# Patient Record
Sex: MALE | Race: WHITE | NOT HISPANIC OR LATINO | Employment: OTHER | URBAN - METROPOLITAN AREA
[De-identification: names, ages, dates, MRNs, and addresses within clinical notes are randomized per-mention and may not be internally consistent; named-entity substitution may affect disease eponyms.]

---

## 2017-01-30 ENCOUNTER — TELEPHONE (OUTPATIENT)
Dept: INTERNAL MEDICINE | Facility: CLINIC | Age: 67
End: 2017-01-30

## 2017-01-30 NOTE — TELEPHONE ENCOUNTER
Panel Management Review      Patient has the following on his problem list:       IVD   ASA:     Last LDL:    CHOL      195   12/1/2015  HDL       38   12/1/2015  LDL      118   12/1/2015  TRIG      195   12/1/2015   CHOLHDLRATIO      6.4   10/6/2015     Is the patient on a Statin? NO   Is the patient on Aspirin? NO                    Last three blood pressure readings:  BP Readings from Last 3 Encounters:   11/18/16 146/82   10/17/16 152/87   09/10/16 144/67        Tobacco History:     History   Smoking status     Never Smoker    Smokeless tobacco     Not on file         Hypertension   Last three blood pressure readings:  BP Readings from Last 3 Encounters:   11/18/16 146/82   10/17/16 152/87   09/10/16 144/67     Blood pressure: Failed    HTN Guidelines:  Age 18-59 BP range:  Less than 140/90  Age 60-85 with Diabetes:  Less than 140/90  Age 60-85 without Diabetes:  less than 150/90      Composite cancer screening  Chart review shows that this patient is due/due soon for the following None  Summary:    Patient is due/failing the following:   LDL and STATIN    Action needed:   Patient needs office visit for as above.    Type of outreach:    Phone, spoke to patient.  Declines follow up on BP and cholesterol. Plans to work on diet. Px 4-3-2017.    Questions for provider review:    None                                                                                                                                    LEE Nguyen LPN       Chart routed to none.

## 2017-02-13 ENCOUNTER — OFFICE VISIT (OUTPATIENT)
Dept: NEUROSURGERY | Facility: CLINIC | Age: 67
End: 2017-02-13
Attending: NURSE PRACTITIONER
Payer: COMMERCIAL

## 2017-02-13 ENCOUNTER — RADIANT APPOINTMENT (OUTPATIENT)
Dept: GENERAL RADIOLOGY | Facility: CLINIC | Age: 67
End: 2017-02-13
Attending: NURSE PRACTITIONER
Payer: COMMERCIAL

## 2017-02-13 VITALS
BODY MASS INDEX: 31.08 KG/M2 | SYSTOLIC BLOOD PRESSURE: 141 MMHG | HEIGHT: 67 IN | DIASTOLIC BLOOD PRESSURE: 85 MMHG | TEMPERATURE: 97.9 F | HEART RATE: 62 BPM | OXYGEN SATURATION: 96 % | WEIGHT: 198 LBS

## 2017-02-13 DIAGNOSIS — Z98.1 STATUS POST CERVICAL SPINAL FUSION: Primary | ICD-10-CM

## 2017-02-13 DIAGNOSIS — Z98.1 STATUS POST CERVICAL SPINAL FUSION: ICD-10-CM

## 2017-02-13 PROCEDURE — 72040 X-RAY EXAM NECK SPINE 2-3 VW: CPT

## 2017-02-13 PROCEDURE — 99211 OFF/OP EST MAY X REQ PHY/QHP: CPT | Performed by: NURSE PRACTITIONER

## 2017-02-13 PROCEDURE — 99214 OFFICE O/P EST MOD 30 MIN: CPT | Performed by: NURSE PRACTITIONER

## 2017-02-13 ASSESSMENT — PAIN SCALES - GENERAL: PAINLEVEL: MILD PAIN (3)

## 2017-02-13 NOTE — PROGRESS NOTES
[facility]  Neurosurgery followup:    HPI:  Mr. Vera is a 66 year old female who returns about 6 months post C6-7 ACDF.  He is doing well at this time. He only notes increased headaches with ROM to the neck and some numbness and tingling.  He states that he is very happy with the results of the surgery.         Exam:  Constitutional:  Alert, well nourished, NAD.  HEENT: Normocephalic, atraumatic.   Pulm:  Without shortness of breath   CV:  No pitting edema of BLE.     Neurological:  Awake  Alert  Oriented x 3  Motor exam:     Shoulder Abduction:  Right:  5/5    Left:  5/5  Biceps:                      Right:  5/5    Left:  5/5  Triceps:                     Right:  5/5    Left:  5/5  Wrist Extensors:       Right:  5/5    Left:  5/5  Wrist Flexors:           Right:  5/5    Left:  5/5  Intrinsics:                  Right:  5/5    Left:  5/5     Able to spontaneously move U/E bilaterally  Sensation intact throughout all U/E dermatomes  Incisions: cervical incision healed  Imaging: cervical fusion intact per xray       A/P: Mr. Vera is a 66 year old female who returns about 6 months post C6-7 ACDF.  He is doing well at this time. He only notes increased headaches with ROM to the neck and some numbness and tingling.  He states that he is very happy with the results of the surgery. He recently he fell again on the ice.  He did so prior to his last visit.  He states that he seems to slip on the ice. He was educated about proper body mechanics.  He is ready to restart to PT as well.            Patient Instructions   Plan:  - Continue activity using pain as your guide  - followup in 6 months with xray prior   - Please schedule your physical therapy.    - Call the clinic at [phone] for increased pain or any other questions and concerns.       Yvonne Li [facility]  [facility]  [address]  [address] [place] [id number]    Tel [phone]  Pager  [phone]

## 2017-02-13 NOTE — PATIENT INSTRUCTIONS
Plan:  - Continue activity using pain as your guide  - followup in 6 months with xray prior   - Please schedule your physical therapy.    - Call the clinic at [phone] for increased pain or any other questions and concerns.

## 2017-02-13 NOTE — MR AVS SNAPSHOT
After Visit Summary   2/13/2017    Harpreet Vera    MRN: [id number]           Patient Information     Date Of Birth          1950        Visit Information        Provider Department      2/13/2017 1:00 PM Yvonne Li APRN CNP [facility]        Today's Diagnoses     Status post cervical spinal fusion    -  1      Care Instructions    Plan:  - Continue activity using pain as your guide  - followup in 6 months with xray prior   - Please schedule your physical therapy.    - Call the clinic at [phone] for increased pain or any other questions and concerns.        Follow-ups after your visit        Additional Services     ERMA PT, HAND, AND CHIROPRACTIC REFERRAL       **This order will print in the [facility] Scheduling Office**    Physical Therapy, Hand Therapy and Chiropractic Care are available through:    *[facility]  *[facility]  *[facility]    Call one number to schedule at any of the above locations: [phone].    Your provider has referred you to: Physical Therapy at [facility] or [facility]    Indication/Reason for Referral: neck and arm pain   Onset of Illness: chronic  Therapy Orders: Evaluate and Treat  Special Programs: None  Special Request: None    Robinson Bowers      Additional Comments for the Therapist or Chiropractor:         Please be aware that coverage of these services is subject to the terms and limitations of your health insurance plan.  Call member services at your health plan with any benefit or coverage questions.      Please bring the following to your appointment:    *Your personal calendar for scheduling future appointments  *Comfortable clothing                  Your next 10 appointments already scheduled     Feb 13, 2017  1:00 PM CST   Return Visit with SALVATORE Hudson CNP   [facility] ([facility])    [address]  [address]  [facility] [id number] [phone]            Apr 03, 2017  8:00 AM CDT   [facility]YS[facility] Lucía Sandhu MD   [facility]    [address]  [facility] [id number] [phone]              Future tests that were ordered for you today     Open Future Orders        Priority Expected Expires Ordered    XR Cervical Spine 2/3 Views Routine 8/13/2017 2/13/2018 2/13/2017            Who to contact     If you have questions or need follow up information about today's clinic visit or your schedule please contact [facility] directly at [phone].  Normal or non-critical lab and imaging results will be communicated to you by [employer], letter or phone within 4 business days after the clinic has received the results. If you do not hear from us within 7 days, please contact the clinic through [employer] or phone. If you have a critical or abnormal lab result, we will notify you by phone as soon as possible.  Submit refill requests through [employer] or call your pharmacy and they will forward the refill request to us. Please allow 3 business days for your refill to be completed.          Additional Information About Your Visit        [employer] Information     [email or web address] gives you secure access to your electronic health record. If you see a primary care provider, you can also send messages to your care team and make appointments. If you have questions, please call your primary care clinic.  If you do not have a primary care provider, please call [phone] and they will assist you.        Care EveryWhere ID     This is your Care EveryWhere ID. This could be used by other organizations to access your [facility] medical records  [id number]        Your Vitals Were     Pulse Temperature Height Pulse Oximetry BMI (Body Mass Index)       62 97.9  F (36.6  C) (Oral) 5' 7\" (1.702 m) 96% 31.01 kg/m2        Blood Pressure from Last 3 Encounters:   02/13/17 " 141/85   11/18/16 146/82   10/17/16 152/87    Weight from Last 3 Encounters:   02/13/17 198 lb (89.8 kg)   11/18/16 198 lb 3.2 oz (89.9 kg)   10/17/16 195 lb 12.8 oz (88.8 kg)              We Performed the Following     ERMA PT, HAND, AND CHIROPRACTIC REFERRAL        Primary Care Provider Office Phone # Fax #    Lucía Sandhu [phone]       [facility] [address]  [facility] [address]        Thank you!     Thank you for choosing [facility]  for your care. Our goal is always to provide you with excellent care. Hearing back from our patients is one way we can continue to improve our services. Please take a few minutes to complete the written survey that you may receive in the mail after your visit with us. Thank you!             Your Updated Medication List - Protect others around you: Learn how to safely use, store and throw away your medicines at www.disposemymeds.org.          This list is accurate as of: 2/13/17 12:33 PM.  Always use your most recent med list.                   Brand Name Dispense Instructions for use    atenolol 25 MG tablet    TENORMIN    90 tablet    Take 1 tablet (25 mg) by mouth daily       AVODART 0.5 MG capsule   Generic drug:  dutasteride     30    1 CAPSULE DAILY       loratadine 10 MG tablet    CLARITIN     Take 10 mg by mouth daily       losartan 100 MG tablet    COZAAR    90 tablet    Take 1 tablet (100 mg) by mouth daily       Misc Intestinal Kaylin Regulat Tabs      Take 1 tablet by mouth daily       mometasone 50 MCG/ACT spray    NASONEX    3 Box    Spray 2 sprays into both nostrils daily       order for DME     1 Device    Bi pap sleep apnea 2003       TYLENOL PO      Take 500 mg by mouth 2 times daily as needed for mild pain or fever       VITAMIN D (CHOLECALCIFEROL) PO      Take 1,000 Units by mouth daily

## 2017-02-13 NOTE — NURSING NOTE
"Harpreet Vera is a 66 year old male who presents for:  Chief Complaint   Patient presents with     Neurologic Problem     6 month follow up, status post cervical spinal fusion DOS 09/09/16 JM        Initial Vitals:  /85  Pulse 62  Temp 97.9  F (36.6  C) (Oral)  Ht 5' 7\" (1.702 m)  Wt 198 lb (89.8 kg)  SpO2 96%  BMI 31.01 kg/m2 Estimated body mass index is 31.01 kg/(m^2) as calculated from the following:    Height as of this encounter: 5' 7\" (1.702 m).    Weight as of this encounter: 198 lb (89.8 kg).. Body surface area is 2.06 meters squared. BP completed using cuff size: large  Mild Pain (3)    Do you feel safe in your environment?  Yes  Do you need any refills today? No    Nursing Comments: 6 month follow up, status post cervical spinal fusion DOS 09/09/16.  Patient rates his pain today as 3      5 min. nursing intake time  Violet [place] MA       Discharge plan: - Continue activity using pain as your guide  - followup in 6 months with xray prior   - Please schedule your physical therapy.    - Call the clinic at [phone] for increased pain or any other questions and concerns.  2 min. nursing discharge time  Violet Chester MA        "

## 2017-02-23 ENCOUNTER — THERAPY VISIT (OUTPATIENT)
Dept: PHYSICAL THERAPY | Facility: CLINIC | Age: 67
End: 2017-02-23
Payer: MEDICARE

## 2017-02-23 DIAGNOSIS — M54.2 CERVICALGIA: Primary | ICD-10-CM

## 2017-02-23 PROCEDURE — 97110 THERAPEUTIC EXERCISES: CPT | Mod: GP | Performed by: PHYSICAL THERAPIST

## 2017-02-23 PROCEDURE — G8982 BODY POS GOAL STATUS: HCPCS | Mod: GP | Performed by: PHYSICAL THERAPIST

## 2017-02-23 PROCEDURE — G8981 BODY POS CURRENT STATUS: HCPCS | Mod: GP | Performed by: PHYSICAL THERAPIST

## 2017-02-23 NOTE — MR AVS SNAPSHOT
After Visit Summary   2/23/2017    Harpreet Vera    MRN: [id number]           Patient Information     Date Of Birth          1950        Visit Information        Provider Department      2/23/2017 9:30 AM Yong Cates, PT [facility] [place]        Today's Diagnoses     Cervicalgia    -  1       Follow-ups after your visit        Your next 10 appointments already scheduled     Mar 02, 2017  9:30 AM CST   ERMA Spine with Yong Cates PT   [facility] [place] ([facility] [place])    [address]  [place] [address] [phone]            Apr 03, 2017  8:00 AM CDT   PHYSICAL with Lucía Sandhu MD   [facility]    [address]  [facility] [address] [phone]              Who to contact     If you have questions or need follow up information about today's clinic visit or your schedule please contact [facility] directly at [phone].  Normal or non-critical lab and imaging results will be communicated to you by [employer], letter or phone within 4 business days after the clinic has received the results. If you do not hear from us within 7 days, please contact the clinic through [employer] or phone. If you have a critical or abnormal lab result, we will notify you by phone as soon as possible.  Submit refill requests through apomio or call your pharmacy and they will forward the refill request to us. Please allow 3 business days for your refill to be completed.          Additional Information About Your Visit        [employer] Information     apomio gives you secure access to your electronic health record. If you see a primary care provider, you can also send messages to your care team and make appointments. If you have questions, please call your primary care clinic.  If you do not have a primary care provider, please call [phone] and they will assist you.         Care EveryWhere ID     This is your Care EveryWhere ID. This could be used by other organizations to access your [facility] medical records  [id number]         Blood Pressure from Last 3 Encounters:   02/13/17 141/85   11/18/16 146/82   10/17/16 152/87    Weight from Last 3 Encounters:   02/13/17 89.8 kg (198 lb)   11/18/16 89.9 kg (198 lb 3.2 oz)   10/17/16 88.8 kg (195 lb 12.8 oz)              We Performed the Following     ERMA PROGRESS NOTES REPORT     ERMA RE-CERT REPORT     THERAPEUTIC EXERCISES        Primary Care Provider Office Phone # Fax #    Lucía Sandhu [phone]       [facility] [address]  [facility] [address]        Thank you!     Thank you for choosing [facility]  for your care. Our goal is always to provide you with excellent care. Hearing back from our patients is one way we can continue to improve our services. Please take a few minutes to complete the written survey that you may receive in the mail after your visit with us. Thank you!             Your Updated Medication List - Protect others around you: Learn how to safely use, store and throw away your medicines at www.disposemymeds.org.          This list is accurate as of: 2/23/17 10:03 AM.  Always use your most recent med list.                   Brand Name Dispense Instructions for use    atenolol 25 MG tablet    TENORMIN    90 tablet    Take 1 tablet (25 mg) by mouth daily       AVODART 0.5 MG capsule   Generic drug:  dutasteride     30    1 CAPSULE DAILY       loratadine 10 MG tablet    CLARITIN     Take 10 mg by mouth daily       losartan 100 MG tablet    COZAAR    90 tablet    Take 1 tablet (100 mg) by mouth daily       Misc Intestinal Kaylin Regulat Tabs      Take 1 tablet by mouth daily       mometasone 50 MCG/ACT spray    NASONEX    3 Box    Spray 2 sprays into both nostrils daily       order for DME     1 Device    Bi pap sleep apnea 2003       TYLENOL PO       Take 500 mg by mouth 2 times daily as needed for mild pain or fever       VITAMIN D (CHOLECALCIFEROL) PO      Take 1,000 Units by mouth daily

## 2017-02-23 NOTE — PROGRESS NOTES
Subjective:    HPI                    Objective:    System    Physical Exam    General     ROS    Assessment/Plan:      PROGRESS  REPORT    Progress reporting period is from November 2016 to February 2017.       SUBJECTIVE  Subjective: Patient reports that he has been having HA's since surgery; his HA's have decreased over the past few weeks since the surgeon allowed him to start taking Alleve; now, his biggest c/o is stiffness in his neck; pt wants to return to golfing, but, surgeon said he can't until after he completes PT to try to lessen stiffness    Current pain level is 3/10  .     Previous pain level was  1/10  .   Changes in function:  Yes (See Goal flowsheet attached for changes in current functional level)  Adverse reaction to treatment or activity: activity - sleeping    OBJECTIVE  Changes noted in objective findings:  Yes  Objective: C-AROM: All WNL except R SB & R rot - mod loss with R-sided pain; after treatment, min loss with sig decrease in pain & increase in ROM; pt required a lot of verbal and manual cueing to not rotate his head/neck during SB      ASSESSMENT/PLAN  Updated problem list and treatment plan: Diagnosis 1:  Neck Pain and Headaches s/p Cervical Fusion C6-7  Pain -  self management, education, directional preference exercise and home program  Decreased ROM/flexibility - therapeutic exercise and home program  Decreased function - therapeutic activities and home program  Impaired posture - home program  STG/LTGs have been met or progress has been made towards goals:  Yes (See Goal flow sheet completed today.)  Assessment of Progress: The patient's condition has exacerbated.  Self Management Plans:  Patient has been instructed in a home treatment program.  I have re-evaluated this patient and find that the nature, scope, duration and intensity of the therapy is appropriate for the medical condition of the patient.  Harpreet continues to require the following intervention to meet STG and LTG's:   PT    Recommendations:  This patient would benefit from continued therapy.     Frequency:  3 X a month, once daily  Duration:  for 1 months        Please refer to the daily flowsheet for treatment today, total treatment time and time spent performing 1:1 timed codes.

## 2017-02-23 NOTE — LETTER
DEPARTMENT OF HEALTH AND HUMAN SERVICES  CENTERS FOR MEDICARE & MEDICAID SERVICES    PLAN/UPDATED PLAN OF PROGRESS FOR OUTPATIENT REHABILITATION    PATIENTS NAME:  Harpreet Vera   : 1950  PROVIDER NUMBER:    [id number]  [id number]  PROVIDER NAME[facility]  MEDICAL RECORD NUMBER: [id number]   START OF CARE DATE:   10/20/16   TYPE:  PT  PRIMARY/TREATMENT DIAGNOSIS: Cervicalgia  VISITS FROM START OF CARE:  Rxs Used: 4     PROGRESS  REPORT  Progress reporting period is from 2016 to 2017.       SUBJECTIVE  Subjective: Patient reports that he has been having HA's since surgery; his HA's have decreased over the past few weeks since the surgeon allowed him to start taking Alleve; now, his biggest c/o is stiffness in his neck; pt wants to return to golfing, but, surgeon said he can't until after he completes PT to try to lessen stiffness    Current pain level is 3/10  .     Previous pain level was  1/10  .   Changes in function:  Yes (See Goal flowsheet attached for changes in current functional level)  Adverse reaction to treatment or activity: activity - sleeping    OBJECTIVE  Changes noted in objective findings:  Yes  Objective: C-AROM: All WNL except R SB & R rot - mod loss with R-sided pain; after treatment, min loss with sig decrease in pain & increase in ROM; pt required a lot of verbal and manual cueing to not rotate his head/neck during SB      ASSESSMENT/PLAN  Updated problem list and treatment plan: Diagnosis 1:  Neck Pain and Headaches s/p Cervical Fusion C6-7  Pain -  self management, education, directional preference exercise and home program  Decreased ROM/flexibility - therapeutic exercise and home program  Decreased function - therapeutic activities and home program  Impaired posture - home program  STG/LTGs have been met or progress has been made towards goals:  Yes (See Goal flow sheet completed today.)  Assessment of Progress: The patient's  "condition has exacerbated.  Self Management Plans:  Patient has been instructed in a home treatment program.  I have re-evaluated this patient and find that the nature, scope, duration and intensity of the therapy is appropriate for the medical condition of the patient.  Harpreet continues to require the following intervention to meet STG and LTG's:  PT      PATIENTS NAME:  Harpreet Vera   : 1950      Recommendations:  This patient would benefit from continued therapy.     Frequency:  3 X a month, once daily  Duration:  for 1 months      Caregiver Signature/Credentials _____________________________ Date ________   Treating Provider: Yong Cates, PT, DPT, Cert MDT, PES, CSCS     I have reviewed and certified the need for these services and plan of treatment while under my care.        PHYSICIAN'S SIGNATURE:   _________________________________________  Date___________   Yvonne Li    Certification period:  Beginning of Cert date period: 17 to  End of Cert period date: 17     Functional Level Progress Report: Please see attached \"Goal Flow sheet for Functional level.\"    ____X____ Continue Services or       ________ DC Services                Service dates: From  SOC Date: 10/20/16 date to present                         "

## 2017-03-02 ENCOUNTER — THERAPY VISIT (OUTPATIENT)
Dept: PHYSICAL THERAPY | Facility: CLINIC | Age: 67
End: 2017-03-02
Payer: MEDICARE

## 2017-03-02 DIAGNOSIS — M54.2 CERVICALGIA: ICD-10-CM

## 2017-03-02 PROCEDURE — 97110 THERAPEUTIC EXERCISES: CPT | Mod: GP | Performed by: PHYSICAL THERAPIST

## 2017-03-02 PROCEDURE — 97530 THERAPEUTIC ACTIVITIES: CPT | Mod: GP | Performed by: PHYSICAL THERAPIST

## 2017-03-02 NOTE — MR AVS SNAPSHOT
After Visit Summary   3/2/2017    Harpreet Vera    MRN: [id number]           Patient Information     Date Of Birth          1950        Visit Information        Provider Department      3/2/2017 9:30 AM Yong Cates PT [facility]        Today's Diagnoses     Cervicalgia           Follow-ups after your visit        Your next 10 appointments already scheduled     Apr 03, 2017  8:00 AM CDT   [facility]YS[facility]    [address]  [facility] [id number] [phone]              Who to contact     If you have questions or need follow up information about today's clinic visit or your schedule please contact [facility] directly at [phone].  Normal or non-critical lab and imaging results will be communicated to you by MyChart, letter or phone within 4 business days after the clinic has received the results. If you do not hear from us within 7 days, please contact the clinic through MyChart or phone. If you have a critical or abnormal lab result, we will notify you by phone as soon as possible.  Submit refill requests through MediaV or call your pharmacy and they will forward the refill request to us. Please allow 3 business days for your refill to be completed.          Additional Information About Your Visit        MyChart Information     MediaV gives you secure access to your electronic health record. If you see a primary care provider, you can also send messages to your care team and make appointments. If you have questions, please call your primary care clinic.  If you do not have a primary care provider, please call [phone] and they will assist you.        Care EveryWhere ID     This is your Care EveryWhere ID. This could be used by other organizations to access your [place] medical records  [id number]         Blood Pressure from Last 3  Encounters:   02/13/17 141/85   11/18/16 146/82   10/17/16 152/87    Weight from Last 3 Encounters:   02/13/17 89.8 kg (198 lb)   11/18/16 89.9 kg (198 lb 3.2 oz)   10/17/16 88.8 kg (195 lb 12.8 oz)              We Performed the Following     THERAPEUTIC ACTIVITIES     THERAPEUTIC EXERCISES        Primary Care Provider Office Phone # Fax #    Lucía Sandhu [phone]       [facility] [address]  [facility] [address]        Thank you!     Thank you for choosing [facility]  for your care. Our goal is always to provide you with excellent care. Hearing back from our patients is one way we can continue to improve our services. Please take a few minutes to complete the written survey that you may receive in the mail after your visit with us. Thank you!             Your Updated Medication List - Protect others around you: Learn how to safely use, store and throw away your medicines at www.disposemymeds.org.          This list is accurate as of: 3/2/17  9:57 AM.  Always use your most recent med list.                   Brand Name Dispense Instructions for use    atenolol 25 MG tablet    TENORMIN    90 tablet    Take 1 tablet (25 mg) by mouth daily       AVODART 0.5 MG capsule   Generic drug:  dutasteride     30    1 CAPSULE DAILY       loratadine 10 MG tablet    CLARITIN     Take 10 mg by mouth daily       losartan 100 MG tablet    COZAAR    90 tablet    Take 1 tablet (100 mg) by mouth daily       Misc Intestinal Kaylin Regulat Tabs      Take 1 tablet by mouth daily       mometasone 50 MCG/ACT spray    NASONEX    3 Box    Spray 2 sprays into both nostrils daily       order for DME     1 Device    Bi pap sleep apnea 2003       TYLENOL PO      Take 500 mg by mouth 2 times daily as needed for mild pain or fever       VITAMIN D (CHOLECALCIFEROL) PO      Take 1,000 Units by mouth daily

## 2017-03-10 ENCOUNTER — DOCUMENTATION ONLY (OUTPATIENT)
Dept: NEUROSURGERY | Facility: CLINIC | Age: 67
End: 2017-03-10

## 2017-03-22 DIAGNOSIS — I10 ESSENTIAL HYPERTENSION, BENIGN: ICD-10-CM

## 2017-03-22 RX ORDER — LOSARTAN POTASSIUM 100 MG/1
TABLET ORAL
Qty: 90 TABLET | Refills: 0 | Status: SHIPPED | OUTPATIENT
Start: 2017-03-22 | End: 2017-04-03

## 2017-03-22 NOTE — TELEPHONE ENCOUNTER
Per note attached to 9/6/16 lab resutls-Your glucose is elevated at 110 (normal <100) but not high enough to make you a diabetic. But it does indicate you are at high risk for developing diabetes mellitus. Exercise, avoiding simple carbohydrates in your diet and wt loss will all help to lower this risk. Please work on these as you can and I recommend rechecking fasting blood sugar in 6 months.     Gayathri Rivers CNP     Pt sched for appt on 4/3      Last OV-9/6/16 for pre op, 8/29/16 with Edson

## 2017-04-03 ENCOUNTER — OFFICE VISIT (OUTPATIENT)
Dept: INTERNAL MEDICINE | Facility: CLINIC | Age: 67
End: 2017-04-03
Payer: COMMERCIAL

## 2017-04-03 VITALS
DIASTOLIC BLOOD PRESSURE: 84 MMHG | OXYGEN SATURATION: 96 % | TEMPERATURE: 98.6 F | WEIGHT: 193.7 LBS | SYSTOLIC BLOOD PRESSURE: 124 MMHG | HEART RATE: 60 BPM | HEIGHT: 67 IN | BODY MASS INDEX: 30.4 KG/M2

## 2017-04-03 DIAGNOSIS — M54.2 CERVICALGIA: ICD-10-CM

## 2017-04-03 DIAGNOSIS — J31.0 CHRONIC RHINITIS: ICD-10-CM

## 2017-04-03 DIAGNOSIS — Z00.00 ROUTINE GENERAL MEDICAL EXAMINATION AT A HEALTH CARE FACILITY: Primary | ICD-10-CM

## 2017-04-03 DIAGNOSIS — J30.2 SEASONAL ALLERGIC RHINITIS, UNSPECIFIED ALLERGIC RHINITIS TRIGGER: ICD-10-CM

## 2017-04-03 DIAGNOSIS — N40.0 BENIGN PROSTATIC HYPERPLASIA WITHOUT LOWER URINARY TRACT SYMPTOMS, UNSPECIFIED MORPHOLOGY: ICD-10-CM

## 2017-04-03 DIAGNOSIS — I10 ESSENTIAL HYPERTENSION, BENIGN: ICD-10-CM

## 2017-04-03 DIAGNOSIS — G47.33 OSA (OBSTRUCTIVE SLEEP APNEA): ICD-10-CM

## 2017-04-03 DIAGNOSIS — E78.00 HYPERCHOLESTEREMIA: ICD-10-CM

## 2017-04-03 DIAGNOSIS — Z12.5 ENCOUNTER FOR SCREENING FOR MALIGNANT NEOPLASM OF PROSTATE: ICD-10-CM

## 2017-04-03 LAB
ALBUMIN SERPL-MCNC: 4.3 G/DL (ref 3.4–5)
ALP SERPL-CCNC: 58 U/L (ref 40–150)
ALT SERPL W P-5'-P-CCNC: 26 U/L (ref 0–70)
ANION GAP SERPL CALCULATED.3IONS-SCNC: 7 MMOL/L (ref 3–14)
AST SERPL W P-5'-P-CCNC: 18 U/L (ref 0–45)
BILIRUB SERPL-MCNC: 1.4 MG/DL (ref 0.2–1.3)
BUN SERPL-MCNC: 16 MG/DL (ref 7–30)
CALCIUM SERPL-MCNC: 9 MG/DL (ref 8.5–10.1)
CHLORIDE SERPL-SCNC: 109 MMOL/L (ref 94–109)
CHOLEST SERPL-MCNC: 199 MG/DL
CO2 SERPL-SCNC: 27 MMOL/L (ref 20–32)
CREAT SERPL-MCNC: 1.07 MG/DL (ref 0.66–1.25)
ERYTHROCYTE [DISTWIDTH] IN BLOOD BY AUTOMATED COUNT: 13.2 % (ref 10–15)
GFR SERPL CREATININE-BSD FRML MDRD: 69 ML/MIN/1.7M2
GLUCOSE SERPL-MCNC: 108 MG/DL (ref 70–99)
HCT VFR BLD AUTO: 43.1 % (ref 40–53)
HDLC SERPL-MCNC: 34 MG/DL
HGB BLD-MCNC: 15.4 G/DL (ref 13.3–17.7)
LDLC SERPL CALC-MCNC: 130 MG/DL
MCH RBC QN AUTO: 32.3 PG (ref 26.5–33)
MCHC RBC AUTO-ENTMCNC: 35.7 G/DL (ref 31.5–36.5)
MCV RBC AUTO: 90 FL (ref 78–100)
NONHDLC SERPL-MCNC: 165 MG/DL
PLATELET # BLD AUTO: 180 10E9/L (ref 150–450)
POTASSIUM SERPL-SCNC: 4.2 MMOL/L (ref 3.4–5.3)
PROT SERPL-MCNC: 7.1 G/DL (ref 6.8–8.8)
PSA SERPL-ACNC: 3 UG/L (ref 0–4)
RBC # BLD AUTO: 4.77 10E12/L (ref 4.4–5.9)
SODIUM SERPL-SCNC: 143 MMOL/L (ref 133–144)
TRIGL SERPL-MCNC: 175 MG/DL
TSH SERPL DL<=0.005 MIU/L-ACNC: 1.4 MU/L (ref 0.4–4)
WBC # BLD AUTO: 7.1 10E9/L (ref 4–11)

## 2017-04-03 PROCEDURE — 80053 COMPREHEN METABOLIC PANEL: CPT | Performed by: INTERNAL MEDICINE

## 2017-04-03 PROCEDURE — 36415 COLL VENOUS BLD VENIPUNCTURE: CPT | Performed by: INTERNAL MEDICINE

## 2017-04-03 PROCEDURE — 99397 PER PM REEVAL EST PAT 65+ YR: CPT | Performed by: INTERNAL MEDICINE

## 2017-04-03 PROCEDURE — 80061 LIPID PANEL: CPT | Performed by: INTERNAL MEDICINE

## 2017-04-03 PROCEDURE — G0103 PSA SCREENING: HCPCS | Performed by: INTERNAL MEDICINE

## 2017-04-03 PROCEDURE — 85027 COMPLETE CBC AUTOMATED: CPT | Performed by: INTERNAL MEDICINE

## 2017-04-03 PROCEDURE — 84443 ASSAY THYROID STIM HORMONE: CPT | Performed by: INTERNAL MEDICINE

## 2017-04-03 RX ORDER — LOSARTAN POTASSIUM 100 MG/1
100 TABLET ORAL DAILY
Qty: 90 TABLET | Refills: 3 | Status: SHIPPED | OUTPATIENT
Start: 2017-04-03 | End: 2018-06-15

## 2017-04-03 RX ORDER — MOMETASONE FUROATE MONOHYDRATE 50 UG/1
2 SPRAY, METERED NASAL DAILY
Qty: 3 BOX | Refills: 2 | Status: SHIPPED | OUTPATIENT
Start: 2017-04-03 | End: 2019-08-21

## 2017-04-03 NOTE — PROGRESS NOTES
SUBJECTIVE:                                                            Harpreet Vera is a 66 year old male who presents for Preventive Visit.    Are you in the first 12 months of your Medicare coverage?  No    Physical   Annual:     Getting at least 3 servings of Calcium per day::  NO    Bi-annual eye exam::  Yes    Dental care twice a year::  Yes    Sleep apnea or symptoms of sleep apnea::  Daytime drowsiness and Sleep apnea    Frequency of exercise::  2-3 days/week    Duration of exercise::  15-30 minutes    Taking medications regularly::  Yes    Medication side effects::  Not applicable    Additional concerns today::  No      COGNITIVE SCREEN  1) Repeat 3 items (Banana, Sunrise, Chair)    2) Clock draw: NORMAL  3) 3 item recall: Recalls 3 objects  Results: 3 items recalled: COGNITIVE IMPAIRMENT LESS LIKELY    Mini-CogTM Copyright S Johnna. Licensed by the author for use in [facility]; reprinted with permission (sae@[facility]). All rights reserved.        Reviewed and updated as needed this visit by clinical staff  Tobacco  Allergies  Med Hx  Surg Hx  Fam Hx  Soc Hx        Reviewed and updated as needed this visit by Provider        Social History   Substance Use Topics     Smoking status: Never Smoker     Smokeless tobacco: Not on file     Alcohol use 0.0 oz/week     0 Standard drinks or equivalent per week      Comment: rarely       The patient does not drink >3 drinks per day nor >7 drinks per week.      Today's PHQ-2 Score:   PHQ-2 ( 1999 [employer]) 3/31/2017   Q1: Little interest or pleasure in doing things -   Q2: Feeling down, depressed or hopeless -   PHQ-2 Score -   Little interest or pleasure in doing things Not at all   Feeling down, depressed or hopeless Not at all   PHQ-2 Score 0       Do you feel safe in your environment - Yes    Do you have a Health Care Directive?: Yes: Patient states has Advance Directive and will bring in a copy to clinic.    Current providers sharing in care for  "this patient include:   Patient Care Team:  Lucía Sandhu MD as PCP - General      Hearing impairment: No    Ability to successfully perform activities of daily living: Yes, no assistance needed     Fall risk: 0     Home safety:  none identified    The following health maintenance items are reviewed in Epic and correct as of today:  Health Maintenance   Topic Date Due     HEPATITIS C SCREENING  04/10/1966     AORTIC ANEURYSM SCREENING (SYSTEM ASSIGNED)  04/10/2013     PNEUMOCOCCAL (1 of 2 - PCV13) 10/01/2013     ADVANCE DIRECTIVE PLANNING Q5 YRS (NO INBASKET)  02/02/2017     FALL RISK ASSESSMENT  08/29/2017     INFLUENZA VACCINE (SYSTEM ASSIGNED)  09/01/2017     LIPID SCREEN Q5 YR MALE (SYSTEM ASSIGNED)  12/01/2020     DEXA Q5 YR  09/19/2021     TETANUS Q10 YR  10/12/2021     COLONOSCOPY Q10 YR INBASKET MESSAGE  04/12/2022              ROS:  C: NEGATIVE for fever, chills, change in weight  I: NEGATIVE for worrisome rashes, moles or lesions  E: NEGATIVE for vision changes or irritation  E/M: NEGATIVE for ear, mouth and throat problems  R: NEGATIVE for significant cough or SOB  B: NEGATIVE for masses, tenderness or discharge  CV: NEGATIVE for chest pain, palpitations or peripheral edema  GI: NEGATIVE for nausea, abdominal pain, heartburn, or change in bowel habits  : NEGATIVE for frequency, dysuria, or hematuria  MUSCULOSKELETAL:ongoing .iontm neck pain  N: NEGATIVE for weakness, dizziness or paresthesias  E: NEGATIVE for temperature intolerance, skin/hair changes  H: NEGATIVE for bleeding problems  P: NEGATIVE for changes in mood or affect    Problem list, Medication list, Allergies, and Medical/Social/Surgical histories reviewed in [facility] and updated as appropriate.  OBJECTIVE:                                                            /84 (BP Location: Right arm, Patient Position: Chair, Cuff Size: Adult Large)  Pulse 60  Temp 98.6  F (37  C) (Oral)  Ht 5' 6.75\" (1.695 m)  Wt 193 lb 11.2 oz (87.9 " "kg)  SpO2 96%  BMI 30.57 kg/m2 Estimated body mass index is 30.57 kg/(m^2) as calculated from the following:    Height as of this encounter: 5' 6.75\" (1.695 m).    Weight as of this encounter: 193 lb 11.2 oz (87.9 kg).  EXAM:   GENERAL: healthy, alert and no distress  EYES: Eyes grossly normal to inspection, PERRL and conjunctivae and sclerae normal  HENT: ear canals and TM's normal, nose and mouth without ulcers or lesions  NECK: no adenopathy, no asymmetry, masses, or scars and thyroid normal to palpation  RESP: lungs clear to auscultation - no rales, rhonchi or wheezes  BREAST: normal without masses, tenderness or nipple discharge and no palpable axillary masses or adenopathy  CV: regular rate and rhythm, normal S1 S2, no S3 or S4, no murmur, click or rub, no peripheral edema and peripheral pulses strong  ABDOMEN: soft, nontender, no hepatosplenomegaly, no masses and bowel sounds normal  MS: no gross musculoskeletal defects noted, no edema  SKIN: no suspicious lesions or rashes  NEURO: Normal strength and tone, mentation intact and speech normal  PSYCH: mentation appears normal, affect normal/bright    ASSESSMENT / PLAN:                                                            1. Routine general medical examination at a health care facility     - PSA, screen  - Comprehensive metabolic panel (BMP + Alb, Alk Phos, ALT, AST, Total. Bili, TP)  - CBC with platelets  - TSH with free T4 reflex  - Lipid Profile with reflex to direct LDL    2. BENIGN HYPERTENSION     - losartan (COZAAR) 100 MG tablet; Take 1 tablet (100 mg) by mouth daily  Dispense: 90 tablet; Refill: 3    3. Chronic rhinitis     - mometasone (NASONEX) 50 MCG/ACT spray; Spray 2 sprays into both nostrils daily  Dispense: 3 Box; Refill: 2    4. Encounter for screening for malignant neoplasm of prostate      - PSA, screen    5. Hypercholesteremia       6. Seasonal allergic rhinitis, unspecified allergic rhinitis trigger       7. MAN (obstructive sleep " "apnea)       8. Benign prostatic hyperplasia without lower urinary tract symptoms, unspecified morphology       9. Cervicalgia         End of Life Planning:  Patient currently has an advanced directive: No.  I have verified the patient's ablity to prepare an advanced directive/make health care decisions.  Literature was provided to assist patient in preparing an advanced directive.    COUNSELING:  Reviewed preventive health counseling, as reflected in patient instructions       Regular exercise       Healthy diet/nutrition        Estimated body mass index is 30.57 kg/(m^2) as calculated from the following:    Height as of this encounter: 5' 6.75\" (1.695 m).    Weight as of this encounter: 193 lb 11.2 oz (87.9 kg).  Weight management plan: Discussed healthy diet and exercise guidelines and patient will follow up in 12 months in clinic to re-evaluate.   reports that he has never smoked. He does not have any smokeless tobacco history on file.      Appropriate preventive services were discussed with this patient, including applicable screening as appropriate for cardiovascular disease, diabetes, osteopenia/osteoporosis, and glaucoma.  As appropriate for age/gender, discussed screening for colorectal cancer, prostate cancer, breast cancer, and cervical cancer. Checklist reviewing preventive services available has been given to the patient.    Reviewed patients plan of care and provided an AVS. The Basic Care Plan (routine screening as documented in Health Maintenance) for Harpreet meets the Care Plan requirement. This Care Plan has been established and reviewed with the Patient.    Counseling Resources:  ATP IV Guidelines  Pooled Cohorts Equation Calculator  Breast Cancer Risk Calculator  FRAX Risk Assessment  ICSI Preventive Guidelines  Dietary Guidelines for Americans, 2010  [employer]'s MyPlate  ASA Prophylaxis  Lung CA Screening    Lucía Sandhu MD  [facility]  Answers for HPI/ROS submitted by the " patient on 3/31/2017   Q1: Little interest or pleasure in doing things: 0=Not at all  Q2: Feeling down, depressed or hopeless: 0=Not at all  PHQ-2 Score: 0

## 2017-04-03 NOTE — NURSING NOTE
"Chief Complaint   Patient presents with     Physical       Initial /84 (BP Location: Right arm, Patient Position: Chair, Cuff Size: Adult Large)  Pulse 60  Temp 98.6  F (37  C) (Oral)  Ht 5' 6.75\" (1.695 m)  Wt 193 lb 11.2 oz (87.9 kg)  SpO2 96%  BMI 30.57 kg/m2 Estimated body mass index is 30.57 kg/(m^2) as calculated from the following:    Height as of this encounter: 5' 6.75\" (1.695 m).    Weight as of this encounter: 193 lb 11.2 oz (87.9 kg).  Medication Reconciliation: complete    "

## 2017-04-03 NOTE — MR AVS SNAPSHOT
After Visit Summary   4/3/2017    Harpreet Vera    MRN: [id number]           Patient Information     Date Of Birth          1950        Visit Information        Provider Department      4/3/2017 8:00 AM Lucía Sandhu MD [facility]        Today's Diagnoses     Routine general medical examination at a health care facility    -  1    BENIGN HYPERTENSION        Chronic rhinitis        Encounter for screening for malignant neoplasm of prostate            Follow-ups after your visit        Your next 10 appointments already scheduled     Apr 05, 2017   Procedure with Tylor Rice MD   [facility]    [address] [facility] [id number] [phone]           [facility] is located at [address] [place]              Who to contact     If you have questions or need follow up information about today's clinic visit or your schedule please contact [facility] directly at [phone].  Normal or non-critical lab and imaging results will be communicated to you by MyChart, letter or phone within 4 business days after the clinic has received the results. If you do not hear from us within 7 days, please contact the clinic through import2hart or phone. If you have a critical or abnormal lab result, we will notify you by phone as soon as possible.  Submit refill requests through Pumodo or call your pharmacy and they will forward the refill request to us. Please allow 3 business days for your refill to be completed.          Additional Information About Your Visit        MyChart Information     Pumodo gives you secure access to your electronic health record. If you see a primary care provider, you can also send messages to your care team and make appointments. If you have questions, please call your primary care clinic.  If you do not have a primary care provider, please call [phone] and  "they will assist you.        Care EveryWhere ID     This is your Care EveryWhere ID. This could be used by other organizations to access your [place] medical records  [id number]        Your Vitals Were     Pulse Temperature Height Pulse Oximetry BMI (Body Mass Index)       60 98.6  F (37  C) (Oral) 5' 6.75\" (1.695 m) 96% 30.57 kg/m2        Blood Pressure from Last 3 Encounters:   04/03/17 124/84   02/13/17 141/85   11/18/16 146/82    Weight from Last 3 Encounters:   04/03/17 193 lb 11.2 oz (87.9 kg)   02/13/17 198 lb (89.8 kg)   11/18/16 198 lb 3.2 oz (89.9 kg)              We Performed the Following     CBC with platelets     Comprehensive metabolic panel (BMP + Alb, Alk Phos, ALT, AST, Total. Bili, TP)     Lipid Profile with reflex to direct LDL     PSA, screen     TSH with free T4 reflex          Today's Medication Changes          These changes are accurate as of: 4/3/17  8:35 AM.  If you have any questions, ask your nurse or doctor.               These medicines have changed or have updated prescriptions.        Dose/Directions    losartan 100 MG tablet   Commonly known as:  COZAAR   This may have changed:  See the new instructions.   Used for:  Essential hypertension, benign   Changed by:  Lucía Sandhu MD        Dose:  100 mg   Take 1 tablet (100 mg) by mouth daily   Quantity:  90 tablet   Refills:  3            Where to get your medicines      These medications were sent to [facility] #[id number] [place] [id number]     Phone:  [phone]     losartan 100 MG tablet    mometasone 50 MCG/ACT spray                Primary Care Provider Office Phone # Fax #    Lucía Sandhu [phone]       [facility] [address]   [facility] [address]        Thank you!     Thank you for choosing [facility]  for your care. Our goal is always to provide you with excellent care. Hearing back from our " patients is one way we can continue to improve our services. Please take a few minutes to complete the written survey that you may receive in the mail after your visit with us. Thank you!             Your Updated Medication List - Protect others around you: Learn how to safely use, store and throw away your medicines at www.disposemymeds.org.          This list is accurate as of: 4/3/17  8:35 AM.  Always use your most recent med list.                   Brand Name Dispense Instructions for use    ALEVE PO          atenolol 25 MG tablet    TENORMIN    90 tablet    Take 1 tablet (25 mg) by mouth daily       AVODART 0.5 MG capsule   Generic drug:  dutasteride     30    1 CAPSULE DAILY       loratadine 10 MG tablet    CLARITIN     Take 10 mg by mouth daily       losartan 100 MG tablet    COZAAR    90 tablet    Take 1 tablet (100 mg) by mouth daily       Misc Intestinal Kaylin Regulat Tabs      Take 1 tablet by mouth daily       mometasone 50 MCG/ACT spray    NASONEX    3 Box    Spray 2 sprays into both nostrils daily       order for DME     1 Device    Bi pap sleep apnea 2003       VITAMIN D (CHOLECALCIFEROL) PO      Take 1,000 Units by mouth daily

## 2017-04-05 ENCOUNTER — HOSPITAL ENCOUNTER (OUTPATIENT)
Facility: CLINIC | Age: 67
Discharge: HOME | End: 2017-04-05
Attending: INTERNAL MEDICINE | Admitting: INTERNAL MEDICINE
Payer: MEDICARE

## 2017-04-05 VITALS
DIASTOLIC BLOOD PRESSURE: 104 MMHG | OXYGEN SATURATION: 96 % | SYSTOLIC BLOOD PRESSURE: 145 MMHG | RESPIRATION RATE: 11 BRPM

## 2017-04-05 LAB — COLONOSCOPY: NORMAL

## 2017-04-05 PROCEDURE — G0500 MOD SEDAT ENDO SERVICE >5YRS: HCPCS | Performed by: INTERNAL MEDICINE

## 2017-04-05 PROCEDURE — 45380 COLONOSCOPY AND BIOPSY: CPT | Performed by: INTERNAL MEDICINE

## 2017-04-05 PROCEDURE — 88305 TISSUE EXAM BY PATHOLOGIST: CPT | Mod: 26 | Performed by: INTERNAL MEDICINE

## 2017-04-05 PROCEDURE — 88305 TISSUE EXAM BY PATHOLOGIST: CPT | Performed by: INTERNAL MEDICINE

## 2017-04-05 PROCEDURE — 25000125 ZZHC RX 250: Performed by: INTERNAL MEDICINE

## 2017-04-05 PROCEDURE — 25000128 H RX IP 250 OP 636: Performed by: INTERNAL MEDICINE

## 2017-04-05 RX ORDER — ONDANSETRON 4 MG/1
4 TABLET, ORALLY DISINTEGRATING ORAL EVERY 6 HOURS PRN
Status: DISCONTINUED | OUTPATIENT
Start: 2017-04-05 | End: 2017-04-05 | Stop reason: HOSPADM

## 2017-04-05 RX ORDER — ONDANSETRON 2 MG/ML
4 INJECTION INTRAMUSCULAR; INTRAVENOUS EVERY 6 HOURS PRN
Status: DISCONTINUED | OUTPATIENT
Start: 2017-04-05 | End: 2017-04-05 | Stop reason: HOSPADM

## 2017-04-05 RX ORDER — LIDOCAINE 40 MG/G
CREAM TOPICAL
Status: DISCONTINUED | OUTPATIENT
Start: 2017-04-05 | End: 2017-04-05 | Stop reason: HOSPADM

## 2017-04-05 RX ORDER — ONDANSETRON 2 MG/ML
4 INJECTION INTRAMUSCULAR; INTRAVENOUS
Status: DISCONTINUED | OUTPATIENT
Start: 2017-04-05 | End: 2017-04-05 | Stop reason: HOSPADM

## 2017-04-05 RX ORDER — FENTANYL CITRATE 50 UG/ML
INJECTION, SOLUTION INTRAMUSCULAR; INTRAVENOUS PRN
Status: DISCONTINUED | OUTPATIENT
Start: 2017-04-05 | End: 2017-04-05 | Stop reason: HOSPADM

## 2017-04-05 RX ORDER — NALOXONE HYDROCHLORIDE 0.4 MG/ML
.1-.4 INJECTION, SOLUTION INTRAMUSCULAR; INTRAVENOUS; SUBCUTANEOUS
Status: DISCONTINUED | OUTPATIENT
Start: 2017-04-05 | End: 2017-04-05 | Stop reason: HOSPADM

## 2017-04-05 RX ORDER — FLUMAZENIL 0.1 MG/ML
0.2 INJECTION, SOLUTION INTRAVENOUS
Status: DISCONTINUED | OUTPATIENT
Start: 2017-04-05 | End: 2017-04-05 | Stop reason: HOSPADM

## 2017-04-05 NOTE — H&P
"Pre-Endoscopy History and Physical     Harpreet Vera MRN# [id number]   YOB: 1950 Age: 66 year old     Date of Procedure: 4/5/2017  Primary care provider: Lucía Sandhu  Type of Endoscopy: Colonoscopy with possible biopsy, possible polypectomy  Reason for Procedure: screen  Type of Anesthesia Anticipated: Conscious Sedation    HPI:    Harpreet is a 66 year old male who will be undergoing the above procedure.      A history and physical has been performed. The patient's medications and allergies have been reviewed. The risks and benefits of the procedure and the sedation options and risks were discussed with the patient.  All questions were answered and informed consent was obtained.      He denies a personal or family history of anesthesia complications or bleeding disorders.     Patient Active Problem List   Diagnosis     Essential hypertension, benign     Allergic rhinitis     Intestinal infection due to Clostridium difficile     CARDIOVASCULAR SCREENING; LDL GOAL LESS THAN 130     Advanced directives, counseling/discussion     Hypercholesteremia     MAN (obstructive sleep apnea)     Vitamin D deficiency     BPH (benign prostatic hypertrophy)     S/P cervical spinal fusion     Cervicalgia        Past Medical History:   Diagnosis Date     Arthritis      Benign neoplasm of colon      Brachial plexopathy      Chronic infection 2007    HX of C Diff     Hypertension      Sleep apnea     Bi-PAP        Past Surgical History:   Procedure Laterality Date     C NONSPECIFIC PROCEDURE      Right clavicle fracture, Multiple right sided rib fracture, hairline fracture \"pelvis\", secondary to fall from tree         C NONSPECIFIC PROCEDURE      Right ulnar/radial fracture      CHOLECYSTECTOMY       COLONOSCOPY       DECOMPRESSION, FUSION CERVICAL ANTERIOR ONE LEVEL, COMBINED N/A 9/9/2016    Procedure: COMBINED DECOMPRESSION, FUSION CERVICAL ANTERIOR ONE LEVEL;  Surgeon: Erick Valles MD;  Location: [facility]" OR     ENT SURGERY      tonsilectomy  as a child     ORTHOPEDIC SURGERY      ambar knees scopedrt shoulder,fx rt arm fx       Social History   Substance Use Topics     Smoking status: Never Smoker     Smokeless tobacco: Not on file     Alcohol use 0.0 oz/week     0 Standard drinks or equivalent per week      Comment: rarely       Family History   Problem Relation Age of Onset     Arthritis Mother      Eye Disorder Mother      cateracts     Lipids Mother      CANCER Father      colon/prostate     Eye Disorder Father      cateracts     Lipids Father      DIABETES Father      CANCER Maternal Grandfather      colon     CANCER Paternal Grandmother      colon     CANCER Maternal Aunt      colon     CANCER Maternal Uncle      colon     Prostate Cancer Brother      Prostate Cancer Brother      Colon Cancer No family hx of        Prior to Admission medications    Medication Sig Start Date End Date Taking? Authorizing Provider   losartan (COZAAR) 100 MG tablet Take 1 tablet (100 mg) by mouth daily 4/3/17  Yes Lucía Sandhu MD   mometasone (NASONEX) 50 MCG/ACT spray Spray 2 sprays into both nostrils daily 4/3/17  Yes Lucía Sandhu MD   atenolol (TENORMIN) 25 MG tablet Take 1 tablet (25 mg) by mouth daily 10/31/16  Yes Lucía Sandhu MD   loratadine (CLARITIN) 10 MG tablet Take 10 mg by mouth daily   Yes Reported, Patient   VITAMIN D, CHOLECALCIFEROL, PO Take 1,000 Units by mouth daily    Yes Reported, Patient   Probiotic Product (MISC INTESTINAL DENICE REGULAT) TABS Take 1 tablet by mouth daily  8/1/13  Yes Lucía Sandhu MD   AVODART 0.5 MG OR CAPS 1 CAPSULE DAILY 1/5/10  Yes Lucía Sandhu MD   Naproxen Sodium (ALEVE PO)     Reported, Patient   ORDER FOR DME Bi pap sleep apnea 2003 8/1/13   Lucía Sandhu MD       Allergies   Allergen Reactions     Pravastatin      Fuzzy brain.         REVIEW OF SYSTEMS:   5 point ROS negative except as noted above in HPI, including Gen., Resp., CV, GI &   "system review.    PHYSICAL EXAM:   BP (!) 168/95  Resp 16  SpO2 98% Estimated body mass index is 30.57 kg/(m^2) as calculated from the following:    Height as of 4/3/17: 1.695 m (5' 6.75\").    Weight as of 4/3/17: 87.9 kg (193 lb 11.2 oz).   GENERAL APPEARANCE: alert, and oriented  MENTAL STATUS: alert  AIRWAY EXAM: Mallampatti Class I (visualization of the soft palate, fauces, uvula, anterior and posterior pillars)  RESP: lungs clear to auscultation - no rales, rhonchi or wheezes  CV: regular rates and rhythm  DIAGNOSTICS:    Not indicated    IMPRESSION   ASA Class 2 - Mild systemic disease    PLAN:   Plan for Colonoscopy with possible biopsy, possible polypectomy. We discussed the risks, benefits and alternatives and the patient wished to proceed.    The above has been forwarded to the consulting provider.      Signed Electronically by: Tylor Rice  April 5, 2017          "

## 2017-04-05 NOTE — LETTER
March 24, 2017      Harpreet Vera  [address] [facility] [address]              Dear Harpreet Vera,    Thank you for choosing [facility] [place]. You are scheduled for the following service(s).   Miralax Prep    Procedure:   Colonoscopy   Provider:         Dr. Tylor Rice  Date:   April 5th, 2017 - Wednesday                Arrival Time:   8:30 am  *check in at Emergency/Endoscopy desk*  Procedure Time:     9:00 am     Location:   [facility]      Endoscopy Department, First Floor (Enter through ER Doors) *       [address] [place], [place] [address]    [phone] or [phone] () to reschedule   What is a colonoscopy?  A colonoscopy is the most accurate test to detect colon polyps and colon cancer, and the only test where polyps can be removed. During this procedure, a doctor examines the lining of your large intestine and rectum through a flexible tube called a colonoscope.   To produce the best and most accurate results, your colon must be completely clean. You will drink a special bowel cleansing preparation to help clean out your colon. You will also need to follow a special diet several days prior to your scheduled colonoscopy.  What happens during a colonoscopy?  Plan to spend up to two hours, starting at registration time, at the endoscopy center the day of your procedure. The exam itself takes approximately 15 minutes to complete, and recovery is approximately 30 minutes.     Before the exam:    You will change into a gown.    Your medical history will be reviewed with you and you will be given a consent form to sign.     A nurse will insert an intravenous (IV) line into your hand or arm.  During the exam:     Medicine will be given through the IV line to help you relax and feel drowsy.     Your heart rate and oxygen levels will be monitored. If your blood pressure is low, you may be given fluids through the IV line.     The doctor  will insert a flexible hollow tube, called a colonoscope, into your rectum.   The scope will be advanced slowly through the large intestine (colon).    You may have a feeling of pressure or fullness.     If an abnormal tissue, or a polyp are found, the doctor may remove it through the endoscope for closer examination, or biopsy. Tissue removal is painless.  What happens after the exam?           The doctor will talk with you about the initial results of your exam.     The doctor will prepare a full report for the physician who referred you for the colonoscopy.     You may feel bloated after the procedure. This is normal.     Medication given during the exam will prohibit you from driving for the rest of the day.     Following the exam, you may resume your normal diet. Avoid alcohol until the next day.     You may resume your regular activities the day after the procedure.     A nurse will provide you with complete discharge instructions before you leave the endoscopy center. Be sure to ask the nurse for specific instructions if you take blood thinners such as aspirin, Coumadin or Plavix.     Any tissue samples removed during the exam will be sent to a lab for evaluation. It may take 5-7 working days for you to be notified of the results.     Miralax-Gatorade  If you have diabetes, ask your regular doctor for diet and medication restrictions.   If you take a medication to thin your blood, such as coumadin or warfarin, please call your primary care provider for directions on when to stop this medication.  If you take aspirin, you may continue to do so.  If you are or may be pregnant, please discuss the risks and benefits of this procedure with your doctor.  You must arrange for a ride for the day of your exam. If you fail to arrange transportation with a responsible adult (someone you know and trust) your procedure will need to be cancelled and rescheduled.  If you must cancel or reschedule your appointment, please  call [phone] as soon as possible.        PREPARATION  To ensure a successful exam, please follow all instructions carefully. Failure to accurately and completely prepare for your exam may result in the need for an additional procedure and both procedures will be billed to your insurance.     Purchase the following over-the-counter supplies at your local pharmacy:      ? 2 tablets bisacodyl, each containing 5 mg of bisacodyl (Dulcolax  laxative NOT Dulcolax  stool softener)   ? 1-8.3 oz. bottle Miralax  powder (238 grams)   ? 64 oz. Gatorade  liquid (NOT red; NOT powdered). Regular Gatorade , Gatorade G2 , Powerade  or  PoweradeZero  are acceptable.   ? 1-10 oz. bottle Magnesium Citrate (NOT red)  7 days before your exam:  Discontinue fiber supplements or medications containing iron. This includes multivitamins with iron, Metamucil  and Fibercon .    3 Days prior to your exam:  Stop eating all high-fiber foods and begin a Low-Fiber Diet. A low fiber diet helps make the cleanout more effective.     Examples of a Low Fiber Diet include:     White bread, white rice, pasta, potato without skin, plain crackers     Fish, white meat chicken, eggs, peanut butter without nuts     Clear beverages (apple juice, white grape juice, Sprite , sparkling water, Gatorade )     Cooked carrots, cooked green beans, cooked spinach        Milk, plain yogurt, cheese     Jelly, salt, pepper, sugar  Avoid: Raw fruits or vegetables, whole wheat or high fiber foods, seeds, nuts, popcorn, bran or bulking agents     2 days prior to your exam     Continue Low Fiber Diet.     Drink at least 8 (eight ounces) glasses of water throughout the day.     Refrigerate the Gatorade  or Powerade , if you wish to drink it cold.     Stop eating solid foods at 11:45 pm.    1 day prior to your exam  Start a Clear Liquid Diet   Examples of a Clear Liquid Diet:     No red liquids; No coffee; No alcohol; No dairy products     May drink clear caffeinated  beverages    Water: drink at least 8 glasses of water during the day     Tea (do not add milk or creamer)     Clear broth or bouillon     Gatorade , Pedialyte  or Powerade  (No red)     Carbonated and non-carbonated soft drinks (Sprite , 7-Up , Ginger ale)     Strained fruit juices without pulp (apple, white grape, white cranberry)     Jell-O , popsicles, hard candy (No red)    At 12 Noon:  Take the 2 bisacodyl (Dulcolax ) tablets    At 4 PM (no later than 6 PM)    Mix 1 bottle of Miralax  (8.3 oz) with 64 oz. of Gatorade  in a large pitcher.     Drink 1 - 8 oz. glass of the Miralax /Gatorade  solution.     Continue drinking 1 - 8 oz. glass every 15 minutes thereafter until the mixture is gone.     Continue clear liquid diet     Colon Cleansing Tips     If you experience nausea or vomiting while taking the prep, rinse your mouth with water,  or mouthwash , take a 15-30 minute break, and then continue taking the prep solution. If you are still unable to complete the prep, without severe nausea or vomiting, you will need to contact your primary care provider (the provider who ordered the test) for a possible anti-nausea medication. Or if you are prone to nausea you may want to ask your primary care provider to prescribe an as needed anti-nausea medication that you may have on hand.    Chill the Miralax /Gatorade  solution in the refrigerator. DO NOT add ice to the solution or your drinking glass.      Set a timer for every 15 minutes. Drink each 8 - oz. glass of solution quickly to help flush your colon.     Stay near a toilet! You will have diarrhea.     Even if you are sitting on the toilet, continue to drink the cleansing solution every 15 minutes.     Drink all of the solution until it is gone.     You will be uncomfortable until the stool has flushed from your colon (in about 2-4 hours). You may feel chilled.     You may suck on a few hard candies (NO red).     Alcohol-free baby wipes or Vaseline  may help ease  skin irritation.     Over-the-counter hydrocortisone creams, hemorrhoid treatments or Tucks may be used if desired.    The day of your exam:            Continue clear liquid diet. Do not eat solid foods.     You may take all of your morning medications.    4 hours before your procedure:     Drink 10 oz. of Magnesium Citrate.    2 hours before your procedure:     Stop drinking clear liquids.     Allow extra time to travel to your procedure as you may need to stop and use a restroom along the way.  You are ready for the exam, if you followed all instructions and your stool is no longer formed, but clear or yellow liquid. If you are unsure whether your colon is clean, please call our department at [phone] before you leave for your appointment.      Bring a list of all of your current medications, including any allergy or over-the-counter medications.      Bring a photo ID, as well as up-to-date insurance information, such as your insurance card and any referral forms that might be required by your insurance company.  DIRECTIONS  From the north ([facility])  Take [address] south, exit to [place] [address]. Get into the left hand codey, turn left (east), go one-half mile to [address]. Turn left (north) on [address]. Go north to first stoplight, take a right on the newly constructed [address] and follow it to the Emergency entrance.  From the south ([facility])  Take 35 north to the east split, [address], and exit to [place] [address]. Turn left (west) on [place] [address] to [address]. Turn right (north) on [address]. Go north to first stoplight, take a right on the newly [employer] and follow it to the Emergency entrance.    From the east via 35E ([facility])  Take [address] south to [place] [address] exit. Turn right on [address] [place] [address]. Go west to [address]. Turn right (north) on [address], go to the first stoplight, take a right and  follow the newly constructed road, [employer], to the Emergency entrance.    From the east via [address] ([facility])  Take [address] west to [address]. Turn left (south) on [address] to [employer]. Turn left (east) on [address] and follow the newly constructed road to the Emergency entrance.    From the west via [address] ([place])  Take [address] east to [address]. Turn right (south) on [address] to [employer].  Turn left (east) on [address] and follow the newly constructed road to the Emergency entrance.  Cut along line  -------------------------------------------------------------------------------------------------------------------  SHOPPING LIST FOR OVER-THE-COUNTER COLONOSCOPY PREP:  ? 2 tablets bisacodyl, each containing 5 mg of bisacodyl (Dulcolax  laxative                     NOT Dulcolax  stool softener)   ? 1-8.3 oz. bottle Miralax  powder (238 grams)   ? 64 oz. Gatorade  liquid (NOT red; NOT powdered). Regular Gatorade ,                     Gatorade G2 , Powerade  or  PoweradeZero  are acceptable.   ? 1-10 oz. bottle Magnesium Citrate (NOT red)

## 2017-04-06 LAB — COPATH REPORT: NORMAL

## 2017-04-10 ENCOUNTER — TELEPHONE (OUTPATIENT)
Dept: INTERNAL MEDICINE | Facility: CLINIC | Age: 67
End: 2017-04-10

## 2017-04-10 NOTE — TELEPHONE ENCOUNTER
Letter/fax recieved from [facility] (in formulary folder/basket) states mometasone is not covered under patient's insurance.      Can medication be changed or should a PA be initiated?    (Provider may request we ask patient to check their formulary book or call their insurance company to find out what medications are on their formulary. Ask patient to call back within 2-3 days.  If they are not able to call back in this time frame this encounter will be closed.  Open new encounter when/if patient calls back.)      PA call [phone]  Pt [phone]

## 2017-04-11 NOTE — TELEPHONE ENCOUNTER
Spoke with pt and will call insurance to find alternatives. Will call back with information. Encounter left open.  Veronica Licea MA

## 2017-04-18 NOTE — TELEPHONE ENCOUNTER
Spoke with pt.  He was not able to get the info re: covered alternatives.    [facility] PA dept [phone]    Per Kobe with [facility], Mometasone has been approved until 4-18-18.    Case # [id number]    Pt and pharm informed.

## 2017-07-13 DIAGNOSIS — R97.20 ELEVATED PSA: Primary | ICD-10-CM

## 2017-07-13 RX ORDER — DUTASTERIDE 0.5 MG/1
0.5 CAPSULE, LIQUID FILLED ORAL DAILY
Qty: 30 CAPSULE | Refills: 0 | Status: SHIPPED | OUTPATIENT
Start: 2017-07-13 | End: 2017-08-14

## 2017-07-21 ENCOUNTER — OFFICE VISIT (OUTPATIENT)
Dept: UROLOGY | Facility: CLINIC | Age: 67
End: 2017-07-21
Payer: COMMERCIAL

## 2017-07-21 VITALS
HEART RATE: 50 BPM | BODY MASS INDEX: 29.51 KG/M2 | WEIGHT: 188 LBS | HEIGHT: 67 IN | SYSTOLIC BLOOD PRESSURE: 172 MMHG | DIASTOLIC BLOOD PRESSURE: 92 MMHG

## 2017-07-21 DIAGNOSIS — Z80.42 FAMILY HISTORY OF PROSTATE CANCER: Primary | ICD-10-CM

## 2017-07-21 DIAGNOSIS — N40.0 BENIGN PROSTATIC HYPERPLASIA WITHOUT LOWER URINARY TRACT SYMPTOMS: ICD-10-CM

## 2017-07-21 LAB
ALBUMIN UR-MCNC: NEGATIVE MG/DL
APPEARANCE UR: CLEAR
BILIRUB UR QL STRIP: NEGATIVE
COLOR UR AUTO: YELLOW
GLUCOSE UR STRIP-MCNC: NEGATIVE MG/DL
HGB UR QL STRIP: NEGATIVE
KETONES UR STRIP-MCNC: NEGATIVE MG/DL
LEUKOCYTE ESTERASE UR QL STRIP: NEGATIVE
NITRATE UR QL: NEGATIVE
PH UR STRIP: 7 PH (ref 5–7)
PSA SERPL-MCNC: 1.9 NG/ML (ref 0–4)
RESIDUAL VOLUME (RV) (EXTERNAL): 42
SP GR UR STRIP: 1.02 (ref 1–1.03)
URN SPEC COLLECT METH UR: NORMAL
UROBILINOGEN UR STRIP-ACNC: 1 EU/DL (ref 0.2–1)

## 2017-07-21 PROCEDURE — 84153 ASSAY OF PSA TOTAL: CPT | Performed by: UROLOGY

## 2017-07-21 PROCEDURE — 51798 US URINE CAPACITY MEASURE: CPT | Performed by: UROLOGY

## 2017-07-21 PROCEDURE — 81003 URINALYSIS AUTO W/O SCOPE: CPT | Performed by: UROLOGY

## 2017-07-21 PROCEDURE — 36415 COLL VENOUS BLD VENIPUNCTURE: CPT | Performed by: UROLOGY

## 2017-07-21 PROCEDURE — 99212 OFFICE O/P EST SF 10 MIN: CPT | Mod: 25 | Performed by: UROLOGY

## 2017-07-21 ASSESSMENT — PAIN SCALES - GENERAL: PAINLEVEL: NO PAIN (0)

## 2017-07-21 NOTE — NURSING NOTE
Chief Complaint   Patient presents with     Psa Screening     Patient is here for PSA.     Annual Prostate Exam     Patient is here for annual exam. He states that he doesnt have any urinary issues.     Ghulam Millan LPN 11:42 AM July 21, 2017

## 2017-07-21 NOTE — LETTER
7/21/2017       RE: Harpreet Vera  [address] [facility] [address]     Dear Colleague,    Thank you for referring your patient, Harpreet Vera, to the [facility] [place] [facility]. Please see a copy of my visit note below.    Harpreet Vera is a 67-year-old male with a family history of prostate cancer. His PSA is 1.9 this morning. He is having no voiding difficulty. His postvoid residual is 40 cc.  Other past medical history: Recent cervical fusion, arthritis, benign colonic polyps, hypertension, sleep apnea, nonsmoker  Medications: Tenormin, Avodart, Claritin, Cozaar, Nasonex, naproxen, probiotic, vitamin D  Allergies: Pravastatin  Urinalysis: Normal  Exam: Normal appearance, normal vital signs, alert and oriented, normocephalic, normal respirations, neuro grossly intact. Normal sphincter tone, no rectal mass or impaction, benign feeling prostate, normal seminal vesicles  Assessment: Family history of prostate cancer-discussed follow-up, PSA screening  Plan: See me yearly with PSA and for digital rectal exam    Again, thank you for allowing me to participate in the care of your patient.      Sincerely,    Hadley Bruno MD

## 2017-07-21 NOTE — PROGRESS NOTES
Harpreet Vera is a 67-year-old male with a family history of prostate cancer. His PSA is 1.9 this morning. He is having no voiding difficulty. His postvoid residual is 40 cc.  Other past medical history: Recent cervical fusion, arthritis, benign colonic polyps, hypertension, sleep apnea, nonsmoker  Medications: Tenormin, Avodart, Claritin, Cozaar, Nasonex, naproxen, probiotic, vitamin D  Allergies: Pravastatin  Urinalysis: Normal  Exam: Normal appearance, normal vital signs, alert and oriented, normocephalic, normal respirations, neuro grossly intact. Normal sphincter tone, no rectal mass or impaction, benign feeling prostate, normal seminal vesicles  Assessment: Family history of prostate cancer-discussed follow-up, PSA screening  Plan: See me yearly with PSA and for digital rectal exam

## 2017-07-21 NOTE — MR AVS SNAPSHOT
After Visit Summary   7/21/2017    Harpreet Vera    MRN: [id number]           Patient Information     Date Of Birth          1950        Visit Information        Provider Department      7/21/2017 11:10 AM Hadley Bruno MD [facility] [place]        Today's Diagnoses     Family history of prostate cancer    -  1    Benign prostatic hyperplasia without lower urinary tract symptoms           Follow-ups after your visit        Follow-up notes from your care team     Return in about 1 year (around 7/21/2018) for PSA.      Future tests that were ordered for you today     Open Future Orders        Priority Expected Expires Ordered    PSA Diag Urologic Phys Routine 7/21/2018 7/21/2018 7/21/2017            Who to contact     If you have questions or need follow up information about today's clinic visit or your schedule please contact [facility] [place] directly at [phone].  Normal or non-critical lab and imaging results will be communicated to you by [employer], letter or phone within 4 business days after the clinic has received the results. If you do not hear from us within 7 days, please contact the clinic through [employer] or phone. If you have a critical or abnormal lab result, we will notify you by phone as soon as possible.  Submit refill requests through [employer] or call your pharmacy and they will forward the refill request to us. Please allow 3 business days for your refill to be completed.          Additional Information About Your Visit        MyChart Information     [employer] gives you secure access to your electronic health record. If you see a primary care provider, you can also send messages to your care team and make appointments. If you have questions, please call your primary care clinic.  If you do not have a primary care provider, please call [phone] and they will assist you.        Care EveryWhere ID     This is  "your Care EveryWhere ID. This could be used by other organizations to access your [facility] medical records  [id number]        Your Vitals Were     Pulse Height BMI (Body Mass Index)             50 1.702 m (5' 7\") 29.44 kg/m2          Blood Pressure from Last 3 Encounters:   07/21/17 (!) 172/92   04/05/17 (!) 145/104   04/03/17 124/84    Weight from Last 3 Encounters:   07/21/17 85.3 kg (188 lb)   04/03/17 87.9 kg (193 lb 11.2 oz)   02/13/17 89.8 kg (198 lb)              We Performed the Following     MEASURE POST-VOID RESIDUAL URINE/BLADDER CAPACITY, US NON-IMAGING     PSA Diag Urologic Phys [[id number]]     UA without Microscopic        Primary Care Provider Office Phone # Fax #    Lucía Sandhu [phone]       [facility] [address]   [facility] [address]        Equal Access to Services     MAXIME ESCOBAR : Hadii aad ku hadasho Soomaali, waaxda luqadaha, qaybta kaalmada adeegyada, waxay idiin hayaan adeeg kharanicole lakassandra . So [facility] [phone].    ATENCIÓN: Si habla español, tiene a melgar disposición servicios gratuitos de asistencia lingüística. Km al [phone].    We comply with applicable federal civil rights laws and [place] laws. We do not discriminate on the basis of race, color, national origin, age, disability sex, sexual orientation or gender identity.            Thank you!     Thank you for choosing [facility] [place]  for your care. Our goal is always to provide you with excellent care. Hearing back from our patients is one way we can continue to improve our services. Please take a few minutes to complete the written survey that you may receive in the mail after your visit with us. Thank you!             Your Updated Medication List - Protect others around you: Learn how to safely use, store and throw away your medicines at www.disposemymeds.org.          This list is accurate as of: 7/21/17 11:56 AM.  Always use your " most recent med list.                   Brand Name Dispense Instructions for use Diagnosis    ALEVE PO           atenolol 25 MG tablet    TENORMIN    90 tablet    Take 1 tablet (25 mg) by mouth daily    Essential hypertension, benign       dutasteride 0.5 MG capsule    AVODART    30 capsule    Take 1 capsule (0.5 mg) by mouth daily    Elevated PSA       loratadine 10 MG tablet    CLARITIN     Take 10 mg by mouth daily        losartan 100 MG tablet    COZAAR    90 tablet    Take 1 tablet (100 mg) by mouth daily    Essential hypertension, benign       Misc Intestinal Kaylin Regulat Tabs      Take 1 tablet by mouth daily        mometasone 50 MCG/ACT spray    NASONEX    3 Box    Spray 2 sprays into both nostrils daily    Chronic rhinitis       order for DME     1 Device    Bi pap sleep apnea 2003    MAN (obstructive sleep apnea)       VITAMIN D (CHOLECALCIFEROL) PO      Take 1,000 Units by mouth daily

## 2017-07-24 ENCOUNTER — TELEPHONE (OUTPATIENT)
Dept: INTERNAL MEDICINE | Facility: CLINIC | Age: 67
End: 2017-07-24

## 2017-07-24 DIAGNOSIS — I10 ESSENTIAL HYPERTENSION, BENIGN: Primary | ICD-10-CM
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
## 2017-07-24 RX ORDER — CHLORTHALIDONE 25 MG/1
25 TABLET ORAL DAILY
Qty: 30 TABLET | Refills: 1 | Status: SHIPPED | OUTPATIENT
Start: 2017-07-24 | End: 2017-09-22

## 2017-07-24 RX ORDER — DOXYCYCLINE HYCLATE 100 MG
100 TABLET ORAL 2 TIMES DAILY
Qty: 20 TABLET | Refills: 0 | Status: SHIPPED | OUTPATIENT
Start: 2017-07-24 | End: 2017-08-09

## 2017-07-24 NOTE — TELEPHONE ENCOUNTER
Call from pt stating his wife removed a dear tick from his back last night. Tick was attached. Unknown how long it was there but he thinks it may have been since Thursday. States area is itchy, sore and reddened. States the redness, itchiness and soreness is improved since last night. Denies typical bullseye pattern as he has had in the past. Pt did save the tick. Pt also states his BP has been elevated for the past 1.5 weeks. States it has been running 180's/100's. Was in to see urologist Friday and was 182/103. States his BP does get high when he goes in for Dr. melednez but he has also been monitoring it at home and it has been 175-195/100. Advised MD khan. Pt prefers to see PCP. No apts available until Wednesday. Pt has a presentation today at 12:00 so would not be available until 2:15 or after. Please advise if pt should be worked in for apt.

## 2017-07-24 NOTE — TELEPHONE ENCOUNTER
1) tick bite he needs Doxycycline 100 mg bid for 10 days. Rx sent in  2)hypertension - will add Chlorthalidone a diuretic, he has never been on this one (he was on Hydrochlorothiazide in the past). He should see me in 2 weeks in the office.

## 2017-08-09 ENCOUNTER — OFFICE VISIT (OUTPATIENT)
Dept: INTERNAL MEDICINE | Facility: CLINIC | Age: 67
End: 2017-08-09
Payer: COMMERCIAL

## 2017-08-09 VITALS
DIASTOLIC BLOOD PRESSURE: 76 MMHG | HEART RATE: 60 BPM | OXYGEN SATURATION: 95 % | SYSTOLIC BLOOD PRESSURE: 134 MMHG | HEIGHT: 67 IN | WEIGHT: 188 LBS | TEMPERATURE: 98.6 F | BODY MASS INDEX: 29.51 KG/M2

## 2017-08-09 DIAGNOSIS — I10 ESSENTIAL HYPERTENSION, BENIGN: Primary | ICD-10-CM
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
## 2017-08-09 PROCEDURE — 99214 OFFICE O/P EST MOD 30 MIN: CPT | Performed by: INTERNAL MEDICINE

## 2017-08-09 RX ORDER — LORATADINE 10 MG/1
10 TABLET ORAL DAILY PRN
Qty: 30 TABLET | Status: ON HOLD | COMMUNITY
Start: 2017-08-09 | End: 2020-08-19

## 2017-08-09 RX ORDER — FAMOTIDINE 20 MG
2000 TABLET ORAL DAILY
Status: ON HOLD | COMMUNITY
Start: 2017-08-09 | End: 2020-08-19

## 2017-08-09 NOTE — PROGRESS NOTES
"  SUBJECTIVE:                                                    Harpreet Vera is a 67 year old male who presents to clinic today for the following health issues:    Follow up HTN.    HPI:   3 weeks ago he was bit by a deer tick. And got Doxcycline but he thinks the tick was on him for about 4 days. He wants to know if there is anything else he needs to do.    He also had about 10 days when hisd blood pressure was high running 190/110. He says he was running some touchy [employer] meetings and was at the same time very worried about his tick bite. blood pressure has since come down and he feels fine.     Problem list and histories reviewed & adjusted, as indicated.  Additional history: as documented    Patient Active Problem List   Diagnosis     Essential hypertension, benign     Allergic rhinitis     Intestinal infection due to Clostridium difficile     CARDIOVASCULAR SCREENING; LDL GOAL LESS THAN 130     Advanced directives, counseling/discussion     Hypercholesteremia     MAN (obstructive sleep apnea)     Vitamin D deficiency     Benign prostatic hyperplasia     S/P cervical spinal fusion     Cervicalgia     Past Surgical History:   Procedure Laterality Date     C NONSPECIFIC PROCEDURE      Right clavicle fracture, Multiple right sided rib fracture, hairline fracture \"pelvis\", secondary to fall from tree         C NONSPECIFIC PROCEDURE      Right ulnar/radial fracture      CHOLECYSTECTOMY       COLONOSCOPY       COLONOSCOPY N/A 4/5/2017    Procedure: COMBINED COLONOSCOPY, SINGLE OR MULTIPLE BIOPSY/POLYPECTOMY BY BIOPSY;  Surgeon: Tylor Rice MD;  Location:  GI     DECOMPRESSION, FUSION CERVICAL ANTERIOR ONE LEVEL, COMBINED N/A 9/9/2016    Procedure: COMBINED DECOMPRESSION, FUSION CERVICAL ANTERIOR ONE LEVEL;  Surgeon: Erick Valles MD;  Location:  OR     ENT SURGERY      tonsilectomy  as a child     ORTHOPEDIC SURGERY      ambar knees scopedrt shoulder,fx rt arm fx     TESTICLE SURGERY       " "VASECTOMY         Social History   Substance Use Topics     Smoking status: Never Smoker     Smokeless tobacco: Never Used     Alcohol use 0.0 oz/week     0 Standard drinks or equivalent per week      Comment: rarely     Family History   Problem Relation Age of Onset     Arthritis Mother      Eye Disorder Mother      cateracts     Lipids Mother      CANCER Father      colon/prostate     Eye Disorder Father      cateracts     Lipids Father      DIABETES Father      CANCER Maternal Grandfather      colon     CANCER Paternal Grandmother      colon     CANCER Maternal Aunt      colon     CANCER Maternal Uncle      colon     Prostate Cancer Brother      Prostate Cancer Brother      Colon Cancer No family hx of              Reviewed and updated as needed this visit by clinical staffTobacco  Allergies  Meds  Med Hx  Surg Hx  Fam Hx  Soc Hx      Reviewed and updated as needed this visit by Provider         ROS:  Constitutional, HEENT, cardiovascular, pulmonary, GI, , musculoskeletal, neuro, skin, endocrine and psych systems are negative, except as otherwise noted.      OBJECTIVE:   /76 (BP Location: Left arm, Patient Position: Chair, Cuff Size: Adult Large)  Pulse 60  Temp 98.6  F (37  C) (Oral)  Ht 5' 7\" (1.702 m)  Wt 188 lb (85.3 kg)  SpO2 95%  BMI 29.44 kg/m2  Body mass index is 29.44 kg/(m^2).  GENERAL: healthy, alert and no distress  RESP: lungs clear to auscultation - no rales, rhonchi or wheezes  CV: regular rate and rhythm, normal S1 S2, no S3 or S4, no murmur, click or rub, no peripheral edema and peripheral pulses strong  ABDOMEN: soft, nontender, no hepatosplenomegaly, no masses and bowel sounds normal  MS: no gross musculoskeletal defects noted, no edema  SKIN: no suspicious lesions or rashes      ASSESSMENT/PLAN:       1. Essential hypertension, benign  Will follow clinically for now. If blood pressure goes up again would increase his Atenolol from 25mg qd to 50 mg qd. Update labs.  - Lyme " Disease Shireen with reflex to WB Serum; Future  - Basic metabolic panel  (Ca, Cl, CO2, Creat, Gluc, K, Na, BUN); Future    2. Tick bite, initial encounter  Will check Lyme titer in 6 weeks      Lucía Sandhu MD  [facility]

## 2017-08-09 NOTE — MR AVS SNAPSHOT
After Visit Summary   8/9/2017    Harpreet Vera    MRN: [id number]           Patient Information     Date Of Birth          1950        Visit Information        Provider Department      8/9/2017 7:00 AM Lucía Sandhu MD [facility]        Today's Diagnoses     Essential hypertension, benign    -  1    Tick bite, initial encounter           Follow-ups after your visit        Future tests that were ordered for you today     Open Future Orders        Priority Expected Expires Ordered    Lyme Disease Shireen with reflex to WB Serum Routine  11/9/2017 8/9/2017    Basic metabolic panel  (Ca, Cl, CO2, Creat, Gluc, K, Na, BUN) Routine  11/9/2017 8/9/2017            Who to contact     If you have questions or need follow up information about today's clinic visit or your schedule please contact [facility] directly at [phone].  Normal or non-critical lab and imaging results will be communicated to you by MyChart, letter or phone within 4 business days after the clinic has received the results. If you do not hear from us within 7 days, please contact the clinic through [employer] or phone. If you have a critical or abnormal lab result, we will notify you by phone as soon as possible.  Submit refill requests through [employer] or call your pharmacy and they will forward the refill request to us. Please allow 3 business days for your refill to be completed.          Additional Information About Your Visit        MyChart Information     [employer] gives you secure access to your electronic health record. If you see a primary care provider, you can also send messages to your care team and make appointments. If you have questions, please call your primary care clinic.  If you do not have a primary care provider, please call [phone] and they will assist you.        Care EveryWhere ID     This is your Care EveryWhere ID. This could be used by other organizations to access your  "[facility] medical records  [id number]        Your Vitals Were     Pulse Temperature Height Pulse Oximetry BMI (Body Mass Index)       60 98.6  F (37  C) (Oral) 5' 7\" (1.702 m) 95% 29.44 kg/m2        Blood Pressure from Last 3 Encounters:   08/09/17 134/76   07/21/17 (!) 172/92   04/05/17 (!) 145/104    Weight from Last 3 Encounters:   08/09/17 188 lb (85.3 kg)   07/21/17 188 lb (85.3 kg)   04/03/17 193 lb 11.2 oz (87.9 kg)                 Today's Medication Changes          These changes are accurate as of: 8/9/17  7:39 AM.  If you have any questions, ask your nurse or doctor.               These medicines have changed or have updated prescriptions.        Dose/Directions    loratadine 10 MG tablet   Commonly known as:  CLARITIN   This may have changed:    - when to take this  - reasons to take this   Changed by:  Lucía Sandhu MD        Dose:  10 mg   Take 1 tablet (10 mg) by mouth daily as needed for allergies   Quantity:  30 tablet   Refills:  0       Vitamin D (Cholecalciferol) 1000 UNITS Caps   This may have changed:    - medication strength  - how much to take   Changed by:  Lucaí Sandhu MD        Dose:  2000 Units   Take 2,000 Units by mouth daily   Refills:  0                Primary Care Provider Office Phone # Fax #    Lucía Sandhu [phone]       [address]   [facility] [address]        Equal Access to Services     [facility] AH: Hadii aad ku hadasho Soomaali, waaxda luqadaha, qaybta kaalmada adeegyada, waxay rosaleein hayhans coelho . So [facility] [phone].    ATENCIÓN: Si innala shayna, tiene a melgar disposición servicios gratuitos de asistencia lingüística. Llame al [phone].    We comply with applicable federal civil rights laws and [place] laws. We do not discriminate on the basis of race, color, national origin, age, disability sex, sexual orientation or gender identity.            Thank you!     Thank you for choosing [facility]  for your care. Our goal is always to provide you with excellent care. Hearing back from our patients is one way we can continue to improve our services. Please take a few minutes to complete the written survey that you may receive in the mail after your visit with us. Thank you!             Your Updated Medication List - Protect others around you: Learn how to safely use, store and throw away your medicines at www.disposemymeds.org.          This list is accurate as of: 8/9/17  7:39 AM.  Always use your most recent med list.                   Brand Name Dispense Instructions for use Diagnosis    ALEVE PO           atenolol 25 MG tablet    TENORMIN    90 tablet    Take 1 tablet (25 mg) by mouth daily    Essential hypertension, benign       chlorthalidone 25 MG tablet    HYGROTON    30 tablet    Take 1 tablet (25 mg) by mouth daily    Essential hypertension, benign       dutasteride 0.5 MG capsule    AVODART    30 capsule    Take 1 capsule (0.5 mg) by mouth daily    Elevated PSA       loratadine 10 MG tablet    CLARITIN    30 tablet    Take 1 tablet (10 mg) by mouth daily as needed for allergies        losartan 100 MG tablet    COZAAR    90 tablet    Take 1 tablet (100 mg) by mouth daily    Essential hypertension, benign       Misc Intestinal Kaylin Regulat Tabs      Take 1 tablet by mouth daily        mometasone 50 MCG/ACT spray    NASONEX    3 Box    Spray 2 sprays into both nostrils daily    Chronic rhinitis       order for DME     1 Device    Bi pap sleep apnea 2003    MAN (obstructive sleep apnea)       Vitamin D (Cholecalciferol) 1000 UNITS Caps      Take 2,000 Units by mouth daily

## 2017-08-14 DIAGNOSIS — R97.20 ELEVATED PSA: ICD-10-CM

## 2017-08-14 RX ORDER — DUTASTERIDE 0.5 MG/1
0.5 CAPSULE, LIQUID FILLED ORAL DAILY
Qty: 90 CAPSULE | Refills: 3 | Status: SHIPPED | OUTPATIENT
Start: 2017-08-14 | End: 2018-08-11

## 2017-09-06 ENCOUNTER — OFFICE VISIT (OUTPATIENT)
Dept: NEUROSURGERY | Facility: CLINIC | Age: 67
End: 2017-09-06
Attending: NURSE PRACTITIONER
Payer: COMMERCIAL

## 2017-09-06 ENCOUNTER — RADIANT APPOINTMENT (OUTPATIENT)
Dept: GENERAL RADIOLOGY | Facility: CLINIC | Age: 67
End: 2017-09-06
Attending: NURSE PRACTITIONER
Payer: COMMERCIAL

## 2017-09-06 VITALS
HEART RATE: 50 BPM | SYSTOLIC BLOOD PRESSURE: 123 MMHG | BODY MASS INDEX: 28.98 KG/M2 | DIASTOLIC BLOOD PRESSURE: 72 MMHG | WEIGHT: 185 LBS | OXYGEN SATURATION: 98 % | TEMPERATURE: 97.4 F

## 2017-09-06 DIAGNOSIS — Z98.1 STATUS POST CERVICAL SPINAL FUSION: Primary | ICD-10-CM

## 2017-09-06 DIAGNOSIS — Z98.1 STATUS POST CERVICAL SPINAL FUSION: ICD-10-CM

## 2017-09-06 PROCEDURE — 99212 OFFICE O/P EST SF 10 MIN: CPT | Performed by: NURSE PRACTITIONER

## 2017-09-06 PROCEDURE — 72040 X-RAY EXAM NECK SPINE 2-3 VW: CPT

## 2017-09-06 NOTE — NURSING NOTE
"Harpreet Vera is a 67 year old male who presents for:  Chief Complaint   Patient presents with     Neurologic Problem     F/U C6-7 ACDF with xrays prior        Initial Vitals:  /72 (BP Location: Right arm, Patient Position: Chair, Cuff Size: Adult Large)  Pulse 50  Temp 97.4  F (36.3  C) (Oral)  Wt 185 lb (83.9 kg)  SpO2 98%  BMI 28.98 kg/m2 Estimated body mass index is 28.98 kg/(m^2) as calculated from the following:    Height as of 8/9/17: 5' 7\" (1.702 m).    Weight as of this encounter: 185 lb (83.9 kg).. Body surface area is 1.99 meters squared. BP completed using cuff size: large  Data Unavailable    Do you feel safe in your environment?  Yes  Do you need any refills today? No    Nursing Comments: F/U C6-7 ACDF with xrays prior.  Patient rates 0  pain today as 9/6/17      5 min. nursing intake time  Love Mei CMA      Discharge plan: See Np dictation  2 min. nursing discharge time  Love Mei CMA         "

## 2017-09-06 NOTE — PROGRESS NOTES
[facility]  Neurosurgery followup:    HPI:  Mr. Vera is a 66 year old female who returns about 12 months post C6-7 ACDF.  He is doing well at this time. He is very happy with the results of the surgery and notes that his golf game is good which makes him happy.     Exam:  Constitutional:  Alert, well nourished, NAD.  HEENT: Normocephalic, atraumatic.   Pulm:  Without shortness of breath   CV:  No pitting edema of BLE.     Neurological:  Awake  Alert  Oriented x 3  Motor exam:     Shoulder Abduction:  Right:  5/5    Left:  5/5  Biceps:                      Right:  5/5    Left:  5/5  Triceps:                     Right:  5/5    Left:  5/5  Wrist Extensors:       Right:  5/5    Left:  5/5  Wrist Flexors:           Right:  5/5    Left:  5/5  Intrinsics:                  Right:  5/5    Left:  5/5     Able to spontaneously move U/E bilaterally  Sensation intact throughout all U/E dermatomes    Imaging:  Cervical xray shows fusion intact   A/P: Mr. Vera is a 66 year old female who returns about 12 months post C6-7 ACDF.  He is doing well at this time. He is very happy with the results of the surgery and notes that his golf game is good which makes him happy. It was explained that we will have him return as needed.      Patient Instructions   - follow up as needed  - Continue activity       Yvonne Li [facility]  [facility]  [address] [address]    Tel [phone]  Pager [phone]

## 2017-09-06 NOTE — MR AVS SNAPSHOT
After Visit Summary   9/6/2017    Harpreet Vera    MRN: [id number]           Patient Information     Date Of Birth          1950        Visit Information        Provider Department      9/6/2017 9:20 AM Yvonne Li APRN CNP [facility]        Care Instructions    - follow up as needed  - Continue activity            Follow-ups after your visit        Your next 10 appointments already scheduled     Sep 06, 2017  9:20 AM CDT   Return Visit with SALVATORE Hudson CNP   [facility]    [address] [facility] [address]  [facility] [address] [phone]              Who to contact     If you have questions or need follow up information about today's clinic visit or your schedule please contact [facility] directly at [phone].  Normal or non-critical lab and imaging results will be communicated to you by MyChart, letter or phone within 4 business days after the clinic has received the results. If you do not hear from us within 7 days, please contact the clinic through MyChart or phone. If you have a critical or abnormal lab result, we will notify you by phone as soon as possible.  Submit refill requests through [employer] or call your pharmacy and they will forward the refill request to us. Please allow 3 business days for your refill to be completed.          Additional Information About Your Visit        MyChart Information     [employer] gives you secure access to your electronic health record. If you see a primary care provider, you can also send messages to your care team and make appointments. If you have questions, please call your primary care clinic.  If you do not have a primary care provider, please call [phone] and they will assist you.        Care EveryWhere ID     This is your Care EveryWhere ID. This could be used by other organizations to access your [facility]  records  [id number]        Your Vitals Were     Pulse Temperature Pulse Oximetry BMI (Body Mass Index)          50 97.4  F (36.3  C) (Oral) 98% 28.98 kg/m2         Blood Pressure from Last 3 Encounters:   09/06/17 123/72   08/09/17 134/76   07/21/17 (!) 172/92    Weight from Last 3 Encounters:   09/06/17 185 lb (83.9 kg)   08/09/17 188 lb (85.3 kg)   07/21/17 188 lb (85.3 kg)              Today, you had the following     No orders found for display       Primary Care Provider Office Phone # Fax #    Lucía Sandhu [phone]       [address] [facility] [address]  [facility] [address]        Equal Access to Services     MAXIME ESCOBAR : Hadii mariela villanueva hadasho Soomaali, waaxda luqadaha, qaybta kaalmada adeegyada, arun singletary hayhans coelho . So [facility] [phone].    ATENCIÓN: Si habla español, tiene a melgar disposición servicios gratuitos de asistencia lingüística. Llame al [phone].    We comply with applicable federal civil rights laws and [place] laws. We do not discriminate on the basis of race, color, national origin, age, disability sex, sexual orientation or gender identity.            Thank you!     Thank you for choosing [facility]  for your care. Our goal is always to provide you with excellent care. Hearing back from our patients is one way we can continue to improve our services. Please take a few minutes to complete the written survey that you may receive in the mail after your visit with us. Thank you!             Your Updated Medication List - Protect others around you: Learn how to safely use, store and throw away your medicines at www.disposemymeds.org.          This list is accurate as of: 9/6/17  9:19 AM.  Always use your most recent med list.                   Brand Name Dispense Instructions for use Diagnosis    ALEVE PO           atenolol 25 MG tablet    TENORMIN    90 tablet    Take 1 tablet (25 mg) by mouth daily    Essential hypertension,  benign       chlorthalidone 25 MG tablet    HYGROTON    30 tablet    Take 1 tablet (25 mg) by mouth daily    Essential hypertension, benign       dutasteride 0.5 MG capsule    AVODART    90 capsule    Take 1 capsule (0.5 mg) by mouth daily    Elevated PSA       loratadine 10 MG tablet    CLARITIN    30 tablet    Take 1 tablet (10 mg) by mouth daily as needed for allergies        losartan 100 MG tablet    COZAAR    90 tablet    Take 1 tablet (100 mg) by mouth daily    Essential hypertension, benign       Misc Intestinal Kaylin Regulat Tabs      Take 1 tablet by mouth daily        mometasone 50 MCG/ACT spray    NASONEX    3 Box    Spray 2 sprays into both nostrils daily    Chronic rhinitis       order for DME     1 Device    Bi pap sleep apnea 2003    MAN (obstructive sleep apnea)       Vitamin D (Cholecalciferol) 1000 UNITS Caps      Take 2,000 Units by mouth daily

## 2017-09-22 DIAGNOSIS — I10 ESSENTIAL HYPERTENSION, BENIGN: ICD-10-CM

## 2017-09-22 RX ORDER — CHLORTHALIDONE 25 MG/1
TABLET ORAL
Qty: 90 TABLET | Refills: 0 | Status: SHIPPED | OUTPATIENT
Start: 2017-09-22 | End: 2017-12-21

## 2017-09-22 NOTE — TELEPHONE ENCOUNTER
Chlorthalidone       Last Written Prescription Date: 7/24/17  Last Fill Quantity: 30, # refills: 1  Last Office Visit with [facility] or [facility] prescribing provider: 8/9/17       Potassium   Date Value Ref Range Status   04/03/2017 4.2 3.4 - 5.3 mmol/L Final     Creatinine   Date Value Ref Range Status   04/03/2017 1.07 0.66 - 1.25 mg/dL Final     BP Readings from Last 3 Encounters:   09/06/17 123/72   08/09/17 134/76   07/21/17 (!) 172/92

## 2017-10-30 DIAGNOSIS — I10 ESSENTIAL HYPERTENSION, BENIGN: ICD-10-CM

## 2017-10-30 NOTE — TELEPHONE ENCOUNTER
Per 8/9 dict-Essential hypertension, benign  Will follow clinically for now. If blood pressure goes up again would increase his Atenolol from 25mg qd to 50 mg qd. Update labs.  - Lyme Disease Shireen with reflex to WB Serum; Future  - Basic metabolic panel  (Ca, Cl, CO2, Creat, Gluc, K, Na, BUN); Future      Pt past due for labs-and med list still states 25mg qd of Atenolol. Need to know if pt increased dose. Sent pt a my chart message

## 2017-11-06 RX ORDER — ATENOLOL 25 MG/1
TABLET ORAL
Qty: 90 TABLET | Refills: 0 | Status: SHIPPED | OUTPATIENT
Start: 2017-11-06 | End: 2018-02-09

## 2017-11-06 NOTE — TELEPHONE ENCOUNTER
Atenolol 25 mg daily is patient's current dose.  He doesn't know what his BP has been running, encouraged him to start checking his BP even once a week as we would like to know what he is running.  Patient is now scheduling the labwork ordered in August.  MADHAV Young R.N.  Requested Prescriptions   Pending Prescriptions Disp Refills     atenolol (TENORMIN) 25 MG tablet [Pharmacy Med Name: Atenolol Oral Tablet 25 MG] 90 tablet 2     Sig: TAKE 1 TABLET BY MOUTH ONCE DAILY    Beta-Blockers Protocol Passed    10/30/2017  3:19 PM       Passed - Blood pressure under 140/90    BP Readings from Last 3 Encounters:   09/06/17 123/72   08/09/17 134/76   07/21/17 (!) 172/92                Passed - Patient is age 6 or older       Passed - Recent or future visit with authorizing provider's specialty    Patient had office visit in the last year or has a visit in the next 30 days with authorizing provider.  See chart review.

## 2017-11-08 DIAGNOSIS — I10 ESSENTIAL HYPERTENSION, BENIGN: ICD-10-CM

## 2017-11-08 LAB
ANION GAP SERPL CALCULATED.3IONS-SCNC: 11 MMOL/L (ref 3–14)
BUN SERPL-MCNC: 26 MG/DL (ref 7–30)
CALCIUM SERPL-MCNC: 9.1 MG/DL (ref 8.5–10.1)
CHLORIDE SERPL-SCNC: 104 MMOL/L (ref 94–109)
CO2 SERPL-SCNC: 24 MMOL/L (ref 20–32)
CREAT SERPL-MCNC: 1.2 MG/DL (ref 0.66–1.25)
GFR SERPL CREATININE-BSD FRML MDRD: 60 ML/MIN/1.7M2
GLUCOSE SERPL-MCNC: 104 MG/DL (ref 70–99)
POTASSIUM SERPL-SCNC: 3.9 MMOL/L (ref 3.4–5.3)
SODIUM SERPL-SCNC: 139 MMOL/L (ref 133–144)

## 2017-11-08 PROCEDURE — 86618 LYME DISEASE ANTIBODY: CPT | Performed by: INTERNAL MEDICINE

## 2017-11-08 PROCEDURE — 36415 COLL VENOUS BLD VENIPUNCTURE: CPT | Performed by: INTERNAL MEDICINE

## 2017-11-08 PROCEDURE — 80048 BASIC METABOLIC PNL TOTAL CA: CPT | Performed by: INTERNAL MEDICINE

## 2017-11-09 LAB — B BURGDOR IGG+IGM SER QL: 0.34 (ref 0–0.89)

## 2017-12-21 DIAGNOSIS — I10 ESSENTIAL HYPERTENSION, BENIGN: ICD-10-CM

## 2017-12-22 RX ORDER — CHLORTHALIDONE 25 MG/1
TABLET ORAL
Qty: 90 TABLET | Refills: 2 | Status: SHIPPED | OUTPATIENT
Start: 2017-12-22 | End: 2018-08-20

## 2017-12-22 NOTE — TELEPHONE ENCOUNTER
Requested Prescriptions   Pending Prescriptions Disp Refills     chlorthalidone (HYGROTON) 25 MG tablet [Pharmacy Med Name: Chlorthalidone Oral Tablet 25 MG] 90 tablet 0     Sig: TAKE 1 TABLET BY MOUTH ONE TIME DAILY    Diuretics (Including Combos) Protocol Passed    12/21/2017  4:53 PM       Passed - Blood pressure under 140/90    BP Readings from Last 3 Encounters:   09/06/17 123/72   08/09/17 134/76   07/21/17 (!) 172/92                Passed - Recent or future visit with authorizing provider's specialty    Patient had office visit in the last year or has a visit in the next 30 days with authorizing provider.  See chart review.              Passed - Patient is age 18 or older       Passed - Normal serum creatinine on file in past 12 months    Recent Labs   Lab Test  11/08/17   0926   CR  1.20             Passed - Normal serum potassium on file in past 12 months    Recent Labs   Lab Test  11/08/17   0926   POTASSIUM  3.9                   Passed - Normal serum sodium on file in past 12 months    Recent Labs   Lab Test  11/08/17   0926   NA  139              Last OV: 08/09/17  Prescription approved per [facility] Refill Protocol.

## 2018-02-09 DIAGNOSIS — I10 ESSENTIAL HYPERTENSION, BENIGN: ICD-10-CM

## 2018-02-14 RX ORDER — ATENOLOL 25 MG/1
TABLET ORAL
Qty: 90 TABLET | Refills: 2 | Status: SHIPPED | OUTPATIENT
Start: 2018-02-14 | End: 2018-11-09

## 2018-02-14 NOTE — TELEPHONE ENCOUNTER
"Requested Prescriptions   Pending Prescriptions Disp Refills     atenolol (TENORMIN) 25 MG tablet [Pharmacy Med Name: Atenolol Oral Tablet 25 MG] 90 tablet 0     Sig: TAKE 1 TABLET BY MOUTH ONCE DAILY    Beta-Blockers Protocol Passed    2/9/2018  2:49 PM       Passed - Blood pressure under 140/90 in past 12 months    BP Readings from Last 3 Encounters:   09/06/17 123/72   08/09/17 134/76   07/21/17 (!) 172/92                Passed - Patient is age 6 or older       Passed - Recent or future visit with authorizing provider's specialty    Patient had office visit in the last year or has a visit in the next 30 days with authorizing provider.  See \"Patient Info\" tab in inbasket, or \"Choose Columns\" in Meds & Orders section of the refill encounter.             Prescription approved per [facility] Refill Protocol.  Silke Davis RN      "

## 2018-06-15 DIAGNOSIS — I10 ESSENTIAL HYPERTENSION, BENIGN: ICD-10-CM

## 2018-06-21 ENCOUNTER — MYC MEDICAL ADVICE (OUTPATIENT)
Dept: INTERNAL MEDICINE | Facility: CLINIC | Age: 68
End: 2018-06-21

## 2018-06-21 RX ORDER — LOSARTAN POTASSIUM 100 MG/1
TABLET ORAL
Qty: 90 TABLET | Refills: 0 | Status: SHIPPED | OUTPATIENT
Start: 2018-06-21 | End: 2018-09-22

## 2018-06-21 NOTE — TELEPHONE ENCOUNTER
Medication is being filled for 1 time refill only due to:  due for f/u appt in Aug 2018.     [employer] message sent.

## 2018-06-21 NOTE — TELEPHONE ENCOUNTER
"Requested Prescriptions   Pending Prescriptions Disp Refills     losartan (COZAAR) 100 MG tablet [Pharmacy Med Name: Losartan Potassium Oral Tablet 100 MG] 90 tablet 2    Last Written Prescription Date:  04/03/2017  Last Fill Quantity: 90,  # refills: 3   Last office visit: 8/9/2017 with prescribing provider:     Future Office Visit:   Sig: TAKE 1 TABLET BY MOUTH ONE TIME DAILY    Angiotensin-II Receptors Passed    6/15/2018  7:00 AM       Passed - Blood pressure under 140/90 in past 12 months    BP Readings from Last 3 Encounters:   09/06/17 123/72   08/09/17 134/76   07/21/17 (!) 172/92                Passed - Recent (12 mo) or future (30 days) visit within the authorizing provider's specialty    Patient had office visit in the last 12 months or has a visit in the next 30 days with authorizing provider or within the authorizing provider's specialty.  See \"Patient Info\" tab in inbasket, or \"Choose Columns\" in Meds & Orders section of the refill encounter.           Passed - Patient is age 18 or older       Passed - Normal serum creatinine on file in past 12 months    Recent Labs   Lab Test  11/08/17   0926   CR  1.20            Passed - Normal serum potassium on file in past 12 months    Recent Labs   Lab Test  11/08/17   0926   POTASSIUM  3.9                    "

## 2018-06-22 NOTE — TELEPHONE ENCOUNTER
Patient calling asking if ok to wait until August to f/u in clinic, advised ok to wait rx was refilled for 90 days.   Chlorthalidone was stopped over a week ago, because feeling dizzy and fatigued. Patient reports feels so much better, not dizzy (mostly relieved) or fatigued, patient reports feeling like I have better energy. Using BiPap machine daily and takes a nap daily.    BP6/21/18: Before meds in AM BP: 122/66 50, Afternoon right after supper BP: 130/71 P. 48  BP6/22/18. Morning before meds: 132/74 P. 46,   Provider please review and advise. Thank you.      Scheduled for OV for 8/20/18 with Dr. Fung.

## 2018-06-25 NOTE — TELEPHONE ENCOUNTER
Let pt know what Dr KELLY commented below and instructed him to come in sooner if he feels dizzy or more fatigued. He thinks it's probably from the chlorthalidone that he stopped 1 week ago

## 2018-06-25 NOTE — TELEPHONE ENCOUNTER
Okay to wait    He dose have a low pulse, but has had a low pulse in the past    If he continues to feel dizzy or more fatigued, he should schedule sooner

## 2018-07-24 DIAGNOSIS — Z80.42 FAMILY HISTORY OF PROSTATE CANCER: ICD-10-CM

## 2018-07-24 DIAGNOSIS — N40.0 BENIGN PROSTATIC HYPERPLASIA WITHOUT LOWER URINARY TRACT SYMPTOMS: ICD-10-CM

## 2018-07-24 PROCEDURE — G0103 PSA SCREENING: HCPCS | Performed by: UROLOGY

## 2018-07-25 LAB — PSA SERPL-ACNC: 1.92 UG/L (ref 0–4)

## 2018-08-02 ENCOUNTER — OFFICE VISIT (OUTPATIENT)
Dept: UROLOGY | Facility: CLINIC | Age: 68
End: 2018-08-02
Payer: COMMERCIAL

## 2018-08-02 VITALS — WEIGHT: 185 LBS | HEIGHT: 67 IN | OXYGEN SATURATION: 95 % | HEART RATE: 48 BPM | BODY MASS INDEX: 29.03 KG/M2

## 2018-08-02 DIAGNOSIS — Z80.42 FAMILY HISTORY OF PROSTATE CANCER: Primary | ICD-10-CM

## 2018-08-02 PROCEDURE — 99213 OFFICE O/P EST LOW 20 MIN: CPT | Performed by: UROLOGY

## 2018-08-02 ASSESSMENT — PAIN SCALES - GENERAL: PAINLEVEL: NO PAIN (0)

## 2018-08-02 NOTE — LETTER
8/2/2018       RE: Harpreet Vera  [address] [facility] [address]     Dear Colleague,    Thank you for referring your patient, Harpreet Vera, to the [facility] UROLOGY CLINIC [place] at [facility]. Please see a copy of my visit note below.    Harpreet Vera is a 68-year-old male with a family history of prostate cancer. His PSA is stable at 1.92. He is voiding comfortably and has no dysuria or hematuria.  Other past medical history:arthritis, benign neoplasm of colon, brachial plexopathy, chronic infection, hypertension, sleep apnea, nonsmoker, knee arthroscopies, shoulder arthroscopy, arm fracture, tonsillectomy, cervical disc surgery  Family history: Father and 2 brothers with prostate cancer  Medications: Atenolol, chlorthalidone, Avodart, loratadine, losartan, Nasonex spray, naproxen, probiotic, vitamin D  Allergies: Pravastatin  Exam: Alert and oriented, normocephalic, normal respirations, neuro grossly intact. Normal sphincter tone, no rectal mass or impaction, benign and symmetric prostate, normal seminal vesicles  Assessment: Strong family history of prostate cancer  Plan: PSA and digital rectal exam yearly    Again, thank you for allowing me to participate in the care of your patient.      Sincerely,    Hadley Bruno MD

## 2018-08-02 NOTE — PROGRESS NOTES
Harpreet Vera is a 68-year-old male with a family history of prostate cancer. His PSA is stable at 1.92. He is voiding comfortably and has no dysuria or hematuria.  Other past medical history:arthritis, benign neoplasm of colon, brachial plexopathy, chronic infection, hypertension, sleep apnea, nonsmoker, knee arthroscopies, shoulder arthroscopy, arm fracture, tonsillectomy, cervical disc surgery  Family history: Father and 2 brothers with prostate cancer  Medications: Atenolol, chlorthalidone, Avodart, loratadine, losartan, Nasonex spray, naproxen, probiotic, vitamin D  Allergies: Pravastatin  Exam: Alert and oriented, normocephalic, normal respirations, neuro grossly intact. Normal sphincter tone, no rectal mass or impaction, benign and symmetric prostate, normal seminal vesicles  Assessment: Strong family history of prostate cancer  Plan: PSA and digital rectal exam yearly

## 2018-08-02 NOTE — MR AVS SNAPSHOT
After Visit Summary   8/2/2018    Harpreet Vera    MRN: [id number]           Patient Information     Date Of Birth          1950        Visit Information        Provider Department      8/2/2018 3:20 PM Hadley Bruno MD [facility] Urology [facility]        Today's Diagnoses     Family history of prostate cancer    -  1       Follow-ups after your visit        Follow-up notes from your care team     Return in about 1 year (around 8/2/2019) for PSA.      Your next 10 appointments already scheduled     Aug 20, 2018  7:20 AM CDT   SHORT with Gonzalez Fung MD   [facility]    [address]  [facility] [id number] [phone]              Future tests that were ordered for you today     Open Future Orders        Priority Expected Expires Ordered    PSA tumor marker Routine 8/2/2019 8/3/2019 8/2/2018            Who to contact     If you have questions or need follow up information about today's clinic visit or your schedule please contact [facility] UROLOGY [facility] directly at [phone].  Normal or non-critical lab and imaging results will be communicated to you by 121nexushart, letter or phone within 4 business days after the clinic has received the results. If you do not hear from us within 7 days, please contact the clinic through 121nexushart or phone. If you have a critical or abnormal lab result, we will notify you by phone as soon as possible.  Submit refill requests through CloudFloor or call your pharmacy and they will forward the refill request to us. Please allow 3 business days for your refill to be completed.          Additional Information About Your Visit        121nexushart Information     CloudFloor gives you secure access to your electronic health record. If you see a primary care provider, you can also send messages to your care team and make appointments. If you have  "questions, please call your primary care clinic.  If you do not have a primary care provider, please call [phone] and they will assist you.        Care EveryWhere ID     This is your Care EveryWhere ID. This could be used by other organizations to access your [place] medical records  [id number]        Your Vitals Were     Pulse Height Pulse Oximetry BMI (Body Mass Index)          48 1.702 m (5' 7\") 95% 28.98 kg/m2         Blood Pressure from Last 3 Encounters:   09/06/17 123/72   08/09/17 134/76   07/21/17 (!) 172/92    Weight from Last 3 Encounters:   08/02/18 83.9 kg (185 lb)   09/06/17 83.9 kg (185 lb)   08/09/17 85.3 kg (188 lb)               Primary Care Provider Office Phone # Fax #    Lucía Sandhu [phone]       [address]  [facility] [address]        Equal Access to Services     [facility] : Hadii aad ku hadasho Soomaali, waaxda luqadaha, qaybta kaalmada adeegyada, waxay idiin hayaan adeeg kharanicole coelho . So [facility] [phone].    ATENCIÓN: Si habla español, tiene a melgar disposición servicios gratuitos de asistencia lingüística. Ll[facility] [phone].    We comply with applicable federal civil rights laws and [place] laws. We do not discriminate on the basis of race, color, national origin, age, disability, sex, sexual orientation, or gender identity.            Thank you!     Thank you for choosing [facility] UROLOGY CLINIC [place]  for your care. Our goal is always to provide you with excellent care. Hearing back from our patients is one way we can continue to improve our services. Please take a few minutes to complete the written survey that you may receive in the mail after your visit with us. Thank you!             Your Updated Medication List - Protect others around you: Learn how to safely use, store and throw away your medicines at www.disposemymeds.org.          This list is accurate as of 8/2/18  3:41 PM.  Always use your " most recent med list.                   Brand Name Dispense Instructions for use Diagnosis    ALEVE PO           atenolol 25 MG tablet    TENORMIN    90 tablet    TAKE 1 TABLET BY MOUTH ONCE DAILY    Essential hypertension, benign       chlorthalidone 25 MG tablet    HYGROTON    90 tablet    TAKE 1 TABLET BY MOUTH ONE TIME DAILY    Essential hypertension, benign       dutasteride 0.5 MG capsule    AVODART    90 capsule    Take 1 capsule (0.5 mg) by mouth daily    Elevated PSA       loratadine 10 MG tablet    CLARITIN    30 tablet    Take 1 tablet (10 mg) by mouth daily as needed for allergies        losartan 100 MG tablet    COZAAR    90 tablet    TAKE 1 TABLET BY MOUTH ONE TIME DAILY    Essential hypertension, benign       Misc Intestinal Kaylin Regulat Tabs      Take 1 tablet by mouth daily        mometasone 50 MCG/ACT spray    NASONEX    3 Box    Spray 2 sprays into both nostrils daily    Chronic rhinitis       order for DME     1 Device    Bi pap sleep apnea 2003    MAN (obstructive sleep apnea)       Vitamin D (Cholecalciferol) 1000 units Caps      Take 2,000 Units by mouth daily

## 2018-08-11 DIAGNOSIS — R97.20 ELEVATED PSA: ICD-10-CM

## 2018-08-13 RX ORDER — DUTASTERIDE 0.5 MG/1
CAPSULE, LIQUID FILLED ORAL
Qty: 90 CAPSULE | Refills: 3 | Status: SHIPPED | OUTPATIENT
Start: 2018-08-13 | End: 2019-08-01

## 2018-08-20 ENCOUNTER — OFFICE VISIT (OUTPATIENT)
Dept: INTERNAL MEDICINE | Facility: CLINIC | Age: 68
End: 2018-08-20
Payer: COMMERCIAL

## 2018-08-20 VITALS
HEIGHT: 67 IN | BODY MASS INDEX: 29.35 KG/M2 | HEART RATE: 56 BPM | SYSTOLIC BLOOD PRESSURE: 128 MMHG | WEIGHT: 187 LBS | DIASTOLIC BLOOD PRESSURE: 64 MMHG | TEMPERATURE: 97.9 F | OXYGEN SATURATION: 97 %

## 2018-08-20 DIAGNOSIS — Z23 NEED FOR SHINGLES VACCINE: ICD-10-CM

## 2018-08-20 DIAGNOSIS — Z00.00 ROUTINE GENERAL MEDICAL EXAMINATION AT A HEALTH CARE FACILITY: Primary | ICD-10-CM

## 2018-08-20 DIAGNOSIS — N40.0 BENIGN PROSTATIC HYPERPLASIA WITHOUT LOWER URINARY TRACT SYMPTOMS: ICD-10-CM

## 2018-08-20 DIAGNOSIS — I10 ESSENTIAL HYPERTENSION, BENIGN: ICD-10-CM

## 2018-08-20 LAB
ALBUMIN SERPL-MCNC: 4.2 G/DL (ref 3.4–5)
ALBUMIN UR-MCNC: NEGATIVE MG/DL
ALP SERPL-CCNC: 50 U/L (ref 40–150)
ALT SERPL W P-5'-P-CCNC: 31 U/L (ref 0–70)
ANION GAP SERPL CALCULATED.3IONS-SCNC: 7 MMOL/L (ref 3–14)
APPEARANCE UR: CLEAR
AST SERPL W P-5'-P-CCNC: 20 U/L (ref 0–45)
BILIRUB SERPL-MCNC: 0.9 MG/DL (ref 0.2–1.3)
BILIRUB UR QL STRIP: NEGATIVE
BUN SERPL-MCNC: 28 MG/DL (ref 7–30)
CALCIUM SERPL-MCNC: 8.9 MG/DL (ref 8.5–10.1)
CHLORIDE SERPL-SCNC: 104 MMOL/L (ref 94–109)
CHOLEST SERPL-MCNC: 175 MG/DL
CO2 SERPL-SCNC: 29 MMOL/L (ref 20–32)
COLOR UR AUTO: YELLOW
CREAT SERPL-MCNC: 1.16 MG/DL (ref 0.66–1.25)
ERYTHROCYTE [DISTWIDTH] IN BLOOD BY AUTOMATED COUNT: 12.3 % (ref 10–15)
GFR SERPL CREATININE-BSD FRML MDRD: 62 ML/MIN/1.7M2
GLUCOSE SERPL-MCNC: 113 MG/DL (ref 70–99)
GLUCOSE UR STRIP-MCNC: NEGATIVE MG/DL
HCT VFR BLD AUTO: 42.1 % (ref 40–53)
HDLC SERPL-MCNC: 31 MG/DL
HGB BLD-MCNC: 15 G/DL (ref 13.3–17.7)
HGB UR QL STRIP: NEGATIVE
KETONES UR STRIP-MCNC: NEGATIVE MG/DL
LDLC SERPL CALC-MCNC: 89 MG/DL
LEUKOCYTE ESTERASE UR QL STRIP: NEGATIVE
MCH RBC QN AUTO: 32.8 PG (ref 26.5–33)
MCHC RBC AUTO-ENTMCNC: 35.6 G/DL (ref 31.5–36.5)
MCV RBC AUTO: 92 FL (ref 78–100)
NITRATE UR QL: NEGATIVE
NONHDLC SERPL-MCNC: 144 MG/DL
PH UR STRIP: 5.5 PH (ref 5–7)
PLATELET # BLD AUTO: 131 10E9/L (ref 150–450)
POTASSIUM SERPL-SCNC: 3.7 MMOL/L (ref 3.4–5.3)
PROT SERPL-MCNC: 6.9 G/DL (ref 6.8–8.8)
RBC # BLD AUTO: 4.57 10E12/L (ref 4.4–5.9)
SODIUM SERPL-SCNC: 140 MMOL/L (ref 133–144)
SOURCE: NORMAL
SP GR UR STRIP: 1.02 (ref 1–1.03)
TRIGL SERPL-MCNC: 274 MG/DL
TSH SERPL DL<=0.005 MIU/L-ACNC: 1.48 MU/L (ref 0.4–4)
UROBILINOGEN UR STRIP-ACNC: 0.2 EU/DL (ref 0.2–1)
WBC # BLD AUTO: 6.6 10E9/L (ref 4–11)

## 2018-08-20 PROCEDURE — 80053 COMPREHEN METABOLIC PANEL: CPT | Performed by: INTERNAL MEDICINE

## 2018-08-20 PROCEDURE — 81003 URINALYSIS AUTO W/O SCOPE: CPT | Performed by: INTERNAL MEDICINE

## 2018-08-20 PROCEDURE — 99397 PER PM REEVAL EST PAT 65+ YR: CPT | Mod: 25 | Performed by: INTERNAL MEDICINE

## 2018-08-20 PROCEDURE — 85027 COMPLETE CBC AUTOMATED: CPT | Performed by: INTERNAL MEDICINE

## 2018-08-20 PROCEDURE — 84443 ASSAY THYROID STIM HORMONE: CPT | Performed by: INTERNAL MEDICINE

## 2018-08-20 PROCEDURE — 90471 IMMUNIZATION ADMIN: CPT | Performed by: INTERNAL MEDICINE

## 2018-08-20 PROCEDURE — 90750 HZV VACC RECOMBINANT IM: CPT | Performed by: INTERNAL MEDICINE

## 2018-08-20 PROCEDURE — 80061 LIPID PANEL: CPT | Performed by: INTERNAL MEDICINE

## 2018-08-20 PROCEDURE — 36415 COLL VENOUS BLD VENIPUNCTURE: CPT | Performed by: INTERNAL MEDICINE

## 2018-08-20 RX ORDER — CHLORTHALIDONE 25 MG/1
12.5 TABLET ORAL DAILY
Qty: 90 TABLET | Refills: 2 | Status: SHIPPED | OUTPATIENT
Start: 2018-08-20 | End: 2019-08-21

## 2018-08-20 ASSESSMENT — ACTIVITIES OF DAILY LIVING (ADL)
CURRENT_FUNCTION: NO ASSISTANCE NEEDED
I_NEED_ASSISTANCE_FOR_THE_FOLLOWING_DAILY_ACTIVITIES:: NO ASSISTANCE IS NEEDED

## 2018-08-20 NOTE — LETTER
August 21, 2018      Harpreet Vera  [address] [place] [address]        Dear ,    We are writing to inform you of your test results.  High TG and low HDL cholesterol. Keep diet and exercise.   Borderline elevated glucose and low platelets . Suggest to recheck in 3 months CBC and check HgbA1C.      Resulted Orders   CBC with platelets   Result Value Ref Range    WBC 6.6 4.0 - 11.0 10e9/L    RBC Count 4.57 4.4 - 5.9 10e12/L    Hemoglobin 15.0 13.3 - 17.7 g/dL    Hematocrit 42.1 40.0 - 53.0 %    MCV 92 78 - 100 fl    MCH 32.8 26.5 - 33.0 pg    MCHC 35.6 31.5 - 36.5 g/dL    RDW 12.3 10.0 - 15.0 %    Platelet Count 131 (L) 150 - 450 10e9/L      Comment:      Results confirmed by repeat test   Comprehensive metabolic panel   Result Value Ref Range    Sodium 140 133 - 144 mmol/L    Potassium 3.7 3.4 - 5.3 mmol/L    Chloride 104 94 - 109 mmol/L    Carbon Dioxide 29 20 - 32 mmol/L    Anion Gap 7 3 - 14 mmol/L    Glucose 113 (H) 70 - 99 mg/dL      Comment:      Fasting specimen    Urea Nitrogen 28 7 - 30 mg/dL    Creatinine 1.16 0.66 - 1.25 mg/dL    GFR Estimate 62 >60 mL/min/1.7m2      Comment:      Non  GFR Calc    GFR Estimate If Black 75 >60 mL/min/1.7m2      Comment:       GFR Calc    Calcium 8.9 8.5 - 10.1 mg/dL    Bilirubin Total 0.9 0.2 - 1.3 mg/dL    Albumin 4.2 3.4 - 5.0 g/dL    Protein Total 6.9 6.8 - 8.8 g/dL    Alkaline Phosphatase 50 40 - 150 U/L    ALT 31 0 - 70 U/L    AST 20 0 - 45 U/L   Lipid panel reflex to direct LDL Fasting   Result Value Ref Range    Cholesterol 175 <200 mg/dL    Triglycerides 274 (H) <150 mg/dL      Comment:      Borderline high:  150-199 mg/dl  High:             200-499 mg/dl  Very high:       >499 mg/dl  Fasting specimen      HDL Cholesterol 31 (L) >39 mg/dL    LDL Cholesterol Calculated 89 <100 mg/dL      Comment:      Desirable:       <100 mg/dl    Non HDL Cholesterol 144 (H) <130 mg/dL      Comment:      Above Desirable:   130-159 mg/dl  Borderline high:  160-189 mg/dl  High:             190-219 mg/dl  Very high:       >219 mg/dl     TSH with free T4 reflex   Result Value Ref Range    TSH 1.48 0.40 - 4.00 mU/L   *UA reflex to Microscopic   Result Value Ref Range    Color Urine Yellow     Appearance Urine Clear     Glucose Urine Negative NEG^Negative mg/dL    Bilirubin Urine Negative NEG^Negative    Ketones Urine Negative NEG^Negative mg/dL    Specific Gravity Urine 1.020 1.003 - 1.035    Blood Urine Negative NEG^Negative    pH Urine 5.5 5.0 - 7.0 pH    Protein Albumin Urine Negative NEG^Negative mg/dL    Urobilinogen Urine 0.2 0.2 - 1.0 EU/dL    Nitrite Urine Negative NEG^Negative    Leukocyte Esterase Urine Negative NEG^Negative    Source Midstream Urine        If you have any questions or concerns, please call the clinic at the number listed above.       Sincerely,        Gonzalez Fung MD

## 2018-08-20 NOTE — PROGRESS NOTES
SUBJECTIVE:   Harpreet Vera is a 68 year old male who presents for Preventive Visit.      Are you in the first 12 months of your Medicare coverage?  No    Physical   Annual:     Getting at least 3 servings of Calcium per day:  NO    Bi-annual eye exam:  Yes    Dental care twice a year:  Yes    Sleep apnea or symptoms of sleep apnea:  Sleep apnea    Diet:  Regular (no restrictions)    Frequency of exercise:  1 day/week    Duration of exercise:  15-30 minutes    Taking medications regularly:  Yes    Medication side effects:  Other    Additional concerns today:  No    Ability to successfully perform activities of daily living: no assistance needed    Home Safety:  Throw rugs in the hallway    Hearing Impairment: need to ask people to speak up or repeat themselves, find that men's voices are easier to understand than woman's and difficulty understanding soft or whispered speech        Fall risk:       COGNITIVE SCREEN  1) Repeat 3 items (Leader, Season, Table)    2) Clock draw: NORMAL  3) 3 item recall: Recalls 3 objects  Results: 3 items recalled: COGNITIVE IMPAIRMENT LESS LIKELY    Mini-CogTM Copyright S Johnna. Licensed by the author for use in [facility]; reprinted with permission (somazin@[email or web address]). All rights reserved.        Reviewed and updated as needed this visit by clinical staff         Reviewed and updated as needed this visit by Provider        Social History   Substance Use Topics     Smoking status: Never Smoker     Smokeless tobacco: Never Used     Alcohol use 0.0 oz/week     0 Standard drinks or equivalent per week      Comment: rarely       Alcohol Use 8/20/2018   If you drink alcohol do you typically have greater than 3 drinks per day OR greater than 7 drinks per week? No           PROBLEMS TO ADD ON...  Has h/o HTN. on medical treatment. BP has been controlled. No side effects from medications. No CP, HA,has felt mild dizziness with getting up from bending down.Good compliance with  medications and low salt diet.  Has H/O BPH. On treatment with Avodart . No  symptoms of frequency, decreased urinary stream or dysuria. No side effects from medications. Follows with urology.       Today's PHQ-2 Score: 0  PHQ-2 ( 1999 [employer]) 8/20/2018   Q1: Little interest or pleasure in doing things 0   Q2: Feeling down, depressed or hopeless 0   PHQ-2 Score 0   Q1: Little interest or pleasure in doing things Not at all   Q2: Feeling down, depressed or hopeless Not at all   PHQ-2 Score 0       Do you feel safe in your environment - Yes    Do you have a Health Care Directive?: Yes: Advance Directive has been received and scanned.    Current providers sharing in care for this patient include:   Patient Care Team:  Lucía Sandhu MD as PCP - General    The following health maintenance items are reviewed in Epic and correct as of today:  Health Maintenance   Topic Date Due     HEPATITIS C SCREENING  04/10/1966     AORTIC ANEURYSM SCREENING (SYSTEM ASSIGNED)  04/10/2013     PNEUMOCOCCAL (1 of 2 - PCV13) 10/01/2013     ADVANCE DIRECTIVE PLANNING Q5 YRS  02/02/2017     FALL RISK ASSESSMENT  04/03/2018     PHQ-2 Q1 YR  04/03/2018     INFLUENZA VACCINE (1) 09/01/2018     DEXA Q5 YR  09/19/2021     TETANUS Q10 YR  10/12/2021     LIPID SCREEN Q5 YR MALE (SYSTEM ASSIGNED)  04/03/2022     COLONOSCOPY Q10 YR  04/05/2027     Labs reviewed in EPIC  BP Readings from Last 3 Encounters:   08/20/18 128/64   09/06/17 123/72   08/09/17 134/76    Wt Readings from Last 3 Encounters:   08/20/18 187 lb (84.8 kg)   08/02/18 185 lb (83.9 kg)   09/06/17 185 lb (83.9 kg)                        Review of Systems  CONSTITUTIONAL: NEGATIVE for fever, chills, change in weight  INTEGUMENTARY/SKIN: NEGATIVE for worrisome rashes, moles or lesions  EYES: NEGATIVE for vision changes or irritation  ENT/MOUTH: NEGATIVE for ear, mouth and throat problems  RESP: NEGATIVE for significant cough or SOB  BREAST: NEGATIVE for masses, tenderness or  "discharge  CV: NEGATIVE for chest pain, palpitations or peripheral edema  GI: NEGATIVE for nausea, abdominal pain, heartburn, or change in bowel habits  : NEGATIVE for frequency, dysuria, or hematuria  MUSCULOSKELETAL: NEGATIVE for significant arthralgias or myalgia  NEURO: NEGATIVE for weakness, dizziness or paresthesias  ENDOCRINE: NEGATIVE for temperature intolerance, skin/hair changes  HEME: NEGATIVE for bleeding problems  PSYCHIATRIC: NEGATIVE for changes in mood or affect    OBJECTIVE:   There were no vitals taken for this visit. Estimated body mass index is 28.98 kg/(m^2) as calculated from the following:    Height as of 8/2/18: 5' 7\" (1.702 m).    Weight as of 8/2/18: 185 lb (83.9 kg).  Physical Exam  GENERAL: healthy, alert and no distress  EYES: Eyes grossly normal to inspection, PERRL and conjunctivae and sclerae normal  HENT: ear canals and TM's normal, nose and mouth without ulcers or lesions  NECK: no adenopathy, no asymmetry, masses, or scars and thyroid normal to palpation  RESP: lungs clear to auscultation - no rales, rhonchi or wheezes  CV: regular rate and rhythm, normal S1 S2, no S3 or S4, no murmur, click or rub, no peripheral edema and peripheral pulses strong  ABDOMEN: soft, nontender, no hepatosplenomegaly, no masses and bowel sounds normal  MS: no gross musculoskeletal defects noted, no edema  SKIN: no suspicious lesions or rashes  NEURO: Normal strength and tone, mentation intact and speech normal  PSYCH: mentation appears normal, affect normal/bright    Diagnostic Test Results:  none     ASSESSMENT / PLAN:       ICD-10-CM    1. Routine general medical examination at a health care facility Z00.00 CBC with platelets     Comprehensive metabolic panel     Lipid panel reflex to direct LDL Fasting     TSH with free T4 reflex     *UA reflex to Microscopic   2. Essential hypertension, benign I10 CBC with platelets     Comprehensive metabolic panel     Lipid panel reflex to direct LDL Fasting     " "TSH with free T4 reflex     *UA reflex to Microscopic     chlorthalidone (HYGROTON) 25 MG tablet   3. Benign prostatic hyperplasia without lower urinary tract symptoms N40.0      Decrease Chlorthalidone to 12.5 mg, monitor BP. Likely orthostatic BP changes.   Also has bradycardia, may needs to stop BB if symptoms persist.     End of Life Planning:  Patient currently has an advanced directive: Yes.  Practitioner is supportive of decision.    COUNSELING:  Reviewed preventive health counseling, as reflected in patient instructions       Regular exercise       Healthy diet/nutrition       Vision screening       Hearing screening       Dental care       Colon cancer screening       Prostate cancer screening    BP Readings from Last 1 Encounters:   09/06/17 123/72     Estimated body mass index is 28.98 kg/(m^2) as calculated from the following:    Height as of 8/2/18: 5' 7\" (1.702 m).    Weight as of 8/2/18: 185 lb (83.9 kg).           reports that he has never smoked. He has never used smokeless tobacco.      Appropriate preventive services were discussed with this patient, including applicable screening as appropriate for cardiovascular disease, diabetes, osteopenia/osteoporosis, and glaucoma.  As appropriate for age/gender, discussed screening for colorectal cancer, prostate cancer, breast cancer, and cervical cancer. Checklist reviewing preventive services available has been given to the patient.    Reviewed patients plan of care and provided an AVS. The Intermediate Care Plan ( asthma action plan, low back pain action plan, and migraine action plan) for Harpreet meets the Care Plan requirement. This Care Plan has been established and reviewed with the Patient.    Counseling Resources:  ATP IV Guidelines  Pooled Cohorts Equation Calculator  Breast Cancer Risk Calculator  FRAX Risk Assessment  ICSI Preventive Guidelines  Dietary Guidelines for Americans, 2010  USDA's MyPlate  ASA Prophylaxis  Lung CA Screening    Gonzalez " DIAMANTE Fung MD  [facility]  Answers for HPI/ROS submitted by the patient on 8/20/2018   PHQ-2 Score: 0

## 2018-08-20 NOTE — NURSING NOTE
"Vital signs:  Temp: 97.9  F (36.6  C) Temp src: Oral BP: 128/64 Pulse: 56     SpO2: 97 %     Height: 5' 7\" (170.2 cm) Weight: 187 lb (84.8 kg)  Estimated body mass index is 29.29 kg/(m^2) as calculated from the following:    Height as of this encounter: 5' 7\" (1.702 m).    Weight as of this encounter: 187 lb (84.8 kg).          "

## 2018-08-20 NOTE — MR AVS SNAPSHOT
After Visit Summary   8/20/2018    Harpreet Vera    MRN: [id number]           Patient Information     Date Of Birth          1950        Visit Information        Provider Department      8/20/2018 7:20 AM Gonzalez Fung MD [facility]        Today's Diagnoses     Routine general medical examination at a health care facility    -  1    Essential hypertension, benign        Benign prostatic hyperplasia without lower urinary tract symptoms           Follow-ups after your visit        Follow-up notes from your care team     Return in about 1 year (around 8/20/2019) for Physical Exam.      Who to contact     If you have questions or need follow up information about today's clinic visit or your schedule please contact [facility] directly at [phone].  Normal or non-critical lab and imaging results will be communicated to you by MyChart, letter or phone within 4 business days after the clinic has received the results. If you do not hear from us within 7 days, please contact the clinic through [id number] or phone. If you have a critical or abnormal lab result, we will notify you by phone as soon as possible.  Submit refill requests through [employer] or call your pharmacy and they will forward the refill request to us. Please allow 3 business days for your refill to be completed.          Additional Information About Your Visit        MyChart Information     [employer] gives you secure access to your electronic health record. If you see a primary care provider, you can also send messages to your care team and make appointments. If you have questions, please call your primary care clinic.  If you do not have a primary care provider, please call [phone] and they will assist you.        Care EveryWhere ID     This is your Care EveryWhere ID. This could be used by other organizations to access your [facility] medical records  [id number]        Your Vitals Were     Pulse  "Temperature Height Pulse Oximetry BMI (Body Mass Index)       56 97.9  F (36.6  C) (Oral) 5' 7\" (1.702 m) 97% 29.29 kg/m2        Blood Pressure from Last 3 Encounters:   08/20/18 128/64   09/06/17 123/72   08/09/17 134/76    Weight from Last 3 Encounters:   08/20/18 187 lb (84.8 kg)   08/02/18 185 lb (83.9 kg)   09/06/17 185 lb (83.9 kg)              We Performed the Following     *UA reflex to Microscopic     CBC with platelets     Comprehensive metabolic panel     Lipid panel reflex to direct LDL Fasting     TSH with free T4 reflex          Today's Medication Changes          These changes are accurate as of 8/20/18  7:48 AM.  If you have any questions, ask your nurse or doctor.               These medicines have changed or have updated prescriptions.        Dose/Directions    chlorthalidone 25 MG tablet   Commonly known as:  HYGROTON   This may have changed:  See the new instructions.   Used for:  Essential hypertension, benign   Changed by:  Gonzalez Fung MD        Dose:  12.5 mg   Take 0.5 tablets (12.5 mg) by mouth daily   Quantity:  90 tablet   Refills:  2            Where to get your medicines      These medications were sent to [id number] [place] [id number]     Phone:  [phone]     chlorthalidone 25 MG tablet                Primary Care Provider Office Phone # Fax #    Lucía Sandhu [phone]       [address] [address]        Equal Access to Services     [facility] : Hadii mariela villanueva hadasho Soomaali, waaxda luqadaha, qaybta kaalmada adeegyada, arun coelho . So [facility] [phone].    ATENCIÓN: Si habla español, tiene a melgar disposición servicios gratuitos de asistencia lingüística. Km al [phone].    We comply with applicable federal civil rights laws and [place] laws. We do not discriminate on the basis of race, color, national origin, age, " disability, sex, sexual orientation, or gender identity.            Thank you!     Thank you for choosing [facility]  for your care. Our goal is always to provide you with excellent care. Hearing back from our patients is one way we can continue to improve our services. Please take a few minutes to complete the written survey that you may receive in the mail after your visit with us. Thank you!             Your Updated Medication List - Protect others around you: Learn how to safely use, store and throw away your medicines at www.disposemymeds.org.          This list is accurate as of 8/20/18  7:48 AM.  Always use your most recent med list.                   Brand Name Dispense Instructions for use Diagnosis    ALEVE PO           atenolol 25 MG tablet    TENORMIN    90 tablet    TAKE 1 TABLET BY MOUTH ONCE DAILY    Essential hypertension, benign       chlorthalidone 25 MG tablet    HYGROTON    90 tablet    Take 0.5 tablets (12.5 mg) by mouth daily    Essential hypertension, benign       dutasteride 0.5 MG capsule    AVODART    90 capsule    TAKE 1 CAPSULE BY MOUTH DAILY    Elevated PSA       loratadine 10 MG tablet    CLARITIN    30 tablet    Take 1 tablet (10 mg) by mouth daily as needed for allergies        losartan 100 MG tablet    COZAAR    90 tablet    TAKE 1 TABLET BY MOUTH ONE TIME DAILY    Essential hypertension, benign       Misc Intestinal Kaylin Regulat Tabs      Take 1 tablet by mouth daily        mometasone 50 MCG/ACT spray    NASONEX    3 Box    Spray 2 sprays into both nostrils daily    Chronic rhinitis       Vitamin D (Cholecalciferol) 1000 units Caps      Take 2,000 Units by mouth daily

## 2018-09-22 DIAGNOSIS — I10 ESSENTIAL HYPERTENSION, BENIGN: ICD-10-CM

## 2018-09-24 NOTE — TELEPHONE ENCOUNTER
"Requested Prescriptions   Pending Prescriptions Disp Refills     losartan (COZAAR) 100 MG tablet [Pharmacy Med Name: Losartan Potassium Oral Tablet 100 MG] 90 tablet 0    Last Written Prescription Date:  06/21/2018  Last Fill Quantity: 90,  # refills: 0   Last office visit: 8/20/2018 with prescribing provider:     Future Office Visit:   Sig: TAKE 1 TABLET BY MOUTH ONE TIME DAILY    Angiotensin-II Receptors Passed    9/22/2018 11:48 AM       Passed - Blood pressure under 140/90 in past 12 months    BP Readings from Last 3 Encounters:   08/20/18 128/64   09/06/17 123/72   08/09/17 134/76                Passed - Recent (12 mo) or future (30 days) visit within the authorizing provider's specialty    Patient had office visit in the last 12 months or has a visit in the next 30 days with authorizing provider or within the authorizing provider's specialty.  See \"Patient Info\" tab in inbasket, or \"Choose Columns\" in Meds & Orders section of the refill encounter.           Passed - Patient is age 18 or older       Passed - Normal serum creatinine on file in past 12 months    Recent Labs   Lab Test  08/20/18   0753   CR  1.16            Passed - Normal serum potassium on file in past 12 months    Recent Labs   Lab Test  08/20/18   0753   POTASSIUM  3.7                    "

## 2018-09-25 RX ORDER — LOSARTAN POTASSIUM 100 MG/1
TABLET ORAL
Qty: 90 TABLET | Refills: 2 | Status: SHIPPED | OUTPATIENT
Start: 2018-09-25 | End: 2019-06-26

## 2018-11-09 DIAGNOSIS — I10 ESSENTIAL HYPERTENSION, BENIGN: ICD-10-CM

## 2018-11-09 RX ORDER — ATENOLOL 25 MG/1
TABLET ORAL
Qty: 90 TABLET | Refills: 3 | Status: SHIPPED | OUTPATIENT
Start: 2018-11-09 | End: 2019-08-21

## 2018-11-09 NOTE — TELEPHONE ENCOUNTER
"Requested Prescriptions   Pending Prescriptions Disp Refills     atenolol (TENORMIN) 25 MG tablet [Pharmacy Med Name: Atenolol Oral Tablet 25 MG] 90 tablet 1    Last Written Prescription Date:  02/14/2018/  Last Fill Quantity: 90,  # refills: 2   Last office visit: 8/20/2018 with prescribing provider:     Future Office Visit:   Sig: TAKE 1 TABLET BY MOUTH ONE TIME DAILY    Beta-Blockers Protocol Passed    11/9/2018  7:00 AM       Passed - Blood pressure under 140/90 in past 12 months    BP Readings from Last 3 Encounters:   08/20/18 128/64   09/06/17 123/72   08/09/17 134/76                Passed - Patient is age 6 or older       Passed - Recent (12 mo) or future (30 days) visit within the authorizing provider's specialty    Patient had office visit in the last 12 months or has a visit in the next 30 days with authorizing provider or within the authorizing provider's specialty.  See \"Patient Info\" tab in inbasket, or \"Choose Columns\" in Meds & Orders section of the refill encounter.              "

## 2019-01-02 ENCOUNTER — ALLIED HEALTH/NURSE VISIT (OUTPATIENT)
Dept: NURSING | Facility: CLINIC | Age: 69
End: 2019-01-02
Payer: MEDICARE

## 2019-01-02 DIAGNOSIS — Z23 NEED FOR VACCINATION: Primary | ICD-10-CM

## 2019-01-02 PROCEDURE — 90471 IMMUNIZATION ADMIN: CPT

## 2019-01-02 PROCEDURE — 90750 HZV VACC RECOMBINANT IM: CPT

## 2019-01-02 NOTE — NURSING NOTE
"Chief Complaint   Patient presents with     Allied Health Visit     2nd shingrix     initial There were no vitals taken for this visit. Estimated body mass index is 29.29 kg/m  as calculated from the following:    Height as of 8/20/18: 1.702 m (5' 7\").    Weight as of 8/20/18: 84.8 kg (187 lb)..  bp completed using cuff size NA (Not Taken)  MARYBEL WILSON LPN  .Screening Questionnaire for Adult Immunization    Are you sick today?   No   Do you have allergies to medications, food, a vaccine component or latex?   No   Have you ever had a serious reaction after receiving a vaccination?   No   Do you have a long-term health problem with heart disease, lung disease, asthma, kidney disease, metabolic disease (e.g. diabetes), anemia, or other blood disorder?   No   Do you have cancer, leukemia, HIV/AIDS, or any other immune system problem?   No   In the past 3 months, have you taken medications that affect  your immune system, such as prednisone, other steroids, or anticancer drugs; drugs for the treatment of rheumatoid arthritis, Crohn s disease, or psoriasis; or have you had radiation treatments?   No   Have you had a seizure, or a brain or other nervous system problem?   No   During the past year, have you received a transfusion of blood or blood     products, or been given immune (gamma) globulin or antiviral drug?   No   For women: Are you pregnant or is there a chance you could become        pregnant during the next month?   No   Have you received any vaccinations in the past 4 weeks?   No     Immunization questionnaire answers were all negative.        Per orders of Dr. Sandhu, injection of Shingrix given by Marybel Wilson. Patient instructed to remain in clinic for 15 minutes afterwards, and to report any adverse reaction to me immediately.       Screening performed by Marybel Wilson on 1/2/2019 at 1:39 PM.  .Prior to injection, verified patient identity using patient's name and date of birth.  Due to injection " administration, patient instructed to remain in clinic for 15 minutes  afterwards, and to report any adverse reaction to me immediately.        Drug Amount Wasted:  None.  Vial/Syringe: Single dose vial  Expiration Date:  04/18/2021

## 2019-06-26 DIAGNOSIS — I10 ESSENTIAL HYPERTENSION, BENIGN: ICD-10-CM

## 2019-06-26 NOTE — TELEPHONE ENCOUNTER
"Requested Prescriptions   Pending Prescriptions Disp Refills     losartan (COZAAR) 100 MG tablet [Pharmacy Med Name: Losartan Potassium Oral Tablet 100 MG] 90 tablet 1     Sig: TAKE 1 TABLET BY MOUTH ONE TIME DAILY   Last Written Prescription Date:  09/25/2018  Last Fill Quantity: 90,  # refills: 2   Last office visit: 8/20/2018 with prescribing provider:     Future Office Visit:      Angiotensin-II Receptors Passed - 6/26/2019  7:01 AM        Passed - Blood pressure under 140/90 in past 12 months     BP Readings from Last 3 Encounters:   08/20/18 128/64   09/06/17 123/72   08/09/17 134/76                 Passed - Recent (12 mo) or future (30 days) visit within the authorizing provider's specialty     Patient had office visit in the last 12 months or has a visit in the next 30 days with authorizing provider or within the authorizing provider's specialty.  See \"Patient Info\" tab in inbasket, or \"Choose Columns\" in Meds & Orders section of the refill encounter.              Passed - Medication is active on med list        Passed - Patient is age 18 or older        Passed - Normal serum creatinine on file in past 12 months     Recent Labs   Lab Test 08/20/18  0753   CR 1.16             Passed - Normal serum potassium on file in past 12 months     Recent Labs   Lab Test 08/20/18  0753   POTASSIUM 3.7                    "

## 2019-06-27 RX ORDER — LOSARTAN POTASSIUM 100 MG/1
TABLET ORAL
Qty: 90 TABLET | Refills: 0 | Status: SHIPPED | OUTPATIENT
Start: 2019-06-27 | End: 2019-08-21

## 2019-06-27 NOTE — TELEPHONE ENCOUNTER
Medication is being filled for 1 time refill only due to:  Patient needs to be seen because it has been more than one year since last visit.     Annual visit due in August.  [employer] message sent.  Silke Davis RN

## 2019-08-01 DIAGNOSIS — R97.20 ELEVATED PSA: ICD-10-CM

## 2019-08-01 RX ORDER — DUTASTERIDE 0.5 MG/1
CAPSULE, LIQUID FILLED ORAL
Qty: 90 CAPSULE | Refills: 2 | Status: SHIPPED | OUTPATIENT
Start: 2019-08-01 | End: 2020-05-12

## 2019-08-16 DIAGNOSIS — R97.20 ELEVATED PSA: Primary | ICD-10-CM

## 2019-08-21 ENCOUNTER — OFFICE VISIT (OUTPATIENT)
Dept: INTERNAL MEDICINE | Facility: CLINIC | Age: 69
End: 2019-08-21
Payer: MEDICARE

## 2019-08-21 VITALS
TEMPERATURE: 97.7 F | HEIGHT: 67 IN | DIASTOLIC BLOOD PRESSURE: 72 MMHG | BODY MASS INDEX: 29.84 KG/M2 | OXYGEN SATURATION: 98 % | HEART RATE: 48 BPM | RESPIRATION RATE: 16 BRPM | SYSTOLIC BLOOD PRESSURE: 132 MMHG | WEIGHT: 190.1 LBS

## 2019-08-21 DIAGNOSIS — J31.0 CHRONIC RHINITIS: ICD-10-CM

## 2019-08-21 DIAGNOSIS — M25.511 RIGHT SHOULDER PAIN, UNSPECIFIED CHRONICITY: ICD-10-CM

## 2019-08-21 DIAGNOSIS — Z12.5 SCREENING FOR PROSTATE CANCER: ICD-10-CM

## 2019-08-21 DIAGNOSIS — Z00.00 PREVENTATIVE HEALTH CARE: Primary | ICD-10-CM

## 2019-08-21 DIAGNOSIS — G47.33 OSA (OBSTRUCTIVE SLEEP APNEA): ICD-10-CM

## 2019-08-21 DIAGNOSIS — I10 ESSENTIAL HYPERTENSION, BENIGN: ICD-10-CM

## 2019-08-21 LAB
ERYTHROCYTE [DISTWIDTH] IN BLOOD BY AUTOMATED COUNT: 13.2 % (ref 10–15)
HCT VFR BLD AUTO: 42 % (ref 40–53)
HGB BLD-MCNC: 14.8 G/DL (ref 13.3–17.7)
MCH RBC QN AUTO: 33 PG (ref 26.5–33)
MCHC RBC AUTO-ENTMCNC: 35.2 G/DL (ref 31.5–36.5)
MCV RBC AUTO: 94 FL (ref 78–100)
PLATELET # BLD AUTO: 133 10E9/L (ref 150–450)
RBC # BLD AUTO: 4.48 10E12/L (ref 4.4–5.9)
WBC # BLD AUTO: 5.8 10E9/L (ref 4–11)

## 2019-08-21 PROCEDURE — 36415 COLL VENOUS BLD VENIPUNCTURE: CPT | Performed by: INTERNAL MEDICINE

## 2019-08-21 PROCEDURE — 80061 LIPID PANEL: CPT | Performed by: INTERNAL MEDICINE

## 2019-08-21 PROCEDURE — G0439 PPPS, SUBSEQ VISIT: HCPCS | Performed by: INTERNAL MEDICINE

## 2019-08-21 PROCEDURE — G0103 PSA SCREENING: HCPCS | Performed by: INTERNAL MEDICINE

## 2019-08-21 PROCEDURE — 80053 COMPREHEN METABOLIC PANEL: CPT | Performed by: INTERNAL MEDICINE

## 2019-08-21 PROCEDURE — 84443 ASSAY THYROID STIM HORMONE: CPT | Performed by: INTERNAL MEDICINE

## 2019-08-21 PROCEDURE — 85027 COMPLETE CBC AUTOMATED: CPT | Performed by: INTERNAL MEDICINE

## 2019-08-21 RX ORDER — LOSARTAN POTASSIUM 100 MG/1
TABLET ORAL
Qty: 90 TABLET | Refills: 3 | Status: ON HOLD | OUTPATIENT
Start: 2019-08-21 | End: 2020-08-19

## 2019-08-21 RX ORDER — CHLORTHALIDONE 25 MG/1
12.5 TABLET ORAL DAILY
Qty: 45 TABLET | Refills: 3 | Status: SHIPPED | OUTPATIENT
Start: 2019-08-21 | End: 2020-03-18

## 2019-08-21 RX ORDER — NAPROXEN SODIUM 220 MG
220 TABLET ORAL DAILY PRN
COMMUNITY
Start: 2019-08-21 | End: 2020-01-15

## 2019-08-21 RX ORDER — MOMETASONE FUROATE MONOHYDRATE 50 UG/1
2 SPRAY, METERED NASAL DAILY
Qty: 3 BOX | Refills: 2 | Status: ON HOLD | COMMUNITY
Start: 2019-08-21 | End: 2020-08-19

## 2019-08-21 RX ORDER — ATENOLOL 25 MG/1
TABLET ORAL
Qty: 90 TABLET | Refills: 3 | Status: SHIPPED | OUTPATIENT
Start: 2019-08-21 | End: 2020-01-13

## 2019-08-21 ASSESSMENT — ENCOUNTER SYMPTOMS
COUGH: 0
HEMATURIA: 0
SHORTNESS OF BREATH: 0
NERVOUS/ANXIOUS: 0
PALPITATIONS: 0
MYALGIAS: 1
CHILLS: 0
DIARRHEA: 1
CONSTIPATION: 0
HEMATOCHEZIA: 0
DIZZINESS: 1
EYE PAIN: 0
JOINT SWELLING: 0
ARTHRALGIAS: 1
FREQUENCY: 0
WEAKNESS: 0
SORE THROAT: 0
DYSURIA: 0
FEVER: 0
PARESTHESIAS: 0
ABDOMINAL PAIN: 0
HEARTBURN: 0
HEADACHES: 0
NAUSEA: 0

## 2019-08-21 ASSESSMENT — ACTIVITIES OF DAILY LIVING (ADL): CURRENT_FUNCTION: NO ASSISTANCE NEEDED

## 2019-08-21 ASSESSMENT — MIFFLIN-ST. JEOR: SCORE: 1575.92

## 2019-08-21 NOTE — PROGRESS NOTES
"SUBJECTIVE:   Harpreet Vera is a 69 year old male who presents for Preventive Visit.    Are you in the first 12 months of your Medicare coverage?  No    Fasting.     Healthy Habits:     In general, how would you rate your overall health?  Good    Frequency of exercise:  2-3 days/week    Duration of exercise:  30-45 minutes    Do you usually eat at least 4 servings of fruit and vegetables a day, include whole grains    & fiber and avoid regularly eating high fat or \"junk\" foods?  No    Taking medications regularly:  Yes    Medication side effects:  Muscle aches and Lightheadedness    Ability to successfully perform activities of daily living:  No assistance needed    Home Safety:  Throw rugs in the hallway    Hearing Impairment:  Difficulty following a conversation in a noisy restaurant or crowded room, difficulty following dialogue in the theater, need to ask people to speak up or repeat themselves, find that men's voices are easier to understand than woman's and difficulty understanding soft or whispered speech    In the past 6 months, have you been bothered by leaking of urine? Yes    In general, how would you rate your overall mental or emotional health?  Excellent      PHQ-2 Total Score: 0    Additional concerns today:  No    Do you feel safe in your environment? Yes    Do you have a Health Care Directive? No: Advance care planning was reviewed with patient; patient declined at this time.      Fall risk  Fallen 2 or more times in the past year?: No  Any fall with injury in the past year?: No    Cognitive Screening   1) Repeat 3 items (Leader, Season, Table)    2) Clock draw: NORMAL  3) 3 item recall: Recalls 3 objects  Results: 3 items recalled: COGNITIVE IMPAIRMENT LESS LIKELY    Mini-CogTM Copyright ESTRELLA Oropeza. Licensed by the author for use in [facility] [employer]; reprinted with permission (sae@.[facility]). All rights reserved.      Do you have sleep apnea, excessive snoring or daytime drowsiness?: " yes    Reviewed and updated as needed this visit by clinical staff  Tobacco  Allergies  Meds  Med Hx  Surg Hx  Fam Hx  Soc Hx        Reviewed and updated as needed this visit by Provider        Social History     Tobacco Use     Smoking status: Never Smoker     Smokeless tobacco: Never Used   Substance Use Topics     Alcohol use: Yes     Alcohol/week: 0.0 oz     Comment: rarely         Alcohol Use 8/21/2019   Prescreen: >3 drinks/day or >7 drinks/week? No   Prescreen: >3 drinks/day or >7 drinks/week? -       Current providers sharing in care for this patient include:   Patient Care Team:  Lucía Sandhu MD as PCP - Gonzalez Liriano MD as Assigned PCP    The following health maintenance items are reviewed in Epic and correct as of today:  Health Maintenance   Topic Date Due     HEPATITIS C SCREENING  1950     AORTIC ANEURYSM SCREENING (SYSTEM ASSIGNED)  04/10/2013     PNEUMOCOCCAL IMMUNIZATION 65+ LOW/MEDIUM RISK (1 of 2 - PCV13) 10/01/2013     ADVANCE CARE PLANNING  02/02/2017     FALL RISK ASSESSMENT  04/03/2018     PHQ-2  01/01/2019     MEDICARE ANNUAL WELLNESS VISIT  08/20/2019     INFLUENZA VACCINE (1) 09/01/2019     DEXA  09/19/2021     DTAP/TDAP/TD IMMUNIZATION (3 - Td) 10/12/2021     LIPID  08/20/2023     COLONOSCOPY  04/05/2027     ZOSTER IMMUNIZATION  Completed     IPV IMMUNIZATION  Aged Out     MENINGITIS IMMUNIZATION  Aged Out     BP Readings from Last 3 Encounters:   08/21/19 132/72   08/20/18 128/64   09/06/17 123/72    Wt Readings from Last 3 Encounters:   08/21/19 86.2 kg (190 lb 1.6 oz)   08/20/18 84.8 kg (187 lb)   08/02/18 83.9 kg (185 lb)                 Review of Systems   Constitutional: Negative for chills and fever.   HENT: Positive for hearing loss. Negative for congestion, ear pain and sore throat.    Eyes: Negative for pain and visual disturbance.   Respiratory: Negative for cough and shortness of breath.    Cardiovascular: Positive for peripheral edema.  "Negative for chest pain and palpitations.   Gastrointestinal: Positive for diarrhea. Negative for abdominal pain, constipation, heartburn, hematochezia and nausea.   Genitourinary: Positive for urgency. Negative for discharge, dysuria, frequency, genital sores, hematuria and impotence.   Musculoskeletal: Positive for arthralgias and myalgias. Negative for joint swelling.   Skin: Negative for rash.   Neurological: Positive for dizziness. Negative for weakness, headaches and paresthesias.   Psychiatric/Behavioral: Negative for mood changes. The patient is not nervous/anxious.      HPI:   His right shoulder has been bothering him for the past 2 months after playing tennis and hitting the ball over hand and casting while fishing. Is waking him up at night due tot the pain.     OBJECTIVE:   /72 (BP Location: Left arm, Patient Position: Chair, Cuff Size: Adult Large)   Pulse (!) 48   Temp 97.7  F (36.5  C) (Oral)   Resp 16   Ht 1.702 m (5' 7\")   Wt 86.2 kg (190 lb 1.6 oz)   SpO2 98%   BMI 29.77 kg/m   Estimated body mass index is 29.77 kg/m  as calculated from the following:    Height as of this encounter: 1.702 m (5' 7\").    Weight as of this encounter: 86.2 kg (190 lb 1.6 oz).  Physical Exam  GENERAL: healthy, alert and no distress  EYES: Eyes grossly normal to inspection, PERRL and conjunctivae and sclerae normal  HENT: ear canals and TM's normal, nose and mouth without ulcers or lesions  NECK: no adenopathy, no asymmetry, masses, or scars and thyroid normal to palpation  RESP: lungs clear to auscultation - no rales, rhonchi or wheezes  CV: regular rate and rhythm, normal S1 S2, no S3 or S4, no murmur, click or rub, no peripheral edema and peripheral pulses strong  ABDOMEN: soft, nontender, no hepatosplenomegaly, no masses and bowel sounds normal  MS: no gross musculoskeletal defects noted, no edema  SKIN: no suspicious lesions or rashes  NEURO: Normal strength and tone, mentation intact and speech " "normal  PSYCH: mentation appears normal, affect normal/bright      ASSESSMENT / PLAN:   1. Preventative health care       2. Right shoulder pain, unspecified chronicity  Will refer to to PT  - naproxen sodium (ALEVE) 220 MG tablet; Take 1 tablet (220 mg) by mouth daily as needed for moderate pain  - [facility] PT, HAND, AND CHIROPRACTIC REFERRAL; Future    3. Essential hypertension, benign  under good control Continue current medications. Will update labs  - atenolol (TENORMIN) 25 MG tablet; TAKE 1 TABLET BY MOUTH ONE TIME DAILY  Dispense: 90 tablet; Refill: 3  - chlorthalidone (HYGROTON) 25 MG tablet; Take 0.5 tablets (12.5 mg) by mouth daily  Dispense: 45 tablet; Refill: 3  - Comprehensive metabolic panel  - TSH with free T4 reflex  - Lipid Profile  - CBC with platelets    4. Screening for prostate cancer  Due for  - PSA, screen    5. Chronic rhinitis     - mometasone (NASONEX) 50 MCG/ACT nasal spray; Spray 2 sprays into both nostrils daily prn  Dispense: 3 Box; Refill: 2    6. MAN (obstructive sleep apnea)  On CPAP      End of Life Planning:  Patient currently has an advanced directive: No.  I have verified the patient's ablity to prepare an advanced directive/make health care decisions.  Literature was provided to assist patient in preparing an advanced directive.    COUNSELING:  Reviewed preventive health counseling, as reflected in patient instructions       Regular exercise       Healthy diet/nutrition    Estimated body mass index is 29.77 kg/m  as calculated from the following:    Height as of this encounter: 1.702 m (5' 7\").    Weight as of this encounter: 86.2 kg (190 lb 1.6 oz).         reports that he has never smoked. He has never used smokeless tobacco.      Appropriate preventive services were discussed with this patient, including applicable screening as appropriate for cardiovascular disease, diabetes, osteopenia/osteoporosis, and glaucoma.  As appropriate for age/gender, discussed screening for colorectal " cancer, prostate cancer, breast cancer, and cervical cancer. Checklist reviewing preventive services available has been given to the patient.    Reviewed patients plan of care and provided an AVS. The Basic Care Plan (routine screening as documented in Health Maintenance) for Harpreet meets the Care Plan requirement. This Care Plan has been established and reviewed with the Patient.    Counseling Resources:  ATP IV Guidelines  Pooled Cohorts Equation Calculator  Breast Cancer Risk Calculator  FRAX Risk Assessment  ICSI Preventive Guidelines  Dietary Guidelines for Americans, 2010  USDA's MyPlate  ASA Prophylaxis  Lung CA Screening    Lucía Sandhu MD  [facility]    Identified Health Risks:

## 2019-08-22 ENCOUNTER — TELEPHONE (OUTPATIENT)
Dept: INTERNAL MEDICINE | Facility: CLINIC | Age: 69
End: 2019-08-22

## 2019-08-22 ENCOUNTER — OFFICE VISIT (OUTPATIENT)
Dept: UROLOGY | Facility: CLINIC | Age: 69
End: 2019-08-22
Payer: MEDICARE

## 2019-08-22 VITALS — HEART RATE: 46 BPM | BODY MASS INDEX: 29.82 KG/M2 | OXYGEN SATURATION: 97 % | WEIGHT: 190 LBS | HEIGHT: 67 IN

## 2019-08-22 DIAGNOSIS — N40.1 BENIGN PROSTATIC HYPERPLASIA WITH WEAK URINARY STREAM: Primary | ICD-10-CM

## 2019-08-22 DIAGNOSIS — Z80.42 FAMILY HISTORY OF PROSTATE CANCER: ICD-10-CM

## 2019-08-22 DIAGNOSIS — M25.511 RIGHT SHOULDER PAIN, UNSPECIFIED CHRONICITY: Primary | ICD-10-CM

## 2019-08-22 DIAGNOSIS — R39.12 BENIGN PROSTATIC HYPERPLASIA WITH WEAK URINARY STREAM: Primary | ICD-10-CM

## 2019-08-22 LAB
ALBUMIN SERPL-MCNC: 4.1 G/DL (ref 3.4–5)
ALP SERPL-CCNC: 55 U/L (ref 40–150)
ALT SERPL W P-5'-P-CCNC: 51 U/L (ref 0–70)
ANION GAP SERPL CALCULATED.3IONS-SCNC: 8 MMOL/L (ref 3–14)
AST SERPL W P-5'-P-CCNC: 29 U/L (ref 0–45)
BILIRUB SERPL-MCNC: 1.5 MG/DL (ref 0.2–1.3)
BUN SERPL-MCNC: 23 MG/DL (ref 7–30)
CALCIUM SERPL-MCNC: 9.1 MG/DL (ref 8.5–10.1)
CHLORIDE SERPL-SCNC: 108 MMOL/L (ref 94–109)
CHOLEST SERPL-MCNC: 184 MG/DL
CO2 SERPL-SCNC: 27 MMOL/L (ref 20–32)
CREAT SERPL-MCNC: 1.2 MG/DL (ref 0.66–1.25)
GFR SERPL CREATININE-BSD FRML MDRD: 60 ML/MIN/{1.73_M2}
GLUCOSE SERPL-MCNC: 108 MG/DL (ref 70–99)
HDLC SERPL-MCNC: 35 MG/DL
LDLC SERPL CALC-MCNC: 102 MG/DL
NONHDLC SERPL-MCNC: 149 MG/DL
POTASSIUM SERPL-SCNC: 3.9 MMOL/L (ref 3.4–5.3)
PROT SERPL-MCNC: 6.8 G/DL (ref 6.8–8.8)
PSA SERPL-ACNC: 2.12 UG/L (ref 0–4)
SODIUM SERPL-SCNC: 143 MMOL/L (ref 133–144)
TRIGL SERPL-MCNC: 237 MG/DL
TSH SERPL DL<=0.005 MIU/L-ACNC: 1.66 MU/L (ref 0.4–4)

## 2019-08-22 PROCEDURE — 99213 OFFICE O/P EST LOW 20 MIN: CPT | Performed by: UROLOGY

## 2019-08-22 ASSESSMENT — MIFFLIN-ST. JEOR: SCORE: 1575.46

## 2019-08-22 ASSESSMENT — PAIN SCALES - GENERAL: PAINLEVEL: NO PAIN (0)

## 2019-08-22 NOTE — NURSING NOTE
Pt had PSA drawn yesterday but it is not resulted yet.  Pt is not able to leave a UA at this time.  Pt denies voiding trouble.  SANCHO Cuellar CMA

## 2019-08-22 NOTE — LETTER
8/22/2019       RE: Harpreet Vera  [address] [facility] [address]     Dear Colleague,    Thank you for referring your patient, Harpreet Vera, to the [facility] [place] at [facility]. Please see a copy of my visit note below.    Harpreet Vera is a pleasant 69-year-old male who has a strong family history of prostate cancer.  His PSA was drawn yesterday with his physical exam and it is still pending.  He is noticed a slow stream in the past and he has some urgency after drinking caffeinated beverages.  Other past medical history: Arthritis, colon polyps, brachial plexopathy, chronic infection-C. difficile in the past, hypertension, sleep apnea, non-smoker.  Surgeries reviewed  Medications: Atenolol, chlorthalidone, Avodart, Claritin, losartan, Nasonex, naproxen, vitamin D  Allergies: Pravastatin  Review of systems: No dysuria or hematuria  Exam: Alert and oriented, normal appearance, normal vital signs.  Normocephalic, normal respirations, neuro grossly intact.  Normal sphincter tone, no rectal mass or impaction, symmetric and benign feeling prostate, normal seminal vesicles  Assessment: BPH, strong family history of prostate cancer  Plan: I will call him with PSA when result is available.  If stable, see me yearly with PSA and for digital rectal exam.  Discussed effective caffeine and chocolate on bladder muscle    Again, thank you for allowing me to participate in the care of your patient.      Sincerely,    Hadley Bruno MD

## 2019-08-22 NOTE — PROGRESS NOTES
Harpreet Vera is a pleasant 69-year-old male who has a strong family history of prostate cancer.  His PSA was drawn yesterday with his physical exam and it is still pending.  He is noticed a slow stream in the past and he has some urgency after drinking caffeinated beverages.  Other past medical history: Arthritis, colon polyps, brachial plexopathy, chronic infection-C. difficile in the past, hypertension, sleep apnea, non-smoker.  Surgeries reviewed  Medications: Atenolol, chlorthalidone, Avodart, Claritin, losartan, Nasonex, naproxen, vitamin D  Allergies: Pravastatin  Review of systems: No dysuria or hematuria  Exam: Alert and oriented, normal appearance, normal vital signs.  Normocephalic, normal respirations, neuro grossly intact.  Normal sphincter tone, no rectal mass or impaction, symmetric and benign feeling prostate, normal seminal vesicles  Assessment: BPH, strong family history of prostate cancer  Plan: I will call him with PSA when result is available.  If stable, see me yearly with PSA and for digital rectal exam.  Discussed effective caffeine and chocolate on bladder muscle

## 2019-08-22 NOTE — TELEPHONE ENCOUNTER
Patient was recommended to have PT. Patient has worked with a PT who now works for [employer] in [place]. Patient has appt tomorrow Friday 8/23/2019 @ 8:30 am.  Please fax to [phone]

## 2019-08-23 NOTE — TELEPHONE ENCOUNTER
R Shoulder pain believed to be a rotator cuff problem per primary care provider, referral given for PT but patient wants to go to [employer] where he has gone for neck problem.  MADHAV Young R.N.

## 2019-10-02 ENCOUNTER — MYC MEDICAL ADVICE (OUTPATIENT)
Dept: INTERNAL MEDICINE | Facility: CLINIC | Age: 69
End: 2019-10-02

## 2019-10-02 DIAGNOSIS — H91.90 HEARING LOSS, UNSPECIFIED HEARING LOSS TYPE, UNSPECIFIED LATERALITY: Primary | ICD-10-CM

## 2019-10-10 ENCOUNTER — TRANSFERRED RECORDS (OUTPATIENT)
Dept: HEALTH INFORMATION MANAGEMENT | Facility: CLINIC | Age: 69
End: 2019-10-10

## 2019-11-12 ENCOUNTER — TRANSFERRED RECORDS (OUTPATIENT)
Dept: HEALTH INFORMATION MANAGEMENT | Facility: CLINIC | Age: 69
End: 2019-11-12

## 2019-11-28 ENCOUNTER — HOSPITAL ENCOUNTER (EMERGENCY)
Facility: CLINIC | Age: 69
Discharge: HOME | End: 2019-11-28
Attending: EMERGENCY MEDICINE | Admitting: EMERGENCY MEDICINE
Payer: MEDICARE

## 2019-11-28 VITALS
HEART RATE: 56 BPM | HEIGHT: 67 IN | DIASTOLIC BLOOD PRESSURE: 76 MMHG | WEIGHT: 185 LBS | SYSTOLIC BLOOD PRESSURE: 122 MMHG | BODY MASS INDEX: 29.03 KG/M2 | TEMPERATURE: 98.1 F | OXYGEN SATURATION: 97 % | RESPIRATION RATE: 20 BRPM

## 2019-11-28 DIAGNOSIS — R40.4 ALTERED LEVEL OF CONSCIOUSNESS: ICD-10-CM

## 2019-11-28 DIAGNOSIS — N28.9 RENAL INSUFFICIENCY: ICD-10-CM

## 2019-11-28 DIAGNOSIS — R55 NEAR SYNCOPE: ICD-10-CM

## 2019-11-28 LAB
ALBUMIN SERPL-MCNC: 4.1 G/DL (ref 3.4–5)
ALP SERPL-CCNC: 58 U/L (ref 40–150)
ALT SERPL W P-5'-P-CCNC: 35 U/L (ref 0–70)
ANION GAP SERPL CALCULATED.3IONS-SCNC: 7 MMOL/L (ref 3–14)
AST SERPL W P-5'-P-CCNC: 20 U/L (ref 0–45)
BASOPHILS # BLD AUTO: 0 10E9/L (ref 0–0.2)
BASOPHILS NFR BLD AUTO: 0.3 %
BILIRUB SERPL-MCNC: 0.6 MG/DL (ref 0.2–1.3)
BUN SERPL-MCNC: 26 MG/DL (ref 7–30)
CALCIUM SERPL-MCNC: 8.8 MG/DL (ref 8.5–10.1)
CHLORIDE SERPL-SCNC: 112 MMOL/L (ref 94–109)
CO2 SERPL-SCNC: 26 MMOL/L (ref 20–32)
CREAT SERPL-MCNC: 1.39 MG/DL (ref 0.66–1.25)
DIFFERENTIAL METHOD BLD: ABNORMAL
EOSINOPHIL # BLD AUTO: 0.1 10E9/L (ref 0–0.7)
EOSINOPHIL NFR BLD AUTO: 1.5 %
ERYTHROCYTE [DISTWIDTH] IN BLOOD BY AUTOMATED COUNT: 12.7 % (ref 10–15)
ETHANOL SERPL-MCNC: 0.06 G/DL
GFR SERPL CREATININE-BSD FRML MDRD: 50 ML/MIN/{1.73_M2}
GLUCOSE SERPL-MCNC: 150 MG/DL (ref 70–99)
HCT VFR BLD AUTO: 39 % (ref 40–53)
HGB BLD-MCNC: 14 G/DL (ref 13.3–17.7)
IMM GRANULOCYTES # BLD: 0 10E9/L (ref 0–0.4)
IMM GRANULOCYTES NFR BLD: 0.2 %
INTERPRETATION ECG - MUSE: NORMAL
LYMPHOCYTES # BLD AUTO: 1.6 10E9/L (ref 0.8–5.3)
LYMPHOCYTES NFR BLD AUTO: 18.1 %
MCH RBC QN AUTO: 33.5 PG (ref 26.5–33)
MCHC RBC AUTO-ENTMCNC: 35.9 G/DL (ref 31.5–36.5)
MCV RBC AUTO: 93 FL (ref 78–100)
MONOCYTES # BLD AUTO: 0.7 10E9/L (ref 0–1.3)
MONOCYTES NFR BLD AUTO: 8.2 %
NEUTROPHILS # BLD AUTO: 6.3 10E9/L (ref 1.6–8.3)
NEUTROPHILS NFR BLD AUTO: 71.7 %
NRBC # BLD AUTO: 0 10*3/UL
NRBC BLD AUTO-RTO: 0 /100
PLATELET # BLD AUTO: 144 10E9/L (ref 150–450)
POTASSIUM SERPL-SCNC: 3.8 MMOL/L (ref 3.4–5.3)
PROT SERPL-MCNC: 7.1 G/DL (ref 6.8–8.8)
RBC # BLD AUTO: 4.18 10E12/L (ref 4.4–5.9)
SODIUM SERPL-SCNC: 145 MMOL/L (ref 133–144)
TROPONIN I SERPL-MCNC: <0.015 UG/L (ref 0–0.04)
WBC # BLD AUTO: 8.8 10E9/L (ref 4–11)

## 2019-11-28 PROCEDURE — 84484 ASSAY OF TROPONIN QUANT: CPT | Performed by: EMERGENCY MEDICINE

## 2019-11-28 PROCEDURE — 85025 COMPLETE CBC W/AUTO DIFF WBC: CPT | Performed by: EMERGENCY MEDICINE

## 2019-11-28 PROCEDURE — 96360 HYDRATION IV INFUSION INIT: CPT

## 2019-11-28 PROCEDURE — 25800030 ZZH RX IP 258 OP 636: Performed by: EMERGENCY MEDICINE

## 2019-11-28 PROCEDURE — 80053 COMPREHEN METABOLIC PANEL: CPT | Performed by: EMERGENCY MEDICINE

## 2019-11-28 PROCEDURE — 80320 DRUG SCREEN QUANTALCOHOLS: CPT | Performed by: EMERGENCY MEDICINE

## 2019-11-28 PROCEDURE — 93005 ELECTROCARDIOGRAM TRACING: CPT

## 2019-11-28 PROCEDURE — 99284 EMERGENCY DEPT VISIT MOD MDM: CPT | Mod: 25

## 2019-11-28 RX ORDER — SODIUM CHLORIDE 9 MG/ML
1000 INJECTION, SOLUTION INTRAVENOUS CONTINUOUS
Status: DISCONTINUED | OUTPATIENT
Start: 2019-11-28 | End: 2019-11-29 | Stop reason: HOSPADM

## 2019-11-28 RX ADMIN — SODIUM CHLORIDE 1000 ML: 9 INJECTION, SOLUTION INTRAVENOUS at 21:34

## 2019-11-28 ASSESSMENT — MIFFLIN-ST. JEOR: SCORE: 1552.78

## 2019-11-28 NOTE — ED AVS SNAPSHOT
Emergency Department  [address] [facility] [address]  Phone:  [phone]  Fax:  [phone]                                    Harpreet Vera   MRN: [id number]    Department:   Emergency Department   Date of Visit:  11/28/2019           After Visit Summary Signature Page    I have received my discharge instructions, and my questions have been answered. I have discussed any challenges I see with this plan with the nurse or doctor.    ..........................................................................................................................................  Patient/Patient Representative Signature      ..........................................................................................................................................  Patient Representative Print Name and Relationship to Patient    ..................................................               ................................................  Date                                   Time    ..........................................................................................................................................  Reviewed by Signature/Title    ...................................................              ..............................................  Date                                               Time          22EPIC Rev 08/18

## 2019-11-29 NOTE — ED NOTES
Bed: ST02  Expected date:   Expected time:   Means of arrival:   Comments:  69M  Possible STEMI/ETOH  2012

## 2019-11-29 NOTE — ED TRIAGE NOTES
Patient  Here with near syncope tonight,he had about 6 to 7 alcohol drinks.Benjamin chest. Patient was given 4 baby ASA

## 2019-11-29 NOTE — ED PROVIDER NOTES
"  History     Chief Complaint:  \"I almost passed out\"    HPI history supplemented by EMS, electronic records, and patient's wife at bedside.    Harpreet Vera is a 69 year old male with a history of hypertension and hyperlipidemia who presents after near syncope.  EMS reported that the patient was at home all day with family celebrating Thanksgiving. Part of that celebration included 6-7 glasses of wine over the course of the afternoon even though the patient does not drink much typically. The patient's wife reported that she was standing in the kitchen near where the patient was sitting at the table when he got up and \"lurched\" towards her saying \"something is wrong\".  The patient then bumped into his wife and she felt him get heavy so she helped guide him gently to the ground. She reported that the patient never passed out but his face was temporarily more red than normal.  With concern for this episode EMS was called to bring the patient to the ED for evaluation. En route the patient was given 4 baby aspirin due to EMS EKG printout reading \"STEMI\" and his blood sugar was 121. On examination the patient states that he does not remember any of the events leading up to him going to the ground. He also reported that he had not had any alcohol 2 hours PTA.  He stated that he had felt not quite right all day but had not said anything to anyone. He denied any chest pain or any other pain.  The patient volunteers that his heart rate is typically low even when he feels fine.  EMS reports that they perceived the patient to be quite anxious during transport, though cooperative.    Allergies:  Pravastatin      Medications:    Tenormin   Hygroton   Avodart   Claritin   Cozaar     Past Medical History:    Hypercholesteremia  hypertension   Intestinal infection due to Clostridium difficile   Arthritis   Brachial plexopathy     Past Surgical History:    Right clavicle fracture   Right ulnar/radial fracture   Cholecystectomy " "  Colonoscopy   Fusion cervical anterior decompression   Tonsillectomy   Testicle surgery   Vasectomy     Family History:    Arthritis   Cataracts   Lipids   Colon/prostate cancer   Lipids   Diabetes    Social History:  Negative for tobacco use.  Rare alcohol use   Negative for drug use.  Marital Status:   [2]     Review of Systems   All other systems reviewed and are negative.    Physical Exam     Patient Vitals for the past 24 hrs:   BP Temp Pulse Heart Rate Resp SpO2 Height Weight   11/28/19 2130 122/76 -- -- 52 -- 97 % -- --   11/28/19 2100 131/69 -- -- 57 -- 95 % -- --   11/28/19 2033 (!) 154/80 98.1  F (36.7  C) 56 -- 20 98 % 1.702 m (5' 7\") 83.9 kg (185 lb)   11/28/19 2030 138/73 -- 52 52 15 95 % -- --       Physical Exam  General: Nontoxic-appearing male sitting upright in stable 2, wife at bedside  HENT: mucous membranes moist, thyroid nonpalpable  CV: slightly bradycardic rate, regular rhythm, no lower extremity edema, no JVD, palpable symmetric radial pulses, no murmur audible  Resp: clear throughout, normal effort, no crackles or wheezing  GI: abdomen soft and nontender  MSK: no bony tenderness   Skin: appropriately warm and dry  Neuro: awake, alert, clear speech, fully oriented, face symmetric,  normal, strength and sensation intact in all extr, no meningismus, ambulatory without ataxia  Psych: cooperative    Emergency Department Course   ECG:  Indication: near syncope   Time: 2021  Vent. Rate 54 bpm. WV interval 190. QRS duration 154. QT/QTc 452/428. P-R-T axis 51 -10 38.  Sinus bradycardia. Right bundle branch block (old).  Read time: 2023    Laboratory:  CBC: WBC: 8.8, HGB: 14.0, PLT: 144 (L)   CMP: Glucose 150 (H), NA: 145 (H), Chloride: 112 (H), Creatinine: 1.39 (H), GFR: 50 (L), o/w WNL    2030 Troponin: <0.015  ETOH: 0.06 (H)     Interventions:  2134 NS 1L IV      Emergency Department Course:  Nursing notes and vitals reviewed. (2017) I performed an exam of the patient as documented " "above.     IV inserted. Medicine administered as documented above. Blood drawn. This was sent to the lab for further testing, results above.    (2137) I rechecked the patient and discussed the results of his workup thus far.   EKG obtained in the ED, see results above.     I performed electronic chart review in [facility].  The patient was placed on continuous cardiac and pulse ox monitoring.    The patient passed a \"road test\" prior to discharge from the ED.    (2207) I reevaluated the patient and provided an update in regards to his ED course.      Findings and plan explained to the Patient. Patient discharged home with instructions regarding supportive care, medications, and reasons to return. The importance of close follow-up was reviewed.     I personally reviewed the laboratory results with the Patient and answered all related questions prior to discharge.     Impression & Plan    Medical Decision Making:  The cause of his her syncope is unconfirmed despite testing as above.  He arrived to the stabilization room due to concern for possible STEMI, though my interpretation of his prehospital EKG, particularly in comparison with old EKGs, is that this was due to a pre-existing right bundle branch block and is not particularly suggestive of acute ischemia.  In addition, the patient adamantly denies having any chest discomfort, palpitations, or other symptoms more suggestive of an acute coronary syndrome.  Initially reported feeling somewhat strange, though did not have more focal symptoms, and his neurologic exam was normal.  Later his ED course, he felt completely back to normal.  No shaking was witnessed by his wife nor others, to suggest any seizure.  The reassurance of his evaluation here today, as well as the limitations, were reviewed with this very pleasant patient and several additional companions who arrived later in his ED course.  We discussed the possibility of a transient but dangerous arrhythmia as well as " "stroke among other diagnostic possibilities.  I offered to perform CT imaging of his brain, MRI of his brain, hospitalization to a monitored bed overnight for further evaluation including possible echocardiogram, and other care, all of which the patient pleasantly declined.  He cites a strong preference for discharge home on this holiday evening, particularly in light of the fact that he feels completely back to baseline.  He was able to road test down the hallway with the nurse telling me that he \"practically ran\".  His alcohol level is detectable though not high enough that I think it alone can fully explain his presentation.  No signs of clinical intoxication at this time.  He was discharged in the care of family, all of whom acknowledge the diagnostic uncertainty and their acceptance of the slight risk that we are missing something more dangerous.  Return precautions reviewed and agreed upon as well as my recommendation for close outpatient follow-up early next week.    Diagnosis:    ICD-10-CM    1. Near syncope R55    2. Altered level of consciousness R40.4    3. Renal insufficiency N28.9     mild       Disposition:  discharged to home    Scribe Disclosure:  I, Kita Hull, am serving as a scribe on 11/28/2019 at 10:33 PM to personally document services performed by Ghanshyam Terry, * based on my observations and the provider's statements to me.     This record was created at least in part using electronic voice recognition software, so please excuse any typographical errors.    Kita Hull  11/28/2019    EMERGENCY DEPARTMENT       Ghanshyam Terry MD  11/29/19 0058    "

## 2019-12-03 ENCOUNTER — OFFICE VISIT (OUTPATIENT)
Dept: INTERNAL MEDICINE | Facility: CLINIC | Age: 69
End: 2019-12-03
Payer: MEDICARE

## 2019-12-03 VITALS
WEIGHT: 189.8 LBS | SYSTOLIC BLOOD PRESSURE: 148 MMHG | OXYGEN SATURATION: 98 % | HEART RATE: 40 BPM | RESPIRATION RATE: 16 BRPM | HEIGHT: 67 IN | BODY MASS INDEX: 29.79 KG/M2 | TEMPERATURE: 98 F | DIASTOLIC BLOOD PRESSURE: 80 MMHG

## 2019-12-03 DIAGNOSIS — R00.1 BRADYCARDIA: Primary | ICD-10-CM

## 2019-12-03 DIAGNOSIS — R93.0 ABNORMAL MRA, HEAD: ICD-10-CM

## 2019-12-03 DIAGNOSIS — R27.9 INCOORDINATION: ICD-10-CM

## 2019-12-03 DIAGNOSIS — R42 DIZZINESS AND GIDDINESS: ICD-10-CM

## 2019-12-03 DIAGNOSIS — I10 ESSENTIAL HYPERTENSION, BENIGN: ICD-10-CM

## 2019-12-03 DIAGNOSIS — I45.10 NEW ONSET RIGHT BUNDLE BRANCH BLOCK (RBBB): ICD-10-CM

## 2019-12-03 DIAGNOSIS — R41.0 TRANSIENT CONFUSION: ICD-10-CM

## 2019-12-03 PROCEDURE — 99215 OFFICE O/P EST HI 40 MIN: CPT | Performed by: INTERNAL MEDICINE

## 2019-12-03 ASSESSMENT — MIFFLIN-ST. JEOR: SCORE: 1574.56

## 2019-12-03 NOTE — PROGRESS NOTES
"Subjective     Harpreet Vera is a 69 year old male who presents to clinic today for the following health issues:    HPI   ED/UC Followup:    Facility:  [facility]  Date of visit: 11-  Reason for visit: near syncope         HPI:   Thanksgiving day he got up and felt mentally a little foggy and lightheaded. Later in the day after a few glasses of wine (and he usually does not drink at all). He actually dropped 2 glasses during the day and later he fell off his chair and could not get up. He has no recollection of this and in general his recollection of the entire day is vague but was awake and answering questions. The paramedics were called and he can recall some of that. By the time he got to the ER he was feeling better but not normal. ETOH level was only 0.06 so he was not drunk. He still does not feel quite normal he feels like he is a little lightheaded and that cognitively he is a little slowed.     Denies any focal weakness, numbness or tingling. Denies any problem with chest pain, pressure or dyspnea on exertion. Troponin was negative in ER EKG showed bradycardia at 56 with RBBB which is not new. He is on Atenolol. Denies palpitations nausea or diaphoresis. Denies any fever chills or night sweats. Cbc was normal     BP Readings from Last 3 Encounters:   12/03/19 (!) 148/80   11/28/19 122/76   08/21/19 132/72    Wt Readings from Last 3 Encounters:   12/03/19 86.1 kg (189 lb 12.8 oz)   11/28/19 83.9 kg (185 lb)   08/22/19 86.2 kg (190 lb)                    Reviewed and updated as needed this visit by Provider         Review of Systems   ROS COMP: Constitutional, HEENT, cardiovascular, pulmonary, GI, , musculoskeletal, neuro, skin, endocrine and psych systems are negative, except as otherwise noted.      Objective    BP (!) 146/86 (BP Location: Right arm, Patient Position: Chair, Cuff Size: Adult Large)   Pulse (!) 40   Temp 98  F (36.7  C) (Oral)   Resp 16   Ht 1.702 m (5' 7\")   Wt 86.1 kg (189 lb " 12.8 oz)   SpO2 98%   BMI 29.73 kg/m    Body mass index is 29.73 kg/m .  Physical Exam   GENERAL: healthy, alert and no distress  NECK: no adenopathy, no asymmetry, masses, or scars and thyroid normal to palpation  RESP: lungs clear to auscultation - no rales, rhonchi or wheezes  CV: regular rate and rhythm, normal S1 S2, no S3 or S4, no murmur, click or rub, no peripheral edema and peripheral pulses strong  ABDOMEN: soft, nontender, no hepatosplenomegaly, no masses and bowel sounds normal  MS: no gross musculoskeletal defects noted, no edema  NEURO: Normal strength and tone, sensory exam grossly normal, mentation intact, cranial nerves 2-12 intact and DTR's normal and symmetric    PSYCH: mentation appears normal, affect normal/bright  LYMPH: no cervical, supraclavicular, axillary, or inguinal adenopathy          Assessment & Plan     1. New onset right bundle branch block (RBBB)  Actually old but with pulse today of 40 in a setting that could indicate poor blood flow due to bradycardia. Will stop Atenolol and check Holter monitor 10-14 days after stopping. Want him to avoid ETOH until then  - Holter Monitor 48 hour Adult Pediatric; Future    2. Transient confusion  As above, he also could have a primary CNS issue rather than bradycardia being the problem. Will check test listed below  - MR Brain w/o & w Contrast; Future  - MRA Brain ([place]) wo Contrast; Future  - Holter Monitor 48 hour Adult Pediatric; Future  - US Carotid Bilateral; Future    3. Bradycardia  as above.   - Holter Monitor 48 hour Adult Pediatric; Future    4. Incoordination  as above.   - MR Brain w/o & w Contrast; Future  - MRA Brain ([place]) wo Contrast; Future  - US Carotid Bilateral; Future    5. Dizziness and giddiness   as above.   - MRA Brain ([place]) wo Contrast; Future  - US Carotid Bilateral; Future    6. Essential hypertension, benign  under inadequate control but under the circumstances would rather have  "it run a little high. Will address when issue above have been clarified. Will arrange follow up when results above known.        BMI:   Estimated body mass index is 29.73 kg/m  as calculated from the following:    Height as of this encounter: 1.702 m (5' 7\").    Weight as of this encounter: 86.1 kg (189 lb 12.8 oz).         No follow-ups on file.    Lucía Sandhu MD  [facility]      "

## 2019-12-09 ENCOUNTER — HOSPITAL ENCOUNTER (OUTPATIENT)
Dept: MRI IMAGING | Facility: CLINIC | Age: 69
Discharge: HOME | End: 2019-12-09
Attending: INTERNAL MEDICINE | Admitting: INTERNAL MEDICINE
Payer: MEDICARE

## 2019-12-09 ENCOUNTER — HOSPITAL ENCOUNTER (OUTPATIENT)
Dept: ULTRASOUND IMAGING | Facility: CLINIC | Age: 69
Discharge: HOME | End: 2019-12-09
Attending: INTERNAL MEDICINE | Admitting: INTERNAL MEDICINE
Payer: MEDICARE

## 2019-12-09 DIAGNOSIS — R42 DIZZINESS AND GIDDINESS: ICD-10-CM

## 2019-12-09 DIAGNOSIS — R41.0 TRANSIENT CONFUSION: ICD-10-CM

## 2019-12-09 DIAGNOSIS — R27.9 INCOORDINATION: ICD-10-CM

## 2019-12-09 PROCEDURE — 70544 MR ANGIOGRAPHY HEAD W/O DYE: CPT | Mod: XS

## 2019-12-09 PROCEDURE — A9585 GADOBUTROL INJECTION: HCPCS | Performed by: INTERNAL MEDICINE

## 2019-12-09 PROCEDURE — 93880 EXTRACRANIAL BILAT STUDY: CPT

## 2019-12-09 PROCEDURE — 70553 MRI BRAIN STEM W/O & W/DYE: CPT

## 2019-12-09 PROCEDURE — 25500064 ZZH RX 255 OP 636: Performed by: INTERNAL MEDICINE

## 2019-12-09 RX ORDER — GADOBUTROL 604.72 MG/ML
10 INJECTION INTRAVENOUS ONCE
Status: COMPLETED | OUTPATIENT
Start: 2019-12-09 | End: 2019-12-09

## 2019-12-09 RX ADMIN — GADOBUTROL 8.5 ML: 604.72 INJECTION INTRAVENOUS at 15:22

## 2019-12-13 ENCOUNTER — HOSPITAL ENCOUNTER (OUTPATIENT)
Dept: CARDIOLOGY | Facility: CLINIC | Age: 69
Discharge: HOME | End: 2019-12-13
Attending: INTERNAL MEDICINE | Admitting: INTERNAL MEDICINE
Payer: MEDICARE

## 2019-12-13 DIAGNOSIS — R41.0 TRANSIENT CONFUSION: ICD-10-CM

## 2019-12-13 DIAGNOSIS — I45.10 NEW ONSET RIGHT BUNDLE BRANCH BLOCK (RBBB): ICD-10-CM

## 2019-12-13 DIAGNOSIS — R00.1 BRADYCARDIA: ICD-10-CM

## 2019-12-13 PROCEDURE — 93227 XTRNL ECG REC<48 HR R&I: CPT | Performed by: INTERNAL MEDICINE

## 2019-12-13 PROCEDURE — 93226 XTRNL ECG REC<48 HR SCAN A/R: CPT

## 2019-12-16 ENCOUNTER — HOSPITAL ENCOUNTER (OUTPATIENT)
Dept: MRI IMAGING | Facility: CLINIC | Age: 69
Discharge: HOME | End: 2019-12-16
Attending: INTERNAL MEDICINE | Admitting: INTERNAL MEDICINE
Payer: MEDICARE

## 2019-12-16 DIAGNOSIS — R93.0 ABNORMAL MRA, HEAD: ICD-10-CM

## 2019-12-16 PROCEDURE — A9585 GADOBUTROL INJECTION: HCPCS | Performed by: INTERNAL MEDICINE

## 2019-12-16 PROCEDURE — 25500064 ZZH RX 255 OP 636: Performed by: INTERNAL MEDICINE

## 2019-12-16 PROCEDURE — 70549 MR ANGIOGRAPH NECK W/O&W/DYE: CPT

## 2019-12-16 RX ORDER — GADOBUTROL 604.72 MG/ML
10 INJECTION INTRAVENOUS ONCE
Status: COMPLETED | OUTPATIENT
Start: 2019-12-16 | End: 2019-12-16

## 2019-12-16 RX ADMIN — GADOBUTROL 10 ML: 604.72 INJECTION INTRAVENOUS at 09:46

## 2019-12-23 ENCOUNTER — MYC MEDICAL ADVICE (OUTPATIENT)
Dept: INTERNAL MEDICINE | Facility: CLINIC | Age: 69
End: 2019-12-23

## 2019-12-23 DIAGNOSIS — I65.02 OCCLUSION OF LEFT VERTEBRAL ARTERY: Primary | ICD-10-CM

## 2019-12-23 NOTE — TELEPHONE ENCOUNTER
I want him to see vascular surgery. He should stay on one asa every day until then. Referral written. Have him call me on my cell phone after 7 pm tonight when I am done seeing patients [phone]

## 2019-12-23 NOTE — TELEPHONE ENCOUNTER
Contacted patient and informed him of Dr. Sandhu's message below. Information also sent to patient via [employer]. Patient verbalizes understanding.

## 2019-12-24 ENCOUNTER — TELEPHONE (OUTPATIENT)
Dept: OTHER | Facility: CLINIC | Age: 69
End: 2019-12-24

## 2019-12-24 NOTE — TELEPHONE ENCOUNTER
December 24, 2019    Referral to [facility] from Lucía Sandhu MD for vertebral artery occlusion, symptomatic on at least one occasion.    MRI in Epic.     Referral for Vascular Surgery.     Patient called to schedule.     Routing to RN Triage for review.       Chacha Khanna  Appointment Scheduling   [facility]  Office: [phone]  Fax: [phone]  Direct: [phone]

## 2019-12-24 NOTE — TELEPHONE ENCOUNTER
I discussed pts imaging results and referral dx with Dr. Rivas.     Pt most appropriately should be referred to Dr. Yong Mcneal, Neuro IR at [facility].     Pt had called a second time and I explained this to him, pt notes understanding.     I called referring provider Dr. Sandhu's office and explained this to them, they will send referral to Dr. Mcneal and note understanding.     MARSHA JosephN, RN  [facility]

## 2019-12-24 NOTE — TELEPHONE ENCOUNTER
Referral printed and faxed to [facility] with copy of MRA/MRI results and recent office visit notes.    Patient informed referral was signed and faxed to [facility].  Phone number provided for scheduling.

## 2019-12-24 NOTE — TELEPHONE ENCOUNTER
Vascular clinic calls. They reviewed patient's referral and MRA results and recommend patient see Dr. Yong Mcneal with Neurovascular Interventional Radiology at [facility] instead. Requesting to have Dr. Sandhu place a new referral for patient. See 12/24/19 telephone encounter for further details from Vascular clinic.    Per Vascular clinic, patient is very anxious and has called their office a few times regarding appointment.     Vascular referral pended for [facility] and routed to provider to review.  Fax referral to [phone]

## 2019-12-27 DIAGNOSIS — I65.02 OCCLUSION OF LEFT VERTEBRAL ARTERY: Primary | ICD-10-CM

## 2019-12-27 RX ORDER — CLOPIDOGREL BISULFATE 75 MG/1
75 TABLET ORAL DAILY
Qty: 30 TABLET | Refills: 3 | Status: SHIPPED | OUTPATIENT
Start: 2019-12-27 | End: 2020-03-16

## 2020-01-03 ENCOUNTER — HOSPITAL ENCOUNTER (OUTPATIENT)
Dept: LAB | Facility: CLINIC | Age: 70
Discharge: HOME | DRG: 039 | End: 2020-01-03
Attending: NURSE PRACTITIONER | Admitting: NURSE PRACTITIONER
Payer: MEDICARE

## 2020-01-03 DIAGNOSIS — I65.01 OCCLUSION AND STENOSIS OF RIGHT VERTEBRAL ARTERY: ICD-10-CM

## 2020-01-03 LAB — PLATELET REACTIVITY P2Y12: 132 PRU

## 2020-01-03 PROCEDURE — 36415 COLL VENOUS BLD VENIPUNCTURE: CPT | Performed by: NURSE PRACTITIONER

## 2020-01-03 PROCEDURE — 85576 BLOOD PLATELET AGGREGATION: CPT | Performed by: NURSE PRACTITIONER

## 2020-01-06 ENCOUNTER — APPOINTMENT (OUTPATIENT)
Dept: INTERVENTIONAL RADIOLOGY/VASCULAR | Facility: CLINIC | Age: 70
DRG: 039 | End: 2020-01-06
Attending: NURSE PRACTITIONER
Payer: MEDICARE

## 2020-01-06 ENCOUNTER — APPOINTMENT (OUTPATIENT)
Dept: INTERVENTIONAL RADIOLOGY/VASCULAR | Facility: CLINIC | Age: 70
DRG: 039 | End: 2020-01-06
Attending: RADIOLOGY
Payer: MEDICARE

## 2020-01-06 ENCOUNTER — HOSPITAL ENCOUNTER (INPATIENT)
Facility: CLINIC | Age: 70
LOS: 1 days | Discharge: HOME | DRG: 039 | End: 2020-01-07
Attending: RADIOLOGY | Admitting: RADIOLOGY
Payer: MEDICARE

## 2020-01-06 DIAGNOSIS — I65.02 OCCLUSION OF LEFT VERTEBRAL ARTERY: ICD-10-CM

## 2020-01-06 PROBLEM — I65.09 VERTEBRAL ARTERY STENOSIS: Status: ACTIVE | Noted: 2020-01-06

## 2020-01-06 LAB
CREAT SERPL-MCNC: 1.2 MG/DL (ref 0.66–1.25)
ERYTHROCYTE [DISTWIDTH] IN BLOOD BY AUTOMATED COUNT: 12.7 % (ref 10–15)
GFR SERPL CREATININE-BSD FRML MDRD: 60 ML/MIN/{1.73_M2}
GLUCOSE BLDC GLUCOMTR-MCNC: 154 MG/DL (ref 70–99)
GLUCOSE BLDC GLUCOMTR-MCNC: 90 MG/DL (ref 70–99)
GLUCOSE BLDC GLUCOMTR-MCNC: 98 MG/DL (ref 70–99)
HBA1C MFR BLD: 5.3 % (ref 0–5.6)
HCT VFR BLD AUTO: 40.1 % (ref 40–53)
HGB BLD-MCNC: 14.6 G/DL (ref 13.3–17.7)
KCT BLD-ACNC: 126 SEC (ref 75–150)
KCT BLD-ACNC: 211 SEC (ref 75–150)
MCH RBC QN AUTO: 33.3 PG (ref 26.5–33)
MCHC RBC AUTO-ENTMCNC: 36.4 G/DL (ref 31.5–36.5)
MCV RBC AUTO: 92 FL (ref 78–100)
PLATELET # BLD AUTO: 167 10E9/L (ref 150–450)
RBC # BLD AUTO: 4.38 10E12/L (ref 4.4–5.9)
WBC # BLD AUTO: 7.7 10E9/L (ref 4–11)

## 2020-01-06 PROCEDURE — 20000003 ZZH R&B ICU

## 2020-01-06 PROCEDURE — 27210886 ZZH ACCESSORY CR5

## 2020-01-06 PROCEDURE — 27210888 ZZH ACCESSORY CR7

## 2020-01-06 PROCEDURE — 36223 PLACE CATH CAROTID/INOM ART: CPT

## 2020-01-06 PROCEDURE — 83036 HEMOGLOBIN GLYCOSYLATED A1C: CPT | Performed by: NURSE PRACTITIONER

## 2020-01-06 PROCEDURE — 85347 COAGULATION TIME ACTIVATED: CPT

## 2020-01-06 PROCEDURE — 36415 COLL VENOUS BLD VENIPUNCTURE: CPT

## 2020-01-06 PROCEDURE — 99233 SBSQ HOSP IP/OBS HIGH 50: CPT | Performed by: INTERNAL MEDICINE

## 2020-01-06 PROCEDURE — C1769 GUIDE WIRE: HCPCS

## 2020-01-06 PROCEDURE — 25000125 ZZHC RX 250: Performed by: RADIOLOGY

## 2020-01-06 PROCEDURE — C1887 CATHETER, GUIDING: HCPCS

## 2020-01-06 PROCEDURE — 27210742 ZZH CATH CR1

## 2020-01-06 PROCEDURE — 27210805 ZZH SHEATH CR4

## 2020-01-06 PROCEDURE — C1874 STENT, COATED/COV W/DEL SYS: HCPCS

## 2020-01-06 PROCEDURE — 25000132 ZZH RX MED GY IP 250 OP 250 PS 637: Mod: GY | Performed by: RADIOLOGY

## 2020-01-06 PROCEDURE — 40000333 IR DISCONTINUE SHEATH

## 2020-01-06 PROCEDURE — 82565 ASSAY OF CREATININE: CPT | Performed by: NURSE PRACTITIONER

## 2020-01-06 PROCEDURE — 25500064 ZZH RX 255 OP 636: Performed by: RADIOLOGY

## 2020-01-06 PROCEDURE — 27210806 ZZH SHEATH CR5

## 2020-01-06 PROCEDURE — 25000131 ZZH RX MED GY IP 250 OP 636 PS 637: Mod: GY | Performed by: INTERNAL MEDICINE

## 2020-01-06 PROCEDURE — 83036 HEMOGLOBIN GLYCOSYLATED A1C: CPT | Performed by: INTERNAL MEDICINE

## 2020-01-06 PROCEDURE — 25800030 ZZH RX IP 258 OP 636: Performed by: RADIOLOGY

## 2020-01-06 PROCEDURE — 037P34Z DILATION OF RIGHT VERTEBRAL ARTERY WITH DRUG-ELUTING INTRALUMINAL DEVICE, PERCUTANEOUS APPROACH: ICD-10-PCS | Performed by: RADIOLOGY

## 2020-01-06 PROCEDURE — 27210894 ZZH CATH CR6

## 2020-01-06 PROCEDURE — 93005 ELECTROCARDIOGRAM TRACING: CPT

## 2020-01-06 PROCEDURE — 27210738 ZZH ACCESSORY CR2

## 2020-01-06 PROCEDURE — 25000128 H RX IP 250 OP 636: Performed by: RADIOLOGY

## 2020-01-06 PROCEDURE — 27210845 ZZH DEVICE INFLATION CR5

## 2020-01-06 PROCEDURE — 85027 COMPLETE CBC AUTOMATED: CPT | Performed by: NURSE PRACTITIONER

## 2020-01-06 PROCEDURE — 25000128 H RX IP 250 OP 636

## 2020-01-06 PROCEDURE — 40000853 ZZH STATISTIC ANGIOGRAM, STENT, VERTEBRO PLASTY

## 2020-01-06 PROCEDURE — 00000146 ZZHCL STATISTIC GLUCOSE BY METER IP

## 2020-01-06 PROCEDURE — 27210732 ZZH ACCESSORY CR1

## 2020-01-06 PROCEDURE — 93010 ELECTROCARDIOGRAM REPORT: CPT | Performed by: INTERNAL MEDICINE

## 2020-01-06 PROCEDURE — 25800030 ZZH RX IP 258 OP 636: Performed by: NURSE PRACTITIONER

## 2020-01-06 RX ORDER — POTASSIUM CHLORIDE 7.45 MG/ML
10 INJECTION INTRAVENOUS
Status: DISCONTINUED | OUTPATIENT
Start: 2020-01-06 | End: 2020-01-07 | Stop reason: HOSPADM

## 2020-01-06 RX ORDER — NALOXONE HYDROCHLORIDE 0.4 MG/ML
.1-.4 INJECTION, SOLUTION INTRAMUSCULAR; INTRAVENOUS; SUBCUTANEOUS
Status: DISCONTINUED | OUTPATIENT
Start: 2020-01-06 | End: 2020-01-07 | Stop reason: HOSPADM

## 2020-01-06 RX ORDER — DUTASTERIDE 0.5 MG/1
0.5 CAPSULE, LIQUID FILLED ORAL
Status: DISCONTINUED | OUTPATIENT
Start: 2020-01-06 | End: 2020-01-07 | Stop reason: HOSPADM

## 2020-01-06 RX ORDER — ASPIRIN 81 MG/1
81 TABLET ORAL DAILY
Status: DISCONTINUED | OUTPATIENT
Start: 2020-01-07 | End: 2020-01-07 | Stop reason: HOSPADM

## 2020-01-06 RX ORDER — LABETALOL HYDROCHLORIDE 5 MG/ML
10 INJECTION, SOLUTION INTRAVENOUS
Status: DISCONTINUED | OUTPATIENT
Start: 2020-01-06 | End: 2020-01-07 | Stop reason: HOSPADM

## 2020-01-06 RX ORDER — CLOPIDOGREL BISULFATE 75 MG/1
75 TABLET ORAL DAILY
Status: DISCONTINUED | OUTPATIENT
Start: 2020-01-07 | End: 2020-01-07 | Stop reason: HOSPADM

## 2020-01-06 RX ORDER — LOSARTAN POTASSIUM 100 MG/1
100 TABLET ORAL
Status: DISCONTINUED | OUTPATIENT
Start: 2020-01-06 | End: 2020-01-07 | Stop reason: HOSPADM

## 2020-01-06 RX ORDER — POTASSIUM CL/LIDO/0.9 % NACL 10MEQ/0.1L
10 INTRAVENOUS SOLUTION, PIGGYBACK (ML) INTRAVENOUS
Status: DISCONTINUED | OUTPATIENT
Start: 2020-01-06 | End: 2020-01-07 | Stop reason: HOSPADM

## 2020-01-06 RX ORDER — POTASSIUM CHLORIDE 1500 MG/1
20-40 TABLET, EXTENDED RELEASE ORAL
Status: DISCONTINUED | OUTPATIENT
Start: 2020-01-06 | End: 2020-01-07 | Stop reason: HOSPADM

## 2020-01-06 RX ORDER — LIDOCAINE 40 MG/G
CREAM TOPICAL
Status: DISCONTINUED | OUTPATIENT
Start: 2020-01-06 | End: 2020-01-06 | Stop reason: HOSPADM

## 2020-01-06 RX ORDER — POTASSIUM CHLORIDE 29.8 MG/ML
20 INJECTION INTRAVENOUS
Status: DISCONTINUED | OUTPATIENT
Start: 2020-01-06 | End: 2020-01-07 | Stop reason: HOSPADM

## 2020-01-06 RX ORDER — IOPAMIDOL 612 MG/ML
150 INJECTION, SOLUTION INTRAVASCULAR ONCE
Status: COMPLETED | OUTPATIENT
Start: 2020-01-06 | End: 2020-01-06

## 2020-01-06 RX ORDER — LIDOCAINE HYDROCHLORIDE 10 MG/ML
INJECTION, SOLUTION INFILTRATION; PERINEURAL
Status: DISCONTINUED
Start: 2020-01-06 | End: 2020-01-06 | Stop reason: HOSPADM

## 2020-01-06 RX ORDER — SODIUM CHLORIDE 9 MG/ML
INJECTION, SOLUTION INTRAVENOUS CONTINUOUS
Status: ACTIVE | OUTPATIENT
Start: 2020-01-06 | End: 2020-01-06

## 2020-01-06 RX ORDER — NALOXONE HYDROCHLORIDE 0.4 MG/ML
.1-.4 INJECTION, SOLUTION INTRAMUSCULAR; INTRAVENOUS; SUBCUTANEOUS
Status: DISCONTINUED | OUTPATIENT
Start: 2020-01-06 | End: 2020-01-06 | Stop reason: HOSPADM

## 2020-01-06 RX ORDER — FENTANYL CITRATE 50 UG/ML
50 INJECTION, SOLUTION INTRAMUSCULAR; INTRAVENOUS
Status: DISCONTINUED | OUTPATIENT
Start: 2020-01-06 | End: 2020-01-07 | Stop reason: HOSPADM

## 2020-01-06 RX ORDER — FENTANYL CITRATE 50 UG/ML
25-50 INJECTION, SOLUTION INTRAMUSCULAR; INTRAVENOUS EVERY 5 MIN PRN
Status: DISCONTINUED | OUTPATIENT
Start: 2020-01-06 | End: 2020-01-06 | Stop reason: HOSPADM

## 2020-01-06 RX ORDER — MAGNESIUM SULFATE HEPTAHYDRATE 40 MG/ML
4 INJECTION, SOLUTION INTRAVENOUS EVERY 4 HOURS PRN
Status: DISCONTINUED | OUTPATIENT
Start: 2020-01-06 | End: 2020-01-07 | Stop reason: HOSPADM

## 2020-01-06 RX ORDER — EPTIFIBATIDE 2 MG/ML
180 INJECTION, SOLUTION INTRAVENOUS ONCE
Status: COMPLETED | OUTPATIENT
Start: 2020-01-06 | End: 2020-01-06

## 2020-01-06 RX ORDER — FENTANYL CITRATE 50 UG/ML
INJECTION, SOLUTION INTRAMUSCULAR; INTRAVENOUS
Status: COMPLETED
Start: 2020-01-06 | End: 2020-01-06

## 2020-01-06 RX ORDER — DEXTROSE MONOHYDRATE 25 G/50ML
25-50 INJECTION, SOLUTION INTRAVENOUS
Status: DISCONTINUED | OUTPATIENT
Start: 2020-01-06 | End: 2020-01-07 | Stop reason: HOSPADM

## 2020-01-06 RX ORDER — MULTIPLE VITAMINS W/ MINERALS TAB 9MG-400MCG
1 TAB ORAL DAILY
Status: ON HOLD | COMMUNITY
End: 2020-08-19

## 2020-01-06 RX ORDER — NICOTINE POLACRILEX 4 MG
15-30 LOZENGE BUCCAL
Status: DISCONTINUED | OUTPATIENT
Start: 2020-01-06 | End: 2020-01-07 | Stop reason: HOSPADM

## 2020-01-06 RX ORDER — HEPARIN SODIUM 1000 [USP'U]/ML
500-6000 INJECTION, SOLUTION INTRAVENOUS; SUBCUTANEOUS
Status: COMPLETED | OUTPATIENT
Start: 2020-01-06 | End: 2020-01-06

## 2020-01-06 RX ORDER — NICOTINE POLACRILEX 4 MG
15-30 LOZENGE BUCCAL
Status: DISCONTINUED | OUTPATIENT
Start: 2020-01-06 | End: 2020-01-06 | Stop reason: HOSPADM

## 2020-01-06 RX ORDER — POTASSIUM CHLORIDE 1.5 G/1.58G
20-40 POWDER, FOR SOLUTION ORAL
Status: DISCONTINUED | OUTPATIENT
Start: 2020-01-06 | End: 2020-01-07 | Stop reason: HOSPADM

## 2020-01-06 RX ORDER — DEXTROSE MONOHYDRATE 25 G/50ML
25-50 INJECTION, SOLUTION INTRAVENOUS
Status: DISCONTINUED | OUTPATIENT
Start: 2020-01-06 | End: 2020-01-06 | Stop reason: HOSPADM

## 2020-01-06 RX ORDER — HEPARIN SODIUM 200 [USP'U]/100ML
INJECTION, SOLUTION INTRAVENOUS
Status: DISCONTINUED
Start: 2020-01-06 | End: 2020-01-06 | Stop reason: HOSPADM

## 2020-01-06 RX ORDER — SODIUM CHLORIDE 9 MG/ML
INJECTION, SOLUTION INTRAVENOUS CONTINUOUS
Status: DISCONTINUED | OUTPATIENT
Start: 2020-01-06 | End: 2020-01-06 | Stop reason: HOSPADM

## 2020-01-06 RX ORDER — ACETAMINOPHEN 325 MG/1
325 TABLET ORAL EVERY 4 HOURS PRN
Status: DISCONTINUED | OUTPATIENT
Start: 2020-01-06 | End: 2020-01-07 | Stop reason: HOSPADM

## 2020-01-06 RX ORDER — HEPARIN SODIUM 1000 [USP'U]/ML
INJECTION, SOLUTION INTRAVENOUS; SUBCUTANEOUS
Status: COMPLETED
Start: 2020-01-06 | End: 2020-01-06

## 2020-01-06 RX ORDER — EPTIFIBATIDE 2 MG/ML
INJECTION, SOLUTION INTRAVENOUS
Status: COMPLETED
Start: 2020-01-06 | End: 2020-01-06

## 2020-01-06 RX ORDER — FLUMAZENIL 0.1 MG/ML
0.2 INJECTION, SOLUTION INTRAVENOUS
Status: DISCONTINUED | OUTPATIENT
Start: 2020-01-06 | End: 2020-01-06 | Stop reason: HOSPADM

## 2020-01-06 RX ADMIN — IOPAMIDOL 84 ML: 612 INJECTION, SOLUTION INTRAVENOUS at 10:45

## 2020-01-06 RX ADMIN — FENTANYL CITRATE 25 MCG: 50 INJECTION INTRAMUSCULAR; INTRAVENOUS at 09:41

## 2020-01-06 RX ADMIN — MIDAZOLAM HYDROCHLORIDE 0.5 MG: 1 INJECTION, SOLUTION INTRAMUSCULAR; INTRAVENOUS at 09:25

## 2020-01-06 RX ADMIN — HEPARIN SODIUM 10000 UNITS: 10000 INJECTION INTRAVENOUS; SUBCUTANEOUS at 09:08

## 2020-01-06 RX ADMIN — EPTIFIBATIDE 15760 MCG: 2 INJECTION, SOLUTION INTRAVENOUS at 10:29

## 2020-01-06 RX ADMIN — MIDAZOLAM HYDROCHLORIDE 0.5 MG: 1 INJECTION, SOLUTION INTRAMUSCULAR; INTRAVENOUS at 09:30

## 2020-01-06 RX ADMIN — MIDAZOLAM HYDROCHLORIDE 0.5 MG: 1 INJECTION, SOLUTION INTRAMUSCULAR; INTRAVENOUS at 09:20

## 2020-01-06 RX ADMIN — DUTASTERIDE 0.5 MG: 0.5 CAPSULE ORAL at 18:27

## 2020-01-06 RX ADMIN — FENTANYL CITRATE 25 MCG: 50 INJECTION INTRAMUSCULAR; INTRAVENOUS at 09:21

## 2020-01-06 RX ADMIN — MIDAZOLAM HYDROCHLORIDE 0.5 MG: 1 INJECTION, SOLUTION INTRAMUSCULAR; INTRAVENOUS at 10:06

## 2020-01-06 RX ADMIN — INSULIN ASPART 1 UNITS: 100 INJECTION, SOLUTION INTRAVENOUS; SUBCUTANEOUS at 20:59

## 2020-01-06 RX ADMIN — FENTANYL CITRATE 25 MCG: 50 INJECTION INTRAMUSCULAR; INTRAVENOUS at 09:30

## 2020-01-06 RX ADMIN — LIDOCAINE HYDROCHLORIDE 10 ML: 10 INJECTION, SOLUTION INFILTRATION; PERINEURAL at 09:31

## 2020-01-06 RX ADMIN — SODIUM CHLORIDE: 9 INJECTION, SOLUTION INTRAVENOUS at 11:34

## 2020-01-06 RX ADMIN — SODIUM CHLORIDE: 9 INJECTION, SOLUTION INTRAVENOUS at 08:01

## 2020-01-06 RX ADMIN — HEPARIN SODIUM 7000 UNITS: 1000 INJECTION, SOLUTION INTRAVENOUS; SUBCUTANEOUS at 10:06

## 2020-01-06 RX ADMIN — HEPARIN SODIUM 3000 UNITS: 1000 INJECTION, SOLUTION INTRAVENOUS; SUBCUTANEOUS at 10:23

## 2020-01-06 RX ADMIN — HEPARIN SODIUM 10000 UNITS: 10000 INJECTION INTRAVENOUS; SUBCUTANEOUS at 09:54

## 2020-01-06 RX ADMIN — LOSARTAN POTASSIUM 100 MG: 100 TABLET, FILM COATED ORAL at 18:27

## 2020-01-06 RX ADMIN — CHLORTHALIDONE 12.5 MG: 25 TABLET ORAL at 18:26

## 2020-01-06 RX ADMIN — FENTANYL CITRATE 25 MCG: 50 INJECTION INTRAMUSCULAR; INTRAVENOUS at 10:06

## 2020-01-06 RX ADMIN — FENTANYL CITRATE 25 MCG: 50 INJECTION INTRAMUSCULAR; INTRAVENOUS at 09:26

## 2020-01-06 RX ADMIN — MIDAZOLAM HYDROCHLORIDE 0.5 MG: 1 INJECTION, SOLUTION INTRAMUSCULAR; INTRAVENOUS at 09:42

## 2020-01-06 RX ADMIN — FENTANYL CITRATE 25 MCG: 50 INJECTION INTRAMUSCULAR; INTRAVENOUS at 10:02

## 2020-01-06 RX ADMIN — MIDAZOLAM HYDROCHLORIDE 0.5 MG: 1 INJECTION, SOLUTION INTRAMUSCULAR; INTRAVENOUS at 10:02

## 2020-01-06 RX ADMIN — MIDAZOLAM HYDROCHLORIDE 0.5 MG: 1 INJECTION, SOLUTION INTRAMUSCULAR; INTRAVENOUS at 09:34

## 2020-01-06 RX ADMIN — FENTANYL CITRATE 25 MCG: 50 INJECTION INTRAMUSCULAR; INTRAVENOUS at 09:34

## 2020-01-06 ASSESSMENT — ACTIVITIES OF DAILY LIVING (ADL)
ADLS_ACUITY_SCORE: 14
ADLS_ACUITY_SCORE: 13
ADLS_ACUITY_SCORE: 13
ADLS_ACUITY_SCORE: 14

## 2020-01-06 ASSESSMENT — MIFFLIN-ST. JEOR: SCORE: 1589.07

## 2020-01-06 NOTE — PROCEDURES
Interventional Neuroradiology Post Procedure    Patient Name: Harpreet Vera  MRN: [id number]  Date of Procedure: January 6, 2020    Procedure: Diagnostic cerebral angiogram and angioplasty/stenting of the extracranial right vertebral artery  Radiologist: Yong Mcneal MD and Gabino Partida MD    Contrast: 64 ml Isovue  Fluoro Time: 16.7 minutes  Air Kerma: 2151.73 mGy  Medications:   Versed 3.5 mg IV  Fentanyl 175 mcg IV  Heparin 10,000 Units IV  Integrilin 15.8 mg IV    Sedation Time: 62 minutes    EBL: 20 cc  Complications: None    Patient reevaluated immediately prior to sedation and prior to procedure.    Preliminary Report:   (See dictation for full detail)  1. Severe focal stenosis (80%) at the origin of the dominant right vertebral artery  2. Angioplasty/stenting of the right vertebral artery origin with mild residual stenosis (10%) at the conclusion of the procedure    Assess/Plan:  Transfer to ICU  SBP goals  mmHg  6 Dominican sheath sewn into the right common femoral artery. The sheath may be removed at 2:30 pm on 1/6/2020.  Continue Aspirin and Plavix daily  Surveillance angiogram in 6 months  Report to ordering provider    Gabino Partida MD MD  [phone]

## 2020-01-06 NOTE — PROGRESS NOTES
Care Suites Admission Nursing Note    Reason for admission: Carotid angiogram  CS arrival time: 0724  Accompanied by: Wife Michelle  Name/phone of DC : Michelle   Medications held: NA  Consent signed: Yes  Abnormal assessment/labs: Lab WDL. Lungs clear diminished especially right base. Denies any respiratory troubles. S1S2. 2+ pedal pulses.  If abnormal, provider notified: JAVAN  Education/questions answered: Reviewed plan for today questions answered. Encouraged pt to ask provider about technical questions about procedure. Reviewed contrast information, pt states understanding.  Plan: Proceed with procedure.

## 2020-01-06 NOTE — PROGRESS NOTES
Critical Care Progress Note      01/06/2020    Name: Harpreet Vera MRN#: [id number]   Age: 69 year old YOB: 1950     [facility]    MV DAY #             Problem List:   Active Problems:    Vertebral artery stenosis           Summary/Hospital Course:     69 year old male history of HTN initially presented to [facility] with dizziness and vertigo 11/19 found to have vertebral artery stenosis (100% occlusion L and 90% occlusion on R) who underwent elective vertebral artery angioplasty and stent placement this AM. He is admitted to ICU post procedure for observation.     On interview patient minimal complaints some headache in back, otherwise denies dizziness, vision changes, vertigo.  10 point ROS negative otherwise.  Hemodynamically appropriate. Baseline EKG 11/19 sinus with RBBB. ICU asked to monitor while in ICU      Assessment and plan :     Harpreet Vera IS a 69 year old male admitted on 1/6/2020 for ICU monitoring post vertebral artery stenting .   I have personally reviewed the daily labs, imaging studies, cultures and discussed the case with referring physician and consulting physicians.     My assessment and plan by system for this patient is as follows:    Neurology/Psychiatry:   1. Vertebral artery stenosis - s/p angioplasty and stenting, neuro intact  2. Post procedure Analgesia -   Plan  - serial neuro/vasc checks per IR   - HOB 30 degrees  - Post op care per neuro IR    Cardiovascular:   1.Hemodynamics -currently appropriate  2.Rhythm - NSR  3. Ischemia - No evidence  Plan  - Goal SBP 90 -160, prn labetolol   - resumed outpt antiHTN  - ASA, plavix  - Repeat EKG     Pulmonary/Ventilator Management:   1. No active issues , Hx of MAN  Plan  - goal sats >90  - incentive spirometry  - home CPAP prn qhs    GI and Nutrition :   1. No acute issues, NPO currently   Plan  -ADAT per Neuro IR    Renal/Fluids/Electrolytes:   1. Cr, UOP appropriate (cr baseline 1.2-1.4)  2. Electrolyte  abnormality  3. Acid/base status  4. Volume status  Plan  - maintain hemodynamics  -  maintenance until PO - per Neuro IR   - monitor function and electrolytes as needed with replacement per ICU protocols.  - generally avoid nephrotoxic agents such as NSAID, IV contrast unless specifically required  - adjust medications as needed for renal clearance  - follow I/O's as appropriate.    Infectious Disease:   1. No acute issue  Plan  - monitor    Endocrine:   1. Stress induced hyperglycemia  Plan  - ICU insulin protocol, goal sugar <180    Hematology/Oncology:   1. No actives  2. On ASA, Plavix   Plan  - follow clinically      MSKL/Rheum:  1.No active issues   Plan  - monitor      IV/Access:   1. Venous access - groin cath,   Plan  - discontinue central access this afternoon      ICU Prophylaxis:   1. DVT: Hep Subq/mechanical  2. Asp: HOB 30 degrees  3. Stress Ulcer: not required   4. Restraints: Not required   5. Wound care - per unit routine   6. Feeding - NPO - advance per IR schedule  7. Family Update: at bedside   8. Disposition - ICU        Key goals for next 24 hours:   1. Pull Line 230pm  2. ADAT   3. Neuro checks per protocol, supportive cares               Interim History:   - admit for observation post procedure           Key Medications:       [START ON 1/7/2020] aspirin  81 mg Oral Daily     chlorthalidone  12.5 mg Oral Daily     [START ON 1/7/2020] clopidogrel  75 mg Oral Daily     dutasteride  0.5 mg Oral Daily     losartan  100 mg Oral Daily       sodium chloride 100 mL/hr at 01/06/20 1134               Physical Examination:   Temp:  [97  F (36.1  C)] 97  F (36.1  C)  Pulse:  [50-60] 51  Heart Rate:  [50-60] 52  Resp:  [7-18] 8  BP: (101-135)/(62-85) 111/74  SpO2:  [93 %-99 %] 98 %  No intake or output data in the 24 hours ending 01/06/20 1151  Wt Readings from Last 4 Encounters:   01/06/20 87.5 kg (193 lb)   12/03/19 86.1 kg (189 lb 12.8 oz)   11/28/19 83.9 kg (185 lb)   08/22/19 86.2 kg (190 lb)         Resp: 8    No lab results found in last 7 days.    GEN: no acute distress , lying in bed   HEENT: head ncat, sclera anicteric, OP patent, trachea midline   PULM: unlabored resp  clear anterior    CV/COR: RRR S1S2 no gallop,  No rub, no murmur  ABD: soft nontender, hypoactive bowel sounds, no mass  EXT: no edema,  warm  NEURO: grossly intact  SKIN: no obvious rash  LINES: clean, dry intact (right groin)         Data:   All data and imaging reviewed     ROUTINE ICU LABS (Last four results)  CMP  Recent Labs   Lab 01/06/20  0745   CR 1.20   GFRESTIMATED 60*   GFRESTBLACK 70     CBC  Recent Labs   Lab 01/06/20  0745   WBC 7.7   RBC 4.38*   HGB 14.6   HCT 40.1   MCV 92   MCH 33.3*   MCHC 36.4   RDW 12.7        INRNo lab results found in last 7 days.  Arterial Blood GasNo lab results found in last 7 days.    All cultures:  No results for input(s): CULT in the last 168 hours.  No results found for this or any previous visit (from the past 24 hour(s)).      Billing: This patient is critically ill: No. Total critical care time today L3

## 2020-01-06 NOTE — PLAN OF CARE
Patient is alert and oriented x4. Move all extremities.Vital signs stable. CMS check within normal.Palpable pulses.Right groin site no hematoma.Continue to monitor closely. Stable.Bethany Montoya, RN, BSN, BC, CCRN

## 2020-01-06 NOTE — PRE-PROCEDURE
GENERAL PRE-PROCEDURE:   Procedure:  Cerebral angiogram  Date/Time:  1/6/2020 8:45 AM    Verbal consent obtained?: Yes    Written consent obtained?: Yes    Risks and benefits: Risks, benefits and alternatives were discussed    Consent given by:  Patient  Patient states understanding of procedure being performed: Yes    Patient's understanding of procedure matches consent: Yes    Procedure consent matches procedure scheduled: Yes    Expected level of sedation:  Moderate  Appropriately NPO:  Yes  ASA Class:  Class 3- Severe systemic disease, definite functional limitations  Mallampati  :  Grade 3- soft palate visible, posterior pharyngeal wall not visible  Lungs:  Lungs clear with good breath sounds bilaterally  Heart:  Normal heart sounds and rate  History & Physical reviewed:  History and physical reviewed and no updates needed  Statement of review:  I have reviewed the lab findings, diagnostic data, medications, and the plan for sedation

## 2020-01-06 NOTE — PROGRESS NOTES
"Interventional Neuroradiology Pre Sedation Assessment    69 year old male with history of ED visit on Thanksgiving after an episode of foggy thinking, fall, dizziness.  He was fully recovered during the visit so minimal testing was done.  As an outpatient he underwent MRI/A imaging which suggested an occluded left vertebral artery.  He was referred to Dr Mcneal and seen in clinic 12/27/19.  It was recommended to proceed with catheter angiogram with possible stent placement if deemed appropriate.    See ER notes and visit with his primary provider.  No changes in medical status       Allergies   Allergen Reactions     Pravastatin      Fuzzy brain.      Labs: Hgb 14.6, plt 167 creat 1.2    Sedation history: Previous problems with sedation. No   History of sleep apnea Yes, uses bipab    Exam:   /69 (BP Location: Left arm)   Pulse 51   Temp 97  F (36.1  C) (Oral)   Resp 18   Ht 1.702 m (5' 7\")   Wt 87.5 kg (193 lb)   SpO2 98%   BMI 30.23 kg/m    Neuro: A/O x4, speech clear and fluent.SILVEIRA strength equal  Cardiac: S1S2  Lungs: clear  Mallampati:  III - Only soft palate is visible. Intubation is predicted to be difficult  ASA: 3 - Severe systemic disease, but not incapacitating  NPO since last evening    Okay to proceed with planned procedure using moderate IV sedation    Procedure its risks, benefits, and alternatives including but not limited to bleeding, infection, stroke, vessel injury, renal failure, contrast dye or medication reaction were discussed with patient and his wife.  Questions and answered and they give written and verbal consent to proceed.    SALVATORE Dunham CNP  "

## 2020-01-06 NOTE — PROGRESS NOTES
Interventional Radiology Intra-procedural Nursing Note    Patient Name: Harpreet Vera  Medical Record Number: [id number]  Today's Date: January 6, 2020    Start Time: 0928  End of procedure time: 1030  Procedure: Cerebral angiogram, possible vertebral artery stent  Report given to: Bethany RN, ICU  Time pt departs:  1055      Other Notes:   Patient in IR3 for cerebral angiogram and possible intervention. VS, labs, and allergies reviewed prior to procedure. Neuro exam WNL, no deficit. Pre-procedure pulses 2+ DP and PT bilaterally.     5F sheath in R femoral artery at 0933.  0937 baseline ACT: 126  1006: 7000 units heparin  1014 ACT: 211  1016: Left vertebral artery angioplasty  1022: Left Vertebral artery stent placement  1022: 3000 units heparin; 7.9 ml integralin  6F sheath in R femoral artery sutured in place, remove in 4 hours per Dr. Partida. Site C/D/I, no hematoma. Patient tolerated procedure, no complications.     Pulses and neuro unchanged, WNL.   175 mcg fentanyl and 3.5 mg midazolam given for sedation      Mary Barrera RN

## 2020-01-07 VITALS
RESPIRATION RATE: 15 BRPM | BODY MASS INDEX: 29.52 KG/M2 | OXYGEN SATURATION: 95 % | HEART RATE: 67 BPM | DIASTOLIC BLOOD PRESSURE: 78 MMHG | WEIGHT: 188.05 LBS | SYSTOLIC BLOOD PRESSURE: 129 MMHG | TEMPERATURE: 98.2 F | HEIGHT: 67 IN

## 2020-01-07 LAB
ANION GAP SERPL CALCULATED.3IONS-SCNC: 7 MMOL/L (ref 3–14)
BUN SERPL-MCNC: 22 MG/DL (ref 7–30)
CALCIUM SERPL-MCNC: 9.1 MG/DL (ref 8.5–10.1)
CHLORIDE SERPL-SCNC: 106 MMOL/L (ref 94–109)
CO2 SERPL-SCNC: 26 MMOL/L (ref 20–32)
CREAT SERPL-MCNC: 1.25 MG/DL (ref 0.66–1.25)
ERYTHROCYTE [DISTWIDTH] IN BLOOD BY AUTOMATED COUNT: 12.8 % (ref 10–15)
GFR SERPL CREATININE-BSD FRML MDRD: 57 ML/MIN/{1.73_M2}
GLUCOSE BLDC GLUCOMTR-MCNC: 113 MG/DL (ref 70–99)
GLUCOSE BLDC GLUCOMTR-MCNC: 134 MG/DL (ref 70–99)
GLUCOSE BLDC GLUCOMTR-MCNC: 158 MG/DL (ref 70–99)
GLUCOSE SERPL-MCNC: 117 MG/DL (ref 70–99)
HCT VFR BLD AUTO: 38.7 % (ref 40–53)
HGB BLD-MCNC: 13.9 G/DL (ref 13.3–17.7)
MAGNESIUM SERPL-MCNC: 2.2 MG/DL (ref 1.6–2.3)
MCH RBC QN AUTO: 33.1 PG (ref 26.5–33)
MCHC RBC AUTO-ENTMCNC: 35.9 G/DL (ref 31.5–36.5)
MCV RBC AUTO: 92 FL (ref 78–100)
PHOSPHATE SERPL-MCNC: 3.5 MG/DL (ref 2.5–4.5)
PLATELET # BLD AUTO: 162 10E9/L (ref 150–450)
POTASSIUM SERPL-SCNC: 3.9 MMOL/L (ref 3.4–5.3)
POTASSIUM SERPL-SCNC: 4 MMOL/L (ref 3.4–5.3)
RBC # BLD AUTO: 4.2 10E12/L (ref 4.4–5.9)
SODIUM SERPL-SCNC: 139 MMOL/L (ref 133–144)
WBC # BLD AUTO: 7.2 10E9/L (ref 4–11)

## 2020-01-07 PROCEDURE — 00000146 ZZHCL STATISTIC GLUCOSE BY METER IP

## 2020-01-07 PROCEDURE — 84100 ASSAY OF PHOSPHORUS: CPT | Performed by: RADIOLOGY

## 2020-01-07 PROCEDURE — 36415 COLL VENOUS BLD VENIPUNCTURE: CPT | Performed by: RADIOLOGY

## 2020-01-07 PROCEDURE — 85027 COMPLETE CBC AUTOMATED: CPT | Performed by: RADIOLOGY

## 2020-01-07 PROCEDURE — 25000132 ZZH RX MED GY IP 250 OP 250 PS 637: Mod: GY | Performed by: RADIOLOGY

## 2020-01-07 PROCEDURE — 83735 ASSAY OF MAGNESIUM: CPT | Performed by: RADIOLOGY

## 2020-01-07 PROCEDURE — 84132 ASSAY OF SERUM POTASSIUM: CPT | Performed by: RADIOLOGY

## 2020-01-07 PROCEDURE — 80048 BASIC METABOLIC PNL TOTAL CA: CPT | Performed by: RADIOLOGY

## 2020-01-07 RX ADMIN — ASPIRIN 81 MG: 81 TABLET, COATED ORAL at 08:14

## 2020-01-07 RX ADMIN — INSULIN ASPART 1 UNITS: 100 INJECTION, SOLUTION INTRAVENOUS; SUBCUTANEOUS at 08:27

## 2020-01-07 RX ADMIN — CLOPIDOGREL BISULFATE 75 MG: 75 TABLET ORAL at 08:14

## 2020-01-07 ASSESSMENT — ACTIVITIES OF DAILY LIVING (ADL)
ADLS_ACUITY_SCORE: 14

## 2020-01-07 ASSESSMENT — MIFFLIN-ST. JEOR: SCORE: 1566.63

## 2020-01-07 NOTE — PLAN OF CARE
Discharge instructions explained to patient and wife. All questions answered. Pt discharge to home via spouse. Belongings returned to patient. No medications to return.

## 2020-01-07 NOTE — PLAN OF CARE
SB 40's while sleeping, pressures stable. Neuro intact. Pulses palpable. R groin site WNL. Adequate UOP.

## 2020-01-07 NOTE — DISCHARGE SUMMARY
"[facility] INTERVENTIONAL NE[facility]ADIOLOGY:  Discharge Summary ~    Harpreet Vera MRN# [id number]   YOB: 1950 Age: 69 year old     Date of Admission:  1/6/2020  Date of Discharge:  1/7/2020  Admitting Physician:  Gabino Partida MD  Discharge Physician:  Yong Mcneal MD / Adrienne Marion CNP  Discharging Service:  Interventional Neuroradiology     Primary Provider: Lucía Sanduh          Admission Diagnoses:   Vertebral artery disease          Discharge Diagnosis:   Occlusion of left vertebral artery  Symptomatic right vertebral artery stenosis s/p right vertebral artery stent placement             Discharge Disposition:   Discharged to home           Condition on Discharge:   Discharge condition: Good   Discharge vitals: Blood pressure 133/78, pulse 63, temperature 98.2  F (36.8  C), temperature source Oral, resp. rate 13, height 1.702 m (5' 7\"), weight 85.3 kg (188 lb 0.8 oz), SpO2 95 %.     Code status on discharge: Full Code           Procedures / Labs / Imaging:   Cerebral angiogram with endovascular stent treatment:  1. Severe focal stenosis (80%) at the origin of the dominant right  vertebral artery resulting in mildly delayed antegrade flow.  2. Successful stent placement in the proximal right vertebral artery  resulting in markedly improved antegrade flow through the right  vertebral artery into the posterior circulation. Mild residual  stenosis (10%) in the proximal right vertebral artery at the  conclusion of the procedure. No evidence of thromboembolic  complication.  3. Mild stenosis in the supraclinoid right internal carotid artery.          Medications Prior to Admission:     Medications Prior to Admission   Medication Sig Dispense Refill Last Dose     aspirin (ASA) 81 MG tablet Take 81 mg by mouth daily   1/6/2020 at Unknown time     chlorthalidone (HYGROTON) 25 MG tablet Take 0.5 tablets (12.5 mg) by mouth daily 45 tablet 3 1/5/2020 at Unknown time     clopidogrel (PLAVIX) " 75 MG tablet Take 1 tablet (75 mg) by mouth daily 30 tablet 3 1/6/2020 at Unknown time     dutasteride (AVODART) 0.5 MG capsule TAKE 1 CAPSULE BY MOUTH DAILY 90 capsule 2 1/5/2020 at Unknown time     losartan (COZAAR) 100 MG tablet TAKE 1 TABLET BY MOUTH ONE TIME DAILY 90 tablet 3 1/5/2020 at Unknown time     multivitamin w/minerals (MULTI-VITAMIN) tablet Take 1 tablet by mouth daily   1/5/2020 at Unknown time     Vitamin D, Cholecalciferol, 1000 UNITS CAPS Take 2,000 Units by mouth daily   1/5/2020 at Unknown time     atenolol (TENORMIN) 25 MG tablet TAKE 1 TABLET BY MOUTH ONE TIME DAILY 90 tablet 3 Taking     loratadine (CLARITIN) 10 MG tablet Take 1 tablet (10 mg) by mouth daily as needed for allergies 30 tablet  More than a month at Unknown time     mometasone (NASONEX) 50 MCG/ACT nasal spray Spray 2 sprays into both nostrils daily prn 3 Box 2 More than a month at Unknown time     naproxen sodium (ALEVE) 220 MG tablet Take 1 tablet (220 mg) by mouth daily as needed for moderate pain   More than a month at Unknown time             Discharge Medications:     Current Discharge Medication List      CONTINUE these medications which have NOT CHANGED    Details   aspirin (ASA) 81 MG tablet Take 81 mg by mouth daily      chlorthalidone (HYGROTON) 25 MG tablet Take 0.5 tablets (12.5 mg) by mouth daily  Qty: 45 tablet, Refills: 3    Associated Diagnoses: Essential hypertension, benign      clopidogrel (PLAVIX) 75 MG tablet Take 1 tablet (75 mg) by mouth daily  Qty: 30 tablet, Refills: 3    Comments: Take with 81 mg aspirin daily  Associated Diagnoses: Occlusion of left vertebral artery      dutasteride (AVODART) 0.5 MG capsule TAKE 1 CAPSULE BY MOUTH DAILY  Qty: 90 capsule, Refills: 2    Associated Diagnoses: Elevated PSA      losartan (COZAAR) 100 MG tablet TAKE 1 TABLET BY MOUTH ONE TIME DAILY  Qty: 90 tablet, Refills: 3      multivitamin w/minerals (MULTI-VITAMIN) tablet Take 1 tablet by mouth daily      Vitamin D,  Cholecalciferol, 1000 UNITS CAPS Take 2,000 Units by mouth daily      atenolol (TENORMIN) 25 MG tablet TAKE 1 TABLET BY MOUTH ONE TIME DAILY  Qty: 90 tablet, Refills: 3    Associated Diagnoses: Essential hypertension, benign      loratadine (CLARITIN) 10 MG tablet Take 1 tablet (10 mg) by mouth daily as needed for allergies  Qty: 30 tablet      mometasone (NASONEX) 50 MCG/ACT nasal spray Spray 2 sprays into both nostrils daily prn  Qty: 3 Box, Refills: 2    Associated Diagnoses: Chronic rhinitis      naproxen sodium (ALEVE) 220 MG tablet Take 1 tablet (220 mg) by mouth daily as needed for moderate pain    Associated Diagnoses: Right shoulder pain, unspecified chronicity                   Consultations:   No consultations were requested during this admission             Brief History of Illness:   This patient was a 69 year old male with a history of ED visit on Thanksgiving after an episode of foggy thinking, fall, dizziness.  He was fully recovered during the visit so minimal testing was done.  As an outpatient he underwent MRI/A imaging which suggested an occluded left vertebral artery.  He was referred to Dr Mcneal and seen in clinic 12/27/19.  It was recommended to proceed with catheter angiogram with possible right vert stent placement if deemed appropriate.          Hospital Course:   Pt presented on 1/6/20 for a diagnostic angiogram with plan for possible right vertebral artery stent placement. Pt presented neurologically intact and fully aware of the pending procedure. He was brought to the neuro angiography suite for procedure with IV sedation.  The right femoral artery was accessed for the procedure and procedure was performed by Dr. FARIDA Partida and Dr. DAVID Mcneal.  The angiogram demonstrated a severe focal stenosis (80%) at the origin of the dominant right vertebral artery with subsequent angioplasty/stenting of the right vertebral artery origin resulting in mild residual stenosis (10%) at the conclusion  of the procedure. There were no noted procedural complications. The patient was admitted to the ICU for overnight monitoring. The right femoral arterial sheath was removed via manual pressure. Hemostasis obtained, no hematoma. The patient had an uneventful overnight stay. This morning he is sitting up in bedside chair. Denies complaints except for feeling tired. No headache today. Neurologically intact. Tolerating PO intake and activity. Groin site is soft and dry. Distal CMS intact. Pt is ready for discharge to home.              Significant Results:   Successful stent placement in the proximal right vertebral artery  resulting in markedly improved antegrade flow through the right  vertebral artery into the posterior circulation. Mild residual  stenosis (10%) in the proximal right vertebral artery at the  conclusion of the procedure. No evidence of thromboembolic  complication.             Pending Results:   None           Discharge Instructions and Follow-Up:   Discharge diet: Advance to a regular diet as tolerated   Discharge activity: Activity as tolerated. Avoid heavy lifting ( > 10 pounds) x 2 days.   Discharge follow-up: Follow up with primary care provider as needed.   Outpatient therapy: None    Home Care agency: None    Supplies and equipment: None   Lines and drains: None    Wound care: Keep groin puncture site clean and dry. Avoid tub baths or swimming x 5 days. Showers are okay.   Other instructions: Continue Plavix and Aspirin for 6 months. Follow up cerebral surveillance angiogram in 6 months.

## 2020-01-07 NOTE — PROGRESS NOTES
ICU BRIEF NOTE    No complaints this AM. Well appearing. Pleasant. VS acceptable Hemodynamics appropriate, neuro intact and breathing comfortably.  ICU will sign off from active management. Anticipate Discharge to home today    Fred Jaramillo MD  L1

## 2020-01-08 ENCOUNTER — TELEPHONE (OUTPATIENT)
Dept: INTERNAL MEDICINE | Facility: CLINIC | Age: 70
End: 2020-01-08

## 2020-01-08 NOTE — TELEPHONE ENCOUNTER
IP F/U    Date: 1-7-20  Diagnosis: occlusion of Left vertebral artery  Is patient active in care coordination? No  Was patient in TCU? No

## 2020-01-08 NOTE — TELEPHONE ENCOUNTER
"Hospital/TCU/ED for chronic condition Discharge Protocol    \"Hi, my name is Mojgan Story RN, a registered nurse, and I am calling from [facility].  I am calling to follow up and see how things are going for you after your recent emergency visit/hospital/TCU stay.\"    Tell me how you are doing now that you are home?\" patient states he is doing fine.      Discharge Instructions    \"Let's review your discharge instructions.  What is/are the follow-up recommendations?  Pt. Response: Take new medication and schedule f/u appointment.    \"Has an appointment with your primary care provider been scheduled?\"   Yes. (confirm)    \"When you see the provider, I would recommend that you bring your medications with you.\"    Medications    \"Tell me what changed about your medicines when you discharged?\"    Changes to chronic meds?    0-1    \"What questions do you have about your medications?\"    None     New diagnoses of heart failure, COPD, diabetes, or MI?    No              Post Discharge Medication Reconciliation Status: discharge medications reconciled, continue medications without change.as per discharge summary.    Was MTM referral placed (*Make sure to put transitions as reason for referral)?   No    Call Summary    \"What questions or concerns do you have about your recent visit and your follow-up care?\"     none    \"If you have questions or things don't continue to improve, we encourage you contact us through the main clinic number (give number).  Even if the clinic is not open, triage nurses are available 24/7 to help you.     We would like you to know that our clinic has extended hours (provide information).  We also have urgent care (provide details on closest location and hours/contact info)\"      \"Thank you for your time and take care!\"             "

## 2020-01-09 LAB — INTERPRETATION ECG - MUSE: NORMAL

## 2020-01-13 ENCOUNTER — OFFICE VISIT (OUTPATIENT)
Dept: PODIATRY | Facility: CLINIC | Age: 70
End: 2020-01-13
Payer: MEDICARE

## 2020-01-13 ENCOUNTER — ANCILLARY PROCEDURE (OUTPATIENT)
Dept: GENERAL RADIOLOGY | Facility: CLINIC | Age: 70
End: 2020-01-13
Attending: INTERNAL MEDICINE
Payer: MEDICARE

## 2020-01-13 ENCOUNTER — NURSE TRIAGE (OUTPATIENT)
Dept: INTERNAL MEDICINE | Facility: CLINIC | Age: 70
End: 2020-01-13

## 2020-01-13 ENCOUNTER — OFFICE VISIT (OUTPATIENT)
Dept: INTERNAL MEDICINE | Facility: CLINIC | Age: 70
End: 2020-01-13
Payer: MEDICARE

## 2020-01-13 VITALS
HEIGHT: 67 IN | TEMPERATURE: 97.6 F | OXYGEN SATURATION: 100 % | RESPIRATION RATE: 16 BRPM | BODY MASS INDEX: 29.98 KG/M2 | HEART RATE: 87 BPM | DIASTOLIC BLOOD PRESSURE: 84 MMHG | SYSTOLIC BLOOD PRESSURE: 108 MMHG | WEIGHT: 191 LBS

## 2020-01-13 VITALS
HEART RATE: 92 BPM | DIASTOLIC BLOOD PRESSURE: 80 MMHG | WEIGHT: 191 LBS | HEIGHT: 67 IN | BODY MASS INDEX: 29.98 KG/M2 | SYSTOLIC BLOOD PRESSURE: 138 MMHG

## 2020-01-13 DIAGNOSIS — M10.9 ACUTE GOUT OF LEFT FOOT, UNSPECIFIED CAUSE: ICD-10-CM

## 2020-01-13 DIAGNOSIS — M79.672 LEFT FOOT PAIN: Primary | ICD-10-CM

## 2020-01-13 DIAGNOSIS — M79.672 LEFT FOOT PAIN: ICD-10-CM

## 2020-01-13 LAB
CRP SERPL-MCNC: 36 MG/L (ref 0–8)
D DIMER PPP FEU-MCNC: 0.4 UG/ML FEU (ref 0–0.5)
ERYTHROCYTE [DISTWIDTH] IN BLOOD BY AUTOMATED COUNT: 13.1 % (ref 10–15)
ERYTHROCYTE [SEDIMENTATION RATE] IN BLOOD BY WESTERGREN METHOD: 14 MM/H (ref 0–20)
HCT VFR BLD AUTO: 42.7 % (ref 40–53)
HGB BLD-MCNC: 15.3 G/DL (ref 13.3–17.7)
MCH RBC QN AUTO: 33 PG (ref 26.5–33)
MCHC RBC AUTO-ENTMCNC: 35.8 G/DL (ref 31.5–36.5)
MCV RBC AUTO: 92 FL (ref 78–100)
PLATELET # BLD AUTO: 229 10E9/L (ref 150–450)
RBC # BLD AUTO: 4.63 10E12/L (ref 4.4–5.9)
WBC # BLD AUTO: 10.5 10E9/L (ref 4–11)

## 2020-01-13 PROCEDURE — 73630 X-RAY EXAM OF FOOT: CPT | Mod: LT

## 2020-01-13 PROCEDURE — 85027 COMPLETE CBC AUTOMATED: CPT | Performed by: INTERNAL MEDICINE

## 2020-01-13 PROCEDURE — 85379 FIBRIN DEGRADATION QUANT: CPT | Performed by: INTERNAL MEDICINE

## 2020-01-13 PROCEDURE — 80053 COMPREHEN METABOLIC PANEL: CPT | Performed by: INTERNAL MEDICINE

## 2020-01-13 PROCEDURE — 84550 ASSAY OF BLOOD/URIC ACID: CPT | Performed by: INTERNAL MEDICINE

## 2020-01-13 PROCEDURE — 86140 C-REACTIVE PROTEIN: CPT | Performed by: INTERNAL MEDICINE

## 2020-01-13 PROCEDURE — 85652 RBC SED RATE AUTOMATED: CPT | Performed by: INTERNAL MEDICINE

## 2020-01-13 PROCEDURE — 99203 OFFICE O/P NEW LOW 30 MIN: CPT | Performed by: PODIATRIST

## 2020-01-13 PROCEDURE — 99214 OFFICE O/P EST MOD 30 MIN: CPT | Performed by: INTERNAL MEDICINE

## 2020-01-13 PROCEDURE — 36415 COLL VENOUS BLD VENIPUNCTURE: CPT | Performed by: INTERNAL MEDICINE

## 2020-01-13 RX ORDER — PREDNISONE 10 MG/1
20 TABLET ORAL DAILY
Qty: 40 TABLET | Refills: 0 | Status: SHIPPED | OUTPATIENT
Start: 2020-01-13 | End: 2020-01-30

## 2020-01-13 ASSESSMENT — MIFFLIN-ST. JEOR
SCORE: 1580
SCORE: 1580

## 2020-01-13 NOTE — LETTER
2020         RE: Harpreet Vera  [address] [facility] [id number]        Dear Colleague,    Thank you for referring your patient, Harpreet Vera, to the [facility]. Please see a copy of my visit note below.      ASSESSMENT/PLAN:    Encounter Diagnoses   Name Primary?     Left foot pain Yes     Acute gout of left foot, unspecified cause      I do not have a high suspicion for an embolus or emboli to the left foot, given the warmth, palpable pedal pulses, skin color in digits.    The focus of pain is around the first metatarsophalangeal joint and clinical exam consistent with gout. Uric acid is pending.     He also described kicking some ice the day prior to the problem starting.  An occult injury of some sort is considered.    A short CAM walker was provided to protect the foot and reduce pain.  Cold application, rest and elevation recommended  Any medication management deferred to Dr. Sandhu.    DVT is considered. D-dimer pending.     Follow up in 1 week.    Body mass index is 29.91 kg/m .    Weight management plan: Patient was referred to their PCP to discuss a diet and exercise plan.      Yong yKle DPM, FACFAS, MS    [facility]      ____________________________________________________________________    HPI:       I was asked by Dr. Lucía Sandhu to evaluate this patient, in consultation, regarding left foot pain.  There is concern of embolus to his left foot status post angiogram and stent placement in left vertebral artery last week.  Gout is also considered  Chief Complaint: sore foot, swollen; he specified that most of the pain is the medial forefoot, but radiates proximal. He has a hard time finding a position that is comfortable when resting. Walking is difficult.   Onset of problem: 5 days  Pain/ discomfort is described as:  throbbing  Ratin/10 at worst   Frequency:  daily    The pain is made worse with walking, weight  "bearing  Previous treatment: ice, elevation    *  Patient Active Problem List   Diagnosis     Essential hypertension, benign     Allergic rhinitis     Intestinal infection due to Clostridium difficile     CARDIOVASCULAR SCREENING; LDL GOAL LESS THAN 130     Advanced directives, counseling/discussion     Hypercholesteremia     MAN (obstructive sleep apnea)     Vitamin D deficiency     Benign prostatic hyperplasia     S/P cervical spinal fusion     Cervicalgia     Vertebral artery stenosis   *  *  Past Surgical History:   Procedure Laterality Date     C NONSPECIFIC PROCEDURE      Right clavicle fracture, Multiple right sided rib fracture, hairline fracture \"pelvis\", secondary to fall from tree         C NONSPECIFIC PROCEDURE      Right ulnar/radial fracture      CHOLECYSTECTOMY       COLONOSCOPY       COLONOSCOPY N/A 4/5/2017    Procedure: COMBINED COLONOSCOPY, SINGLE OR MULTIPLE BIOPSY/POLYPECTOMY BY BIOPSY;  Surgeon: Tylor Rice MD;  Location: [facility]     DECOMPRESSION, FUSION CERVICAL ANTERIOR ONE LEVEL, COMBINED N/A 9/9/2016    Procedure: COMBINED DECOMPRESSION, FUSION CERVICAL ANTERIOR ONE LEVEL;  Surgeon: Erick Valles MD;  Location: [facility] OR     ENT SURGERY      tonsilectomy  as a child     IR CAROTID CEREBRAL ANGIOGRAM BILATERAL  1/6/2020     IR DISCONTINUE SHEATH  1/6/2020     ORTHOPEDIC SURGERY      ambar knees scopedrt shoulder,fx rt arm fx     TESTICLE SURGERY       VASECTOMY     *  *  Current Outpatient Medications   Medication Sig Dispense Refill     aspirin (ASA) 81 MG tablet Take 81 mg by mouth daily       chlorthalidone (HYGROTON) 25 MG tablet Take 0.5 tablets (12.5 mg) by mouth daily 45 tablet 3     clopidogrel (PLAVIX) 75 MG tablet Take 1 tablet (75 mg) by mouth daily 30 tablet 3     dutasteride (AVODART) 0.5 MG capsule TAKE 1 CAPSULE BY MOUTH DAILY 90 capsule 2     loratadine (CLARITIN) 10 MG tablet Take 1 tablet (10 mg) by mouth daily as needed for allergies 30 tablet      losartan " (COZAAR) 100 MG tablet TAKE 1 TABLET BY MOUTH ONE TIME DAILY 90 tablet 3     mometasone (NASONEX) 50 MCG/ACT nasal spray Spray 2 sprays into both nostrils daily prn 3 Box 2     multivitamin w/minerals (MULTI-VITAMIN) tablet Take 1 tablet by mouth daily       naproxen sodium (ALEVE) 220 MG tablet Take 1 tablet (220 mg) by mouth daily as needed for moderate pain       Vitamin D, Cholecalciferol, 1000 UNITS CAPS Take 2,000 Units by mouth daily         ROS:     A 10-point review of systems was performed and is positive for that noted above in the HPI and as seen below.  All other areas are negative.     Numbness in feet?  no   Calf pain with walking? no  Recent foot/ankle injury? See HPI  Weight change over past 12 months? no  Self perception as overweight? yes  Recent flu-like symptoms? no  Joint pain other than feet ? no    Social History: Employment:  retired;  Exercise/Physical activity:  no;  Tobacco use:  no  Social History     Socioeconomic History     Marital status:      Spouse name: Not on file     Number of children: Not on file     Years of education: Not on file     Highest education level: Not on file   Occupational History     Not on file   Social Needs     Financial resource strain: Not on file     Food insecurity:     Worry: Not on file     Inability: Not on file     Transportation needs:     Medical: Not on file     Non-medical: Not on file   Tobacco Use     Smoking status: Never Smoker     Smokeless tobacco: Never Used   Substance and Sexual Activity     Alcohol use: Yes     Alcohol/week: 0.0 standard drinks     Comment: rarely     Drug use: No     Sexual activity: Yes     Partners: Female   Lifestyle     Physical activity:     Days per week: Not on file     Minutes per session: Not on file     Stress: Not on file   Relationships     Social connections:     Talks on phone: Not on file     Gets together: Not on file     Attends [employer] service: Not on file     Active member of club or  "organization: Not on file     Attends meetings of clubs or organizations: Not on file     Relationship status: Not on file     Intimate partner violence:     Fear of current or ex partner: Not on file     Emotionally abused: Not on file     Physically abused: Not on file     Forced sexual activity: Not on file   Other Topics Concern     Parent/sibling w/ CABG, MI or angioplasty before 65F 55M? Not Asked   Social History Narrative     Not on file       Family history:  Family History   Problem Relation Age of Onset     Arthritis Mother      Eye Disorder Mother         cateracts     Lipids Mother      Cancer Father         colon/prostate     Eye Disorder Father         cateracts     Lipids Father      Diabetes Father      Cancer Maternal Grandfather         colon     Cancer Paternal Grandmother         colon     Cancer Maternal Aunt         colon     Cancer Maternal Uncle         colon     Prostate Cancer Brother      Colon Cancer Brother 74        liver mets     Prostate Cancer Brother        Rheumatoid arthritis:  parent  Foot Problems: no  Diabetes: parent      EXAM:    Vitals: /80 (BP Location: Right arm, Patient Position: Chair, Cuff Size: Adult Regular)   Pulse 92   Ht 1.702 m (5' 7\")   Wt 86.6 kg (191 lb)   BMI 29.91 kg/m     BMI: Body mass index is 29.91 kg/m .  Height: 5' 7\"     ESR  14  WBC  10.5    Constitutional/ general:  Pt is in no apparent distress, appears well-nourished.  Cooperative with history and physical exam.     Vascular:  Pedal pulses are palpable bilaterally for both the DP and PT arteries.  CFT < 3 sec.  Generalized left foot edema with more around the 1st metatarsophalangeal joint.  Pedal hair growth noted. Skin is warm.     Neuro:  Alert and oriented x 3. Coordinated gait.  Light touch sensation is intact to the L4, L5, S1 distributions. No obvious deficits.  No evidence of neurological-based weakness, spasticity, or contracture in the lower extremities.     Derm: Normal texture " and turgor.  No erythema, ecchymosis, or cyanosis.  No open lesions.     Musculoskeletal:    Lower extremity muscle strength is normal.  Patient is ambulatory without an assistive device or brace .  No gross deformities.  Pain on palpation: with manipulation of the left first metatarsophalangeal joint.        Radiographic Exam:  X-Ray Findings:  I personally reviewed the left foot films.  Negative.     Preliminary Report:    FOOT LEFT THREE OR MORE VIEWS January 13, 2020 12:41 PM      HISTORY: Left foot pain.                                                                      IMPRESSION: Calcaneal spurring. Mild focal cortical thickening at the  lateral aspect of the fourth metatarsal. An age-indeterminate stress  fracture in this area cannot be excluded. Clinical correlation  recommended.        Again, thank you for allowing me to participate in the care of your patient.        Sincerely,        Yong Kyle DPM

## 2020-01-13 NOTE — PROGRESS NOTES
".Subjective     Harpreet Vera is a 69 year old male who presents to clinic today for the following health issues:    HPI   Last week he had an angiogram entry through right groin and stent placed in left vertebral artery. Was placed on blood thinners and did well until 2 days later he kicked some ice with his foot. The foot did not hurt when he did it but later in bed the heal area did not feel right. The heel was worse the next day then it seemed the area of the big toe hurt. Now his entire foot hurts, is swollen, painful, slightly blue and pain seems to be coming up the ankle.    Denies any fever chills or night sweats. No shortness of breath. He is on ASA and Plavix.       BP Readings from Last 3 Encounters:   01/13/20 138/80   01/13/20 108/84   01/07/20 129/78    Wt Readings from Last 3 Encounters:   01/13/20 86.6 kg (191 lb)   01/13/20 86.6 kg (191 lb)   01/07/20 85.3 kg (188 lb 0.8 oz)                 Reviewed and updated as needed this visit by Provider         Review of Systems   ROS COMP: Constitutional, HEENT, cardiovascular, pulmonary, GI, , musculoskeletal, neuro, skin, endocrine and psych systems are negative, except as otherwise noted.      Objective    /84   Pulse 87   Temp 97.6  F (36.4  C) (Oral)   Resp 16   Ht 1.702 m (5' 7\")   Wt 86.6 kg (191 lb)   SpO2 100%   BMI 29.91 kg/m    Body mass index is 29.91 kg/m .  Physical Exam   GENERAL: healthy, alert and no distress  NECK: no adenopathy, no asymmetry, masses, or scars and thyroid normal to palpation  RESP: lungs clear to auscultation - no rales, rhonchi or wheezes  CV: regular rate and rhythm, normal S1 S2, no S3 or S4, no murmur, click or rub, no peripheral edema and peripheral pulses strong  ABDOMEN: soft, nontender, no hepatosplenomegaly, no masses and bowel sounds normal  MS: left foot is swollen to the ankle, foot is dusky and mildly erythematous  to the ankle, mild tenderness, dorsalis pdes pulse is present on left (slightly " decreased compared to right), posterior tibial present.    NEURO: Normal strength and tone and sensory exam grossly normal  PSYCH: affect normal/bright and mentation a little slow, has been since Thanksgiving.     XRAY FOOT: unremarkable         Assessment & Plan     1. Left foot pain  I am not entirely sure it is gout.  He could have an embolus to the foot induced by the angiogram. He could also have DVT. Discussed with Dr Kyle of Podiatry he agreed to see the patient. He did feel it was gout and so I started him on prednisone 20 mg every day and will see back in 2 days.   - XR Foot Left G/E 3 Views; Future  - CBC with platelets  - Comprehensive metabolic panel  - ESR: Erythrocyte sedimentation rate  - CRP, inflammation  - D dimer, quantitative       Return in about 2 days (around 1/15/2020).    Lucía Sandhu MD  [facility]

## 2020-01-13 NOTE — PATIENT INSTRUCTIONS
Thank you for choosing [facility] Podiatry / Foot & Ankle Surgery!    DR. LOWE'S CLINIC LOCATIONS     MONDAY - [place] WEDNESDAY (AM ONLY) - [place]   [address] [place] [address] [id number] [address]   [phone] / [phone] / [phone]       THURSDAY - [place] SCHEDULE SURGERY: [phone]   [address] APPOINTMENTS: [phone]   [place] [address] BILLING QUESTIONS: [phone] / [phone]       GOUT  Gout is a disorder that results from the build-up of uric acid in the tissues or a joint. It most often affects the joint of the big toe.  CAUSES  Gout attacks are caused by deposits of crystallized uric acid in the joint. Uric acid is present in the blood and eliminated in the urine, but in people who have gout, uric acid accumulates and crystallizes in the joints. Uric acid is the result of the breakdown of purines, chemicals that are found naturally in our bodies and in food. Some people develop gout because their kidneys have difficulty eliminating normal amounts of uric acid, while others produce too much uric acid.  Gout occurs most commonly in the big toe because uric acid is sensitive to temperature changes. At cooler temperatures, uric acid turns into crystals. Since the toe is the part of the body that is farthest from the heart, it s also the coolest part of the body - and, thus, the most likely target of gout. However, gout can affect any joint in the body.  The tendency to accumulate uric acid is often inherited. Other factors that put a person at risk for developing gout include: high blood pressure, diabetes, obesity, surgery, chemotherapy, stress, and certain medications and vitamins. For example, the body s ability to remove uric acid can be negatively affected by taking aspirin, some diuretic medications ( water pills ), and the vitamin niacin (also called nicotinic acid). While gout is more  common in men aged 40 to 60 years, it can occur in younger men as well as in women.  Consuming foods and beverages that contain high levels of purines can trigger an attack of gout. Some foods contain more purines than others and have been associated with an increase of uric acid, which leads to gout. You may be able to reduce your chances of getting a gout attack by limiting or avoiding shellfish, organ meats (kidney, liver, etc.), red wine, beer, and red meat.  Other Triggers include but are not limited to:  Congestive heart failure, deep vein thrombosis, pneumonia, fasting, dehydration, trauma/surgery, psoriasis.  SYMTOMS  An attack of gout can be miserable, marked by the following symptoms:  Intense pain that comes on suddenly - often in the middle of the night or upon arising   Signs of inflammation such as redness, swelling, and warmth over the joint.   DIAGNOSIS  To diagnose gout, the foot and ankle surgeon will ask questions about your personal and family medical history, followed by an examination of the affected joint. Laboratory tests and x-rays are sometimes ordered to determine if the inflammation is caused by something other than gout.  TREATMENT  Initial treatment of an attack of gout typically includes the following:  Medications. Prescription medications or injections are used to treat the pain, swelling, and inflammation.   Dietary restrictions. Foods and beverages that are high in purines should be avoided, since purines are converted in the body to uric acid.   Fluids. Drink plenty of water and other fluids each day, while also avoiding alcoholic beverages, which cause dehydration. Cherry Juice works well to help decrease pain.  Immobilize and elevate the foot. Avoid standing and walking to give your foot a rest. Also, elevate your foot (level with or slightly above the heart) to help reduce swelling.   The symptoms of gout and the inflammatory process usually resolve in three to ten days with  treatment. If gout symptoms continue despite the initial treatment, or if repeated attacks occur, see your primary care physician for maintenance treatment that may involve daily medication. In cases of repeated episodes, the underlying problem must be addressed, as the build-up of uric acid over time can cause arthritic damage to the joint.  DIET CHANGE  A gout diet reduces your intake of foods that are high in purines, which helps control your body's production of uric acid. If you're overweight or obese, lose weight. However, avoid fasting and rapid weight loss because these can promote a gout attack. Drink plenty of fluids to help flush uric acid from your body. Also avoid high-protein diets, which can cause you to produce too much uric acid (hyperuricemia).   To follow the diet:   Limit meat, poultry and fish. Animal proteins are high in purine. Avoid or severely limit high-purine foods, such as organ meats, herring, anchovies and mackerel. Red meat (beef, pork and lamb), fatty fish and seafood (tuna, shrimp, lobster and scallops) are associated with increased risk of gout. Because all meat, poultry and fish contain purines, limit your intake to 4 to 6 ounces (113 to 170 grams) daily.   Eat more plant-based proteins. You can increase your protein by including more plant-based sources, such as beans and legumes. This switch will also help you cut down on saturated fats, which may indirectly contribute to obesity and gout.   Limit or avoid alcohol. Alcohol interferes with the elimination of uric acid from your body. Drinking beer, in particular, has been linked to gout attacks. If you're having an attack, avoid alcohol. However, when you're not having an attack, drinking one or two 5-ounce (148 milliliter) servings a day of wine is not likely to increase your risk.   Drink plenty of fluids, particularly water. Fluids can help remove uric acid from your body. Aim for eight to 16 8-ounce (237 milliliter) glasses a  day.   Choose low-fat or fat-free dairy products. Some studies have shown that drinking skim or low-fat milk and eating foods made with them, such as yogurt, help reduce the risk of gout. Aim for adequate dairy intake of 16 to 24 fluid ounces (473 to 710 milliliters) daily.   Choose complex carbohydrates. Eat more whole grains and fruits and vegetables and fewer refined carbohydrates, such as white bread, cakes and candy.   Limit or avoid sugar. Too many sweets can leave you with no room for plant-based proteins and low-fat or fat-free dairy products -- the foods you need to avoid gout. Sugary foods also tend to be high in calories, so they make it easier to eat more than you're likely to burn off. Although there's debate about whether sugar has a direct effect on uric acid levels, sweets are definitely linked to overweight and obesity.   There's also some evidence that drinking four to six cups of coffee a day lowers gout risk in men.   RESULTS  Following a gout diet can help you limit your body's uric acid production and increase its elimination. It's not likely to lower the uric acid concentration in your blood enough to treat your gout without medication, but it may help decrease the number of attacks and limit their severity. Following the gout diet and limiting your calories -- particularly if you also add in moderate daily exercise, such as brisk walking -- also can improve your overall health by helping you achieve and maintain a healthy weight.         [employer] / CAM WALKING BOOT INSTRUCTIONS  - Do NOT drive with CAM walker on. This is due to safety and legal issues.   - Do NOT wear the CAM walker on long car/train rides or on an airplane.  - Remove the CAM walker several times a day and do ankle range of motion (ROM) exercises/wiggle toes.  - It is recommended that a thick-soled shoe be worn on the other foot to offset any created leg length issue.   - The boot does not have to be worn at night.   - There  is an increased risk of developing a blood clot with lower extremity immobilization. ROM exercises and knee-high compression (tenso /ACE wrap) is recommended to lower that risk.   - You should seek medical attention if you experience calf swelling and/or pain, chest pain, or shortness of breath.             FYI: BODY WEIGHT AND YOUR FEET  The following information is included in the after visit summary for all patients. Body weight can be a sensitive issue to discuss in clinic, but we think the following information is very important. Although we focus on the feet and ankles, we do support the overall health of our patients.     Many things can cause foot and ankle problems. Foot structure, activity level, foot mechanics and injuries are common causes of pain. One very important issue that often goes unmentioned, is body weight. Extra weight can cause increased stress on muscles, ligaments, bones and tendons. Sometimes just a few extra pounds is all it takes to put one over her/his threshold. Without reducing that stress, it can be difficult to alleviate pain. As Foot & Ankle specialists, our job is addressing the lower extremity problem and possible causes. Regarding extra body weight, we encourage patients to discuss diet and weight management plans with their primary care doctors. It is this team approach that gives you the best opportunity for pain relief and getting you back on your feet.      [place] has a Comprehensive Weight Management Program. This program includes counseling, education, non-surgical and surgical approaches to weight loss. If you are interested in learning more either talk to you primary care provider or call [phone] Mullins to follow up with Primary Care provider regarding elevated blood pressure.

## 2020-01-13 NOTE — PROGRESS NOTES
ASSESSMENT/PLAN:    Encounter Diagnoses   Name Primary?     Left foot pain Yes     Acute gout of left foot, unspecified cause      I do not have a high suspicion for an embolus or emboli to the left foot, given the warmth, palpable pedal pulses, skin color in digits.    The focus of pain is around the first metatarsophalangeal joint and clinical exam consistent with gout. Uric acid is pending.     He also described kicking some ice the day prior to the problem starting.  An occult injury of some sort is considered.    A short CAM walker was provided to protect the foot and reduce pain.  Cold application, rest and elevation recommended  Any medication management deferred to Dr. Sandhu.    DVT is considered. D-dimer pending.     Follow up in 1 week.    Body mass index is 29.91 kg/m .    Weight management plan: Patient was referred to their PCP to discuss a diet and exercise plan.      Yong Kyle DPM, FACFAS, MS    [place] [facility]/Foot & Ankle Surgery      ____________________________________________________________________    HPI:       I was asked by Dr. Lucía Sandhu to evaluate this patient, in consultation, regarding left foot pain.  There is concern of embolus to his left foot status post angiogram and stent placement in left vertebral artery last week.  Gout is also considered  Chief Complaint: sore foot, swollen; he specified that most of the pain is the medial forefoot, but radiates proximal. He has a hard time finding a position that is comfortable when resting. Walking is difficult.   Onset of problem: 5 days  Pain/ discomfort is described as:  throbbing  Ratin/10 at worst   Frequency:  daily    The pain is made worse with walking, weight bearing  Previous treatment: ice, elevation    *  Patient Active Problem List   Diagnosis     Essential hypertension, benign     Allergic rhinitis     Intestinal infection due to Clostridium difficile     CARDIOVASCULAR SCREENING; LDL GOAL LESS THAN 130  "    Advanced directives, counseling/discussion     Hypercholesteremia     MAN (obstructive sleep apnea)     Vitamin D deficiency     Benign prostatic hyperplasia     S/P cervical spinal fusion     Cervicalgia     Vertebral artery stenosis   *  *  Past Surgical History:   Procedure Laterality Date     C NONSPECIFIC PROCEDURE      Right clavicle fracture, Multiple right sided rib fracture, hairline fracture \"pelvis\", secondary to fall from tree         C NONSPECIFIC PROCEDURE      Right ulnar/radial fracture      CHOLECYSTECTOMY       COLONOSCOPY       COLONOSCOPY N/A 4/5/2017    Procedure: COMBINED COLONOSCOPY, SINGLE OR MULTIPLE BIOPSY/POLYPECTOMY BY BIOPSY;  Surgeon: Tylor Rice MD;  Location: [facility]     DECOMPRESSION, FUSION CERVICAL ANTERIOR ONE LEVEL, COMBINED N/A 9/9/2016    Procedure: COMBINED DECOMPRESSION, FUSION CERVICAL ANTERIOR ONE LEVEL;  Surgeon: Erick Valles MD;  Location: [facility] OR     ENT SURGERY      tonsilectomy  as a child     IR CAROTID CEREBRAL ANGIOGRAM BILATERAL  1/6/2020     IR DISCONTINUE SHEATH  1/6/2020     ORTHOPEDIC SURGERY      ambar knees scopedrt shoulder,fx rt arm fx     TESTICLE SURGERY       VASECTOMY     *  *  Current Outpatient Medications   Medication Sig Dispense Refill     aspirin (ASA) 81 MG tablet Take 81 mg by mouth daily       chlorthalidone (HYGROTON) 25 MG tablet Take 0.5 tablets (12.5 mg) by mouth daily 45 tablet 3     clopidogrel (PLAVIX) 75 MG tablet Take 1 tablet (75 mg) by mouth daily 30 tablet 3     dutasteride (AVODART) 0.5 MG capsule TAKE 1 CAPSULE BY MOUTH DAILY 90 capsule 2     loratadine (CLARITIN) 10 MG tablet Take 1 tablet (10 mg) by mouth daily as needed for allergies 30 tablet      losartan (COZAAR) 100 MG tablet TAKE 1 TABLET BY MOUTH ONE TIME DAILY 90 tablet 3     mometasone (NASONEX) 50 MCG/ACT nasal spray Spray 2 sprays into both nostrils daily prn 3 Box 2     multivitamin w/minerals (MULTI-VITAMIN) tablet Take 1 tablet by mouth daily   "     naproxen sodium (ALEVE) 220 MG tablet Take 1 tablet (220 mg) by mouth daily as needed for moderate pain       Vitamin D, Cholecalciferol, 1000 UNITS CAPS Take 2,000 Units by mouth daily         ROS:     A 10-point review of systems was performed and is positive for that noted above in the HPI and as seen below.  All other areas are negative.     Numbness in feet?  no   Calf pain with walking? no  Recent foot/ankle injury? See HPI  Weight change over past 12 months? no  Self perception as overweight? yes  Recent flu-like symptoms? no  Joint pain other than feet ? no    Social History: Employment:  retired;  Exercise/Physical activity:  no;  Tobacco use:  no  Social History     Socioeconomic History     Marital status:      Spouse name: Not on file     Number of children: Not on file     Years of education: Not on file     Highest education level: Not on file   Occupational History     Not on file   Social Needs     Financial resource strain: Not on file     Food insecurity:     Worry: Not on file     Inability: Not on file     Transportation needs:     Medical: Not on file     Non-medical: Not on file   Tobacco Use     Smoking status: Never Smoker     Smokeless tobacco: Never Used   Substance and Sexual Activity     Alcohol use: Yes     Alcohol/week: 0.0 standard drinks     Comment: rarely     Drug use: No     Sexual activity: Yes     Partners: Female   Lifestyle     Physical activity:     Days per week: Not on file     Minutes per session: Not on file     Stress: Not on file   Relationships     Social connections:     Talks on phone: Not on file     Gets together: Not on file     Attends Taoist service: Not on file     Active member of club or organization: Not on file     Attends meetings of clubs or organizations: Not on file     Relationship status: Not on file     Intimate partner violence:     Fear of current or ex partner: Not on file     Emotionally abused: Not on file     Physically abused: Not  "on file     Forced sexual activity: Not on file   Other Topics Concern     Parent/sibling w/ CABG, MI or angioplasty before 65F 55M? Not Asked   Social History Narrative     Not on file       Family history:  Family History   Problem Relation Age of Onset     Arthritis Mother      Eye Disorder Mother         cateracts     Lipids Mother      Cancer Father         colon/prostate     Eye Disorder Father         cateracts     Lipids Father      Diabetes Father      Cancer Maternal Grandfather         colon     Cancer Paternal Grandmother         colon     Cancer Maternal Aunt         colon     Cancer Maternal Uncle         colon     Prostate Cancer Brother      Colon Cancer Brother 74        liver mets     Prostate Cancer Brother        Rheumatoid arthritis:  parent  Foot Problems: no  Diabetes: parent      EXAM:    Vitals: /80 (BP Location: Right arm, Patient Position: Chair, Cuff Size: Adult Regular)   Pulse 92   Ht 1.702 m (5' 7\")   Wt 86.6 kg (191 lb)   BMI 29.91 kg/m    BMI: Body mass index is 29.91 kg/m .  Height: 5' 7\"     ESR  14  WBC  10.5    Constitutional/ general:  Pt is in no apparent distress, appears well-nourished.  Cooperative with history and physical exam.     Vascular:  Pedal pulses are palpable bilaterally for both the DP and PT arteries.  CFT < 3 sec.  Generalized left foot edema with more around the 1st metatarsophalangeal joint.  Pedal hair growth noted. Skin is warm.     Neuro:  Alert and oriented x 3. Coordinated gait.  Light touch sensation is intact to the L4, L5, S1 distributions. No obvious deficits.  No evidence of neurological-based weakness, spasticity, or contracture in the lower extremities.     Derm: Normal texture and turgor.  No erythema, ecchymosis, or cyanosis.  No open lesions.     Musculoskeletal:    Lower extremity muscle strength is normal.  Patient is ambulatory without an assistive device or brace .  No gross deformities.  Pain on palpation: with manipulation of " the left first metatarsophalangeal joint.        Radiographic Exam:  X-Ray Findings:  I personally reviewed the left foot films.  Negative.     Preliminary Report:    FOOT LEFT THREE OR MORE VIEWS January 13, 2020 12:41 PM      HISTORY: Left foot pain.                                                                      IMPRESSION: Calcaneal spurring. Mild focal cortical thickening at the  lateral aspect of the fourth metatarsal. An age-indeterminate stress  fracture in this area cannot be excluded. Clinical correlation  recommended.

## 2020-01-13 NOTE — TELEPHONE ENCOUNTER
Reason for call:  Patient reporting a symptom    Symptom or request: left big toe pain    Duration (how long have symptoms been present): 5 days    Have you been treated for this before? No    Additional comments: patient called to report pain in left big toe, swelling, warm to the touch. Difficult to walk. Please call back to advise.    Phone Number patient can be reached at:  Home number on file [phone] (home)    Best Time:  any    Can we leave a detailed message on this number:  YES    Call taken on 1/13/2020 at 8:09 AM by Mariama Jackson

## 2020-01-13 NOTE — NURSING NOTE
"Chief Complaint   Patient presents with     Pain     left big toe red and swollen going up into ankle     initial /84   Pulse 87   Temp 97.6  F (36.4  C) (Oral)   Resp 16   Ht 1.702 m (5' 7\")   Wt 86.6 kg (191 lb)   SpO2 100%   BMI 29.91 kg/m   Estimated body mass index is 29.91 kg/m  as calculated from the following:    Height as of this encounter: 1.702 m (5' 7\").    Weight as of this encounter: 86.6 kg (191 lb)..  bp completed using cuff size large  MARYBEL PAULINO LPN  "

## 2020-01-13 NOTE — TELEPHONE ENCOUNTER
"Contacted patient. Pain initially started in left big toe, but now pain is moving up to the ankle. He is not sure if the radiating pain is from walking differently or not. The painis interfering with walking and unable to put shoe on - has to wear a boot on that foot. The toe is red, warm and swollen. Pain level varies, can be as high as an 8 or 9, but right now rates pain at 4 or 5. No history of gout. He did have a vertebral stent placed a week ago, he spoke to the surgeon after symptoms started and they did not think the pain was related to surgery. The surgeon's office recommended he contact PCP to discuss.     Patient has appointment with Dr. Sandhu on Wednesday for follow-up, but doesn't think he should wait that long. This RN agreed with sooner appointment and advised patient to be seen today due to pain spreading up his foot. No appointments available, patient will go to [facility] today. He requests this message be forwarded to Dr. Sandhu as IGLESIA.     Additional Information    Negative: Followed an injury    Negative: Wound looks infected    Negative: Caused by an animal bite    Negative: Caused by frostbite    Negative: Athlete's Foot suspected (i.e., itchy red rash in web space between toes)    Negative: Foot pain is the main symptom    Negative: Foot is cool or blue in comparison to other foot    Negative: Purple or black skin on toe (EXCEPTION: simple recalled injury with bruise)    Negative: Patient sounds very sick or weak to the triager    Negative: SEVERE pain (e.g., excruciating, unable to do any normal activities) and not improved after 2 hours of pain medicine    Looks like a boil, infected sore, deep ulcer, or other infected rash (spreading redness, pus)    Answer Assessment - Initial Assessment Questions  1. ONSET: \"When did the pain start?\"       5 days  2. LOCATION: \"Where is the pain located?\"   (e.g., around nail, entire toe, at foot joint)       Big left toe, now pain is spreading up to " "ankle  3. PAIN: \"How bad is the pain?\"    (Scale 1-10; or mild, moderate, severe)    -  MILD (1-3): doesn't interfere with normal activities     -  MODERATE (4-7): interferes with normal activities (e.g., work or school) or awakens from sleep, limping     -  SEVERE (8-10): excruciating pain, unable to do any normal activities, unable to walk      Varies - sometimes severe  4. APPEARANCE: \"What does the toe look like?\" (e.g., redness, swelling, bruising, pallor)      Swollen and pink  5. CAUSE: \"What do you think is causing the toe pain?\"      unsure  6. OTHER SYMPTOMS: \"Do you have any other symptoms?\" (e.g., leg pain, rash, fever, numbness)      warm to touch  7. PREGNANCY: \"Is there any chance you are pregnant?\" \"When was your last menstrual period?\"      n/a    Protocols used: TOE PAIN-A-OH    "

## 2020-01-14 LAB
ALBUMIN SERPL-MCNC: 4.2 G/DL (ref 3.4–5)
ALP SERPL-CCNC: 62 U/L (ref 40–150)
ALT SERPL W P-5'-P-CCNC: 25 U/L (ref 0–70)
ANION GAP SERPL CALCULATED.3IONS-SCNC: 10 MMOL/L (ref 3–14)
AST SERPL W P-5'-P-CCNC: 15 U/L (ref 0–45)
BILIRUB SERPL-MCNC: 0.9 MG/DL (ref 0.2–1.3)
BUN SERPL-MCNC: 23 MG/DL (ref 7–30)
CALCIUM SERPL-MCNC: 9.6 MG/DL (ref 8.5–10.1)
CHLORIDE SERPL-SCNC: 103 MMOL/L (ref 94–109)
CO2 SERPL-SCNC: 25 MMOL/L (ref 20–32)
CREAT SERPL-MCNC: 1.27 MG/DL (ref 0.66–1.25)
GFR SERPL CREATININE-BSD FRML MDRD: 56 ML/MIN/{1.73_M2}
GLUCOSE SERPL-MCNC: 90 MG/DL (ref 70–99)
POTASSIUM SERPL-SCNC: 3.6 MMOL/L (ref 3.4–5.3)
PROT SERPL-MCNC: 7.5 G/DL (ref 6.8–8.8)
SODIUM SERPL-SCNC: 138 MMOL/L (ref 133–144)
URATE SERPL-MCNC: 7.3 MG/DL (ref 3.5–7.2)

## 2020-01-15 ENCOUNTER — HOSPITAL ENCOUNTER (OUTPATIENT)
Dept: ULTRASOUND IMAGING | Facility: CLINIC | Age: 70
Discharge: HOME | End: 2020-01-15
Attending: INTERNAL MEDICINE | Admitting: INTERNAL MEDICINE
Payer: MEDICARE

## 2020-01-15 ENCOUNTER — HOSPITAL ENCOUNTER (OUTPATIENT)
Dept: CT IMAGING | Facility: CLINIC | Age: 70
Discharge: HOME | End: 2020-01-15
Attending: INTERNAL MEDICINE | Admitting: INTERNAL MEDICINE
Payer: MEDICARE

## 2020-01-15 ENCOUNTER — OFFICE VISIT (OUTPATIENT)
Dept: INTERNAL MEDICINE | Facility: CLINIC | Age: 70
End: 2020-01-15
Payer: MEDICARE

## 2020-01-15 VITALS
HEART RATE: 62 BPM | TEMPERATURE: 97.6 F | BODY MASS INDEX: 30.72 KG/M2 | OXYGEN SATURATION: 95 % | RESPIRATION RATE: 16 BRPM | DIASTOLIC BLOOD PRESSURE: 78 MMHG | WEIGHT: 195.7 LBS | HEIGHT: 67 IN | SYSTOLIC BLOOD PRESSURE: 138 MMHG

## 2020-01-15 DIAGNOSIS — M79.672 LEFT FOOT PAIN: ICD-10-CM

## 2020-01-15 DIAGNOSIS — M10.472 OTHER SECONDARY ACUTE GOUT OF LEFT FOOT: Primary | ICD-10-CM

## 2020-01-15 PROCEDURE — 25000125 ZZHC RX 250: Performed by: INTERNAL MEDICINE

## 2020-01-15 PROCEDURE — 99213 OFFICE O/P EST LOW 20 MIN: CPT | Performed by: INTERNAL MEDICINE

## 2020-01-15 PROCEDURE — 73706 CT ANGIO LWR EXTR W/O&W/DYE: CPT | Mod: LT

## 2020-01-15 PROCEDURE — 93971 EXTREMITY STUDY: CPT | Mod: LT

## 2020-01-15 PROCEDURE — 25000128 H RX IP 250 OP 636: Performed by: INTERNAL MEDICINE

## 2020-01-15 RX ORDER — IOPAMIDOL 755 MG/ML
500 INJECTION, SOLUTION INTRAVASCULAR ONCE
Status: COMPLETED | OUTPATIENT
Start: 2020-01-15 | End: 2020-01-15

## 2020-01-15 RX ADMIN — SODIUM CHLORIDE 65 ML: 9 INJECTION, SOLUTION INTRAVENOUS at 10:57

## 2020-01-15 RX ADMIN — IOPAMIDOL 100 ML: 755 INJECTION, SOLUTION INTRAVENOUS at 10:57

## 2020-01-15 ASSESSMENT — MIFFLIN-ST. JEOR: SCORE: 1601.32

## 2020-01-15 NOTE — PROGRESS NOTES
"Subjective     Harpreet Vera is a 69 year old male who presents to clinic today for the following health issues:    HPI     Follow up left foot problems.     With the prednisone he is not sleeping but foot is 90% better. Tests showed elevated CRP and uric acid over 7. He has had uric acid kidney stones. Podiatry gave him a boot which also has made it easier for him to walk. CTA looked like he might have some trauma other than gout or an AVM but no obstruction. ultrasound was normal.     BP Readings from Last 3 Encounters:   01/15/20 138/78   01/13/20 138/80   01/13/20 108/84    Wt Readings from Last 3 Encounters:   01/15/20 88.8 kg (195 lb 11.2 oz)   01/13/20 86.6 kg (191 lb)   01/13/20 86.6 kg (191 lb)                 Reviewed and updated as needed this visit by Provider         Review of Systems   ROS COMP: Constitutional, HEENT, cardiovascular, pulmonary, gi and gu systems are negative, except as otherwise noted.      Objective    /78 (BP Location: Right arm, Patient Position: Chair, Cuff Size: Adult Large)   Pulse 62   Temp 97.6  F (36.4  C) (Oral)   Resp 16   Ht 1.702 m (5' 7\")   Wt 88.8 kg (195 lb 11.2 oz)   SpO2 95%   BMI 30.65 kg/m    Body mass index is 30.65 kg/m .  Physical Exam   GENERAL: healthy, alert and no distress  RESP: lungs clear to auscultation - no rales, rhonchi or wheezes  CV: regular rate and rhythm, normal S1 S2, no S3 or S4, no murmur, click or rub, no peripheral edema and peripheral pulses strong  ABDOMEN: soft, nontender, no hepatosplenomegaly, no masses and bowel sounds normal  MS: foot slightly less swollen and less dusky but more stippling almost white polka dot effect across top of foot, still moderate tenderness at base of great toe.           Assessment & Plan     1. Other secondary acute gout of left foot  Continue prednisone 20 mg every day for 5 days then decrease to 10 mg every day. Follow up by phone or mychart in 1 week.         No follow-ups on file.    Lucía" Constance Sandhu MD  [facility]

## 2020-01-21 ENCOUNTER — TRANSFERRED RECORDS (OUTPATIENT)
Dept: HEALTH INFORMATION MANAGEMENT | Facility: CLINIC | Age: 70
End: 2020-01-21

## 2020-01-22 ENCOUNTER — MYC MEDICAL ADVICE (OUTPATIENT)
Dept: INTERNAL MEDICINE | Facility: CLINIC | Age: 70
End: 2020-01-22

## 2020-01-29 ENCOUNTER — MYC MEDICAL ADVICE (OUTPATIENT)
Dept: INTERNAL MEDICINE | Facility: CLINIC | Age: 70
End: 2020-01-29

## 2020-01-29 DIAGNOSIS — M10.079 IDIOPATHIC GOUT OF FOOT, UNSPECIFIED CHRONICITY, UNSPECIFIED LATERALITY: Primary | ICD-10-CM

## 2020-01-29 DIAGNOSIS — M10.9 ACUTE GOUT OF LEFT FOOT, UNSPECIFIED CAUSE: ICD-10-CM

## 2020-01-30 ENCOUNTER — MYC MEDICAL ADVICE (OUTPATIENT)
Dept: INTERNAL MEDICINE | Facility: CLINIC | Age: 70
End: 2020-01-30

## 2020-01-30 RX ORDER — PREDNISONE 10 MG/1
20 TABLET ORAL DAILY
Qty: 40 TABLET | Refills: 0 | Status: SHIPPED | OUTPATIENT
Start: 2020-01-30 | End: 2020-02-24

## 2020-01-30 RX ORDER — ALLOPURINOL 100 MG/1
100 TABLET ORAL DAILY
Qty: 90 TABLET | Refills: 0 | Status: SHIPPED | OUTPATIENT
Start: 2020-01-30 | End: 2020-02-25

## 2020-02-10 ENCOUNTER — OFFICE VISIT (OUTPATIENT)
Dept: INTERNAL MEDICINE | Facility: CLINIC | Age: 70
End: 2020-02-10
Payer: MEDICARE

## 2020-02-10 VITALS
WEIGHT: 190.8 LBS | TEMPERATURE: 97 F | HEIGHT: 67 IN | HEART RATE: 62 BPM | SYSTOLIC BLOOD PRESSURE: 120 MMHG | DIASTOLIC BLOOD PRESSURE: 76 MMHG | BODY MASS INDEX: 29.95 KG/M2 | OXYGEN SATURATION: 95 % | RESPIRATION RATE: 16 BRPM

## 2020-02-10 DIAGNOSIS — M10.072 ACUTE IDIOPATHIC GOUT INVOLVING TOE OF LEFT FOOT: Primary | ICD-10-CM

## 2020-02-10 PROCEDURE — 99213 OFFICE O/P EST LOW 20 MIN: CPT | Performed by: INTERNAL MEDICINE

## 2020-02-10 ASSESSMENT — MIFFLIN-ST. JEOR: SCORE: 1579.09

## 2020-02-10 NOTE — PROGRESS NOTES
"Subjective     Harpreet Vera is a 69 year old male who presents to clinic today for the following health issues:    HPI     Follow up gout.    HPI:   His foot is much better. Swelling erythema, and palpable tenderness are gone. He still has pain with walking (over 50 feet). He is on prednisone 20 mg am, 10 mg pm and allopurinol.     BP Readings from Last 3 Encounters:   02/10/20 120/76   01/15/20 138/78   01/13/20 138/80    Wt Readings from Last 3 Encounters:   02/10/20 86.5 kg (190 lb 12.8 oz)   01/15/20 88.8 kg (195 lb 11.2 oz)   01/13/20 86.6 kg (191 lb)                 Reviewed and updated as needed this visit by Provider         Review of Systems   ROS COMP: Constitutional, HEENT, cardiovascular, pulmonary, gi and gu systems are negative, except as otherwise noted.      Objective    /76 (BP Location: Left arm, Patient Position: Chair, Cuff Size: Adult Large)   Pulse 62   Temp 97  F (36.1  C) (Oral)   Resp 16   Ht 1.702 m (5' 7\")   Wt 86.5 kg (190 lb 12.8 oz)   SpO2 95%   BMI 29.88 kg/m    Body mass index is 29.88 kg/m .  Physical Exam   GENERAL: healthy, alert and no distress  RESP: lungs clear to auscultation - no rales, rhonchi or wheezes  CV: regular rate and rhythm, normal S1 S2, no S3 or S4, no murmur, click or rub, no peripheral edema and peripheral pulses strong  ABDOMEN: soft, nontender, no hepatosplenomegaly, no masses and bowel sounds normal  MS: no gross musculoskeletal defects noted, no edema          Assessment & Plan     1. Acute idiopathic gout involving toe of left foot  Will taper prednisone at this point and recheck in 2 weeks. Will also check uric acid level at that time as well.          No follow-ups on file.    Lucía Sandhu MD  [facility]      "

## 2020-02-24 ENCOUNTER — OFFICE VISIT (OUTPATIENT)
Dept: INTERNAL MEDICINE | Facility: CLINIC | Age: 70
End: 2020-02-24
Payer: MEDICARE

## 2020-02-24 VITALS
SYSTOLIC BLOOD PRESSURE: 114 MMHG | RESPIRATION RATE: 20 BRPM | TEMPERATURE: 97.9 F | BODY MASS INDEX: 30.2 KG/M2 | WEIGHT: 192.4 LBS | DIASTOLIC BLOOD PRESSURE: 70 MMHG | HEART RATE: 80 BPM | HEIGHT: 67 IN | OXYGEN SATURATION: 96 %

## 2020-02-24 DIAGNOSIS — M10.079 IDIOPATHIC GOUT OF FOOT, UNSPECIFIED CHRONICITY, UNSPECIFIED LATERALITY: ICD-10-CM

## 2020-02-24 DIAGNOSIS — M76.62 ACHILLES TENDINITIS OF LEFT LOWER EXTREMITY: ICD-10-CM

## 2020-02-24 DIAGNOSIS — M1A.0720 CHRONIC GOUT OF LEFT FOOT, UNSPECIFIED CAUSE: Primary | ICD-10-CM

## 2020-02-24 DIAGNOSIS — I10 ESSENTIAL HYPERTENSION, BENIGN: ICD-10-CM

## 2020-02-24 LAB — URATE SERPL-MCNC: 6.6 MG/DL (ref 3.5–7.2)

## 2020-02-24 PROCEDURE — 84550 ASSAY OF BLOOD/URIC ACID: CPT | Performed by: INTERNAL MEDICINE

## 2020-02-24 PROCEDURE — 99214 OFFICE O/P EST MOD 30 MIN: CPT | Performed by: INTERNAL MEDICINE

## 2020-02-24 PROCEDURE — 36415 COLL VENOUS BLD VENIPUNCTURE: CPT | Performed by: INTERNAL MEDICINE

## 2020-02-24 ASSESSMENT — MIFFLIN-ST. JEOR: SCORE: 1586.35

## 2020-02-24 NOTE — PROGRESS NOTES
Subjective     Harpreet Vera is a 69 year old male who presents to clinic today for the following health issues:    HPI   Hypertension Follow-up      Do you check your blood pressure regularly outside of the clinic? No     Are you following a low salt diet? No    Are your blood pressures ever more than 140 on the top number (systolic) OR more   than 90 on the bottom number (diastolic), for example 140/90? Yes      How many servings of fruits and vegetables do you eat daily?  2-3    On average, how many sweetened beverages do you drink each day (Examples: soda, juice, sweet tea, etc.  Do NOT count diet or artificially sweetened beverages)?   1    How many days per week do you exercise enough to make your heart beat faster? None sore foot    How many minutes a day do you exercise enough to make your heart beat faster? none    How many days per week do you miss taking your medication? 0    HPI:   He has recently had gout in his left foot over the 1 st MTP. Is better now with swelling and erythema gone but still has some pain when walking or going up stairs. He has been off the prednisone for 5 days now.     He has a long history of intermittent achillis tendonitis and with the gout he has been unable to stretch the achhillis tendon as all of his usual stretches put pressure over the MTPs. Wants to kno what he can try.     BP Readings from Last 3 Encounters:   02/24/20 114/70   02/10/20 120/76   01/15/20 138/78    Wt Readings from Last 3 Encounters:   02/24/20 87.3 kg (192 lb 6.4 oz)   02/10/20 86.5 kg (190 lb 12.8 oz)   01/15/20 88.8 kg (195 lb 11.2 oz)                  Reviewed and updated as needed this visit by Provider         Review of Systems   ROS COMP: Constitutional, HEENT, cardiovascular, pulmonary, GI, , musculoskeletal, neuro, skin, endocrine and psych systems are negative, except as otherwise noted.      Objective    /70 (BP Location: Right arm, Patient Position: Right side, Cuff Size: Adult  "Large)   Pulse 80   Temp 97.9  F (36.6  C) (Oral)   Resp 20   Ht 1.702 m (5' 7\")   Wt 87.3 kg (192 lb 6.4 oz)   SpO2 96%   BMI 30.13 kg/m    Body mass index is 30.13 kg/m .  Physical Exam   GENERAL: healthy, alert and no distress  NECK: no adenopathy, no asymmetry, masses, or scars and thyroid normal to palpation  RESP: lungs clear to auscultation - no rales, rhonchi or wheezes  CV: regular rate and rhythm, normal S1 S2, no S3 or S4, no murmur, click or rub, no peripheral edema and peripheral pulses strong  ABDOMEN: soft, nontender, no hepatosplenomegaly, no masses and bowel sounds normal  MS: no gross musculoskeletal defects noted, no edema  PSYCH: mentation appears normal, affect normal/bright          Assessment & Plan     1. Chronic gout of left foot, unspecified cause  Will check UA to see if we need to go up on Allopurinol  - Uric acid    2. Achilles tendinitis of left lower extremity  recommended he try stretching it with a belt under the distal arch but proximal to the MCPs. If going for a walk or something that might iriitate it he could also warm it up byt putting the foot in warm water for 15 minutes before hand.     3. Essential hypertension, benign  under good control Continue current medications.         No follow-ups on file.    Lucía Sandhu MD  [facility]        "

## 2020-02-25 RX ORDER — ALLOPURINOL 100 MG/1
200 TABLET ORAL DAILY
Qty: 180 TABLET | Refills: 3 | Status: ON HOLD | OUTPATIENT
Start: 2020-02-25 | End: 2020-08-19

## 2020-03-13 DIAGNOSIS — I65.09 VERTEBRAL ARTERY STENOSIS, UNSPECIFIED LATERALITY: Primary | ICD-10-CM

## 2020-03-13 RX ORDER — CLOPIDOGREL BISULFATE 75 MG/1
75 TABLET ORAL DAILY
Qty: 90 TABLET | Refills: 0 | Status: SHIPPED | OUTPATIENT
Start: 2020-03-13 | End: 2020-06-29

## 2020-03-16 ENCOUNTER — OFFICE VISIT (OUTPATIENT)
Dept: INTERNAL MEDICINE | Facility: CLINIC | Age: 70
End: 2020-03-16
Payer: MEDICARE

## 2020-03-16 VITALS
SYSTOLIC BLOOD PRESSURE: 118 MMHG | RESPIRATION RATE: 16 BRPM | WEIGHT: 196.6 LBS | TEMPERATURE: 97.6 F | HEIGHT: 67 IN | OXYGEN SATURATION: 98 % | BODY MASS INDEX: 30.86 KG/M2 | DIASTOLIC BLOOD PRESSURE: 74 MMHG | HEART RATE: 80 BPM

## 2020-03-16 DIAGNOSIS — M1A.0790 CHRONIC GOUT OF FOOT, UNSPECIFIED CAUSE, UNSPECIFIED LATERALITY: Primary | ICD-10-CM

## 2020-03-16 LAB
ERYTHROCYTE [DISTWIDTH] IN BLOOD BY AUTOMATED COUNT: 15.2 % (ref 10–15)
HCT VFR BLD AUTO: 38.4 % (ref 40–53)
HGB BLD-MCNC: 13.2 G/DL (ref 13.3–17.7)
MCH RBC QN AUTO: 32.5 PG (ref 26.5–33)
MCHC RBC AUTO-ENTMCNC: 34.4 G/DL (ref 31.5–36.5)
MCV RBC AUTO: 95 FL (ref 78–100)
PLATELET # BLD AUTO: 234 10E9/L (ref 150–450)
RBC # BLD AUTO: 4.06 10E12/L (ref 4.4–5.9)
WBC # BLD AUTO: 8.3 10E9/L (ref 4–11)

## 2020-03-16 PROCEDURE — 85027 COMPLETE CBC AUTOMATED: CPT | Performed by: INTERNAL MEDICINE

## 2020-03-16 PROCEDURE — 84550 ASSAY OF BLOOD/URIC ACID: CPT | Performed by: INTERNAL MEDICINE

## 2020-03-16 PROCEDURE — 99213 OFFICE O/P EST LOW 20 MIN: CPT | Performed by: INTERNAL MEDICINE

## 2020-03-16 PROCEDURE — 36415 COLL VENOUS BLD VENIPUNCTURE: CPT | Performed by: INTERNAL MEDICINE

## 2020-03-16 ASSESSMENT — MIFFLIN-ST. JEOR: SCORE: 1605.4

## 2020-03-16 NOTE — PROGRESS NOTES
"Subjective     Harpreet Vera is a 69 year old male who presents to clinic today for the following health issues:    HPI     Follow up gout.    HPI:   His gout will still flare with minimal stimulus. The allopurinol is making him very tired.     Reviewed and updated as needed this visit by Provider         Review of Systems   ROS COMP: Constitutional, HEENT, cardiovascular, pulmonary, GI, , musculoskeletal, neuro, skin, endocrine and psych systems are negative, except as otherwise noted.      Objective    /74 (BP Location: Right arm, Patient Position: Chair, Cuff Size: Adult Large)   Pulse 80   Temp 97.6  F (36.4  C) (Oral)   Resp 16   Ht 1.702 m (5' 7\")   Wt 89.2 kg (196 lb 9.6 oz)   SpO2 98%   BMI 30.79 kg/m    Body mass index is 30.79 kg/m .  Physical Exam   GENERAL: healthy, alert and no distress  NECK: no adenopathy, no asymmetry, masses, or scars and thyroid normal to palpation  RESP: lungs clear to auscultation - no rales, rhonchi or wheezes  CV: regular rate and rhythm, normal S1 S2, no S3 or S4, no murmur, click or rub, no peripheral edema and peripheral pulses strong  ABDOMEN: soft, nontender, no hepatosplenomegaly, no masses and bowel sounds normal  MS: no gross musculoskeletal defects noted, no edema          Assessment & Plan     1. Chronic gout of foot, unspecified cause, unspecified laterality  Will refer to Rheumatology   - Uric acid  - CBC with platelets  - RHEUMATOLOGY REFERRAL       No follow-ups on file.    Lucía Sandhu MD  [facility]      "

## 2020-03-17 LAB — URATE SERPL-MCNC: 6.5 MG/DL (ref 3.5–7.2)

## 2020-03-18 ENCOUNTER — TRANSFERRED RECORDS (OUTPATIENT)
Dept: HEALTH INFORMATION MANAGEMENT | Facility: CLINIC | Age: 70
End: 2020-03-18

## 2020-03-18 ENCOUNTER — TELEPHONE (OUTPATIENT)
Dept: INTERNAL MEDICINE | Facility: CLINIC | Age: 70
End: 2020-03-18

## 2020-03-18 LAB
ALT SERPL-CCNC: 35 IU/L (ref 5–40)
AST SERPL-CCNC: 30 U/L (ref 5–34)
CREAT SERPL-MCNC: 1.11 MG/DL (ref 0.5–1.3)

## 2020-03-18 NOTE — TELEPHONE ENCOUNTER
Patient saw the rheumatologist today, Dr. Nava, who advised that he stop taking chlorthalidone. Patient wants PCP to be aware he is no longer taking the medication.

## 2020-03-24 DIAGNOSIS — I65.09 VERTEBRAL ARTERY STENOSIS, UNSPECIFIED LATERALITY: Primary | ICD-10-CM

## 2020-03-24 RX ORDER — CLOPIDOGREL BISULFATE 75 MG/1
75 TABLET ORAL DAILY
Qty: 30 TABLET | Refills: 3 | Status: SHIPPED | OUTPATIENT
Start: 2020-03-24 | End: 2020-06-29

## 2020-04-16 ENCOUNTER — TRANSFERRED RECORDS (OUTPATIENT)
Dept: HEALTH INFORMATION MANAGEMENT | Facility: CLINIC | Age: 70
End: 2020-04-16

## 2020-04-16 LAB
ALT SERPL-CCNC: 25 IU/L (ref 5–40)
AST SERPL-CCNC: 23 U/L (ref 5–34)
CREAT SERPL-MCNC: 1.24 MG/DL (ref 0.5–1.3)

## 2020-05-12 DIAGNOSIS — R97.20 ELEVATED PSA: ICD-10-CM

## 2020-05-12 RX ORDER — DUTASTERIDE 0.5 MG/1
CAPSULE, LIQUID FILLED ORAL
Qty: 90 CAPSULE | Refills: 0 | Status: SHIPPED | OUTPATIENT
Start: 2020-05-12 | End: 2020-08-10

## 2020-05-18 ENCOUNTER — MYC MEDICAL ADVICE (OUTPATIENT)
Dept: INTERNAL MEDICINE | Facility: CLINIC | Age: 70
End: 2020-05-18

## 2020-05-18 DIAGNOSIS — R60.0 LOCALIZED EDEMA: Primary | ICD-10-CM

## 2020-05-18 DIAGNOSIS — I10 ESSENTIAL HYPERTENSION, BENIGN: ICD-10-CM

## 2020-05-19 RX ORDER — FUROSEMIDE 20 MG
10 TABLET ORAL DAILY
Qty: 30 TABLET | Refills: 1 | Status: ON HOLD | OUTPATIENT
Start: 2020-05-19 | End: 2020-08-19

## 2020-05-19 NOTE — TELEPHONE ENCOUNTER
Try lasix 10 mg daily - will help with fluid retention and blood pressure     Follow up with Dr Sanhdu in 2 weeks

## 2020-05-19 NOTE — TELEPHONE ENCOUNTER
Please review MyChart message from patient and advise.   Patient saw Dr. Sandhu for gout on 3/16/2020 and then per 3/18/2020 telephone encounter, Dr. Nava, Rheumatology had him stop Chlorthalidone.

## 2020-06-11 DIAGNOSIS — Z11.59 ENCOUNTER FOR SCREENING FOR OTHER VIRAL DISEASES: Primary | ICD-10-CM

## 2020-06-17 ENCOUNTER — OFFICE VISIT (OUTPATIENT)
Dept: INTERNAL MEDICINE | Facility: CLINIC | Age: 70
End: 2020-06-17
Payer: MEDICARE

## 2020-06-17 VITALS
BODY MASS INDEX: 30.69 KG/M2 | HEIGHT: 67 IN | HEART RATE: 64 BPM | RESPIRATION RATE: 18 BRPM | OXYGEN SATURATION: 96 % | WEIGHT: 195.5 LBS | DIASTOLIC BLOOD PRESSURE: 78 MMHG | SYSTOLIC BLOOD PRESSURE: 135 MMHG | TEMPERATURE: 98.1 F

## 2020-06-17 DIAGNOSIS — N40.1 BENIGN PROSTATIC HYPERPLASIA WITH URINARY HESITANCY: ICD-10-CM

## 2020-06-17 DIAGNOSIS — G47.33 OSA (OBSTRUCTIVE SLEEP APNEA): ICD-10-CM

## 2020-06-17 DIAGNOSIS — I65.01 STENOSIS OF RIGHT VERTEBRAL ARTERY: ICD-10-CM

## 2020-06-17 DIAGNOSIS — Z01.818 PREOP GENERAL PHYSICAL EXAM: Primary | ICD-10-CM

## 2020-06-17 DIAGNOSIS — R39.11 BENIGN PROSTATIC HYPERPLASIA WITH URINARY HESITANCY: ICD-10-CM

## 2020-06-17 DIAGNOSIS — I10 ESSENTIAL HYPERTENSION, BENIGN: ICD-10-CM

## 2020-06-17 DIAGNOSIS — I65.02 OCCLUSION OF LEFT VERTEBRAL ARTERY: ICD-10-CM

## 2020-06-17 PROCEDURE — 80048 BASIC METABOLIC PNL TOTAL CA: CPT | Performed by: INTERNAL MEDICINE

## 2020-06-17 PROCEDURE — 93000 ELECTROCARDIOGRAM COMPLETE: CPT | Performed by: INTERNAL MEDICINE

## 2020-06-17 PROCEDURE — 99214 OFFICE O/P EST MOD 30 MIN: CPT | Performed by: INTERNAL MEDICINE

## 2020-06-17 PROCEDURE — 36415 COLL VENOUS BLD VENIPUNCTURE: CPT | Performed by: INTERNAL MEDICINE

## 2020-06-17 ASSESSMENT — MIFFLIN-ST. JEOR: SCORE: 1595.41

## 2020-06-17 NOTE — PROGRESS NOTES
[place] [address]  [facility] [address]  [phone]  Dept: [phone]    PRE-OP EVALUATION:  Today's date: 2020    Harpreet Vera (: 1950) presents for pre-operative evaluation assessment as requested by Dr. Joshua Phan.  He requires evaluation and anesthesia risk assessment prior to undergoing surgery/procedure for treatment of angiogram cerebral stunt .    Fax number for surgical facility: [facility]  Primary Physician: Lucía Sandhu  Type of Anesthesia Anticipated: General    Patient has a Health Care Directive or Living Will:  NO    Preop Questions 2020   Who is doing your surgery? [facility]   What are you having done? angiogram   Date of Surgery/Procedure: 2020   Facility or Hospital where procedure/surgery will be performed: [facility]   1.  Do you have a history of Heart attack, stroke, stent, coronary bypass surgery, or other heart surgery? YES  - vertebral artery stent 2020   2.  Do you ever have any pain or discomfort in your chest? No   3.  Do you have a history of  Heart Failure? No   4.   Are you troubled by shortness of breath when:  walking on a level surface, or up a slight hill, or at night? No   5.  Do you currently have a cold, bronchitis or other respiratory infection? No   7.  Do you sometimes get pains in the calves of your legs when you walk? YES - right calf, not recently   8. Do you or anyone in your family have previous history of blood clots? No   9.  Do you or does anyone in your family have a serious bleeding problem such as prolonged bleeding following surgeries or cuts? No         HPI:     HPI related to upcoming procedure: No recent illnesses.      See problem list for active medical problems.  Problems all longstanding and stable, except as noted/documented.  See ROS for pertinent symptoms related to these conditions.      MEDICAL HISTORY:     Patient Active Problem List    Diagnosis Date Noted  "    Vertebral artery stenosis 01/06/2020     Priority: Medium     Cervicalgia 02/23/2017     Priority: Medium     S/P cervical spinal fusion 09/09/2016     Priority: Medium     Vitamin D deficiency 10/06/2015     Priority: Medium     Benign prostatic hyperplasia 10/06/2015     Priority: Medium     MAN (obstructive sleep apnea) 08/01/2013     Priority: Medium     Hypercholesteremia 08/03/2012     Priority: Medium     Advanced directives, counseling/discussion 02/02/2012     Priority: Medium     Discussed Advance Directive planning with patient; however, patient declined at this time.          CARDIOVASCULAR SCREENING; LDL GOAL LESS THAN 130 10/31/2010     Priority: Medium     Intestinal infection due to Clostridium difficile 09/06/2007     Priority: Medium     Allergic rhinitis 06/14/2007     Priority: Medium     Problem list name updated by automated process. Provider to review       Essential hypertension, benign 01/14/2003     Priority: Medium      Past Medical History:   Diagnosis Date     Arthritis      Benign neoplasm of colon      Brachial plexopathy      Chronic infection 2007    HX of C Diff     Hypertension      Mumps      Sleep apnea     Bi-PAP     Past Surgical History:   Procedure Laterality Date     C NONSPECIFIC PROCEDURE      Right clavicle fracture, Multiple right sided rib fracture, hairline fracture \"pelvis\", secondary to fall from tree         C NONSPECIFIC PROCEDURE      Right ulnar/radial fracture      CHOLECYSTECTOMY       COLONOSCOPY       COLONOSCOPY N/A 4/5/2017    Procedure: COMBINED COLONOSCOPY, SINGLE OR MULTIPLE BIOPSY/POLYPECTOMY BY BIOPSY;  Surgeon: Tylor Rice MD;  Location:  GI     DECOMPRESSION, FUSION CERVICAL ANTERIOR ONE LEVEL, COMBINED N/A 9/9/2016    Procedure: COMBINED DECOMPRESSION, FUSION CERVICAL ANTERIOR ONE LEVEL;  Surgeon: Erick Valles MD;  Location:  OR     ENT SURGERY      tonsilectomy  as a child     IR CAROTID CEREBRAL ANGIOGRAM BILATERAL  " 1/6/2020     IR DISCONTINUE SHEATH  1/6/2020     ORTHOPEDIC SURGERY      ambar knees scopedrt shoulder,fx rt arm fx     TESTICLE SURGERY       VASECTOMY       Current Outpatient Medications   Medication Sig Dispense Refill     allopurinol (ZYLOPRIM) 100 MG tablet Take 2 tablets (200 mg) by mouth daily 180 tablet 3     aspirin (ASA) 81 MG tablet Take 81 mg by mouth daily       clopidogrel (PLAVIX) 75 MG tablet Take 1 tablet (75 mg) by mouth daily 30 tablet 3     clopidogrel (PLAVIX) 75 MG tablet Take 1 tablet (75 mg) by mouth daily 90 tablet 0     dutasteride (AVODART) 0.5 MG capsule TAKE 1 CAPSULE BY MOUTH ONE TIME DAILY  90 capsule 0     furosemide (LASIX) 20 MG tablet Take 0.5 tablets (10 mg) by mouth daily 30 tablet 1     loratadine (CLARITIN) 10 MG tablet Take 1 tablet (10 mg) by mouth daily as needed for allergies 30 tablet      losartan (COZAAR) 100 MG tablet TAKE 1 TABLET BY MOUTH ONE TIME DAILY 90 tablet 3     mometasone (NASONEX) 50 MCG/ACT nasal spray Spray 2 sprays into both nostrils daily prn 3 Box 2     multivitamin w/minerals (MULTI-VITAMIN) tablet Take 1 tablet by mouth daily       Vitamin D, Cholecalciferol, 1000 UNITS CAPS Take 2,000 Units by mouth daily       OTC products: None, except as noted above    Allergies   Allergen Reactions     Pravastatin      Fuzzy brain.       Latex Allergy: NO    Social History     Tobacco Use     Smoking status: Never Smoker     Smokeless tobacco: Never Used   Substance Use Topics     Alcohol use: Yes     Alcohol/week: 0.0 standard drinks     Comment: rarely     History   Drug Use No       REVIEW OF SYSTEMS:   REVIEW OF SYSTEMS: The following systems have been completely reviewed and are negative except as noted above:   Constitutional, HEENT, respiratory, cardiovascular, gastrointestinal, genitourinary, musculoskeletal, dermatologic, hematologic, endocrine, psychiatric, and neurologic systems.      EXAM:   Vitals: /78   Pulse 64   Temp 98.1  F (36.7  C) (Oral)  "  Resp 18   Ht 1.702 m (5' 7\")   Wt 88.7 kg (195 lb 8 oz)   SpO2 96%   BMI 30.62 kg/m    BMI= Body mass index is 30.62 kg/m .     GENERAL APPEARANCE: healthy, alert and no distress  HENT: ear canals and TM's normal and nose and mouth without ulcers or lesions  RESP: lungs clear to auscultation - no rales, rhonchi or wheezes  CV: regular rate and rhythm, normal S1 S2, no S3 or S4 and no murmur, click or rub   ABDOMEN: soft, nontender, no HSM or masses and bowel sounds normal  NEURO: Normal strength and tone, sensory exam grossly normal, mentation intact and speech normal    DIAGNOSTICS:     EKG: Normal Sinus Rhythm, normal axis, no acute ST/T changes c/w ischemia, no LVH by voltage criteria, Right Bundle Branch Block and left anterior fascicular block, Q waves in inferior limb leads, unchanged from previous tracings    Labs Drawn and in Process:   Unresulted Labs Ordered in the Past 30 Days of this Admission     Date and Time Order Name Status Description    6/17/2020 1136 BASIC METABOLIC PANEL In process           Recent Labs   Lab Test 04/16/20 03/18/20 03/16/20  1432 01/13/20  1245 01/07/20  0542  01/06/20  0745   HGB  --   --  13.2* 15.3 13.9  --  14.6   PLT  --   --  234 229 162  --  167   NA  --   --   --  138 139  --   --    POTASSIUM  --   --   --  3.6 4.0   < >  --    CR 1.240 1.110  --  1.27* 1.25  --  1.20   A1C  --   --   --   --   --   --  5.3    < > = values in this interval not displayed.        IMPRESSION:   Reason for surgery/procedure: Cerebral angiogram  Diagnosis/reason for consult:  Preoperative risk assessment.     The proposed surgical procedure is considered INTERMEDIATE risk.    REVISED CARDIAC RISK INDEX  The patient has the following serious cardiovascular risks for perioperative complications such as (MI, PE, VFib and 3  AV Block):  Cerebrovascular disease  INTERPRETATION: 1 risks: Class II (low risk - 0.9% complication rate)    The patient has the following additional risks for " perioperative complications:  No identified additional risks      ICD-10-CM    1. Preop general physical exam  Z01.818    (Z01.818) Preop general physical exam  (primary encounter diagnosis)  Comment:  Satisfactory operative candidate with anesthesia as required.   Plan: EKG 12-lead complete w/read - Clinics, Basic         metabolic panel  (Ca, Cl, CO2, Creat, Gluc, K,         Na, BUN)          (I65.01) Stenosis of right vertebral artery  (I65.02) Occlusion of left vertebral artery  Comment: Cerebral angiogram scheduled.     (I10) Essential hypertension, benign  Comment: BP satisfactorily controlled. Continue current meds.     (G47.33) MAN (obstructive sleep apnea)  Comment: Bring Bipap to surgery in case overnight stay required.     (N40.1,  R39.11) Benign prostatic hyperplasia with urinary hesitancy      RECOMMENDATIONS:     Contact the radiology or surgery specialist's office to check on timing of the Covid-19 lab test.     Stop by [address] for one preop lab test today.     Take usual meds the night before surgery. Okay to hold off on taking furosemide the morning of the procedure.     Everything looks fine to go ahead with procedure on 7/1/2020 as planned. Bring Bipap along in case they keep you overnight.     Plan on seeing Dr Sandhu (video visit) as scheduled 6/29/2020, or else reschedule according to her recommendations.          APPROVAL GIVEN to proceed with proposed procedure, without further diagnostic evaluation       Signed Electronically by: Marlo Ayoub MD,     Copy of this evaluation report is provided to requesting physician.    [facility] Preop Guidelines    Revised Cardiac Risk Index

## 2020-06-17 NOTE — PATIENT INSTRUCTIONS
Before Your Surgery      Call your surgeon if there is any change in your health. This includes signs of a cold or flu (such as a sore throat, runny nose, cough, rash or fever).    Do not smoke, drink alcohol or take over the counter medicine (unless your surgeon or primary care doctor tells you to) for the 24 hours before and after surgery.    If you take prescribed drugs: Follow your doctor s orders about which medicines to take and which to stop until after surgery.    Eating and drinking prior to surgery: follow the instructions from your surgeon    Take a shower or bath the night before surgery. Use the soap your surgeon gave you to gently clean your skin. If you do not have soap from your surgeon, use your regular soap. Do not shave or scrub the surgery site.  Wear clean pajamas and have clean sheets on your bed.       Contact the radiology or surgery specialist's office to check on timing of the Covid-19 lab test.     Stop by [address] for one preop lab test today.     Take usual meds the night before surgery. Okay to hold off on taking furosemide the morning of the procedure.     Everything looks fine to go ahead with procedure on 7/1/2020 as planned. Bring Bipap along in case they keep you overnight.     Plan on seeing Dr Sandhu (video visit) as scheduled 6/29/2020, or else reschedule according to her recommendations.

## 2020-06-18 LAB
ANION GAP SERPL CALCULATED.3IONS-SCNC: 7 MMOL/L (ref 3–14)
BUN SERPL-MCNC: 18 MG/DL (ref 7–30)
CALCIUM SERPL-MCNC: 9.1 MG/DL (ref 8.5–10.1)
CHLORIDE SERPL-SCNC: 107 MMOL/L (ref 94–109)
CO2 SERPL-SCNC: 26 MMOL/L (ref 20–32)
CREAT SERPL-MCNC: 1.2 MG/DL (ref 0.66–1.25)
GFR SERPL CREATININE-BSD FRML MDRD: 60 ML/MIN/{1.73_M2}
GLUCOSE SERPL-MCNC: 104 MG/DL (ref 70–99)
POTASSIUM SERPL-SCNC: 4.1 MMOL/L (ref 3.4–5.3)
SODIUM SERPL-SCNC: 140 MMOL/L (ref 133–144)

## 2020-06-28 DIAGNOSIS — Z11.59 ENCOUNTER FOR SCREENING FOR OTHER VIRAL DISEASES: ICD-10-CM

## 2020-06-28 PROCEDURE — U0003 INFECTIOUS AGENT DETECTION BY NUCLEIC ACID (DNA OR RNA); SEVERE ACUTE RESPIRATORY SYNDROME CORONAVIRUS 2 (SARS-COV-2) (CORONAVIRUS DISEASE [COVID-19]), AMPLIFIED PROBE TECHNIQUE, MAKING USE OF HIGH THROUGHPUT TECHNOLOGIES AS DESCRIBED BY CMS-2020-01-R: HCPCS | Performed by: NURSE PRACTITIONER

## 2020-06-29 ENCOUNTER — VIRTUAL VISIT (OUTPATIENT)
Dept: INTERNAL MEDICINE | Facility: CLINIC | Age: 70
End: 2020-06-29
Payer: MEDICARE

## 2020-06-29 DIAGNOSIS — I10 ESSENTIAL HYPERTENSION, BENIGN: ICD-10-CM

## 2020-06-29 DIAGNOSIS — I65.09 VERTEBRAL ARTERY STENOSIS, UNSPECIFIED LATERALITY: ICD-10-CM

## 2020-06-29 DIAGNOSIS — Z12.5 ENCOUNTER FOR SCREENING FOR MALIGNANT NEOPLASM OF PROSTATE: ICD-10-CM

## 2020-06-29 DIAGNOSIS — N40.1 BENIGN PROSTATIC HYPERPLASIA WITH URINARY HESITANCY: Primary | ICD-10-CM

## 2020-06-29 DIAGNOSIS — R39.11 BENIGN PROSTATIC HYPERPLASIA WITH URINARY HESITANCY: Primary | ICD-10-CM

## 2020-06-29 LAB
SARS-COV-2 RNA SPEC QL NAA+PROBE: NOT DETECTED
SPECIMEN SOURCE: NORMAL

## 2020-06-29 PROCEDURE — 99442 ZZC PHYSICIAN TELEPHONE EVALUATION 11-20 MIN: CPT | Performed by: INTERNAL MEDICINE

## 2020-06-29 RX ORDER — CLOPIDOGREL BISULFATE 75 MG/1
75 TABLET ORAL DAILY
Qty: 90 TABLET | Refills: 3 | Status: ON HOLD | OUTPATIENT
Start: 2020-06-29 | End: 2020-08-19

## 2020-06-29 NOTE — PROGRESS NOTES
"Harpreet Vera is a 70 year old male who is being evaluated via a billable telephone visit.      The patient has been notified of following:     \"This telephone visit will be conducted via a call between you and your physician/provider. We have found that certain health care needs can be provided without the need for a physical exam.  This service lets us provide the care you need with a short phone conversation.  If a prescription is necessary we can send it directly to your pharmacy.  If lab work is needed we can place an order for that and you can then stop by our lab to have the test done at a later time.    Telephone visits are billed at different rates depending on your insurance coverage. During this emergency period, for some insurers they may be billed the same as an in-person visit.  Please reach out to your insurance provider with any questions.    If during the course of the call the physician/provider feels a telephone visit is not appropriate, you will not be charged for this service.\"    Patient has given verbal consent for Telephone visit?  Yes    What phone number would you like to be contacted at? [phone]    How would you like to obtain your AVS? MyChart    Subjective     Harpreet Vera is a 70 year old male who presents via phone visit today for the following health issues:    HPI    Follow up gout.     He is still getting intermittent toe pain and swelling. We took him off hydrochlorothiazide and pain resolved but he eventually developed severe lower extremity edema. So he was given low does Lasix which worked but he started to get intermittent toe pain again. He has lowered the Lasix to q 3 days. Swelling is controlled and toe is much better. Wants to know if q 3 days is ok to do.    He has angiogram later this week. Got his Covid test but results are pending. Wants to know when he will find out.     He needs a refill on his Plavix. No problems with dizziness, balance or mental fogginess. "     He is due for a PSA. His symptoms are about the same. Some frequency and hesitancy. PSA has been borderline high.     BP Readings from Last 3 Encounters:   06/17/20 135/78   03/16/20 118/74   02/24/20 114/70    Wt Readings from Last 3 Encounters:   06/17/20 88.7 kg (195 lb 8 oz)   03/16/20 89.2 kg (196 lb 9.6 oz)   02/24/20 87.3 kg (192 lb 6.4 oz)                    Reviewed and updated as needed this visit by Provider         Review of Systems   Constitutional, HEENT, cardiovascular, pulmonary, GI, , musculoskeletal, neuro, skin, endocrine and psych systems are negative, except as otherwise noted.       Objective   Reported vitals:  There were no vitals taken for this visit.   healthy, alert and no distress  PSYCH: Alert and oriented times 3; coherent speech, normal   rate and volume, able to articulate logical thoughts, able   to abstract reason, no tangential thoughts, no hallucinations   or delusions  His affect is normal  RESP: No cough, no audible wheezing, able to talk in full sentences  Remainder of exam unable to be completed due to telephone visits          Assessment/Plan:  1. Vertebral artery stenosis, unspecified laterality  Stable, refilled  - clopidogrel (PLAVIX) 75 MG tablet; Take 1 tablet (75 mg) by mouth daily  Dispense: 90 tablet; Refill: 3    2. Benign prostatic hyperplasia with urinary hesitancy  Stable, due for PSA  - **Prostate spec antigen screen FUTURE 1yr; Future    3. Essential hypertension, benign  under good control Continue current medications.   - Lipid panel reflex to direct LDL Fasting; Future    4. Encounter for screening for malignant neoplasm of prostate      - **Prostate spec antigen screen FUTURE 1yr; Future    5. Gout  SEE BELOW    6. Lower extremity edema  Agree with using Lasix just when he needs it. Continue to follow low salt diet and drinks enough clear water.     No follow-ups on file.      Phone call duration:  16 minutes start time 7:05 end time 7:21 am    Lucía  Constance Sandhu MD

## 2020-06-30 ENCOUNTER — TELEPHONE (OUTPATIENT)
Dept: INTERVENTIONAL RADIOLOGY/VASCULAR | Facility: CLINIC | Age: 70
End: 2020-06-30

## 2020-06-30 NOTE — TELEPHONE ENCOUNTER
Pre-Procedure Negative COVID Test     Step 1 Patient Notification  Patient notified of the negative COVID test result    Step 2 Patient Information  Verified the patient remains symptom free   Patient informed to contact the ordering provider if any of the symptoms develop prior to the procedure    Fever/Chills   Cough   Shortness of breath   New loss of taste or smell  Sore throat  Muscle or body aches  Headaches  Fatigue  Vomiting or diarrhea    Step 3 Review Visitor Policy  Patient informed of the updated visitor policy   1 visitor allowed per patient   Visitor must screen negative for COVID symptoms   Visitor must wear a mask  Waiting rooms continue to be closed to visitors    Ada Francisco RN

## 2020-07-01 ENCOUNTER — HOSPITAL ENCOUNTER (OUTPATIENT)
Facility: CLINIC | Age: 70
Discharge: HOME | End: 2020-07-01
Attending: RADIOLOGY | Admitting: RADIOLOGY
Payer: MEDICARE

## 2020-07-01 ENCOUNTER — APPOINTMENT (OUTPATIENT)
Dept: INTERVENTIONAL RADIOLOGY/VASCULAR | Facility: CLINIC | Age: 70
End: 2020-07-01
Attending: NURSE PRACTITIONER
Payer: MEDICARE

## 2020-07-01 VITALS
SYSTOLIC BLOOD PRESSURE: 122 MMHG | RESPIRATION RATE: 16 BRPM | HEART RATE: 50 BPM | TEMPERATURE: 97.5 F | HEIGHT: 67 IN | BODY MASS INDEX: 30.45 KG/M2 | DIASTOLIC BLOOD PRESSURE: 73 MMHG | WEIGHT: 194 LBS | OXYGEN SATURATION: 95 %

## 2020-07-01 DIAGNOSIS — I65.09 VERTEBRAL ARTERY STENOSIS, UNSPECIFIED LATERALITY: ICD-10-CM

## 2020-07-01 LAB
HGB BLD-MCNC: 14.8 G/DL (ref 13.3–17.7)
PLATELET # BLD AUTO: 144 10E9/L (ref 150–450)

## 2020-07-01 PROCEDURE — 25500064 ZZH RX 255 OP 636: Performed by: RADIOLOGY

## 2020-07-01 PROCEDURE — C1769 GUIDE WIRE: HCPCS

## 2020-07-01 PROCEDURE — 27210806 ZZH SHEATH CR5

## 2020-07-01 PROCEDURE — 85018 HEMOGLOBIN: CPT | Performed by: RADIOLOGY

## 2020-07-01 PROCEDURE — 40000853 ZZH STATISTIC ANGIOGRAM, STENT, VERTEBRO PLASTY

## 2020-07-01 PROCEDURE — 27210742 ZZH CATH CR1

## 2020-07-01 PROCEDURE — 27210732 ZZH ACCESSORY CR1

## 2020-07-01 PROCEDURE — 85049 AUTOMATED PLATELET COUNT: CPT | Performed by: RADIOLOGY

## 2020-07-01 PROCEDURE — 25800030 ZZH RX IP 258 OP 636: Performed by: NURSE PRACTITIONER

## 2020-07-01 PROCEDURE — 25000125 ZZHC RX 250: Performed by: RADIOLOGY

## 2020-07-01 PROCEDURE — 25000128 H RX IP 250 OP 636: Performed by: RADIOLOGY

## 2020-07-01 PROCEDURE — 36226 PLACE CATH VERTEBRAL ART: CPT

## 2020-07-01 RX ORDER — ACETAMINOPHEN 325 MG/1
325-650 TABLET ORAL EVERY 4 HOURS PRN
Status: DISCONTINUED | OUTPATIENT
Start: 2020-07-01 | End: 2020-07-01 | Stop reason: HOSPADM

## 2020-07-01 RX ORDER — HEPARIN SODIUM 200 [USP'U]/100ML
1 INJECTION, SOLUTION INTRAVENOUS CONTINUOUS PRN
Status: DISCONTINUED | OUTPATIENT
Start: 2020-07-01 | End: 2020-07-01 | Stop reason: HOSPADM

## 2020-07-01 RX ORDER — NALOXONE HYDROCHLORIDE 0.4 MG/ML
.1-.4 INJECTION, SOLUTION INTRAMUSCULAR; INTRAVENOUS; SUBCUTANEOUS
Status: DISCONTINUED | OUTPATIENT
Start: 2020-07-01 | End: 2020-07-01 | Stop reason: HOSPADM

## 2020-07-01 RX ORDER — IOPAMIDOL 612 MG/ML
50 INJECTION, SOLUTION INTRAVASCULAR ONCE
Status: COMPLETED | OUTPATIENT
Start: 2020-07-01 | End: 2020-07-01

## 2020-07-01 RX ORDER — ONDANSETRON 2 MG/ML
4 INJECTION INTRAMUSCULAR; INTRAVENOUS EVERY 6 HOURS PRN
Status: DISCONTINUED | OUTPATIENT
Start: 2020-07-01 | End: 2020-07-01 | Stop reason: HOSPADM

## 2020-07-01 RX ORDER — NICOTINE POLACRILEX 4 MG
15-30 LOZENGE BUCCAL
Status: DISCONTINUED | OUTPATIENT
Start: 2020-07-01 | End: 2020-07-01 | Stop reason: HOSPADM

## 2020-07-01 RX ORDER — ONDANSETRON 4 MG/1
4 TABLET, ORALLY DISINTEGRATING ORAL EVERY 6 HOURS PRN
Status: DISCONTINUED | OUTPATIENT
Start: 2020-07-01 | End: 2020-07-01 | Stop reason: HOSPADM

## 2020-07-01 RX ORDER — DEXTROSE MONOHYDRATE 25 G/50ML
25-50 INJECTION, SOLUTION INTRAVENOUS
Status: DISCONTINUED | OUTPATIENT
Start: 2020-07-01 | End: 2020-07-01 | Stop reason: HOSPADM

## 2020-07-01 RX ORDER — LIDOCAINE 40 MG/G
CREAM TOPICAL
Status: DISCONTINUED | OUTPATIENT
Start: 2020-07-01 | End: 2020-07-01 | Stop reason: HOSPADM

## 2020-07-01 RX ORDER — SODIUM CHLORIDE 9 MG/ML
INJECTION, SOLUTION INTRAVENOUS CONTINUOUS
Status: DISCONTINUED | OUTPATIENT
Start: 2020-07-01 | End: 2020-07-01 | Stop reason: HOSPADM

## 2020-07-01 RX ORDER — FLUMAZENIL 0.1 MG/ML
0.2 INJECTION, SOLUTION INTRAVENOUS
Status: DISCONTINUED | OUTPATIENT
Start: 2020-07-01 | End: 2020-07-01 | Stop reason: HOSPADM

## 2020-07-01 RX ORDER — FENTANYL CITRATE 50 UG/ML
25-50 INJECTION, SOLUTION INTRAMUSCULAR; INTRAVENOUS EVERY 5 MIN PRN
Status: DISCONTINUED | OUTPATIENT
Start: 2020-07-01 | End: 2020-07-01 | Stop reason: HOSPADM

## 2020-07-01 RX ADMIN — MIDAZOLAM HYDROCHLORIDE 1 MG: 1 INJECTION, SOLUTION INTRAMUSCULAR; INTRAVENOUS at 10:47

## 2020-07-01 RX ADMIN — IOPAMIDOL 25 ML: 612 INJECTION, SOLUTION INTRAVENOUS at 11:03

## 2020-07-01 RX ADMIN — FENTANYL CITRATE 50 MCG: 50 INJECTION, SOLUTION INTRAMUSCULAR; INTRAVENOUS at 10:52

## 2020-07-01 RX ADMIN — MIDAZOLAM HYDROCHLORIDE 1 MG: 1 INJECTION, SOLUTION INTRAMUSCULAR; INTRAVENOUS at 10:39

## 2020-07-01 RX ADMIN — LIDOCAINE HYDROCHLORIDE 9 ML: 10 INJECTION, SOLUTION INFILTRATION; PERINEURAL at 11:07

## 2020-07-01 RX ADMIN — SODIUM CHLORIDE: 9 INJECTION, SOLUTION INTRAVENOUS at 08:16

## 2020-07-01 RX ADMIN — HEPARIN SODIUM 2000 UNITS: 200 INJECTION, SOLUTION INTRAVENOUS at 10:29

## 2020-07-01 RX ADMIN — FENTANYL CITRATE 50 MCG: 50 INJECTION, SOLUTION INTRAMUSCULAR; INTRAVENOUS at 10:43

## 2020-07-01 ASSESSMENT — MIFFLIN-ST. JEOR: SCORE: 1588.61

## 2020-07-01 NOTE — PROGRESS NOTES
Eating lunch. Right groin remains D/I. Denies pain. VSS. Discharge instructions done with pt and wife. States understanding.

## 2020-07-01 NOTE — PROGRESS NOTES
patient Name:  Harpreet Vera    Medical Record Number: [id number]  Today's Date:  July 1, 2020  Procedure: diagnostic cerebralangiogram  Start Time: 1039  End of procedure time: 1100    Report given to: care suite RN  Time pt departs:  1130       Notes:       Pt transferred to IR table. Prepped and draped appropriately. Monitoring equipment applied. NC applied. No complaints of pain at this time. Timeout recorded.       VSS. Pt remains on RA. No c/o pain at this time. Right Groin site closed with manual pressure CDI, soft. Hemostasis achieved at 1127am  No further questions from RN or pt.    Versed 2mg, Fentanyl 100mcg.

## 2020-07-01 NOTE — PROGRESS NOTES
"[facility]:  Pre Sedation Assessment ~    We are planning for a recommended interval follow up cerebral angiogram.    Imaging: Endovascular procedure 1/6/2020 ~  1. Severe focal stenosis (80%) at the origin of the dominant right  vertebral artery resulting in mildly delayed antegrade flow.  2. Successful stent placement in the proximal right vertebral artery  resulting in markedly improved antegrade flow through the right  vertebral artery into the posterior circulation. Mild residual  stenosis (10%) in the proximal right vertebral artery at the  conclusion of the procedure. No evidence of thromboembolic  complication.  3. Mild stenosis in the supraclinoid right internal carotid artery.       Allergies   Allergen Reactions     Pravastatin      Fuzzy brain.      Labs: Hgb 14.8, plt 144 creat 1.20  Covid negative on 6/28    Preoperative H&P from 6/17/2020 is reviewed.  There are no pertinent changes in exam.  Sedation history: Previous problems with sedation. No   History of sleep apnea No    Exam:   /78 (BP Location: Right arm)   Pulse 51   Temp 97.5  F (36.4  C) (Oral)   Resp 18   Ht 1.702 m (5' 7\")   Wt 88 kg (194 lb)   SpO2 98%   BMI 30.38 kg/m    Neuro: Alert and oriented x 3. Speech is clear. Nonfocal  Cardiac: S1 S2, no murmur  Lungs: clear  Mallampati:  II - Faucial pillars and soft palate may be seen, but uvula is masked by the base of the tongue  ASA: 3 - Severe systemic disease, but not incapacitating  NPO since midnight    Impression/plan:  This is a 70 year old male with a history of symptomatic cerebrovascular disease with documented occlusion of left vertebral artery and high grade stenosis of right vertebral artery s/p right vertebral artery stent in 1/2020. He has been maintained on ANG.  He denies any recent symptoms and presents today for recommended interval follow up cerebral angiography.  Harpreet is medically stable and appropriately NPO for procedure as " planned with IV sedation.    The procedure benefits and risks were discussed with the patient and his wife. They express an understanding of the procedure and provide permission for us to proceed. Informed consent obtained and signed.    SALVATORE Miller CNP

## 2020-07-01 NOTE — PROGRESS NOTES
Care Suites Post Procedure Note    Patient Information  Name: Harpreet Vera  Age: 70 year old    Post Procedure  Time patient returned to Care Suites: 1135  Concerns/abnormal assessment: na. VSS. Denies c/o. Right groin D/I. No bleeding/swelling.Instructed on activity restrictions  If abnormal assessment, provider notified: N/A  Plan/Other: 4 hrs bedrest then discharge.    Rere Elise RN

## 2020-07-01 NOTE — DISCHARGE INSTRUCTIONS
Cerebral Angiogram Discharge Instructions - Femoral     After you go home:      Have an adult stay with you until tomorrow.    Drink extra fluids for 2 days.    You may resume your normal diet.    No smoking       For 24 hours - due to the sedation you received:    Relax and take it easy.    Do NOT make any important or legal decisions.    Do NOT drive or operate machines at home or at work.    Do NOT drink alcohol.    Care of Groin Puncture Site:      For the first 24 hrs - check the puncture site every 1-2 hours while awake.    For 2 days, when you cough, sneeze, laugh or move your bowels, hold your hand over the puncture site and press firmly.    Remove the bandaid after 24 hours. If there is minor oozing, apply another bandaid and remove it after 12 hours.    It is normal to have a small bruise or pea size lump at the site.    You may shower tomorrow. Do NOT take a bath, or use a hot tub or pool for at least 3 days. Do NOT scrub the site. Do not use lotion or powder near the puncture site.     Activity:            For 2 days:    No stooping or squatting    Do NOT do any heavy activity such as exercise, lifting, or straining.     No housework, yard work or any activity that make you sweat    Do NOT lift more than 10 pounds    Bleeding:      If you start bleeding from the site in your groin, lie down flat and press firmly on/above the site for 10 minutes.     Once bleeding stops, lay flat for 2 hours.     Call [facility] as soon as you can.       Call 911 right away if you have heavy bleeding or bleeding that does not stop.      Medicines:      If you are on Metformin (Glucophage) and your GFR (kidney function level) is >30, you may continue taking your Metformin.    If you are on Metformin (Glucophage) and your GFR (kidney function level) is <30, do not restart the Metformin for 48 hours after your procedure. Check with your primary care giver before restarting the Metformin to see if you need to  have blood drawn to recheck your kidney function (GFR).    If you are taking an antiplatelet medication such as Plavix, do not stop taking it until you talk to your provider.       Take your medications, including blood thinners, unless your provider tells you not to.      If you take Coumadin (Warfarin), have your INR checked by your provider in  3-5 days. Call your clinic to schedule this.    If you have stopped any medicines, check with your provider about when to restart them.        Follow Up Appointments:      Follow up with [facility]  as directed.    Call the clinic if:      You have increased pain or a large or growing hard lump around the site.    The site is red, swollen, hot or tender.    Blood or fluid is draining from the site.    You have chills or a fever greater than 101 F (38 C).    Your leg feels numb, cool or changes color.    You have hives, a rash or unusual itching.    New pain in the back or belly that you cannot control with Tylenol.    You experience changes in your vision, hearing, balance, coordination, speech, thinking or memory.    You experience weakness in one or more extremity.    Any questions or concerns.    If you have questions or your original symptoms do not improve, call:         [facility] @ [phone]

## 2020-07-01 NOTE — PROGRESS NOTES
Care Suites Admission Nursing Note    Patient Information  Name: Harpreet Vera  Age: 70 year old  Reason for admission: cerebral angiogram  Care Suites arrival time: 0745    Patient Admission/Assessment   Pre-procedure assessment complete: Yes  If abnormal assessment/labs, provider notified: N/A  NPO: Yes  Medications held per instructions/orders: N/A  Consent: obtained  If applicable, pregnancy test status: deferred  Patient oriented to room: Yes  Education/questions answered: Yes  Plan/other: proceed with procedure    Discharge Planning  Accompanied by: wife  Discharge name/phone number: Michelle [phone]  Overnight post sedation caregiver: Michelle  Discharge location: home    Rere Elise RN

## 2020-07-01 NOTE — PROGRESS NOTES
PATIENT/VISITOR WELLNESS SCREENING    Step 1 Patient Screening    1. In the last month, have you been in contact with someone who was confirmed or suspected to have Coronavirus/COVID-19? No    2. Do you have the following symptoms?  Fever/Chills? No   Cough? No   Shortness of breath? No   New loss of taste or smell? No  Sore throat? No  Muscle or body aches? No  Headaches? No  Fatigue? No  Vomiting or diarrhea? No    Step 2 Visitor Screening    1. Name of Visitor (1 visitor per patient): Michelle    2. In the last month, have you been in contact with someone who was confirmed or suspected to have Coronavirus/COVID-19? No    3. Do you have the following symptoms?  Fever/Chills? No   Cough? No   Shortness of breath? No   Skin rash? No   Loss of taste or smell? No  Sore throat? No  Runny or stuffy nose? No  Muscle or body aches? No  Headaches? No  Fatigue? No  Vomiting or diarrhea? No    If the visitor has positive symptoms, notify supervisor/manger  Per policy, the visitor will need to leave the facility     Step 3 Refer to logic grid below for actions    NO SYMPTOM(S)    ACTIONS:  1. Standard rooming process  2. Provider to assess per normal protocol  3. Implement precautions as needed and per guidelines     POSITIVE SYMPTOM(S)  If positive for ANY of the following symptoms: fever, cough, shortness of breath, rash    ACTION:  1. Continue to have the patient wear a mask   2. Room patient as soon as possible  3. Don appropriate PPE when entering room  4. Provider evaluation

## 2020-07-01 NOTE — PROCEDURES
[facility]:  Post Procedure Note ~    Patient Name: Harpreet Vera  MRN: [id number]  Date of Procedure: July 1, 2020    Procedure: Cerebral angiogram  Radiologist: Ghanshyam Leon MD    Contrast: 25 ml Isovue 300    Fluoro Time: 2.1 minutes  Air Kerma: 576 mGy    Medications:   Versed 2 mg IV  Fentanyl 100 mcg IV    Sedation Time: 20 minutes    EBL: minimal  Complications: none    Patient reevaluated immediately prior to sedation and prior to procedure.    Preliminary Report:   (See dictation for full detail)  1. Minor intimal hyperplasia at proximal aspect of stent, no more than 15-20% stenosis  2. Normal filling of posterior circulation  3. Persistent occlusion of R vertebral artery    Assess/Plan:  Routine post sedation and groin site monitoring.  Bedrest x 4 hours after groin hemostasis obtained.  Discharge when meets criteria.  Continue Aspirin - increase to 325 mg daily. Stop Plavix.  Follow up CTA in 1 year. We will call him to schedule at that time.    Ghanshyam Leon MD MD  [phone]

## 2020-07-01 NOTE — PROGRESS NOTES
Care Suites Discharge Nursing Note    Patient Information  Name: Harpreet Vera  Age: 70 year old    Discharge Education:  Discharge instructions reviewed: Yes  Additional education/resources provided: n/a  Patient/patient representative verbalizes understanding: Yes  Patient discharging on new medications: No  Medication education completed: Yes    Discharge Plans:   Discharge location: home  Discharge ride contacted: Yes  Approximate discharge time: 1600    Discharge Criteria:  Discharge criteria met and vital signs stable: Yes    Patient Belongs:  Patient belongings returned to patient: Yes    Adia Rodriguez RN

## 2020-07-01 NOTE — PRE-PROCEDURE
GENERAL PRE-PROCEDURE:   Procedure:  Cerebral angiogram  Date/Time:  7/1/2020 9:54 AM    Verbal consent obtained?: Yes    Written consent obtained?: Yes    Risks and benefits: Risks, benefits and alternatives were discussed    Consent given by:  Patient  Patient states understanding of procedure being performed: Yes    Patient's understanding of procedure matches consent: Yes    Procedure consent matches procedure scheduled: Yes    Expected level of sedation:  Moderate  Appropriately NPO:  Yes  ASA Class:  Class 2- mild systemic disease, no acute problems, no functional limitations  Mallampati  :  Grade 2- soft palate, base of uvula, tonsillar pillars, and portion of posterior pharyngeal wall visible  Lungs:  Lungs clear with good breath sounds bilaterally  Heart:  Normal heart sounds and rate  History & Physical reviewed:  History and physical reviewed and no updates needed  Statement of review:  I have reviewed the lab findings, diagnostic data, medications, and the plan for sedation

## 2020-07-08 ENCOUNTER — TRANSFERRED RECORDS (OUTPATIENT)
Dept: HEALTH INFORMATION MANAGEMENT | Facility: CLINIC | Age: 70
End: 2020-07-08

## 2020-07-08 LAB
ALT SERPL-CCNC: 19 IU/L (ref 5–40)
AST SERPL-CCNC: 16 U/L (ref 5–34)
CREAT SERPL-MCNC: 1.04 MG/DL (ref 0.5–1.3)

## 2020-08-07 DIAGNOSIS — Z12.5 ENCOUNTER FOR SCREENING FOR MALIGNANT NEOPLASM OF PROSTATE: ICD-10-CM

## 2020-08-07 DIAGNOSIS — R39.11 BENIGN PROSTATIC HYPERPLASIA WITH URINARY HESITANCY: ICD-10-CM

## 2020-08-07 DIAGNOSIS — N40.1 BENIGN PROSTATIC HYPERPLASIA WITH URINARY HESITANCY: ICD-10-CM

## 2020-08-07 DIAGNOSIS — I10 ESSENTIAL HYPERTENSION, BENIGN: ICD-10-CM

## 2020-08-07 PROCEDURE — G0103 PSA SCREENING: HCPCS | Performed by: INTERNAL MEDICINE

## 2020-08-07 PROCEDURE — 80061 LIPID PANEL: CPT | Performed by: INTERNAL MEDICINE

## 2020-08-07 PROCEDURE — 36415 COLL VENOUS BLD VENIPUNCTURE: CPT | Performed by: INTERNAL MEDICINE

## 2020-08-08 DIAGNOSIS — R97.20 ELEVATED PSA: ICD-10-CM

## 2020-08-08 LAB
CHOLEST SERPL-MCNC: 178 MG/DL
HDLC SERPL-MCNC: 33 MG/DL
LDLC SERPL CALC-MCNC: 106 MG/DL
NONHDLC SERPL-MCNC: 145 MG/DL
PSA SERPL-ACNC: 2.72 UG/L (ref 0–4)
TRIGL SERPL-MCNC: 196 MG/DL

## 2020-08-10 RX ORDER — DUTASTERIDE 0.5 MG/1
CAPSULE, LIQUID FILLED ORAL
Qty: 90 CAPSULE | Refills: 0 | Status: ON HOLD | OUTPATIENT
Start: 2020-08-10 | End: 2020-08-19

## 2020-08-19 ENCOUNTER — APPOINTMENT (OUTPATIENT)
Dept: CT IMAGING | Facility: CLINIC | Age: 70
End: 2020-08-19
Attending: EMERGENCY MEDICINE
Payer: MEDICARE

## 2020-08-19 ENCOUNTER — HOSPITAL ENCOUNTER (OUTPATIENT)
Facility: CLINIC | Age: 70
Setting detail: OBSERVATION
Discharge: ANOTHER HOSPITAL | End: 2020-08-21
Attending: EMERGENCY MEDICINE | Admitting: EMERGENCY MEDICINE
Payer: MEDICARE

## 2020-08-19 ENCOUNTER — NURSE TRIAGE (OUTPATIENT)
Dept: INTERNAL MEDICINE | Facility: CLINIC | Age: 70
End: 2020-08-19

## 2020-08-19 DIAGNOSIS — R55 SYNCOPE, UNSPECIFIED SYNCOPE TYPE: ICD-10-CM

## 2020-08-19 DIAGNOSIS — R00.1 BRADYCARDIA: ICD-10-CM

## 2020-08-19 LAB
ANION GAP SERPL CALCULATED.3IONS-SCNC: 5 MMOL/L (ref 3–14)
BASOPHILS # BLD AUTO: 0 10E9/L (ref 0–0.2)
BASOPHILS NFR BLD AUTO: 0.6 %
BUN SERPL-MCNC: 16 MG/DL (ref 7–30)
CALCIUM SERPL-MCNC: 9.1 MG/DL (ref 8.5–10.1)
CHLORIDE SERPL-SCNC: 107 MMOL/L (ref 94–109)
CO2 SERPL-SCNC: 27 MMOL/L (ref 20–32)
CREAT SERPL-MCNC: 1.17 MG/DL (ref 0.66–1.25)
DIFFERENTIAL METHOD BLD: NORMAL
EOSINOPHIL # BLD AUTO: 0.2 10E9/L (ref 0–0.7)
EOSINOPHIL NFR BLD AUTO: 2.8 %
ERYTHROCYTE [DISTWIDTH] IN BLOOD BY AUTOMATED COUNT: 13.4 % (ref 10–15)
GFR SERPL CREATININE-BSD FRML MDRD: 62 ML/MIN/{1.73_M2}
GLUCOSE SERPL-MCNC: 95 MG/DL (ref 70–99)
HCT VFR BLD AUTO: 41.5 % (ref 40–53)
HGB BLD-MCNC: 14.5 G/DL (ref 13.3–17.7)
IMM GRANULOCYTES # BLD: 0 10E9/L (ref 0–0.4)
IMM GRANULOCYTES NFR BLD: 0.3 %
INTERPRETATION ECG - MUSE: NORMAL
LYMPHOCYTES # BLD AUTO: 1.4 10E9/L (ref 0.8–5.3)
LYMPHOCYTES NFR BLD AUTO: 20.6 %
MCH RBC QN AUTO: 32.4 PG (ref 26.5–33)
MCHC RBC AUTO-ENTMCNC: 34.9 G/DL (ref 31.5–36.5)
MCV RBC AUTO: 93 FL (ref 78–100)
MONOCYTES # BLD AUTO: 0.7 10E9/L (ref 0–1.3)
MONOCYTES NFR BLD AUTO: 10 %
NEUTROPHILS # BLD AUTO: 4.5 10E9/L (ref 1.6–8.3)
NEUTROPHILS NFR BLD AUTO: 65.7 %
NRBC # BLD AUTO: 0 10*3/UL
NRBC BLD AUTO-RTO: 0 /100
PLATELET # BLD AUTO: 178 10E9/L (ref 150–450)
POTASSIUM SERPL-SCNC: 4.2 MMOL/L (ref 3.4–5.3)
RBC # BLD AUTO: 4.48 10E12/L (ref 4.4–5.9)
SODIUM SERPL-SCNC: 139 MMOL/L (ref 133–144)
TROPONIN I SERPL-MCNC: <0.015 UG/L (ref 0–0.04)
WBC # BLD AUTO: 6.8 10E9/L (ref 4–11)

## 2020-08-19 PROCEDURE — 99285 EMERGENCY DEPT VISIT HI MDM: CPT | Mod: 25

## 2020-08-19 PROCEDURE — 25000132 ZZH RX MED GY IP 250 OP 250 PS 637: Mod: GY | Performed by: PHYSICIAN ASSISTANT

## 2020-08-19 PROCEDURE — G0378 HOSPITAL OBSERVATION PER HR: HCPCS

## 2020-08-19 PROCEDURE — 80048 BASIC METABOLIC PNL TOTAL CA: CPT | Performed by: EMERGENCY MEDICINE

## 2020-08-19 PROCEDURE — 25000125 ZZHC RX 250: Performed by: EMERGENCY MEDICINE

## 2020-08-19 PROCEDURE — C9803 HOPD COVID-19 SPEC COLLECT: HCPCS

## 2020-08-19 PROCEDURE — 25800030 ZZH RX IP 258 OP 636: Performed by: PHYSICIAN ASSISTANT

## 2020-08-19 PROCEDURE — 99220 ZZC INITIAL OBSERVATION CARE,LEVL III: CPT | Performed by: PHYSICIAN ASSISTANT

## 2020-08-19 PROCEDURE — 85025 COMPLETE CBC W/AUTO DIFF WBC: CPT | Performed by: EMERGENCY MEDICINE

## 2020-08-19 PROCEDURE — 93005 ELECTROCARDIOGRAM TRACING: CPT | Mod: 59

## 2020-08-19 PROCEDURE — 70498 CT ANGIOGRAPHY NECK: CPT

## 2020-08-19 PROCEDURE — 70450 CT HEAD/BRAIN W/O DYE: CPT | Mod: 59

## 2020-08-19 PROCEDURE — 84484 ASSAY OF TROPONIN QUANT: CPT | Performed by: EMERGENCY MEDICINE

## 2020-08-19 PROCEDURE — 25000128 H RX IP 250 OP 636: Performed by: EMERGENCY MEDICINE

## 2020-08-19 PROCEDURE — U0003 INFECTIOUS AGENT DETECTION BY NUCLEIC ACID (DNA OR RNA); SEVERE ACUTE RESPIRATORY SYNDROME CORONAVIRUS 2 (SARS-COV-2) (CORONAVIRUS DISEASE [COVID-19]), AMPLIFIED PROBE TECHNIQUE, MAKING USE OF HIGH THROUGHPUT TECHNOLOGIES AS DESCRIBED BY CMS-2020-01-R: HCPCS | Performed by: EMERGENCY MEDICINE

## 2020-08-19 PROCEDURE — 12011 RPR F/E/E/N/L/M 2.5 CM/<: CPT

## 2020-08-19 RX ORDER — ALLOPURINOL 100 MG/1
200 TABLET ORAL EVERY EVENING
Status: DISCONTINUED | OUTPATIENT
Start: 2020-08-19 | End: 2020-08-21 | Stop reason: HOSPADM

## 2020-08-19 RX ORDER — DUTASTERIDE 0.5 MG/1
0.5 CAPSULE, LIQUID FILLED ORAL EVERY EVENING
COMMUNITY
End: 2020-12-15

## 2020-08-19 RX ORDER — ONDANSETRON 4 MG/1
4 TABLET, ORALLY DISINTEGRATING ORAL EVERY 6 HOURS PRN
Status: DISCONTINUED | OUTPATIENT
Start: 2020-08-19 | End: 2020-08-21 | Stop reason: HOSPADM

## 2020-08-19 RX ORDER — ONDANSETRON 2 MG/ML
4 INJECTION INTRAMUSCULAR; INTRAVENOUS EVERY 6 HOURS PRN
Status: DISCONTINUED | OUTPATIENT
Start: 2020-08-19 | End: 2020-08-21 | Stop reason: HOSPADM

## 2020-08-19 RX ORDER — FUROSEMIDE 20 MG
20 TABLET ORAL
COMMUNITY
End: 2020-09-16

## 2020-08-19 RX ORDER — DUTASTERIDE 0.5 MG/1
0.5 CAPSULE, LIQUID FILLED ORAL EVERY EVENING
Status: DISCONTINUED | OUTPATIENT
Start: 2020-08-19 | End: 2020-08-21 | Stop reason: HOSPADM

## 2020-08-19 RX ORDER — ASPIRIN 325 MG/1
325 TABLET, FILM COATED ORAL EVERY EVENING
Status: DISCONTINUED | OUTPATIENT
Start: 2020-08-19 | End: 2020-08-21 | Stop reason: HOSPADM

## 2020-08-19 RX ORDER — LOSARTAN POTASSIUM 100 MG/1
100 TABLET ORAL EVERY EVENING
COMMUNITY
End: 2020-10-15

## 2020-08-19 RX ORDER — PROCHLORPERAZINE 25 MG
12.5 SUPPOSITORY, RECTAL RECTAL EVERY 12 HOURS PRN
Status: DISCONTINUED | OUTPATIENT
Start: 2020-08-19 | End: 2020-08-21 | Stop reason: HOSPADM

## 2020-08-19 RX ORDER — LOSARTAN POTASSIUM 50 MG/1
100 TABLET ORAL EVERY EVENING
Status: DISCONTINUED | OUTPATIENT
Start: 2020-08-19 | End: 2020-08-21 | Stop reason: HOSPADM

## 2020-08-19 RX ORDER — CHOLECALCIFEROL (VITAMIN D3) 50 MCG
1 TABLET ORAL EVERY EVENING
COMMUNITY

## 2020-08-19 RX ORDER — LIDOCAINE 40 MG/G
CREAM TOPICAL
Status: DISCONTINUED | OUTPATIENT
Start: 2020-08-19 | End: 2020-08-21

## 2020-08-19 RX ORDER — ASPIRIN 325 MG/1
325 TABLET, FILM COATED ORAL EVERY EVENING
COMMUNITY

## 2020-08-19 RX ORDER — NITROGLYCERIN 0.4 MG/1
0.4 TABLET SUBLINGUAL EVERY 5 MIN PRN
Status: DISCONTINUED | OUTPATIENT
Start: 2020-08-19 | End: 2020-08-21 | Stop reason: HOSPADM

## 2020-08-19 RX ORDER — ACETAMINOPHEN 325 MG/1
650 TABLET ORAL EVERY 4 HOURS PRN
Status: DISCONTINUED | OUTPATIENT
Start: 2020-08-19 | End: 2020-08-21 | Stop reason: HOSPADM

## 2020-08-19 RX ORDER — ALLOPURINOL 100 MG/1
200 TABLET ORAL EVERY EVENING
COMMUNITY

## 2020-08-19 RX ORDER — ACETAMINOPHEN 500 MG
500 TABLET ORAL PRN
COMMUNITY
End: 2020-12-10

## 2020-08-19 RX ORDER — MULTIPLE VITAMINS W/ MINERALS TAB 9MG-400MCG
1 TAB ORAL EVERY EVENING
COMMUNITY

## 2020-08-19 RX ORDER — ACETAMINOPHEN 650 MG/1
650 SUPPOSITORY RECTAL EVERY 4 HOURS PRN
Status: DISCONTINUED | OUTPATIENT
Start: 2020-08-19 | End: 2020-08-21 | Stop reason: HOSPADM

## 2020-08-19 RX ORDER — PROCHLORPERAZINE MALEATE 5 MG
5 TABLET ORAL EVERY 6 HOURS PRN
Status: DISCONTINUED | OUTPATIENT
Start: 2020-08-19 | End: 2020-08-21 | Stop reason: HOSPADM

## 2020-08-19 RX ORDER — IOPAMIDOL 755 MG/ML
500 INJECTION, SOLUTION INTRAVASCULAR ONCE
Status: COMPLETED | OUTPATIENT
Start: 2020-08-19 | End: 2020-08-19

## 2020-08-19 RX ADMIN — LOSARTAN POTASSIUM 100 MG: 100 TABLET, FILM COATED ORAL at 19:43

## 2020-08-19 RX ADMIN — DUTASTERIDE 0.5 MG: 0.5 CAPSULE ORAL at 19:43

## 2020-08-19 RX ADMIN — SODIUM CHLORIDE 500 ML: 9 INJECTION, SOLUTION INTRAVENOUS at 17:09

## 2020-08-19 RX ADMIN — ASPIRIN 325 MG: 325 TABLET, FILM COATED ORAL at 20:59

## 2020-08-19 RX ADMIN — ALLOPURINOL 200 MG: 100 TABLET ORAL at 19:43

## 2020-08-19 RX ADMIN — IOPAMIDOL 70 ML: 755 INJECTION, SOLUTION INTRAVENOUS at 13:11

## 2020-08-19 RX ADMIN — SODIUM CHLORIDE 80 ML: 9 INJECTION, SOLUTION INTRAVENOUS at 13:11

## 2020-08-19 ASSESSMENT — ENCOUNTER SYMPTOMS
FEVER: 0
WOUND: 1
HEADACHES: 0
COUGH: 0

## 2020-08-19 NOTE — ED PROVIDER NOTES
History     Chief Complaint:  Syncope      HPI   Harpreet Vera is a 70 year old male with a history of vertebral artery stenosis s/p right vertebral stent who presents with syncope. The patient states that he had a syncopal episode while he was trying to open his refrigerator this morning at 0930. He reports falling backwards and hitting his head on the refrigerator and sustained a laceration to the right ear. The patient regained consciousness immediately after he was on the floor. He notes having a similar syncopal episode about 4 days ago. He denies any palpitations, fevers, cough, cold, or headache. Of note, the patient was recently taken off his plavix but is still taking aspirin.     Allergies:  Pravastatin     Medications:    allopurinol  aspirin   dutasteride   furosemide  loratadine  losartan     Past Medical History:    Arthritis   Benign neoplasm of colon   Brachial plexopathy   Chronic infection   Hypertension   Mumps   Sleep apnea   Vertebral artery stenosis   Vitamin D deficiency   Hypercholesterolemia   Cancer     Past Surgical History:    Cholecystectomy   Cervical fusion   Polypectomy   Tonsillectomy   Carotid cerebral angiogram bilateral   Orthopedic surgery   Testicle surgery   Vasectomy      Family History:    arthritis   Eye disorder  Lipids  Cancer   Prostate cancer     Social History:  Smoking Status: Never Smoker  Smokeless Tobacco: Never Used  Alcohol Use: Yes  Drug Use: No  PCP: Lucía Sandhu     Review of Systems   Constitutional: Negative for fever.   Respiratory: Negative for cough.    Skin: Positive for wound (laceration to right ear).   Neurological: Positive for syncope. Negative for headaches.   All other systems reviewed and are negative.      Physical Exam     Patient Vitals for the past 24 hrs:   BP Temp Temp src Pulse Resp SpO2   08/19/20 1445 (!) 146/87 -- -- (!) 48 24 97 %   08/19/20 1430 (!) 148/88 -- -- (!) 46 8 96 %   08/19/20 1415 (!) 141/89 -- -- (!) 47 9 96 %    08/19/20 1400 (!) 148/94 -- -- (!) 47 8 96 %   08/19/20 1345 138/88 -- -- (!) 49 9 98 %   08/19/20 1330 127/83 -- -- (!) 46 -- --   08/19/20 1300 (!) 140/84 -- -- (!) 47 10 94 %   08/19/20 1245 (!) 149/83 -- -- (!) 48 11 96 %   08/19/20 1230 (!) 149/88 -- -- (!) 48 9 97 %   08/19/20 1215 (!) 148/88 -- -- (!) 47 11 97 %   08/19/20 1200 (!) 148/93 -- -- 50 9 98 %   08/19/20 1142 (!) 193/97 97.8  F (36.6  C) Oral 50 18 98 %        Physical Exam  Nursing note and vitals reviewed.  Constitutional: Cooperative.   HENT:   Mouth/Throat: Moist mucous membranes.   Eyes: EOMI, nonicteric sclera  Cardiovascular: Normal rate, regular rhythm, no murmurs, rubs, or gallops  Pulmonary/Chest: Effort normal and breath sounds normal. No respiratory distress. No wheezes. No rales.   Abdominal: Soft. Nontender, nondistended, no guarding or rigidity.   Musculoskeletal: Normal range of motion. No midline C/T/L spine tenderness.   Neurological: Alert. Moves all extremities spontaneously.   Skin: Skin is warm and dry. Superficial 2cm laceration to superior posterior portion of right auricle.   Psychiatric: Normal mood and affect.       Emergency Department Course   ECG:  ECG taken at 1155, ECG read at 1157  Sinus bradycardia   Right bundle branch block  Abnormal ECG  Rate 47 bpm. WI interval 204 ms. QRS duration 160 ms. QT/QTc 486/430 ms. P-R-T axes 25 -18 16.   No significant change when compared to EKG dated 6/17/20.     Imaging:  Radiology findings were communicated with the patient who voiced understanding of the findings.    CTA Head Neck with Contrast  Preliminary Result  IMPRESSION:  1. No definite acute intracranial large vessel occlusion.  2. The stent at the origin of the right vertebral artery remains  patent, without definite high-grade stenosis. There is mild stenosis  of the right vertebral artery at its origin and in its V1 segment,  without high-grade narrowing. The remainder of the visualized cervical  right vertebral  artery is widely patent.  3. Chronic left vertebral artery occlusion, unchanged.  4. The bilateral cervical carotid arteries are widely patent.  5. Mild stenosis of the proximal left subclavian artery.  6. No significant major intracranial arterial stenosis.  Reading per radiology     Head CT w/o contrast  Preliminary Result  IMPRESSION:     1. No evidence of acute intracranial hemorrhage, mass, or herniation.  2. Mild diffuse parenchymal volume loss and white matter changes  likely due to chronic microvascular ischemic disease.   Reading per radiology     Laboratory:  Laboratory findings were communicated with the patient who voiced understanding of the findings.    CBC:  WBC 6.8, HGB 14.5, , o/w WNL     BMP: AWNL (Creatinine 1.17)     Troponin(1202):  <0.015      Asymptomatic COVID-19 Virus (Coronavirus) by PCR Nasopharyngeal swab: pending      Procedures     Laceration Repair        LACERATION:  A simple and superficial clean 2 cm laceration.      LOCATION:  Right ear superior/posterior auricle.      FUNCTION:  Distally sensation and circulation are intact.      ANESTHESIA:  none      PREPARATION:  Irrigation with normal saline.       DEBRIDEMENT:  no debridement      CLOSURE:  Wound was closed with Dermabond     Emergency Department Course:  Past medical records, nursing notes, and vitals reviewed.    1155 I performed an exam of the patient as documented above.    IV was inserted and blood was drawn for laboratory testing, results above.     The patient was sent for imaging while in the emergency department, results above.       1220 Patient rechecked and updated. The patient's laceration was repaired as noted above.    1440 Patient rechecked and updated.     1459 I spoke with Veronica MOELLER for Dr. Mccoy of the Hospitalist service from [facility] regarding patient's presentation, findings, and plan of care.       Findings and plan explained to the Patient who consents to admission. Discussed the patient with  Veronica MOELLER for Dr. Mccoy, who will admit the patient to a observation unit bed for further monitoring, evaluation, and treatment.      Impression & Plan   Covid-19  Harpreet Vera was evaluated during a global COVID-19 pandemic, which necessitated consideration that the patient might be at risk for infection with the SARS-CoV-2 virus that causes COVID-19.   Applicable protocols for evaluation were followed during the patient's care.   COVID-19 was considered as part of the patient's evaluation. The plan for testing is:  a test was obtained during this visit.    Medical Decision Making:  Harpreet Vera is a 70 year old male who presents to the emergency department today following 2 losses of consciousness in the last week. He has an ear laceration today, but it is not significantly gaping, so I think I can safely repair this with dermabond. Discussed scarring with pt and wife and they were in agreement. Concerning his loss of consciousness, when he felt like this previously, he was found to have his vertebral artery stenosis, therefore CTA of head and neck was performed which fortunately appears unchanged from previous, and vertebral artery stenosis is unlikely the cause of his loss of consciousness.  Patient was started on Lasix somewhat recently, but he does not appear clinically dry, and his loss of consciousness was not orthostatic in nature by his history.  Patient's heart rates were noted to be quite low while he was here, but per patient and wife, his heart rates have been low for possibly the last 2 decades.  This is corroborated by a prior hospitalization where he was noted to be similarly bradycardic.  His most recent clinic notes however were not noted to be less than 60.  Given his multiple episodes of loss of consciousness, his age, his risk factors, and his low heart rate, I think he would be best served by admission for further evaluation.  He and his wife are in agreement with this plan.  He is  admitted in stable condition.      Discharge Diagnosis:    ICD-10-CM    1. Syncope, unspecified syncope type  R55    2. Bradycardia  R00.1        Disposition:  Admitted.    Scribe Disclosure:  I, Antonio Roth, am serving as a scribe at 11:55 AM on 8/19/2020 to document services personally performed by Ariel Adrian MD based on my observations and the provider's statements to me.      8/19/2020   Ariel Adrian MD [place]uro, Lucas Ray, MD  08/19/20 1956

## 2020-08-19 NOTE — ED TRIAGE NOTES
Syncopal episode at home in the kitchen about 0930.  Feeling dizzy prior to the event.  Occurred also last Saturday.   Struck head against the refridgerator laceration to right ear. Bleeding controlled. Unsure last tetanus

## 2020-08-19 NOTE — TELEPHONE ENCOUNTER
"Wife calls on behalf of patient (on consent to communicate) reporting patient has had dizzy spells on and off for the last week. Per wife, patient was standing up drinking a glass of water about an hour ago when he \"blacked out\" and fell to the ground. Patient came to within seconds. Fall was unwitnessed per wife, but patient did think he hit his head. Wife notes there is a minor cut with controlled bleeding on the back of patient's head, no other injuries noted.     Wife notes patient had a cerebral stent placed in January, and feels that patient is having very similar symptoms as he was prior to the stents. Wife reports she called patient's vascular clinic today, and they feel that patient's stents looked great the last time he was in, and feel that it could be cardiac related. Vascular clinic advised patient should be seen today by primary care provider today.    Wife is aware that primary care provider is working from home today, and reports she is going to take patient to [facility], but would like an FYI passed on to primary care provider. Routing to primary care provider as an FYI.    Reason for Disposition    Fainted, and still feels dizzy afterwards    Additional Information    Negative: Difficult to awaken or acting confused (e.g., disoriented, slurred speech)    Negative: Shock suspected (e.g., cold/pale/clammy skin, too weak to stand, low BP, rapid pulse)    Answer Assessment - Initial Assessment Questions  1. DESCRIPTION: \"Describe your dizziness.\"      Wife unable to answer  2. LIGHTHEADED: \"Do you feel lightheaded?\" (e.g., somewhat faint, woozy, weak upon standing)      Wife unable to answer   3. VERTIGO: \"Do you feel like either you or the room is spinning or tilting?\" (i.e. vertigo)      Wife unable to answer   4. SEVERITY: \"How bad is it?\"  \"Do you feel like you are going to faint?\" \"Can you stand and walk?\"    - MILD - walking normally    - MODERATE - interferes with normal activities (e.g., " "work, school)     - SEVERE - unable to stand, requires support to walk, feels like passing out now.       Wife unable to answer   5. ONSET:  \"When did the dizziness begin?\"      One week ago   6. AGGRAVATING FACTORS: \"Does anything make it worse?\" (e.g., standing, change in head position)      Wife unable to answer   7. HEART RATE: \"Can you tell me your heart rate?\" \"How many beats in 15 seconds?\"  (Note: not all patients can do this)        Wife unable to answer   8. CAUSE: \"What do you think is causing the dizziness?\"      Wife concerned about patient's cerebral stents  9. RECURRENT SYMPTOM: \"Have you had dizziness before?\" If so, ask: \"When was the last time?\" \"What happened that time?\"      January when patient had cerebral stents placed   10. OTHER SYMPTOMS: \"Do you have any other symptoms?\" (e.g., fever, chest pain, vomiting, diarrhea, bleeding)        No  11. PREGNANCY: \"Is there any chance you are pregnant?\" \"When was your last menstrual period?\"        No    Protocols used: DIZZINESS-A-OH      "

## 2020-08-19 NOTE — ED NOTES
[facility]  ED Nurse Handoff Report    Harpreet Vera is a 70 year old male   ED Chief complaint: No chief complaint on file.  . ED Diagnosis:   Final diagnoses:   Syncope, unspecified syncope type   Bradycardia     Allergies:   Allergies   Allergen Reactions     Pravastatin      Fuzzy brain.        Code Status: Full Code  Activity level - Baseline/Home:  Independent. Activity Level - Current:   Stand by Assist. Lift room needed: No. Bariatric: No   Needed: No   Isolation: No. Infection: Not Applicable.     Vital Signs:   Vitals:    08/19/20 1400 08/19/20 1415 08/19/20 1430 08/19/20 1445   BP: (!) 148/94 (!) 141/89 (!) 148/88 (!) 146/87   Pulse: (!) 47 (!) 47 (!) 46 (!) 48   Resp: 8 9 8 24   Temp:       TempSrc:       SpO2: 96% 96% 96% 97%       Cardiac Rhythm:  ,      Pain level: 0-10 Pain Scale: 2  Patient confused: No. Patient Falls Risk: Yes.   Elimination Status: Has voided   Patient Report - Initial Complaint:   ED Triage Notes Filed Syncopal episode at home in the kitchen about 0930.  Feeling dizzy prior to the event.  Occurred also last Saturday.   Struck head against the refridgerator laceration to right ear. Bleeding controlled. Unsure last tetanus     . Focused Assessment:   12:08 Cardiac Cardiac - Cardiac WDL: WDL        12:08 Skin Color/Condition Skin - Skin WDL: -WDL except  Skin Integrity: wound  Skin Comment: right ear laceration       12:08 Neurological Cognitive - Cognitive/Neuro/Behavioral WDL: WDL   Eloy Coma Scale - Best Eye Response: 4-->(E4) spontaneous  Best Motor Response: 6-->(M6) obeys commands  Best Verbal Response: 5-->(V5) oriented  Eloy Coma Scale Score: 15      Patient is normally independent but has been a standby assist in the ER due to his 2 recent syncopal events. Patient has laceration to right ear, bleeding was controlled and skin adhesive placed by MD. Patient is bradycardic at baseline and remains in sinus bradycardia during ER visit.  Tests  Performed: labs, imaging Abnormal Results:   Labs Ordered and Resulted from Time of ED Arrival Up to the Time of Departure from the ED   CBC WITH PLATELETS DIFFERENTIAL   BASIC METABOLIC PANEL   TROPONIN I     CTA Head Neck with Contrast   Preliminary Result   IMPRESSION:   1. No definite acute intracranial large vessel occlusion.   2. The stent at the origin of the right vertebral artery remains   patent, without definite high-grade stenosis. There is mild stenosis   of the right vertebral artery at its origin and in its V1 segment,   without high-grade narrowing. The remainder of the visualized cervical   right vertebral artery is widely patent.   3. Chronic left vertebral artery occlusion, unchanged.   4. The bilateral cervical carotid arteries are widely patent.   5. Mild stenosis of the proximal left subclavian artery.   6. No significant major intracranial arterial stenosis.      Head CT w/o contrast   Preliminary Result   IMPRESSION:      1. No evidence of acute intracranial hemorrhage, mass, or herniation.   2. Mild diffuse parenchymal volume loss and white matter changes   likely due to chronic microvascular ischemic disease.            Treatments provided: see MAR  Family Comments: wife at bedside  OBS brochure/video discussed/provided to patient:  N/A  ED Medications:   Medications   iopamidol (ISOVUE-370) solution 500 mL (70 mLs Intravenous Given 8/19/20 1311)   CT scan flush (80 mLs Intravenous Given 8/19/20 1311)     Drips infusing:  No  For the majority of the shift, the patient's behavior Green. Interventions performed were N/A.    Sepsis treatment initiated: No       ED Nurse Name/Phone Number: Gloria Garnica RN,   2:47 PM  RECEIVING UNIT ED HANDOFF REVIEW    Above ED Nurse Handoff Report was reviewed: Yes  Reviewed by: Vicente Pruett RN on August 19, 2020 at 4:38 PM

## 2020-08-19 NOTE — H&P
[facility]  History and Physical Exam     Harpreet Vera MRN# [id number]   YOB: 1950 Age: 70 year old      Date of Admission: 8/19/2020    Primary care provider: Lucía Sandhu          Assessment   Harpreet Vera is a 70 year old male with a PMH significant for sleep apnea compliant with Bipap, hypertension, hypercholesterolemia, radial plexopathy, BPH, previous C. difficile infection, gout, and recent vertebral artery stenosis with complete occlusion of left and high grade stenosis on right s/p right vertebral artery stent (01/2020) who presented to the Emergency Room with two episodes of syncope over the last week.     Work up in the ED reveals: bradycardic as low as 47 bpm, mildly hypertensive, BMP WNL, negative troponin, CBC WNL, CT of head negative for acute pathology, CTA of head and neck shows no definite acute intracranial large vessel occlusion, stent at origin of right vertebral artery remains patent, mild stenosis of right vertebral artery at its origin in its V1 segment, chronic left vertebral artery occlusion unchanged, bilateral cervical carotid arteries are widely patent, and EKG shows  rate of 47 bpm in sinus bradycardia with RBBB, no significant changes from EKG on 6/17/20.     Patient will be registered to Observation for further work-up and evaluation.     #Syncope: episode on 8/15 while standing of acute onset of dizziness with lightheadedness resulting in LOC for only seconds with no confusion with gaining consciousness. Another episode this morning at 9:30 AM with the same symptoms. No other associated cardiac or neurologic symptoms. CT of head negative. CTA reassuring with patent right vertebral artery stent and known occluded left vertebral artery with no new occlusion or stenosis noted. EKG shows bradycardia at 47 bpm, not currently on beta blocker. Recently started Lasix that he takes PRN about every 3 days, took both days he syncopized.  Suspect symptoms may be related to symptomatic bradycardia but need further telemetry monitoring to prove significant bradycardia likely causing symptoms. Lower suspicion Lasix is causing orthostatic hypotension since both episodes occurred while the patient was standing and not with position changes, will check orthostatics and give gentle IVFs in case this is contributing.     #Vertebral artery occlusion: known chronically occluded left vertebral artery, previous 80% stenosis of right vertebral artery s/p right vertebral artery stent in 01/2020 that appears patent on CTA today. Unlikely contributing to presenting syncope. Previously on DAPT with ASA and Plavix, recently stopped Plavix on 7/2/20.     #Bradycardia: reported h/o bradycardia which is documented during hospitalization after spine surgery in 09/2016 when the patient was on Atenolol which has since been stopped. Heart rate in the upper 40-50s today. Previous clinic notes show HR as low as the 60s. No currently on beta blocker.  - Monitor on telemetry  - Consider cardiology consult pending significant bradycardia or pauses >3 seconds  - May need cardiac event monitor at discharge if nothing found on telemetry during stay    #HTN: mildly hypertensive today. Use to be on Chlorthalidone, but this was stopped per rheumatology recommendation since this could be contributing to gout flare ups. Started on PRN Lasix, which patient reports taking about every 3 days.  - continue PTA Losartan  - hold Lasix for now    #BPH: resume PTA Dutasteride    #Chronic gout: following with Rheumatology of recent due to recurrent flares, continue PTA Allopurinol     #MAN: continue nightly Bipap if with patient, otherwise PRN supplemental oxygen while sleeping     #Mild expressive aphasia: reports difficulty with word finding and mispronunciation of words over the last couple months, no prior h/o CVA and no other reported neurologic deficits. Not present on current exam.  Recommend continuing to monitor and follow up with PCP if progressively worsens.         Plan     1. Registered to Observation  2. Continue telemetry monitoring  3. Orthostatic vitals on admission (if not performed already).   4. IV hydration with Normal saline, @ 100 ml/hour  5. Obtain ECHO to evaluate for valvular dysfunction and assess for LV function  6. Consider cardiology consult pending telemetry overnight and ongoing bradycardia     COVID status: testing from 8/19 pending   DVT prophylaxis: pt at low risk, encourage ambulation   Code: Full, discussed with patient   Dispo: likely discharge in 24-48 hours, will admit to observation                 Chief Complaint:   Syncopal episode         History of Present Illness:   Harpreet Vera is a 70 year old male who complains of dizziness and lightheadedness with resulting loss of consciousness.  The patient states that last Saturday he was outside in his garden standing when he had sudden onset of dizziness and lightheadedness and fell backwards onto the dirt resulting in an abrasion to his right leg otherwise no other injuries.  He reports loss of consciousness for only seconds and did not hit his head.  The patient states that this morning at 9:30 AM he was in his kitchen trying to open the fridge where he had the same symptoms and fell backwards when he lost consciousness which resulted in the patient hitting his head and obtaining a right ear laceration.  Again the episode lasted seconds before the patient gained consciousness.  Denies any associated chest pain, shortness of breath, palpitations, nausea, vomiting, diaphoresis, numbness/tingling, slurred speech, or unilateral weakness prior or after either of these episodes.  Denies any associated confusion when he gained consciousness.  He states that neither of the episodes involved the patient changing positions.  The patient states he previously had an episode like this that was worse on Thanksgiving 2019  "where he was disoriented at the time and diagnosed with a TIA as well found to have significant vertebral artery occlusion, uncertain if this was correlated.  The patient reports that he recently started Lasix in the last month due to stopping his chlorthalidone he had been taking for his blood pressure due to his rheumatologist believing this could be causing his gout attacks.  The patient states he only takes his Lasix about every 3 days when he has increased lower extremity swelling, which he did take on Saturday as well as this morning.  He reports drinking plenty of water at home and feels that he is hydrated.  The patient has had previous issues with bradycardia that was thought to be related to being on atenolol, which improved with stopping this. Recently his heart rate has been slowly decreasing and been in the 60s in clinic. No prior history of arrhythmias. Patient does express concern for recent mispronunciation of words and some mild word finding difficulties over the last couple months.     History obtained from discussion with ED provider, chart review, and interview with patient. Wife was present during interview and able to provide supplemental history.          Past Medical History:     Past Medical History:   Diagnosis Date     Arthritis      Benign neoplasm of colon      Brachial plexopathy      Chronic infection 2007    HX of C Diff     Hypertension      Mumps      Sleep apnea     Bi-PAP          Past Surgical History:     Past Surgical History:   Procedure Laterality Date     C NONSPECIFIC PROCEDURE      Right clavicle fracture, Multiple right sided rib fracture, hairline fracture \"pelvis\", secondary to fall from tree         C NONSPECIFIC PROCEDURE      Right ulnar/radial fracture      CHOLECYSTECTOMY       COLONOSCOPY       COLONOSCOPY N/A 4/5/2017    Procedure: COMBINED COLONOSCOPY, SINGLE OR MULTIPLE BIOPSY/POLYPECTOMY BY BIOPSY;  Surgeon: Tylor Rice MD;  Location: [facility]     " DECOMPRESSION, FUSION CERVICAL ANTERIOR ONE LEVEL, COMBINED N/A 9/9/2016    Procedure: COMBINED DECOMPRESSION, FUSION CERVICAL ANTERIOR ONE LEVEL;  Surgeon: Erick Valles MD;  Location: [facility] OR     ENT SURGERY      tonsilectomy  as a child     IR CAROTID CEREBRAL ANGIOGRAM BILATERAL  1/6/2020     IR CAROTID CEREBRAL ANGIOGRAM BILATERAL  7/1/2020     IR DISCONTINUE SHEATH  1/6/2020     ORTHOPEDIC SURGERY      ambar knees scopedrt shoulder,fx rt arm fx     TESTICLE SURGERY       VASECTOMY            Social History:     Social History     Socioeconomic History     Marital status:      Spouse name: Not on file     Number of children: Not on file     Years of education: Not on file     Highest education level: Not on file   Occupational History     Not on file   Social Needs     Financial resource strain: Not on file     Food insecurity     Worry: Not on file     Inability: Not on file     Transportation needs     Medical: Not on file     Non-medical: Not on file   Tobacco Use     Smoking status: Never Smoker     Smokeless tobacco: Never Used   Substance and Sexual Activity     Alcohol use: Yes     Alcohol/week: 0.0 standard drinks     Comment: rarely     Drug use: No     Sexual activity: Yes     Partners: Female   Lifestyle     Physical activity     Days per week: Not on file     Minutes per session: Not on file     Stress: Not on file   Relationships     Social connections     Talks on phone: Not on file     Gets together: Not on file     Attends [employer] service: Not on file     Active member of club or organization: Not on file     Attends meetings of clubs or organizations: Not on file     Relationship status: Not on file     Intimate partner violence     Fear of current or ex partner: Not on file     Emotionally abused: Not on file     Physically abused: Not on file     Forced sexual activity: Not on file   Other Topics Concern     Parent/sibling w/ CABG, MI or angioplasty before 65F 55M? Not Asked    Social History Narrative     Not on file           Family History:   Family history reviewed with patient and noncontributory.          Allergies:      Allergies   Allergen Reactions     Pravastatin      Fuzzy brain.           Medications:     Prior to Admission medications    Medication Sig Last Dose Taking? Auth Provider   allopurinol (ZYLOPRIM) 100 MG tablet Take 2 tablets (200 mg) by mouth daily   Lucía Sandhu MD   aspirin (ASA) 81 MG tablet Take 81 mg by mouth daily   Reported, Patient   clopidogrel (PLAVIX) 75 MG tablet Take 1 tablet (75 mg) by mouth daily   Lucía Sandhu MD   dutasteride (AVODART) 0.5 MG capsule TAKE 1 CAPSULE BY MOUTH ONE TIME DAILY    Hadley Bruno MD   furosemide (LASIX) 20 MG tablet Take 0.5 tablets (10 mg) by mouth daily   Megha Kenney APRN CNP   loratadine (CLARITIN) 10 MG tablet Take 1 tablet (10 mg) by mouth daily as needed for allergies   Lucía Sandhu MD   losartan (COZAAR) 100 MG tablet TAKE 1 TABLET BY MOUTH ONE TIME DAILY   Lucía Sandhu MD   mometasone (NASONEX) 50 MCG/ACT nasal spray Spray 2 sprays into both nostrils daily prn   Lucía Sandhu MD   multivitamin w/minerals (MULTI-VITAMIN) tablet Take 1 tablet by mouth daily   Reported, Patient   Vitamin D, Cholecalciferol, 1000 UNITS CAPS Take 2,000 Units by mouth daily   Lucía Sandhu MD          Review of Systems:   A Comprehensive greater than 10 system review of systems was carried out.  Pertinent positives and negatives are noted above.  Otherwise negative for contributory information.          Physical Exam:   Blood pressure (!) 146/87, pulse (!) 48, temperature 97.8  F (36.6  C), temperature source Oral, resp. rate 24, SpO2 97 %.    GENERAL: healthy, alert and no distress  EYES: Eyes grossly normal to inspection  HENT: ear canals- normal, laceration over right ear that has been repaired with glue; TMs- normal; Nose- normal; Mouth- no ulcers, no lesions. ORAL MUCOSA:  within normal limits, no visible lesions  NECK: no tenderness, no adenopathy, no asymmetry, no masses, no stiffness; thyroid- normal to palpation  RESP: lungs clear to auscultation - no rales, no rhonchi, no wheezes  CV: bradycardia, no murmur, click or rub, no irregular beats, peripheral pulses strong  ABDOMEN: soft, no tenderness, no  hepatosplenomegaly, no masses, normal bowel sounds  MS: extremities- no gross deformities noted, no peripheral edema  SKIN: no suspicious lesions, no rashes  NEURO: Normal strength and tone, sensory exam grossly normal, speech normal, mentation intact and cranial nerves 2-12 intact  PSYCH: Alert and oriented times 3; coherent speech, normal rate and volume, able to articulate logical thoughts, able to abstract reason, no tangential thoughts, no hallucinations or delusions. Affect is normal.         Data:     EKG demonstrates: rate of 47 bpm in sinus bradycardia with RBBB, no significant changes from EKG on 6/17/20.    Results for orders placed or performed during the hospital encounter of 08/19/20   CTA Head Neck with Contrast     Status: None (Preliminary result)    Narrative    CT ANGIOGRAM OF THE HEAD AND NECK WITH CONTRAST  8/19/2020 1:24 PM     HISTORY:  Dizziness, syncope, history of vertebral stenosis with  right-sided stent.    TECHNIQUE:  CT angiography with an injection of 70 mL [id number]-[id number] IV  with scans through the head and neck. Images were transferred to a  separate 3-D workstation where multiplanar reformations and 3-D images  were created. Estimates of carotid stenoses are made relative to the  distal internal carotid artery diameters except as noted. Radiation  dose for this scan was reduced using automated exposure control,  adjustment of the mA and/or kV according to patient size, or iterative  reconstruction technique.      COMPARISON: Fluoroscopic images from cerebral angiogram dated 7/1/2020  and prior, MR angiogram of the brain dated 12/9/2019, MR angiogram  of  the neck dated 12/16/2019.    CT HEAD FINDINGS: No contrast enhancing lesions. Cerebral blood flow  is grossly normal.     CT ANGIOGRAM HEAD FINDINGS: The distal cervical, petrous, cavernous  and supraclinoid segments of both internal carotid arteries are  patent. Mild atherosclerosis of the carotid siphons without  significant stenosis. The major proximal visualized branches of the  middle and anterior cerebral arteries are widely patent.    As before, there is occlusion of the extracranial left vertebral  artery. There is reconstitution of flow into the relatively  hypoplastic left vertebral artery and its major branches including the  left posterior inferior cerebellar artery, likely via retrograde flow  across the vertebrobasilar confluence. The dominant right vertebral  artery is widely patent. The basilar artery is patent, as are its  major branches including the bilateral posterior cerebral arteries. No  aneurysm or high flow vascular malformation identified. The major  dural venous sinuses appear grossly patent. There is a relatively  hypoplastic left transverse sinus. Multiple presumed arachnoid  granulations in the right transverse sinus.    CT ANGIOGRAM NECK FINDINGS:  Mild mixed atherosclerotic disease  involving the aortic arch. There is mild stenosis of the proximal left  subclavian artery, as seen previously. No significant stenosis of the  brachiocephalic or left common carotid artery origins.     Right carotid artery: The right common and internal carotid arteries  are patent. No significant stenosis or atherosclerotic disease in the  carotid artery.     Left carotid artery: The left common and internal carotid arteries are  patent. Minimal atherosclerotic calcification of the left carotid  bifurcation. No significant stenosis. Tortuous internal carotid artery  tonsillar loop.    Vertebral arteries: A stent is again in place at the origin of the  right vertebral artery. The stent appears to be  patent, without  significant in-stent stenosis. Overall, mild residual stenosis of the  right vertebral artery origin/proximal V1 segment, without definite  flow limitation. Otherwise, the dominant right vertebral artery  appears widely patent in its mid to distal cervical aspect. The left  vertebral artery remains occluded from its origin at least to the mid  V2 segment where there is thready segmental reconstitution, likely via  _______ spinal muscular collaterals and/or via retrograde flow across  the vertebrobasilar confluence.    Other findings: ACDF changes at C6-C7, with solid endplate fusion  across the disc space at that level. Mild multilevel degenerative  changes elsewhere in the visualized cervical thoracic spine.       Impression    IMPRESSION:  1. No definite acute intracranial large vessel occlusion.  2. The stent at the origin of the right vertebral artery remains  patent, without definite high-grade stenosis. There is mild stenosis  of the right vertebral artery at its origin and in its V1 segment,  without high-grade narrowing. The remainder of the visualized cervical  right vertebral artery is widely patent.  3. Chronic left vertebral artery occlusion, unchanged.  4. The bilateral cervical carotid arteries are widely patent.  5. Mild stenosis of the proximal left subclavian artery.  6. No significant major intracranial arterial stenosis.   Head CT w/o contrast     Status: None (Preliminary result)    Narrative    CT SCAN OF THE HEAD WITHOUT CONTRAST   8/19/2020 1:23 PM     HISTORY: Dizziness, syncope, head trauma. History vertebral stenosis  with right-sided stent.    TECHNIQUE:  Axial images of the head and coronal reformations without  IV contrast material. Radiation dose for this scan was reduced using  automated exposure control, adjustment of the mA and/or kV according  to patient size, or iterative reconstruction technique.    COMPARISON: MRI brain dated 12/19/2019. MR angiogram of the  brain  dated 12/19/2019.    FINDINGS: There is no evidence of intracranial hemorrhage, mass, acute  infarct or anomaly. The ventricles are normal in size, shape and  configuration. Mild diffuse parenchymal volume loss. Mild patchy  periventricular white matter hypodensities which are nonspecific, but  likely related to chronic microvascular ischemic disease. Scattered  atherosclerotic calcifications involving the carotid siphons  primarily.    The visualized portions of the sinuses and mastoids appear normal. The  bony calvarium and bones of the skull base appear intact.       Impression    IMPRESSION:     1. No evidence of acute intracranial hemorrhage, mass, or herniation.  2. Mild diffuse parenchymal volume loss and white matter changes  likely due to chronic microvascular ischemic disease.    CBC with platelets differential     Status: None   Result Value Ref Range    WBC 6.8 4.0 - 11.0 10e9/L    RBC Count 4.48 4.4 - 5.9 10e12/L    Hemoglobin 14.5 13.3 - 17.7 g/dL    Hematocrit 41.5 40.0 - 53.0 %    MCV 93 78 - 100 fl    MCH 32.4 26.5 - 33.0 pg    MCHC 34.9 31.5 - 36.5 g/dL    RDW 13.4 10.0 - 15.0 %    Platelet Count 178 150 - 450 10e9/L    Diff Method Automated Method     % Neutrophils 65.7 %    % Lymphocytes 20.6 %    % Monocytes 10.0 %    % Eosinophils 2.8 %    % Basophils 0.6 %    % Immature Granulocytes 0.3 %    Nucleated RBCs 0 0 /100    Absolute Neutrophil 4.5 1.6 - 8.3 10e9/L    Absolute Lymphocytes 1.4 0.8 - 5.3 10e9/L    Absolute Monocytes 0.7 0.0 - 1.3 10e9/L    Absolute Eosinophils 0.2 0.0 - 0.7 10e9/L    Absolute Basophils 0.0 0.0 - 0.2 10e9/L    Abs Immature Granulocytes 0.0 0 - 0.4 10e9/L    Absolute Nucleated RBC 0.0    Basic metabolic panel     Status: None   Result Value Ref Range    Sodium 139 133 - 144 mmol/L    Potassium 4.2 3.4 - 5.3 mmol/L    Chloride 107 94 - 109 mmol/L    Carbon Dioxide 27 20 - 32 mmol/L    Anion Gap 5 3 - 14 mmol/L    Glucose 95 70 - 99 mg/dL    Urea Nitrogen 16 7 - 30  mg/dL    Creatinine 1.17 0.66 - 1.25 mg/dL    GFR Estimate 62 >60 mL/min/[1.73_m2]    GFR Estimate If Black 72 >60 mL/min/[1.73_m2]    Calcium 9.1 8.5 - 10.1 mg/dL   Troponin I     Status: None   Result Value Ref Range    Troponin I ES <0.015 0.000 - 0.045 ug/L   EKG 12-lead, tracing only     Status: None   Result Value Ref Range    Interpretation ECG Click View Image link to view waveform and result      Krystle Monaco PA-C

## 2020-08-19 NOTE — PHARMACY-ADMISSION MEDICATION HISTORY
Admission medication history interview status for this patient is complete. See [facility] admission navigator for allergy information, prior to admission medications and immunization status.     Medication history interview done via telephone during Covid-19 pandemic, indicate source(s): Patient  Medication history resources (including written lists, pill bottles, clinic record): Rx refill hx  [facility]  Changes made to PTA medication list:  Added:  Tylenol  Deleted:  Plavix (discontinued 7/2/2020), Claritin, Nasonex  Changed:  Aspirin 81mg daily ---> 325mg daily    Actions taken by pharmacist (provider contacted, etc):None     Additional medication history information:None    Medication reconciliation/reorder completed by provider prior to medication history?  No    For patients on insulin therapy: No      Prior to Admission medications    Medication Sig Last Dose Taking? Auth Provider   acetaminophen (TYLENOL) 500 MG tablet Take 500 mg by mouth as needed for mild pain prn Yes Unknown, Entered By History   allopurinol (ZYLOPRIM) 100 MG tablet Take 200 mg by mouth every evening 8/18/2020 at pm Yes Unknown, Entered By History   aspirin - buffered (ASCRIPTIN) 325 MG TABS tablet Take 325 mg by mouth every evening 8/18/2020 at pm Yes Unknown, Entered By History   dutasteride (AVODART) 0.5 MG capsule Take 0.5 mg by mouth every evening 8/18/2020 at pm Yes Unknown, Entered By History   furosemide (LASIX) 20 MG tablet Take 20 mg by mouth every 3 days as needed 8/19/2020 at am Yes Unknown, Entered By History   losartan (COZAAR) 100 MG tablet Take 100 mg by mouth every evening 8/18/2020 at pm Yes Unknown, Entered By History   multivitamin w/minerals (THERA-VIT-M) tablet Take 1 tablet by mouth every evening 8/18/2020 at pm Yes Unknown, Entered By History   vitamin D3 (CHOLECALCIFEROL) 50 mcg (2000 units) tablet Take 1 tablet by mouth every evening 8/18/2020 at pm Yes Unknown, Entered By History

## 2020-08-20 ENCOUNTER — APPOINTMENT (OUTPATIENT)
Dept: CARDIOLOGY | Facility: CLINIC | Age: 70
End: 2020-08-20
Attending: PHYSICIAN ASSISTANT
Payer: MEDICARE

## 2020-08-20 LAB
SARS-COV-2 RNA SPEC QL NAA+PROBE: NOT DETECTED
SPECIMEN SOURCE: NORMAL

## 2020-08-20 PROCEDURE — G0378 HOSPITAL OBSERVATION PER HR: HCPCS

## 2020-08-20 PROCEDURE — 99204 OFFICE O/P NEW MOD 45 MIN: CPT | Mod: 25 | Performed by: INTERNAL MEDICINE

## 2020-08-20 PROCEDURE — 99207 ZZC CDG-CODE CATEGORY CHANGED: CPT | Performed by: INTERNAL MEDICINE

## 2020-08-20 PROCEDURE — 25000132 ZZH RX MED GY IP 250 OP 250 PS 637: Mod: GY | Performed by: PHYSICIAN ASSISTANT

## 2020-08-20 PROCEDURE — 99225 ZZC SUBSEQUENT OBSERVATION CARE,LEVEL II: CPT | Performed by: INTERNAL MEDICINE

## 2020-08-20 PROCEDURE — 93306 TTE W/DOPPLER COMPLETE: CPT

## 2020-08-20 PROCEDURE — 93306 TTE W/DOPPLER COMPLETE: CPT | Mod: 26 | Performed by: INTERNAL MEDICINE

## 2020-08-20 RX ADMIN — LOSARTAN POTASSIUM 100 MG: 100 TABLET, FILM COATED ORAL at 20:38

## 2020-08-20 RX ADMIN — ALLOPURINOL 200 MG: 100 TABLET ORAL at 19:20

## 2020-08-20 RX ADMIN — DUTASTERIDE 0.5 MG: 0.5 CAPSULE ORAL at 19:20

## 2020-08-20 RX ADMIN — ASPIRIN 325 MG: 325 TABLET, FILM COATED ORAL at 19:20

## 2020-08-20 NOTE — PLAN OF CARE
PRIMARY DIAGNOSIS: SYNCOPE  OUTPATIENT/OBSERVATION GOALS TO BE MET BEFORE DISCHARGE:  1. Orthostatic performed: No. Next shift to perform  2. Diagnostic testing complete & at baseline neurologic testing: Echo scheduled for tomorrow     3. Cleared by consultants (if involved):     4. Interpretation of cardiac rhythm per telemetry tech: Sinus kyle BBB 40's-50's    5. Tolerating adequate PO diet and medications: Yes    6. Return to near baseline physical activity or neurologic status: Yes  Discharge Planner Nurse   Safe discharge environment identified: Yes  Barriers to discharge: tests pending       Entered by: Vicente Pruett 08/19/2020 11:45 PM     Please review provider order for any additional goals.   Nurse to notify provider when observation goals have been met and patient is ready for discharge.      Pt A&Ox4. Up with SBA. Denied feeling lightheaded. Denied pain. Denied nausea. Tolerating diet. Voiding. Received 500cc bolus. CRISTIANO LOPEZ

## 2020-08-20 NOTE — PROGRESS NOTES
PRIMARY DIAGNOSIS: SYNCOPE  OUTPATIENT/OBSERVATION GOALS TO BE MET BEFORE DISCHARGE:  1. Orthostatic performed: No    2. Diagnostic testing complete & at baseline neurologic testing: No    3. Cleared by consultants (if involved): N/A    4. Interpretation of cardiac rhythm per telemetry tech: SB w/1*AVB, BBB (40-50s)    5. Tolerating adequate PO diet and medications: Yes    6. Return to near baseline physical activity or neurologic status: Yes    Discharge Planner Nurse   Safe discharge environment identified: Yes  Barriers to discharge: Yes, test not completed       Entered by: Grey Thompson 08/20/2020 5:41 AM     Please review provider order for any additional goals.   Nurse to notify provider when observation goals have been met and patient is ready for discharge.

## 2020-08-20 NOTE — PROGRESS NOTES
Cardiology consult dictated.  2 episodes of yaya syncope this week.  Episodes of sinus pauses on telemetry but not reaching criteria for pacemaker placement.  Longest episode was 2.9 seconds.  History of obstructive sleep apnea which can be associated with sinus pauses but some of these pauses occurred while the patient was awake.  Very little warning when he gets the syncopal episodes usually preceded by dizziness and lightheadedness.  Has had episodes of dizziness and lightheadedness without syncope also.  Does take furosemide for ankle edema every third day but does not notice that the episodes of dizziness and lightheadedness or syncope occurs on the days he takes Lasix.  Takes Lasix for peripheral edema but the cause of the peripheral edema has not been elucidated and he does not have symptoms of congestive heart failure such as orthopnea PND or dyspnea on exertion.  Does have a history of gout which any diuretic can aggravate and cause.  Does have essential hypertension for which he takes losartan.  Losartan will be unlikely to cause peripheral edema.  Echocardiography shows no structural cardiac reason for syncope and that he has no evidence of aortic stenosis, LV dysfunction or pericardial effusion or hypertrophic cardiomyopathy.  Has history of vertebral artery stenting of the right vertebral artery which was performed in January of this year has a chronically occluded left vertebral artery.  CT angiography of the neck showed that the stent to the right vertebral artery is widely patent.  There is no evidence of subclavian steal and that the subclavian arteries are patent.  I discussed the case with Dr. James Douglas from the EP service.  He agrees that the 2.9-second pauses are somewhat concerning but do not reach criteria for permanent pacemaker placement.  That is not to say of course that the patient is having longer episodes causing syncope.  2 options are available to the patient one is empiric  placement of a permanent pacemaker, the other is 30-day event monitor to determine if there are any prolonged pauses which could be correlated with the syncope and near syncope.  We would also check a stress echocardiogram for ischemia and chronotropic incompetence.  The patient would prefer to go with a more conservative approach rather than the empiric placement of the pacemaker at this stage which I think is a very reasonable approach.  We will also perform orthostatic blood pressure checks when his blood pressure is being checked on each shift.  We will hold the Lasix for the time being.  We will continue to follow.

## 2020-08-20 NOTE — PROVIDER NOTIFICATION
REASON FOR CONTACT: Tele tech reported 3.2 sec pause.  PROVIDER CONTACTED: admitting hospitalist    TIME CONTACTED:now   MODE OF CONTACT: wbt

## 2020-08-20 NOTE — PLAN OF CARE
PRIMARY DIAGNOSIS: SYNCOPE  OUTPATIENT/OBSERVATION GOALS TO BE MET BEFORE DISCHARGE:  1. Orthostatic performed: No    2. Diagnostic testing complete & at baseline neurologic testing: Yes    3. Cleared by consultants (if involved): No    4. Interpretation of cardiac rhythm per telemetry tech: SB/SR with BBB block     5. Tolerating adequate PO diet and medications: Yes    6. Return to near baseline physical activity or neurologic status: Yes    Discharge Planner Nurse   Safe discharge environment identified: Yes  Barriers to discharge: Yes       Entered by: Odessa Garcia 08/20/2020 3:46 PM     Cardiology saw pt this afternoon-stress test to be completed tomorrow and then likely discharge home.  Pt up with SBA. Denies dizziness in room.  Voiding.  Denies pain. Plans for discharge home with spouse tomorrow.

## 2020-08-20 NOTE — UTILIZATION REVIEW
Concurrent stay review; Secondary Review Determination     Under the authority of the Utilization Management Committee, the utilization review process indicated a secondary review on the above patient. The review outcome is based on review of the medical records, discussions with staff, and applying clinical experience noted on the date of the review.     (x) Observation Status Appropriate - Concurrent stay review     RATIONALE FOR DETERMINATION:  70 year old male with a PMH significant for sleep apnea compliant with Bipap, hypertension, hypercholesterolemia, radial plexopathy, BPH, previous C. difficile infection, gout, and recent vertebral artery stenosis with complete occlusion of left and high grade stenosis on right s/p right vertebral artery stent (01/2020) who presented to the Emergency Room with two episodes of syncope over the last week.   The patient was noted to be bradycardic on presentation.  Telemetry monitoring revealed asymptomatic sinus pauses overnight.  TTE has been performed showing normal LV function and a cardiology consult has been placed.    If the patient is felt to be stable to discharge today then continued observation status is appropriate.  However, if continued hospitalization is required beyond today for further evaluation and work up then conversion to inpatient status would be appropriate.      Patient is clinically improving and there is no clear indication to change patient's status to inpatient. The severity of illness, intensity of service provided, expected LOS and risk for adverse outcome make the care appropriate for observation.     The information on this document is developed by the utilization review team in order for the business office to ensure compliance. This only denotes the appropriateness of proper admission status and does not reflect the quality of care rendered.   The definitions of Inpatient Status and Observation Status used in making the determination above  are those provided in the CMS Coverage Manual, Chapter 1 and Chapter 6, section 70.4.     Sincerely,     Marquez Browning MD  Utilization Review   Physician Advisor   [facility].

## 2020-08-20 NOTE — PLAN OF CARE
PRIMARY DIAGNOSIS: SYNCOPE  OUTPATIENT/OBSERVATION GOALS TO BE MET BEFORE DISCHARGE:  1. Orthostatic performed: No    2. Diagnostic testing complete & at baseline neurologic testing: No    3. Cleared by consultants (if involved): No    4. Interpretation of cardiac rhythm per telemetry tech: SB/SR with 1st degree block, BBB    5. Tolerating adequate PO diet and medications: Yes    6. Return to near baseline physical activity or neurologic status: Yes    Discharge Planner Nurse   Safe discharge environment identified: Yes  Barriers to discharge: Yes       Entered by: Odessa Garcia 08/20/2020 1:55 PM       Pt scheduled to have echo later today as well as cardiology consult. Possible discharge later this afternoon if cleared by cardiology. Remains on telemetry.  Denies dizziness when up to bathroom. Denies pain.  Voiding well. Tolerating regular diet. Will continue to monitor.

## 2020-08-20 NOTE — CONSULTS
Consult Date:  08/20/2020      CARDIOLOGY CONSULTATION      REQUESTING PHYSICIAN:  Hospitalist Service.      INDICATION FOR CARDIAC CONSULTATION:  Syncopal episodes.      Dear Doctor:        It is my pleasure to see your patient, Harpreet Vera.  He is a very pleasant 70-year-old patient who [facility] was having episodes of dizziness and lightheadedness.  As part of his investigation, he was found to have a left vertebral artery occlusion and high-grade right vertebral artery stenosis and he underwent stenting of the vertebral artery.  The patient was also having episodes of bradycardia and atenolol was stopped; this has been stopped for the last few months.  However, he has been having episodes of dizziness, which come on suddenly.  However, on Sunday, he was out in his garden.  He had a sudden episode of lightheadedness and dizziness and then a second later he was on the ground and had fallen and lost consciousness.  He had an abrasion on his right arm from falling.  He feels that he was well hydrated when he went out to his yard.  Then he had an episode yesterday where he was standing, drinking a glass of water, the dizziness and lightheadedness came on.  He had enough warning that he was able to put the glass down and then he lost consciousness completely and lacerated his ear.  The patient does take Lasix for peripheral edema, the cause of which has not been elucidated.  He also has a history of gout.  He was previously on chlorthalidone, which was switched then to Lasix.  He does have a history of essential hypertension, for which the chlorthalidone was initially treating, but then was switched to losartan and then Lasix was added in, which he takes every third day for swelling of the ankles.  He has not noticed that on the days he takes Lasix that he has syncopal episodes.  Also, he does not notice that he is more dizzy on the days that he takes Lasix.  He has been on telemetry here while in hospital,  and he is having 2.9 second pauses.  These are occurring when he is awake as well.  The pauses are from the sinus node.  He has normal WV interval and QRS and then there is a prolongation of the P:P interval with nonconduction of the P-wave and then the rhythm then starts back up again.  He has never had one longer than 2.9 seconds.  We are not sure if the episodes of dizziness and lightheadedness occurred during those episodes.  He has had some episodes of dizziness and lightheadedness.  Orthostatic blood pressures have not been checked here.  Echocardiogram was performed today and this shows normal left ventricular systolic function.  There was no evidence of aortic stenosis.  His pulmonary pressures appear to be normal.  The ascending aorta is mildly dilated at 4.0 cm, as is the aortic root at 3.9 cm.  This patient did have Holter monitoring in 12/2019.  This did not show any significant conduction system disease.  He had supraventricular ectopics, 158 in total and 1 PVC.  No pauses were noted.  His average heart rate was 61 beats per minute.        He does tell me that he does get slowish heartbeats and sometimes he is in the 40s.  He does not particularly associate that with the dizziness or lightheadedness.  He has no chest pains or chest pressure or arm, throat or jaw discomfort.  He does have peripheral edema, but he has no orthopnea or PND.  Nobody has told him as to why he gets peripheral edema.      PAST MEDICAL HISTORY:     1.  Arthritis.     2.  Benign neoplasm of the colon.     3.  C. diff infection.     4.  Brachial plexopathy.     5.  Mumps.     6.  Essential hypertension.     7.  Sleep apnea.     8.  Right infraclavicular fracture and a hairline fracture of the pelvis secondary to fall from a tree.   9.  Right ulnar, radial fracture.   10.  Cholecystectomy.   11.  Colonoscopy.   12.  Decompression and fusion of cervical spine anteriorly.   13.  Vasectomy.   14.  Orthopedic surgery to both knees with  "scopes, a scope of the right shoulder, fracture of the right arm.   15.  Stenting of the right vertebral artery with occlusion of the left.  CT angiography showing that the stent at the origin of the right vertebral artery is patent without high-grade stenosis and mild stenosis of the right vertebral artery origin and in the V1 segment without high-grade narrowing.  Chronic left vertebral artery occlusion as mentioned above.  Mild stenosis of the proximal left subclavian artery.      FAMILY HISTORY:  No family history of coronary artery disease.      SOCIAL HISTORY:  He is .  Does not smoke.  Drinks occasionally.      MEDICATIONS:  Allopurinol 200 mg every evening, aspirin 325 mg per day, Avodart 0.5 mg orally every evening, losartan 100 mg every evening.      ALLERGIES:  PRAVASTATIN CAUSES \"FUZZY BRAIN.\"      REVIEW OF SYSTEMS:   CONSTITUTIONAL:  Negative.   EYES:  Negative.   ENT:  Negative.   CARDIOVASCULAR:  As above.   RESPIRATORY:  Nil.   GASTROINTESTINAL:  Nil.   GENITOURINARY:  Nil.   MUSCULOSKELETAL:  Nil.   NEUROLOGICAL:  As above.   PSYCHIATRIC:  Nil.   ENDOCRINE:  Nil.   HEMATOLYMPHATIC:  Nil.   ALLERGY AND IMMUNOLOGY:  As above.      PHYSICAL EXAMINATION:   GENERAL:  He is a pleasant man in no apparent distress.   VITAL SIGNS:  Blood pressure is 134/74, his pulse is 54 beats per minute, respirations are 16, temperature is 98.6, O2 sats are 96%.   HEENT:  Unremarkable.   NECK:  His jugular venous pulse does not appear to be raised.  His carotids are normal with no bruits.  His trachea is not deviated.  Thyroid gland is not enlarged.   HEART:  Murray beat is not displaced.  Heart sounds 1 and 2 are normal with no murmurs.   CHEST:  Clear to percussion and auscultation with no added sounds.   ABDOMEN:  Unremarkable with no organomegaly, no masses or bruits.   EXTREMITIES:  His pedal pulses are 2+.  No peripheral edema noted today.   SKIN:  No skin lesions noted.   NEUROLOGIC:  He moves all 4 limbs " appropriately with no obvious sensory or motor loss.  He is fully oriented to time, place and person with a normal affect.      LABORATORY INVESTIGATIONS:  Sodium is 139, potassium is 4.2, BUN is 16, creatinine is 1.17.  GFR 62.  LDL cholesterol is 106, HDL is 33, total cholesterol is 178.  Troponin less than 0.015.  Hemoglobin 14.5.  White cell count 6.8, platelet count 178,000.  COVID-19 negative.      12-lead electrocardiogram:  Sinus bradycardia with a right bundle branch block.      IMPRESSION:   1.  Syncopal episodes.  The presence of 2.9 second pauses and episodes of sinus bradycardia does indicate that the patient may have SA node disease (sick sinus syndrome).  However, diuretics and in particular loop diuretics can cause dehydration, which can cause syncope or near-syncope.  However, the patient appears to have adequate hydration on close questioning and he does not have higher incidence of the syncopal episodes on the days that he is taking the Lasix, which is every third day.  So, I think the possibility of dehydration is significantly lower on the list.  He does not reach the criteria for permanent pacemaker placement given that he has not reached the 3.0 second, which would be an indication for permanent pacemaker, but that is not to say that he is not having episodes, especially when he has syncope where the pauses are significantly longer.  It does not appear to be related to his vertebral arteries, as the right vertebral artery stent is widely patent and the occluded left vertebral artery is chronic.  Even stopping his beta blockers, he tends to run in the slower heart rates, indicating sinus node disease.   2.  Peripheral edema.  The patient has been treated with diuretics for peripheral edema, but the cause of the peripheral edema has never been elucidated.  There is nothing to suggest congestive heart failure in this patient and one wonders did he have venous insufficiency.   3.  His symptoms do  not appear to be orthostatic.  He does not tend to get dizzy or lightheaded when he goes from a sitting to a standing position.  We have not checked orthostatic pressures here.  He has no evidence of a pericardial effusion, reduced left ventricular systolic function or aortic stenosis to suggest structural heart disease issue causing his syncope.      PLAN:  I discussed the case at length with my EP partner, Dr. James Douglas.  He reiterated the fact that the patient has not fully reach criteria for permanent pacemaker placement, which is 3.0 seconds and he does not seem to have long pauses that would cause a syncopal episode, but he is in the gray zone and is borderline close to that threshold.  His opinion was if the patient was interested, a pacemaker could be placed, but he does not fulfill full criteria.  The issue, of course, is that a permanent pacemaker could be placed and syncopal episodes could continue and then he would have a permanent pacemaker placed for no good reason.  However, this also might clear up the issue since he does clearly have some degree of SA node disease, but it is not clear whether this is causing his symptoms; so I posed 2 options:  One was placement of the permanent pacemaker empirically.  The other was to perform further investigation such as further monitoring for the next 24 hours and if nothing is found, placement of an event monitor for 30 days.  I would also perform a stress echocardiogram for 2 reasons:  One to determine if there is any evidence of ischemia since right coronary artery disease can affect the SA node and AV node and also to see if there is evidence of chronotropic incompetence.  Finally, as an outpatient, he should have venous competency tests done to see if that is the cause of his bilateral lower extremity swelling.  Finally, I would stop the Lasix medication to remove this variable as a possible cause for his fainting, though I do put that lower on the list.   The patient agreed with the more conservative approach rather than going ahead with permanent pacemaker placement.      It has been my pleasure to be involved in the care of this nice patient.         NATI STRANGE MD, [facility]             D: 2020   T: 2020   MT: ALEXANDER      Name:     ALVAREZ JESUS   MRN:      [id number]        Account:       [id number]   :      1950           Consult Date:  2020      Document: [id number]       cc: Lucía Sandhu MD

## 2020-08-20 NOTE — PROGRESS NOTES
PRIMARY DIAGNOSIS: SYNCOPE  OUTPATIENT/OBSERVATION GOALS TO BE MET BEFORE DISCHARGE:  1. Orthostatic performed: Yes, no change     2. Diagnostic testing complete & at baseline neurologic testing: No     3. Cleared by consultants (if involved): N/A     4. Interpretation of cardiac rhythm per telemetry tech: SB w/1*AVB, BBB (40-50s)     5. Tolerating adequate PO diet and medications: Yes     6. Return to near baseline physical activity or neurologic status: Yes        Discharge Planner Nurse      Safe discharge environment identified: Yes  Barriers to discharge: Yes, tests not completed       Entered by: Grey Thompson 08/20/2020 5:41 AM     Please review provider order for any additional goals.   Nurse to notify provider when observation goals have been met and patient is ready for discharge.

## 2020-08-20 NOTE — PROVIDER NOTIFICATION
Telemetry tech reported 2.9sec pause this morning. BP stable with HR low 50's. Pt asymptomatic, was asleep upon entering room. Page sent to Dr. Piper to notify.

## 2020-08-21 ENCOUNTER — HOSPITAL ENCOUNTER (INPATIENT)
Facility: CLINIC | Age: 70
LOS: 1 days | Discharge: HOME | DRG: 244 | End: 2020-08-22
Attending: HOSPITALIST | Admitting: HOSPITALIST
Payer: MEDICARE

## 2020-08-21 VITALS
TEMPERATURE: 97.9 F | HEART RATE: 49 BPM | SYSTOLIC BLOOD PRESSURE: 135 MMHG | HEIGHT: 67 IN | RESPIRATION RATE: 11 BRPM | DIASTOLIC BLOOD PRESSURE: 87 MMHG | BODY MASS INDEX: 29.63 KG/M2 | WEIGHT: 188.8 LBS | OXYGEN SATURATION: 98 %

## 2020-08-21 DIAGNOSIS — R00.1 BRADYCARDIA, SEVERE SINUS: Primary | ICD-10-CM

## 2020-08-21 LAB
ANION GAP SERPL CALCULATED.3IONS-SCNC: 4 MMOL/L (ref 3–14)
B-HCG SERPL-ACNC: <1 IU/L
BUN SERPL-MCNC: 16 MG/DL (ref 7–30)
CALCIUM SERPL-MCNC: 8.8 MG/DL (ref 8.5–10.1)
CHLORIDE SERPL-SCNC: 110 MMOL/L (ref 94–109)
CO2 SERPL-SCNC: 26 MMOL/L (ref 20–32)
CREAT SERPL-MCNC: 1.18 MG/DL (ref 0.66–1.25)
ERYTHROCYTE [DISTWIDTH] IN BLOOD BY AUTOMATED COUNT: 13.6 % (ref 10–15)
GFR SERPL CREATININE-BSD FRML MDRD: 61 ML/MIN/{1.73_M2}
GLUCOSE BLDC GLUCOMTR-MCNC: 112 MG/DL (ref 70–99)
GLUCOSE SERPL-MCNC: 111 MG/DL (ref 70–99)
HCT VFR BLD AUTO: 39.8 % (ref 40–53)
HGB BLD-MCNC: 14.2 G/DL (ref 13.3–17.7)
INR PPP: 1.09 (ref 0.86–1.14)
MAGNESIUM SERPL-MCNC: 2.1 MG/DL (ref 1.6–2.3)
MCH RBC QN AUTO: 32.4 PG (ref 26.5–33)
MCHC RBC AUTO-ENTMCNC: 35.7 G/DL (ref 31.5–36.5)
MCV RBC AUTO: 91 FL (ref 78–100)
PLATELET # BLD AUTO: 174 10E9/L (ref 150–450)
POTASSIUM SERPL-SCNC: 4.1 MMOL/L (ref 3.4–5.3)
RBC # BLD AUTO: 4.38 10E12/L (ref 4.4–5.9)
SODIUM SERPL-SCNC: 139 MMOL/L (ref 133–144)
TSH SERPL DL<=0.005 MIU/L-ACNC: 3.26 MU/L (ref 0.4–4)
WBC # BLD AUTO: 7.1 10E9/L (ref 4–11)

## 2020-08-21 PROCEDURE — G0378 HOSPITAL OBSERVATION PER HR: HCPCS

## 2020-08-21 PROCEDURE — 84443 ASSAY THYROID STIM HORMONE: CPT | Performed by: INTERNAL MEDICINE

## 2020-08-21 PROCEDURE — 84702 CHORIONIC GONADOTROPIN TEST: CPT | Performed by: NURSE PRACTITIONER

## 2020-08-21 PROCEDURE — 83735 ASSAY OF MAGNESIUM: CPT | Performed by: INTERNAL MEDICINE

## 2020-08-21 PROCEDURE — 99233 SBSQ HOSP IP/OBS HIGH 50: CPT | Mod: 25 | Performed by: INTERNAL MEDICINE

## 2020-08-21 PROCEDURE — 99223 1ST HOSP IP/OBS HIGH 75: CPT | Mod: AI | Performed by: HOSPITALIST

## 2020-08-21 PROCEDURE — 33208 INSRT HEART PM ATRIAL & VENT: CPT | Mod: KX | Performed by: INTERNAL MEDICINE

## 2020-08-21 PROCEDURE — 80048 BASIC METABOLIC PNL TOTAL CA: CPT | Performed by: INTERNAL MEDICINE

## 2020-08-21 PROCEDURE — 21000001 ZZH R&B HEART CARE

## 2020-08-21 PROCEDURE — 25000128 H RX IP 250 OP 636: Performed by: INTERNAL MEDICINE

## 2020-08-21 PROCEDURE — 25000125 ZZHC RX 250: Performed by: INTERNAL MEDICINE

## 2020-08-21 PROCEDURE — 99291 CRITICAL CARE FIRST HOUR: CPT | Performed by: INTERNAL MEDICINE

## 2020-08-21 PROCEDURE — 99207 ZZC APP CREDIT; MD BILLING SHARED VISIT: CPT | Performed by: INTERNAL MEDICINE

## 2020-08-21 PROCEDURE — C1898 LEAD, PMKR, OTHER THAN TRANS: HCPCS | Performed by: INTERNAL MEDICINE

## 2020-08-21 PROCEDURE — 36415 COLL VENOUS BLD VENIPUNCTURE: CPT | Performed by: INTERNAL MEDICINE

## 2020-08-21 PROCEDURE — C1785 PMKR, DUAL, RATE-RESP: HCPCS | Performed by: INTERNAL MEDICINE

## 2020-08-21 PROCEDURE — 99153 MOD SED SAME PHYS/QHP EA: CPT | Performed by: INTERNAL MEDICINE

## 2020-08-21 PROCEDURE — 85610 PROTHROMBIN TIME: CPT | Performed by: NURSE PRACTITIONER

## 2020-08-21 PROCEDURE — 85027 COMPLETE CBC AUTOMATED: CPT | Performed by: NURSE PRACTITIONER

## 2020-08-21 PROCEDURE — 25000132 ZZH RX MED GY IP 250 OP 250 PS 637: Mod: GY | Performed by: HOSPITALIST

## 2020-08-21 PROCEDURE — C1894 INTRO/SHEATH, NON-LASER: HCPCS | Performed by: INTERNAL MEDICINE

## 2020-08-21 PROCEDURE — 02HK3JZ INSERTION OF PACEMAKER LEAD INTO RIGHT VENTRICLE, PERCUTANEOUS APPROACH: ICD-10-PCS | Performed by: INTERNAL MEDICINE

## 2020-08-21 PROCEDURE — 36415 COLL VENOUS BLD VENIPUNCTURE: CPT | Performed by: NURSE PRACTITIONER

## 2020-08-21 PROCEDURE — 27210794 ZZH OR GENERAL SUPPLY STERILE: Performed by: INTERNAL MEDICINE

## 2020-08-21 PROCEDURE — 00000146 ZZHCL STATISTIC GLUCOSE BY METER IP

## 2020-08-21 PROCEDURE — 0JH606Z INSERTION OF PACEMAKER, DUAL CHAMBER INTO CHEST SUBCUTANEOUS TISSUE AND FASCIA, OPEN APPROACH: ICD-10-PCS | Performed by: INTERNAL MEDICINE

## 2020-08-21 PROCEDURE — 99152 MOD SED SAME PHYS/QHP 5/>YRS: CPT | Performed by: INTERNAL MEDICINE

## 2020-08-21 PROCEDURE — 02H63JZ INSERTION OF PACEMAKER LEAD INTO RIGHT ATRIUM, PERCUTANEOUS APPROACH: ICD-10-PCS | Performed by: INTERNAL MEDICINE

## 2020-08-21 PROCEDURE — 25800029 ZZH RX IP 258 OP 250: Performed by: NURSE PRACTITIONER

## 2020-08-21 DEVICE — PCMKR CARD ACCOLADE MRI EL DR: Type: IMPLANTABLE DEVICE | Status: FUNCTIONAL

## 2020-08-21 DEVICE — LEAD INGEVITY+ AF IS1 7840 4CM: Type: IMPLANTABLE DEVICE | Status: FUNCTIONAL

## 2020-08-21 DEVICE — LEAD INGEVITY+ AF IS1 7841 52CM: Type: IMPLANTABLE DEVICE | Status: FUNCTIONAL

## 2020-08-21 RX ORDER — ACETAMINOPHEN 325 MG/1
650 TABLET ORAL EVERY 4 HOURS PRN
Status: DISCONTINUED | OUTPATIENT
Start: 2020-08-21 | End: 2020-08-22 | Stop reason: HOSPADM

## 2020-08-21 RX ORDER — SODIUM CHLORIDE 450 MG/100ML
INJECTION, SOLUTION INTRAVENOUS CONTINUOUS
Status: DISCONTINUED | OUTPATIENT
Start: 2020-08-21 | End: 2020-08-21 | Stop reason: HOSPADM

## 2020-08-21 RX ORDER — OXYCODONE AND ACETAMINOPHEN 5; 325 MG/1; MG/1
1 TABLET ORAL EVERY 4 HOURS PRN
Status: DISCONTINUED | OUTPATIENT
Start: 2020-08-21 | End: 2020-08-22 | Stop reason: HOSPADM

## 2020-08-21 RX ORDER — CEFAZOLIN SODIUM 2 G/100ML
2 INJECTION, SOLUTION INTRAVENOUS
Status: DISCONTINUED | OUTPATIENT
Start: 2020-08-21 | End: 2020-08-21 | Stop reason: HOSPADM

## 2020-08-21 RX ORDER — NALOXONE HYDROCHLORIDE 0.4 MG/ML
.1-.4 INJECTION, SOLUTION INTRAMUSCULAR; INTRAVENOUS; SUBCUTANEOUS
Status: DISCONTINUED | OUTPATIENT
Start: 2020-08-21 | End: 2020-08-21

## 2020-08-21 RX ORDER — ALLOPURINOL 100 MG/1
200 TABLET ORAL EVERY EVENING
Status: DISCONTINUED | OUTPATIENT
Start: 2020-08-21 | End: 2020-08-22 | Stop reason: HOSPADM

## 2020-08-21 RX ORDER — LIDOCAINE 40 MG/G
CREAM TOPICAL
Status: DISCONTINUED | OUTPATIENT
Start: 2020-08-21 | End: 2020-08-21

## 2020-08-21 RX ORDER — NITROGLYCERIN 0.4 MG/1
0.4 TABLET SUBLINGUAL EVERY 5 MIN PRN
Status: DISCONTINUED | OUTPATIENT
Start: 2020-08-21 | End: 2020-08-21 | Stop reason: HOSPADM

## 2020-08-21 RX ORDER — NALOXONE HYDROCHLORIDE 0.4 MG/ML
.1-.4 INJECTION, SOLUTION INTRAMUSCULAR; INTRAVENOUS; SUBCUTANEOUS
Status: DISCONTINUED | OUTPATIENT
Start: 2020-08-21 | End: 2020-08-21 | Stop reason: HOSPADM

## 2020-08-21 RX ORDER — NITROGLYCERIN 0.4 MG/1
0.4 TABLET SUBLINGUAL EVERY 5 MIN PRN
Status: DISCONTINUED | OUTPATIENT
Start: 2020-08-21 | End: 2020-08-22

## 2020-08-21 RX ORDER — AMOXICILLIN 250 MG
1 CAPSULE ORAL 2 TIMES DAILY PRN
Status: DISCONTINUED | OUTPATIENT
Start: 2020-08-21 | End: 2020-08-22 | Stop reason: HOSPADM

## 2020-08-21 RX ORDER — FENTANYL CITRATE 50 UG/ML
INJECTION, SOLUTION INTRAMUSCULAR; INTRAVENOUS
Status: DISCONTINUED | OUTPATIENT
Start: 2020-08-21 | End: 2020-08-21 | Stop reason: HOSPADM

## 2020-08-21 RX ORDER — ASPIRIN 325 MG/1
325 TABLET, FILM COATED ORAL EVERY EVENING
Status: DISCONTINUED | OUTPATIENT
Start: 2020-08-21 | End: 2020-08-22 | Stop reason: HOSPADM

## 2020-08-21 RX ORDER — AMOXICILLIN 250 MG
2 CAPSULE ORAL 2 TIMES DAILY PRN
Status: DISCONTINUED | OUTPATIENT
Start: 2020-08-21 | End: 2020-08-22 | Stop reason: HOSPADM

## 2020-08-21 RX ORDER — DUTASTERIDE 0.5 MG/1
0.5 CAPSULE, LIQUID FILLED ORAL EVERY EVENING
Status: DISCONTINUED | OUTPATIENT
Start: 2020-08-21 | End: 2020-08-22 | Stop reason: HOSPADM

## 2020-08-21 RX ORDER — LIDOCAINE 40 MG/G
CREAM TOPICAL
Status: DISCONTINUED | OUTPATIENT
Start: 2020-08-21 | End: 2020-08-21 | Stop reason: HOSPADM

## 2020-08-21 RX ORDER — LOSARTAN POTASSIUM 100 MG/1
100 TABLET ORAL EVERY EVENING
Status: DISCONTINUED | OUTPATIENT
Start: 2020-08-21 | End: 2020-08-22 | Stop reason: HOSPADM

## 2020-08-21 RX ORDER — BUPIVACAINE HYDROCHLORIDE 2.5 MG/ML
INJECTION, SOLUTION EPIDURAL; INFILTRATION; INTRACAUDAL
Status: DISCONTINUED | OUTPATIENT
Start: 2020-08-21 | End: 2020-08-21 | Stop reason: HOSPADM

## 2020-08-21 RX ORDER — MULTIPLE VITAMINS W/ MINERALS TAB 9MG-400MCG
1 TAB ORAL EVERY EVENING
Status: DISCONTINUED | OUTPATIENT
Start: 2020-08-21 | End: 2020-08-22 | Stop reason: HOSPADM

## 2020-08-21 RX ORDER — ATROPINE SULFATE 0.1 MG/ML
1 INJECTION INTRAVENOUS
Status: DISCONTINUED | OUTPATIENT
Start: 2020-08-21 | End: 2020-08-21 | Stop reason: HOSPADM

## 2020-08-21 RX ORDER — NALOXONE HYDROCHLORIDE 0.4 MG/ML
.1-.4 INJECTION, SOLUTION INTRAMUSCULAR; INTRAVENOUS; SUBCUTANEOUS
Status: DISCONTINUED | OUTPATIENT
Start: 2020-08-21 | End: 2020-08-22 | Stop reason: HOSPADM

## 2020-08-21 RX ORDER — ONDANSETRON 2 MG/ML
4 INJECTION INTRAMUSCULAR; INTRAVENOUS EVERY 6 HOURS PRN
Status: DISCONTINUED | OUTPATIENT
Start: 2020-08-21 | End: 2020-08-22 | Stop reason: HOSPADM

## 2020-08-21 RX ORDER — ONDANSETRON 4 MG/1
4 TABLET, ORALLY DISINTEGRATING ORAL EVERY 6 HOURS PRN
Status: DISCONTINUED | OUTPATIENT
Start: 2020-08-21 | End: 2020-08-22 | Stop reason: HOSPADM

## 2020-08-21 RX ADMIN — MULTIPLE VITAMINS W/ MINERALS TAB 1 TABLET: TAB at 20:34

## 2020-08-21 RX ADMIN — ALLOPURINOL 200 MG: 100 TABLET ORAL at 20:33

## 2020-08-21 RX ADMIN — LOSARTAN POTASSIUM 100 MG: 100 TABLET, FILM COATED ORAL at 20:34

## 2020-08-21 RX ADMIN — SODIUM CHLORIDE: 4.5 INJECTION, SOLUTION INTRAVENOUS at 15:38

## 2020-08-21 RX ADMIN — DUTASTERIDE 0.5 MG: 0.5 CAPSULE, LIQUID FILLED ORAL at 20:34

## 2020-08-21 RX ADMIN — ASPIRIN 325 MG: 325 TABLET, FILM COATED ORAL at 20:33

## 2020-08-21 RX ADMIN — ACETAMINOPHEN 650 MG: 325 TABLET ORAL at 20:41

## 2020-08-21 ASSESSMENT — MIFFLIN-ST. JEOR: SCORE: 1565.02

## 2020-08-21 NOTE — H&P
[facility]    History and Physical - Hospitalist Service       Date of Admission:  8/21/2020    Assessment & Plan   Harpreet Vera is a 70 year old male admitted to [facility] on 8/19 and transferred to [facility] on 8/21/2020. He has a PMH of MAN on BiPAP therapy, hypertension, hyper lipidemia, recent vertebral artery stenosis with complete occlusion of left and high-grade stenosis on the right status post stent placement in January 2020 who presented with syncope and was found to have significant sinus pauses. Transferred from OSH for PPM placement.     Syncope  Symptomatic bradycardia  Sick sinus syndrome  - consults to cardiology and EP  - monitor on telemetry  - [facility] status  - NPO for pacemaker placement today - discussed with cardiology     Vertebral artery occlusion  Known chronically occluded left vertebral artery, previous 80% stenosis of right vertebral artery s/p right vertebral artery stent in 01/2020 that appears patent on CTA. Previously on DAPT with ASA and Plavix, recently stopped Plavix on 7/2/20.     CKD3  Not mentioned in PMH, but GFR decreased. Stable over several years.  - avoid nephrotoxic medications.    HTN  Patient takes Lasix every few days to control blood pressure and volume status.  - Continue losartan; holding Lasix.     BPH  - Continue dutasteride     Gout  - Continue allopurinol     MAN  - Continue BiPAP at night     COVID 19 negative     Diet: NPO per Anesthesia Guidelines for Procedure/Surgery Except for: Meds    DVT Prophylaxis: Pneumatic Compression Devices  Reis Catheter: not present  Code Status: Full Code           Disposition Plan   Expected discharge: 2 - 3 days, recommended to prior living arrangement once pacemaker placed and cardiology cleared for discharge.  Entered: Zoraida Sewell MD 08/21/2020, 12:48 PM     The patient's care was discussed with the Bedside Nurse and Patient.    Zoraida Sewell MD  [facility]  Hospital    ______________________________________________________________________    Chief Complaint   Syncope    History is obtained from the patient and EMR.    History of Present Illness   Harpreet Vera is a 70 year old male who with MAN, hypertension, hyperlipidemia, recent vertebral artery stenosis with complete occlusion of left and high-grade stenosis on the right status post stent placement in January 2020 who presented to OSH with syncope and was noted in the hospital to have significant sinus pauses. He was transferred to [place] today for pacemaker placement.    From OSH note: Patient mentioned 2 episodes of syncope with preceding lightheadedness over the past week.  He had not been having any chest pain or palpitations prior to that.  EKG on admission here showed sinus bradycardia with rates in the high 40s.  CTA of the head and neck showed a patent vertebral artery stent.  After admission, he had pauses on the monitor up to 2.9 seconds. Cardiology was consulted. At that point, pauses did not meet criteria for pacemaker placement.  Cardiogram showed normal LVEF, normal left ventricular filling pressures.  Patient was kept in the hospital overnight with plans for stress echocardiogram.  However, overnight patient developed much longer pauses, now at 7, 9 and 11 seconds.  It became evident that these pauses were the cause for his syncope.  Pacemaker placement on urgent basis therefore indicated, and patient was transferred from this hospital to [facility], for EP consult and anticipated pacemaker placement today.    On arrival here, pt denies CP, SOB, any acute discomfort. No dizziness. He has been NPO all day. Wife is at bedside. Cardiology team did confirm that procedure is planned for today and pt and wife updated.    Review of Systems    The 10 point Review of Systems is negative other than noted in the HPI or here.     Past Medical History    I have reviewed this patient's medical  "history and updated it with pertinent information if needed.   Past Medical History:   Diagnosis Date     Arthritis      Benign neoplasm of colon      Brachial plexopathy      Chronic infection 2007    HX of C Diff     Hypertension      Mumps      Sleep apnea     Bi-PAP       Past Surgical History   I have reviewed this patient's surgical history and updated it with pertinent information if needed.  Past Surgical History:   Procedure Laterality Date     C NONSPECIFIC PROCEDURE      Right clavicle fracture, Multiple right sided rib fracture, hairline fracture \"pelvis\", secondary to fall from tree         C NONSPECIFIC PROCEDURE      Right ulnar/radial fracture      CHOLECYSTECTOMY       COLONOSCOPY       COLONOSCOPY N/A 4/5/2017    Procedure: COMBINED COLONOSCOPY, SINGLE OR MULTIPLE BIOPSY/POLYPECTOMY BY BIOPSY;  Surgeon: Tylor Rice MD;  Location:  GI     DECOMPRESSION, FUSION CERVICAL ANTERIOR ONE LEVEL, COMBINED N/A 9/9/2016    Procedure: COMBINED DECOMPRESSION, FUSION CERVICAL ANTERIOR ONE LEVEL;  Surgeon: Erick Valles MD;  Location: [facility] OR     ENT SURGERY      tonsilectomy  as a child     IR CAROTID CEREBRAL ANGIOGRAM BILATERAL  1/6/2020     IR CAROTID CEREBRAL ANGIOGRAM BILATERAL  7/1/2020     IR DISCONTINUE SHEATH  1/6/2020     ORTHOPEDIC SURGERY      ambar knees scopedrt shoulder,fx rt arm fx     TESTICLE SURGERY       VASECTOMY         Social History   I have reviewed this patient's social history and updated it with pertinent information if needed.  Social History     Tobacco Use     Smoking status: Never Smoker     Smokeless tobacco: Never Used   Substance Use Topics     Alcohol use: Yes     Alcohol/week: 0.0 standard drinks     Comment: rarely     Drug use: No       Family History   I have reviewed this patient's family history and updated it with pertinent information if needed.  Family History   Problem Relation Age of Onset     Arthritis Mother      Eye Disorder Mother         " cateracts     Lipids Mother      Cancer Father         colon/prostate     Eye Disorder Father         cateracts     Lipids Father      Diabetes Father      Cancer Maternal Grandfather         colon     Cancer Paternal Grandmother         colon     Cancer Maternal Aunt         colon     Cancer Maternal Uncle         colon     Prostate Cancer Brother      Colon Cancer Brother 74        liver mets     Prostate Cancer Brother        Prior to Admission Medications   Prior to Admission Medications   Prescriptions Last Dose Informant Patient Reported? Taking?   acetaminophen (TYLENOL) 500 MG tablet   Yes No   Sig: Take 500 mg by mouth as needed for mild pain   allopurinol (ZYLOPRIM) 100 MG tablet   Yes No   Sig: Take 200 mg by mouth every evening   aspirin - buffered (ASCRIPTIN) 325 MG TABS tablet   Yes No   Sig: Take 325 mg by mouth every evening   dutasteride (AVODART) 0.5 MG capsule   Yes No   Sig: Take 0.5 mg by mouth every evening   furosemide (LASIX) 20 MG tablet   Yes No   Sig: Take 20 mg by mouth every 3 days as needed   losartan (COZAAR) 100 MG tablet   Yes No   Sig: Take 100 mg by mouth every evening   multivitamin w/minerals (THERA-VIT-M) tablet   Yes No   Sig: Take 1 tablet by mouth every evening   vitamin D3 (CHOLECALCIFEROL) 50 mcg (2000 units) tablet   Yes No   Sig: Take 1 tablet by mouth every evening      Facility-Administered Medications: None     Allergies   Allergies   Allergen Reactions     Pravastatin      Fuzzy brain.        Physical Exam   Vital Signs: Temp: 98  F (36.7  C) Temp src: Oral BP: (!) 149/88 Pulse: 50   Resp: 18 SpO2: 96 % O2 Device: None (Room air)    Weight: 0 lbs 0 oz    General Appearance: awake, alert, oriented, NAD  Eyes: PERLLA, EOMI  HEENT: neck supple  Respiratory: CTAB  Cardiovascular: regular, kyle  GI: soft, NTND  Skin: no major rashes or lesions; warm, well perfused  Musculoskeletal: moving all extremities  Neurologic: oriented x3, CN2-12 intact  Psychiatric: normal,  pleasant affect    Data   Data reviewed today: I reviewed all medications, new labs and imaging results over the last 24 hours. I personally reviewed no images or EKG's today.    Recent Labs   Lab 08/21/20  0745 08/19/20  1202   WBC  --  6.8   HGB  --  14.5   MCV  --  93   PLT  --  178    139   POTASSIUM 4.1 4.2   CHLORIDE 110* 107   CO2 26 27   BUN 16 16   CR 1.18 1.17   ANIONGAP 4 5   LATHA 8.8 9.1   * 95   TROPI  --  <0.015     No results found for this or any previous visit (from the past 24 hour(s)).

## 2020-08-21 NOTE — PLAN OF CARE
Have been getting calls from Telemetry tech throughout night re frequent pauses. Pt had been denying any symptoms. VVS except for baseline bradycardia. Now this am tele reports more frequent and lengthy pauses. Pt now symptomatic stating is intermittently dizzy and feeling faint. BP good. See flowsheet for vitals. Plan is to tx to ICU when bed available. Crash cart outside door. Pacer pads placed on pt. Pt is alert and conversing. Pt called wife and informed her of transfer.

## 2020-08-21 NOTE — PRE-PROCEDURE
GENERAL PRE-PROCEDURE:   Procedure:  Pacemaker implantation  Date/Time:  8/21/2020 5:33 PM    Verbal consent obtained?: Yes    Written consent obtained?: Yes    Risks and benefits: Risks, benefits and alternatives were discussed    Consent given by:  Patient  Patient states understanding of procedure being performed: Yes    Patient's understanding of procedure matches consent: Yes    Procedure consent matches procedure scheduled: Yes    Expected level of sedation:  Moderate  Appropriately NPO:  Yes  ASA Class:  Class 3- Severe systemic disease, definite functional limitations  Mallampati  :  Grade 1- soft palate, uvula, tonsillar pillars, and posterior pharyngeal wall visible  Lungs:  Lungs clear with good breath sounds bilaterally  Heart:  Normal heart sounds and rate  History & Physical reviewed:  History and physical reviewed and no updates needed  Statement of review:  I have reviewed the lab findings, diagnostic data, medications, and the plan for sedation

## 2020-08-21 NOTE — PROGRESS NOTES
X-cover:  Nursing reported that pt had 7.9 second pause on tele while asleep.    Receiving no culprit meds  Will check lytes, mag, TSH  Cardiology following    Addendum:    Notified by nurse that pt has had multiple more recurrent pauses up to 5.9 seconds.  No symptoms    Plan:  - tx to [facility] [place]  - place pacer pads on patient in case external pacing needed  - atropine 1 mg IV at bedside in case needed for bradycardia  - pt had stress test ordered for this AM.  I have canceled this    Above d/w bedside nurse

## 2020-08-21 NOTE — PROGRESS NOTES
"[facility] Cardiology Progress Note            Interval History:   Admitted with syncopal episodes.  Yesterday longest pauses were 2.9 seconds but overnight had multiple long pauses as long as 11 seconds associated with recurrence of the symptoms.              Medications:       allopurinol  200 mg Oral QPM     aspirin - buffered  325 mg Oral QPM     dutasteride  0.5 mg Oral QPM     losartan  100 mg Oral QPM     sodium chloride (PF)  3 mL Intracatheter Q8H     sodium chloride (PF)  3 mL Intracatheter Q8H               Physical Exam:   Blood pressure 135/87, pulse (!) 49, temperature 97.9  F (36.6  C), temperature source Oral, resp. rate 19, height 1.702 m (5' 7\"), weight 85.6 kg (188 lb 12.8 oz), SpO2 92 %.  Rhythm: Sinus bradycardia.   Constitutional:   awake, alert, cooperative, no apparent distress, and appears stated age     Neck:   no jugular venous distension and no carotid bruits     Lungs:   No increased work of breathing, good air exchange, clear to auscultation bilaterally, no crackles or wheezing     Cardiovascular:   Normal apical impulse, regular rate and rhythm, normal S1 and S2, no S3 or S4, and no murmur noted            Data:          Lab Results   Component Value Date     08/21/2020     08/19/2020     06/17/2020    Lab Results   Component Value Date    CHLORIDE 110 08/21/2020    CHLORIDE 107 08/19/2020    CHLORIDE 107 06/17/2020    Lab Results   Component Value Date    BUN 16 08/21/2020    BUN 16 08/19/2020    BUN 18 06/17/2020      Lab Results   Component Value Date    POTASSIUM 4.1 08/21/2020    POTASSIUM 4.2 08/19/2020    POTASSIUM 4.1 06/17/2020    Lab Results   Component Value Date    CO2 26 08/21/2020    CO2 27 08/19/2020    CO2 26 06/17/2020    Lab Results   Component Value Date    CR 1.18 08/21/2020    CR 1.17 08/19/2020    CR 1.040 07/08/2020        Lab Results   Component Value Date    HGB 14.5 08/19/2020    HGB 14.8 07/01/2020    HGB 13.2 (L) 03/16/2020     Lab " Results   Component Value Date     08/19/2020     (L) 07/01/2020     03/16/2020     Lab Results   Component Value Date    TROPI <0.015 08/19/2020    TROPI <0.015 11/28/2019     Lab Results   Component Value Date    WBC 6.8 08/19/2020    WBC 8.3 03/16/2020    WBC 10.5 01/13/2020                   Assessment and Plan:   Assessment:   Problem List    Syncope.  Due to sick sinus syndrome.  Long pauses with symptoms.    * No resolved hospital problems. *       Plan:   For permanent pacemaker today.  Contacted Dr. James Douglas from the EP service who is aware of the patient I will be seeing him when he arrives in [facility] after he is transferred to the hospitalist service.  Explained the risks and benefits of the procedure to the patient including the risk of bleeding infection pneumothorax and pericardial effusion.  He understands also that he would be staying overnight after the implantation of the pacemaker.  He will also have restrictions after the placement of the pacemaker which will be explained to him after the procedure.  We will sign off at this stage but please call if any cardiac questions.       Attestation:  I have reviewed today's vital signs, notes, medications, labs and imaging.  Amount of time spent providing critical care service today: 35 minutes.  Care coordination / counseling time: 25 minutes         Leander Fonseca MD, MD 8/21/2020  8:45 AM

## 2020-08-21 NOTE — PROVIDER NOTIFICATION
REASON FOR CONTACT: Tele tech reported 2.9 sec pause. Pt sleeping. VSS.  PROVIDER CONTACTED: Admitting Hospitalist    TIME CONTACTED: now  MODE OF CONTACT: wbt

## 2020-08-21 NOTE — PROGRESS NOTES
[facility]  Cardiology Progress Note  Date of Service: 08/21/2020    Assessment & Plan    Harpreet Vera is a 70 year old male with past medical history significant for MAN, hypertension, hyperlipidemia, recent vertebral artery stenosis with complete occlusion of left and high-grade stenosis on the right status post stent placement in January 2020 was admitted on 8/21/2020 with syncope. He was transferred from [facility] for PPM.    Assessment:   1. Syncopal episode with symptomatic bradycardia    Single episode of syncope PTA    Bradycardia vs SSS noted on tele    Echo with LVEF 55-60%, no WMA, mildly decreased RVSF, trace TR and MR    Lightheaded and dizziness    2. Hypertension    149/88    Losartan 100 mg daily     3. Chronic kidney disease stage III    Creatinine 1.18, GFR 61    4. Vertebral artery occlusion    Chronically occluded left vertebral artery, previous 80% stenosis of right vertebral artery s/p right vertebral artery stent in 01/2020 that appears patent on CTA    Aspirin 81 mg dialy, Plavix discontinued in 07/2020    5. MAN    BiPAP at night      Plan:  1. PPM placement today with Dr. Douglas. We discussed the risks, benefits and indications of PPM, including but not limited to use of conscious sedation including need for a , discomfort, peripheral vessel injury, pneumothorax, cardiac puncture and/or tamponade, device malfunction and/or recall, and infection requiring explantation of the device and long term antibiotics. We also briefly discussed follow up expectations and restrictions following the procedure. The patient voiced understanding and is willing to proceed.  A formal consent form will be signed by the procedural physician.      SALVATORE Garvin CNP  Pager:  [phone]   Text Page  (7am - 4pm, M-F)      Interval History   Resting in bed    Physical Exam   Temp: 98  F (36.7  C) Temp src: Oral BP: (!) 149/88 Pulse: 50   Resp: 18 SpO2: 96 % O2 Device: None (Room  air)    There were no vitals filed for this visit.    Constitutional:   NAD   Skin:   Warm and dry   Head:   Nontraumatic   Neck:   supple, symmetrical, trachea midline   Lungs:   symmetric, clear to auscultation   Cardiovascular:   regular rate and rhythm, normal S1 and S2 and no murmur noted   Abdomen:   Benign   Extremities and Back:   No edema   Neurological:   Grossly nonfocal       Medications       allopurinol  200 mg Oral QPM     aspirin - buffered  325 mg Oral QPM     dutasteride  0.5 mg Oral QPM     losartan  100 mg Oral QPM     multivitamin w/minerals  1 tablet Oral QPM     sodium chloride (PF)  3 mL Intracatheter Q8H     sodium chloride (PF)  3 mL Intracatheter Q8H       Data     Most Recent 3 CBC's:  Recent Labs   Lab Test 08/19/20  1202 07/01/20  0810 03/16/20  1432 01/13/20  1245   WBC 6.8  --  8.3 10.5   HGB 14.5 14.8 13.2* 15.3   MCV 93  --  95 92    144* 234 229     Most Recent 3 BMP's:  Recent Labs   Lab Test 08/21/20  0745 08/19/20  1202 07/08/20 06/17/20  1142    139  --  140   POTASSIUM 4.1 4.2  --  4.1   CHLORIDE 110* 107  --  107   CO2 26 27  --  26   BUN 16 16  --  18   CR 1.18 1.17 1.040 1.20   ANIONGAP 4 5  --  7   LATHA 8.8 9.1  --  9.1   * 95  --  104*

## 2020-08-21 NOTE — PLAN OF CARE
No c/o pain. Up with sba & indep. Pt decline within arms reach despite at times being lightheaded. He has been good at sitting on edge of bed waiting for head to clear before standing up. Up in [place] with sba vadim well. Tole regular diet & voiding without difficulty. Tele sinus kyle, first degree AVB, BBB. Stress test scheduled for tomorrow.

## 2020-08-21 NOTE — PLAN OF CARE
Pt transferred from [facility], needs PPM. So far no pauses noted, SB in the 50S'. Seen by cards with plan for PPM, consented, awaiting orders. Wife is here. No acute distress.

## 2020-08-21 NOTE — PROGRESS NOTES
PRIMARY DIAGNOSIS: SYNCOPE  OUTPATIENT/OBSERVATION GOALS TO BE MET BEFORE DISCHARGE:  1. Orthostatic performed: Yes, no change     2. Diagnostic testing complete & at baseline neurologic testing: No, stress test to be done today     3. Cleared by consultants (if involved): No     4. Interpretation of cardiac rhythm per telemetry tech: received call from telemetry tech about 2.9 sec pause. Pt was sleeping, asymptomatic, VSS.  5. Tolerating adequate PO diet and medications: Yes     6. Return to near baseline physical activity or neurologic status: Yes        Discharge Planner Nurse      Safe discharge environment identified: Yes  Barriers to discharge: Yes, tests not completed       Entered by: Grey Thompson 08/20/2020 5:41 AM     Please review provider order for any additional goals.   Nurse to notify provider when observation goals have been met and patient is ready for discharge.

## 2020-08-21 NOTE — PROGRESS NOTES
REASON FOR CONTACT: per tele tech had 7.9 sec pause. Pt sleeping.  PROVIDER CONTACTED: admitting hospitalist    TIME CONTACTED: now  MODE OF CONTACT: wbt

## 2020-08-21 NOTE — PLAN OF CARE
ICU End of Shift Summary.  For vital signs and complete assessments, please see documentation flowsheets.     Pertinent assessments: SB 40-50s, HTN, afebrile, LS clear on RA, no SOB, no pain, good pulses, +BS, voiding per urinal, ambulates with SBA if necessary but mainly bedrest due to recent syncope, NPO overnight and no intake this morning  Major Shift Events: transfer to ICU for closer monitoring due to prolonged HR pauses and syncope  Plan (Upcoming Events): transfer to [facility] for pacemaker placement  Discharge/Transfer Needs: telephone report given to Vazquez RN at [facility]U, bedside report given to EMS team, wife took all pt's belongings and will meet him at [facility]

## 2020-08-21 NOTE — PROGRESS NOTES
REASON FOR CONTACT:tele reports frequent pauses, one 11 sec. pt has now been dizzy and feeling faint with the pauses. vitals ok. IMC doesn't do Atropine, only ICU does.   PROVIDER CONTACTED:Dr. Browning, Dr. Piper    TIME CONTACTED:now   MODE OF CONTACT: wbt

## 2020-08-21 NOTE — PROGRESS NOTES
REASON FOR CONTACT: At 0616 had 5.9 sec pause. Since then, over a 10 min period had 9 pauses ranging 2.9-4.6 sec.  PROVIDER CONTACTED:admitting hospitalist    TIME CONTACTED:now   MODE OF CONTACT:wbt

## 2020-08-21 NOTE — DISCHARGE SUMMARY
[facility]  Hospitalist Discharge Summary       Date of Admission:  8/19/2020  Date of Discharge:  8/21/2020  Discharging Provider: Adriel Piper MD      Discharge Diagnoses   Symptomatic bradycardia from probable sick sinus syndrome    Follow-ups Needed After Discharge   Follow-up Appointments     Follow Up and recommended labs and tests      TBD                 Discharge Disposition   Transferred to [facility]  Condition at discharge: [facility]     Harpreet Vera is a 70 year old male with a MAN on BiPAP therapy, hypertension, hyperlipidemia, recent vertebral artery stenosis with complete occlusion of left and high-grade stenosis on the right status post stent placement in January 2020 who presented to the ER with syncope.     Patient mentioned 2 episodes of syncope with preceding lightheadedness over the past week.  He had not been having any chest pain or palpitations prior to that.  EKG on admission here showed sinus bradycardia with rates in the high 40s.  CTA of the head and neck showed a patent vertebral artery stent.  After admission, he had pauses on the monitor up to 2.9 seconds. Cardiology was consulted. At that point, pauses did not meet criteria for pacemaker placement.  Cardiogram showed normal LVEF, normal left ventricular filling pressures.  Patient was kept in the hospital overnight with plans for stress echocardiogram.  However, overnight patient developed much longer pauses, now at 7, 9 and 11 seconds.  It became evident that these pauses were the cause for his syncope.  Pacemaker placement on urgent basis therefore indicated, and patient was transferred from this hospital to [facility], for EP consult and anticipated pacemaker placement today.    Consultations This Hospital Stay   CARDIOLOGY IP CONSULT      Code Status   Full Code    Time Spent on this Encounter   I, Adriel Piper MD, personally saw the patient today  and spent greater than 30 minutes discharging this patient.       Adriel Piper MD  [facility]  ______________________________________________________________________    Physical Exam   Vital Signs: Temp: 97.9  F (36.6  C) Temp src: Oral BP: 135/87 Pulse: (!) 49   Resp: 11 SpO2: 98 % O2 Device: None (Room air)    Weight: 188 lbs 12.8 oz      General: Laying in bed, no distress    HEENT: No scleral icterus. Oropharynx moist.     Neck: Supple. Normal range of motion.    Pulmonary: Normal work of breathing. Clear to auscultation bilaterally.    Cardiovascular: Bradycardic without murmur or extra heart sounds.    Abdomen: Soft and non-tender.    Extremities: No peripheral edema. No clubbing.    Neurologic: Awake, alert, appropriate.    Skin: Warm and dry.    Psychiatric: Normal affect and mood.       Primary Care Physician   Lucía Sandhu    Discharge Orders      Reason for your hospital stay    Syncope and bradycardia with pauses     Follow Up and recommended labs and tests    TBD     Activity - Up ad carolyn     Advance Diet as Tolerated    Follow this diet upon discharge: NPO       Significant Results and Procedures   Most Recent 3 CBC's:  Recent Labs   Lab Test 08/19/20  1202 07/01/20  0810 03/16/20  1432 01/13/20  1245   WBC 6.8  --  8.3 10.5   HGB 14.5 14.8 13.2* 15.3   MCV 93  --  95 92    144* 234 229     Most Recent 3 BMP's:  Recent Labs   Lab Test 08/21/20  0745 08/19/20  1202 07/08/20 06/17/20  1142    139  --  140   POTASSIUM 4.1 4.2  --  4.1   CHLORIDE 110* 107  --  107   CO2 26 27  --  26   BUN 16 16  --  18   CR 1.18 1.17 1.040 1.20   ANIONGAP 4 5  --  7   LATHA 8.8 9.1  --  9.1   * 95  --  104*     Most Recent 2 LFT's:  Recent Labs   Lab Test 07/08/20 04/16/20 01/13/20  1245 11/28/19  2030   AST 16 23   < > 15 20   ALT 19 25   < > 25 35   ALKPHOS  --   --   --  62 58   BILITOTAL  --   --   --  0.9 0.6    < > = values in this interval not displayed.     Most  Recent 3 INR's:No lab results found.,   Results for orders placed or performed during the hospital encounter of 08/19/20   CTA Head Neck with Contrast    Narrative    CT ANGIOGRAM OF THE HEAD AND NECK WITH CONTRAST  8/19/2020 1:24 PM     HISTORY:  Dizziness, syncope, history of vertebral stenosis with  right-sided stent.    TECHNIQUE:  CT angiography with an injection of 70 mL [id number]-[id number] IV  with scans through the head and neck. Images were transferred to a  separate 3-D workstation where multiplanar reformations and 3-D images  were created. Estimates of carotid stenoses are made relative to the  distal internal carotid artery diameters except as noted. Radiation  dose for this scan was reduced using automated exposure control,  adjustment of the mA and/or kV according to patient size, or iterative  reconstruction technique.      COMPARISON: Fluoroscopic images from cerebral angiogram dated 7/1/2020  and prior, MR angiogram of the brain dated 12/9/2019, MR angiogram of  the neck dated 12/16/2019.    CT HEAD FINDINGS: No contrast enhancing lesions. Cerebral blood flow  is grossly normal.     CT ANGIOGRAM HEAD FINDINGS: The distal cervical, petrous, cavernous  and supraclinoid segments of both internal carotid arteries are  patent. Mild atherosclerosis of the carotid siphons without  significant stenosis. The major proximal visualized branches of the  middle and anterior cerebral arteries are widely patent.    As before, there is occlusion of the extracranial left vertebral  artery. There is reconstitution of flow into the relatively  hypoplastic left vertebral artery and its major branches including the  left posterior inferior cerebellar artery, likely via retrograde flow  across the vertebrobasilar confluence. The dominant right vertebral  artery is widely patent. The basilar artery is patent, as are its  major branches including the bilateral posterior cerebral arteries.  There is mild to moderate focal stenosis  of the P1-P2 junction of the  left posterior cerebral artery, which may be due to atherosclerotic  disease. No aneurysm or high flow vascular malformation identified.  The major dural venous sinuses appear grossly patent. There is a  relatively hypoplastic left transverse sinus. Multiple presumed  arachnoid granulations in the right transverse sinus.    CT ANGIOGRAM NECK FINDINGS:  Mild mixed atherosclerotic disease  involving the aortic arch. There is mild stenosis of the proximal left  subclavian artery, as seen previously. No significant stenosis of the  brachiocephalic or left common carotid artery origins.     Right carotid artery: The right common and internal carotid arteries  are patent. No significant stenosis or atherosclerotic disease in the  carotid artery.     Left carotid artery: The left common and internal carotid arteries are  patent. Minimal atherosclerotic calcification of the left carotid  bifurcation. No significant stenosis. Tortuous internal carotid artery  tonsillar loop.    Vertebral arteries: A stent is again in place at the origin of the  right vertebral artery. The stent appears to be patent, without  significant in-stent stenosis. Overall, mild residual stenosis of the  right vertebral artery origin/proximal V1 segment, without definite  flow limitation. Otherwise, the dominant right vertebral artery  appears widely patent in its mid to distal cervical aspect. The left  vertebral artery remains occluded from its origin at least to the mid  V2 segment where there is thready segmental reconstitution, likely via  spinal muscular collaterals and/or via retrograde flow across the  vertebrobasilar confluence.    Other findings: ACDF changes at C6-C7, with solid endplate fusion  across the disc space at that level. Mild multilevel degenerative  changes elsewhere in the visualized cervical thoracic spine.       Impression    IMPRESSION:  1. No definite acute intracranial large vessel  occlusion.  2. The stent at the origin of the right vertebral artery remains  patent, without definite high-grade stenosis. There is mild stenosis  of the right vertebral artery at its origin and in its V1 segment,  without high-grade narrowing. The remainder of the visualized cervical  right vertebral artery is widely patent.  3. Chronic left vertebral artery occlusion, unchanged.  4. The bilateral cervical carotid arteries are widely patent.  5. Mild stenosis of the proximal left subclavian artery.  6. No significant major intracranial arterial stenosis.    TATI JOE MD   Head CT w/o contrast    Narrative    CT SCAN OF THE HEAD WITHOUT CONTRAST   8/19/2020 1:23 PM     HISTORY: Dizziness, syncope, head trauma. History vertebral stenosis  with right-sided stent.    TECHNIQUE:  Axial images of the head and coronal reformations without  IV contrast material. Radiation dose for this scan was reduced using  automated exposure control, adjustment of the mA and/or kV according  to patient size, or iterative reconstruction technique.    COMPARISON: MRI brain dated 12/19/2019. MR angiogram of the brain  dated 12/19/2019.    FINDINGS: There is no evidence of intracranial hemorrhage, mass, acute  infarct or anomaly. The ventricles are normal in size, shape and  configuration. Mild diffuse parenchymal volume loss. Mild patchy  periventricular white matter hypodensities which are nonspecific, but  likely related to chronic microvascular ischemic disease. Scattered  atherosclerotic calcifications involving the carotid siphons  primarily.    The visualized portions of the sinuses and mastoids appear normal. The  bony calvarium and bones of the skull base appear intact.       Impression    IMPRESSION:     1. No evidence of acute intracranial hemorrhage, mass, or herniation.  2. Mild diffuse parenchymal volume loss and white matter changes  likely due to chronic microvascular ischemic disease.     TATI JOE MD    Echocardiogram Complete    Narrative    [id number]  [id number]  [id number]  [id number]^BRENNEN^SEDRICK^SIRI           [facility]  Echocardiography Laboratory  [address] [place] [address]        Name: ALVAREZ JESUS  MRN: [id number]  : 1950  Study Date: 2020 10:47 AM  Age: 70 yrs  Gender: Male  Patient Location: [facility]  Reason For Study: Syncope, Bradycardia - Sinus  Ordering Physician: SEDRICK HUTTON  Referring Physician: Lucía Sandhu  Performed By: Andre Styles RDCS     BSA: 2.0 m2  Height: 67 in  Weight: 192 lb  HR: 58  BP: 142/89 mmHg  _____________________________________________________________________________  __        Procedure  Complete Echo Adult.  _____________________________________________________________________________  __        Interpretation Summary     The left ventricle is normal in size. There is normal left ventricular wall  thickness. Left ventricular systolic function is normal. The visual ejection  fraction is estimated at 55-60%. Diastolic Doppler findings (E/E' ratio and/or  other parameters) suggest left ventricular filling pressures are normal. No  regional wall motion abnormalities noted.  The right ventricle is normal size. Mildly decreased right ventricular  systolic function.  Trace mitral and tricuspid regurgitation.  No pericardial effusion.  In comparison to the previous report dated 2007, the findings are  similar.  _____________________________________________________________________________  __        Left Ventricle  The left ventricle is normal in size. There is normal left ventricular wall  thickness. Left ventricular systolic function is normal. The visual ejection  fraction is estimated at 55-60%. Diastolic Doppler findings (E/E' ratio and/or  other parameters) suggest left ventricular filling pressures are normal. No  regional wall motion abnormalities noted.     Right Ventricle  The right ventricle is normal size. Mildly decreased  right ventricular  systolic function.     Atria  Normal left atrial size. Right atrial size is normal. There is no color  Doppler evidence of an atrial shunt.     Mitral Valve  There is trace mitral regurgitation.        Tricuspid Valve  There is trace tricuspid regurgitation. The right ventricular systolic  pressure is approximated at 15.6 mmHg plus the right atrial pressure.     Aortic Valve  There is mild trileaflet aortic sclerosis. No aortic regurgitation is present.  No aortic stenosis is present.     Pulmonic Valve  There is trace pulmonic valvular regurgitation. There is no pulmonic valvular  stenosis.     Vessels  The aortic root is normal size. The ascending aorta is Mildly dilated.  Descending aortic velocity normal. The inferior vena cava is normal.     Pericardium  There is no pericardial effusion.        Rhythm  Sinus rhythm was noted.  _____________________________________________________________________________  __  MMode/2D Measurements & Calculations  IVSd: 1.00 cm     LVIDd: 5.0 cm  LVIDs: 2.2 cm  LVPWd: 0.98 cm  FS: 55.8 %  LV mass(C)d: 177.8 grams  LV mass(C)dI: 89.4 grams/m2  Ao root diam: 3.9 cm  LA dimension: 4.4 cm  asc Aorta Diam: 4.0 cm  LA/Ao: 1.1  LVOT diam: 2.2 cm  LVOT area: 3.9 cm2  LA Volume (BP): 62.0 ml  LA Volume Index (BP): 31.2 ml/m2  RWT: 0.40           Doppler Measurements & Calculations  MV E max samuel: 49.9 cm/sec  MV A max samuel: 67.1 cm/sec  MV E/A: 0.74  MV dec time: 0.30 sec  LV V1 max PG: 3.0 mmHg  LV V1 max: 86.9 cm/sec  LV V1 VTI: 19.9 cm  CO(LVOT): 4.0 l/min  CI(LVOT): 2.0 l/min/m2  SV(LVOT): 78.3 ml  SI(LVOT): 39.4 ml/m2  PA acc time: 0.11 sec  TR max samuel: 197.8 cm/sec  TR max PG: 15.6 mmHg  E/E' av.3  Lateral E/e': 7.2  Medial E/e': 11.4              _____________________________________________________________________________  __        Report approved by: Emily Bermudez 2020 12:41 PM            Discharge Medications   Discharge Medication List as of  8/21/2020 10:21 AM      CONTINUE these medications which have NOT CHANGED    Details   acetaminophen (TYLENOL) 500 MG tablet Take 500 mg by mouth as needed for mild pain, Historical      allopurinol (ZYLOPRIM) 100 MG tablet Take 200 mg by mouth every evening, Historical      aspirin - buffered (ASCRIPTIN) 325 MG TABS tablet Take 325 mg by mouth every evening, Historical      dutasteride (AVODART) 0.5 MG capsule Take 0.5 mg by mouth every evening, Historical      furosemide (LASIX) 20 MG tablet Take 20 mg by mouth every 3 days as needed, Historical      losartan (COZAAR) 100 MG tablet Take 100 mg by mouth every evening, Historical      multivitamin w/minerals (THERA-VIT-M) tablet Take 1 tablet by mouth every evening, Historical      vitamin D3 (CHOLECALCIFEROL) 50 mcg (2000 units) tablet Take 1 tablet by mouth every evening, Historical           Allergies   Allergies   Allergen Reactions     Pravastatin      Fuzzy brain.

## 2020-08-22 ENCOUNTER — APPOINTMENT (OUTPATIENT)
Dept: GENERAL RADIOLOGY | Facility: CLINIC | Age: 70
DRG: 244 | End: 2020-08-22
Attending: INTERNAL MEDICINE
Payer: MEDICARE

## 2020-08-22 VITALS
BODY MASS INDEX: 29.44 KG/M2 | TEMPERATURE: 97.8 F | WEIGHT: 188 LBS | DIASTOLIC BLOOD PRESSURE: 83 MMHG | HEART RATE: 60 BPM | SYSTOLIC BLOOD PRESSURE: 150 MMHG | RESPIRATION RATE: 16 BRPM | OXYGEN SATURATION: 95 %

## 2020-08-22 LAB
ANION GAP SERPL CALCULATED.3IONS-SCNC: 5 MMOL/L (ref 3–14)
BUN SERPL-MCNC: 17 MG/DL (ref 7–30)
CALCIUM SERPL-MCNC: 8.8 MG/DL (ref 8.5–10.1)
CHLORIDE SERPL-SCNC: 111 MMOL/L (ref 94–109)
CO2 SERPL-SCNC: 25 MMOL/L (ref 20–32)
CREAT SERPL-MCNC: 1.11 MG/DL (ref 0.66–1.25)
ERYTHROCYTE [DISTWIDTH] IN BLOOD BY AUTOMATED COUNT: 13.7 % (ref 10–15)
GFR SERPL CREATININE-BSD FRML MDRD: 66 ML/MIN/{1.73_M2}
GLUCOSE SERPL-MCNC: 129 MG/DL (ref 70–99)
HCT VFR BLD AUTO: 40.4 % (ref 40–53)
HGB BLD-MCNC: 14.3 G/DL (ref 13.3–17.7)
MCH RBC QN AUTO: 32.4 PG (ref 26.5–33)
MCHC RBC AUTO-ENTMCNC: 35.4 G/DL (ref 31.5–36.5)
MCV RBC AUTO: 91 FL (ref 78–100)
PLATELET # BLD AUTO: 169 10E9/L (ref 150–450)
POTASSIUM SERPL-SCNC: 4.2 MMOL/L (ref 3.4–5.3)
RBC # BLD AUTO: 4.42 10E12/L (ref 4.4–5.9)
SODIUM SERPL-SCNC: 141 MMOL/L (ref 133–144)
WBC # BLD AUTO: 7.4 10E9/L (ref 4–11)

## 2020-08-22 PROCEDURE — 99238 HOSP IP/OBS DSCHRG MGMT 30/<: CPT | Performed by: HOSPITALIST

## 2020-08-22 PROCEDURE — 99232 SBSQ HOSP IP/OBS MODERATE 35: CPT | Performed by: INTERNAL MEDICINE

## 2020-08-22 PROCEDURE — 99207 ZZC CDG-CODE INCORRECT PER BILLING BASED ON TIME: CPT | Performed by: HOSPITALIST

## 2020-08-22 PROCEDURE — 36415 COLL VENOUS BLD VENIPUNCTURE: CPT | Performed by: HOSPITALIST

## 2020-08-22 PROCEDURE — 40000986 XR CHEST 2 VW

## 2020-08-22 PROCEDURE — 85027 COMPLETE CBC AUTOMATED: CPT | Performed by: HOSPITALIST

## 2020-08-22 PROCEDURE — 80048 BASIC METABOLIC PNL TOTAL CA: CPT | Performed by: HOSPITALIST

## 2020-08-22 NOTE — PLAN OF CARE
Pt is 100% A-paced, PPM site is CD&I, Pt denies pain, good PO intake. Possible discharge tomorrow.

## 2020-08-22 NOTE — DISCHARGE SUMMARY
[facility]  Hospitalist Discharge Summary      Date of Admission:  8/21/2020  Date of Discharge:  8/22/2020  Discharging Provider: Zoraida Sewell MD      Discharge Diagnoses   Syncope  Symptomatic bradycardia  S/p pacemaker placement 8/21/20  CKD3  Vertebral artery occlusion  MAN    Follow-ups Needed After Discharge   Follow-up Appointments     Follow-up and recommended labs and tests       Follow up with primary care provider, Lucía Sandhu, within 7 days   for hospital follow- up.  No follow up labs or test are needed.             Discharge Disposition   Discharged to home  Condition at discharge: Stable    Hospital Course   Harpreet Vera is a 70 year old male admitted to [facility] on 8/19 and transferred to [facility] on 8/21/2020. He has a PMH of MAN on BiPAP therapy, hypertension, hyper lipidemia, recent vertebral artery stenosis with complete occlusion of left and high-grade stenosis on the right status post stent placement in January 2020 who presented with syncope and was found to have significant sinus pauses. Transferred from OSH for PPM placement.     Syncope  Symptomatic bradycardia  Sick sinus syndrome  - consults to cardiology and EP  - monitor on telemetry  - PPM placed 8/21/20. Follow up CXR and interrogation looked good.     Vertebral artery occlusion  Known chronically occluded left vertebral artery, previous 80% stenosis of right vertebral artery s/p right vertebral artery stent in 01/2020 that appears patent on CTA. Previously on DAPT with ASA and Plavix, recently stopped Plavix on 7/2/20.     CKD3  Not mentioned in PMH, but GFR decreased. Stable over several years.  - avoid nephrotoxic medications.    HTN  Patient takes Lasix every few days to control blood pressure and volume status.  - Continue losartan; holding Lasix.     BPH  - Continue dutasteride     Gout  - Continue allopurinol     MAN  - Continue BiPAP at night     COVID 19 negative    Consultations  This Hospital Stay   ELECTROPHYSIOLOGY IP CONSULT  CARDIOLOGY IP CONSULT    Code Status   Full Code    Time Spent on this Encounter   I, Zoraida Sewell MD, personally saw the patient today and spent less than or equal to 30 minutes discharging this patient.       Zoraida Sewell MD  [facility]  ______________________________________________________________________    Physical Exam   Vital Signs: Temp: 97.8  F (36.6  C) Temp src: Oral BP: (!) 150/83 Pulse: 60   Resp: 16 SpO2: 95 % O2 Device: None (Room air) Oxygen Delivery: 2 LPM  Weight: 188 lbs 0 oz  General Appearance: awake, alert, oriented, NAD  Eyes: PERLLA, EOMI  HEENT: neck supple  Respiratory: CTAB  Cardiovascular: regular, kyle  GI: soft, NTND  Skin: no major rashes or lesions; warm, well perfused  Musculoskeletal: moving all extremities  Neurologic: oriented x3, CN2-12 intact  Psychiatric: normal, pleasant affect       Primary Care Physician   Lucía Sandhu    Discharge Orders      Discharge Instructions - Keep incision dry for 72 hours    Keep incision dry for 72 hours (unless Derma Dobbs was applied)     Discharge Instructions - Follow up with Device Check RN     Follow up with Device Check RN in 7-10 days.     Reason for your hospital stay    Syncope due to symptomatic bradycardia s/p pacemaker placement     Follow-up and recommended labs and tests     Follow up with primary care provider, Lucía Sandhu, within 7 days for hospital follow- up.  No follow up labs or test are needed.     Activity    Your activity upon discharge: activity as tolerated     Full Code     Diet    Follow this diet upon discharge: Orders Placed This Encounter      Regular Diet Adult     Case Request EP: EP Pacemaker       Significant Results and Procedures   Most Recent 3 CBC's:  Recent Labs   Lab Test 08/22/20  0544 08/21/20  1558 08/19/20  1202   WBC 7.4 7.1 6.8   HGB 14.3 14.2 14.5   MCV 91 91 93    174 178     Most Recent 3 BMP's:  Recent  Labs   Lab Test 08/22/20  0544 08/21/20  0745 08/19/20  1202    139 139   POTASSIUM 4.2 4.1 4.2   CHLORIDE 111* 110* 107   CO2 25 26 27   BUN 17 16 16   CR 1.11 1.18 1.17   ANIONGAP 5 4 5   LATHA 8.8 8.8 9.1   * 111* 95   ,   Results for orders placed or performed during the hospital encounter of 08/21/20   X-ray Chest 2 vws*    Narrative    CHEST TWO VIEWS 8/22/2020 8:09 AM     HISTORY: Status post pacer/ICD    COMPARISON: None.       Impression    Impression: Left-sided cardiac pacemaker with leads over the RA and  RV. No pneumothorax. Normal heart size. Normal pulmonary vascularity.  Lungs clear. No pleural fluid. Surgical clips right upper quadrant.  Degenerative changes in the spine. Postsurgical changes cervical  spine. Old healed right rib fractures.    CURT L BEHRNS, MD   EP Device    Narrative    PROCEDURE PERFORMED:  1. Conscious sedation.  2. Implantation of a dual-chamber pacemaker system.    INDICATIONS FOR PROCEDURE: Mr. Vera is a 70-year-old white male who  presented for evaluation of syncope. He was found to have recurrent  asystole up to 11 seconds.    PROCEDURE AND RESULTS: After the written informed consent was obtained  the patient was transported to CV lab 4 in the fasting state. I  assessed the patient for sedation and pacemaker implantation in the EP  lab. The procedure was performed under local anesthesia and sterile  conditions. IV Versed and fentanyl were used for conscious sedation.  The heart rate, blood pressure and oxygen saturation were monitored by  the RN under my supervision.    The left subclavian vein was accessed percutaneously and a guidewire  was inserted. After the guidewire was secured over the left shoulder  by using the hemostat onto the draping the incision was made below the  clavicle around the guidewire. The pacemaker pocket was created  subcutaneously. The second guidewire was inserted after the creation  of the pocket. The leads were introduced by using  the guidewires and  peel-away sheaths. The ventricular lead was screwed into the RV high  apex and atrial lead to the RA appendage. Both leads were anchored at  the insertion site after the pacing and sensing parameters were  measured. The pocket was irrigated with normal saline and complete  hemostasis was obtained before the pulse generator implantation. The  incision was closed in layers and dressed in a sterile fashion. There  was no complication.    PACEMAKER INFORMATION:  1. Pulse generator: [employer] model L3-3-1, serial #[id number].   2. Atrial lead: [employer] 7840, serial number [id number]. P-wave  3.9 mV, pacing threshold 1.5 V, pacing impedance 994 ohms.   3. Ventricular lead: [employer] model 7841, serial number  [id number]. R-wave 16.9 mV, pacing threshold 0.6 V, pacing impedance 828  ohms.       Discharge Medications   Current Discharge Medication List      CONTINUE these medications which have NOT CHANGED    Details   acetaminophen (TYLENOL) 500 MG tablet Take 500 mg by mouth as needed for mild pain      allopurinol (ZYLOPRIM) 100 MG tablet Take 200 mg by mouth every evening      aspirin - buffered (ASCRIPTIN) 325 MG TABS tablet Take 325 mg by mouth every evening      dutasteride (AVODART) 0.5 MG capsule Take 0.5 mg by mouth every evening      furosemide (LASIX) 20 MG tablet Take 20 mg by mouth every 3 days as needed      losartan (COZAAR) 100 MG tablet Take 100 mg by mouth every evening      multivitamin w/minerals (THERA-VIT-M) tablet Take 1 tablet by mouth every evening      vitamin D3 (CHOLECALCIFEROL) 50 mcg (2000 units) tablet Take 1 tablet by mouth every evening           Allergies   Allergies   Allergen Reactions     Pravastatin      Fuzzy brain.

## 2020-08-22 NOTE — PLAN OF CARE
Pt received discharge instructions, questions answered. Refused wheelchair and ambulated off of unit.

## 2020-08-22 NOTE — PROGRESS NOTES
[facility] Cardiology Progress Note       Assessment and Plan:   1. Syncopal episode with symptomatic bradycardia    Single episode of syncope PTA    Bradycardia vs SSS noted on tele    Echo with LVEF 55-60%, no WMA, mildly decreased RVSF, trace TR and MR    Dual-chamber insertion yesterday, CXR fine, as is pacer check     2. Hypertension     3. Chronic kidney disease stage III    Creatinine 1.18, GFR 61     4. Vertebral artery occlusion    Chronically occluded left vertebral artery, previous 80% stenosis of right vertebral artery s/p right vertebral artery stent in 01/2020 that appears patent on CTA    Aspirin 81 mg dialy, Plavix discontinued in 07/2020     5. MAN    BiPAP at night     Can go home today with follow-up in pacer clinic.         Interval History:   Doing well at this time, no cardiac symptoms.  Pacer insertion site left infraclavicular fossa shows very minimal swelling only.                  Medications:     Current Facility-Administered Medications   Medication     acetaminophen (TYLENOL) tablet 650 mg     allopurinol (ZYLOPRIM) tablet 200 mg     aspirin - buffered (ASCRIPTIN) 325 MG tablet 325 mg     dutasteride (AVODART) capsule 0.5 mg     lidocaine 1 % 0.1-1 mL     lidocaine 1 % 0.1-1 mL     losartan (COZAAR) tablet 100 mg     magnesium hydroxide (MILK OF MAGNESIA) suspension 30 mL     melatonin tablet 1 mg     multivitamin w/minerals (THERA-VIT-M) tablet 1 tablet     naloxone (NARCAN) injection 0.1-0.4 mg     nitroGLYcerin (NITROSTAT) sublingual tablet 0.4 mg     ondansetron (ZOFRAN-ODT) ODT tab 4 mg    Or     ondansetron (ZOFRAN) injection 4 mg     oxyCODONE-acetaminophen (PERCOCET) 5-325 MG per tablet 1 tablet     senna-docusate (SENOKOT-S/PERICOLACE) 8.6-50 MG per tablet 1 tablet    Or     senna-docusate (SENOKOT-S/PERICOLACE) 8.6-50 MG per tablet 2 tablet     sodium chloride (PF) 0.9% PF flush 3 mL     sodium chloride (PF) 0.9% PF flush 3 mL     sodium chloride (PF) 0.9% PF flush 3 mL      sodium chloride (PF) 0.9% PF flush 3 mL             Physical Exam:   Blood pressure (!) 140/86, pulse 61, temperature 98.1  F (36.7  C), resp. rate 16, weight 85.3 kg (188 lb), SpO2 96 %.  Wt Readings from Last 4 Encounters:   20 85.3 kg (188 lb)   20 85.6 kg (188 lb 12.8 oz)   20 88 kg (194 lb)   20 88.7 kg (195 lb 8 oz)         Vital Sign Ranges  Temperature Temp  Av.9  F (36.6  C)  Min: 97.7  F (36.5  C)  Max: 98.1  F (36.7  C)   Blood pressure Systolic (24hrs), Av , Min:133 , Max:149        Diastolic (24hrs), Av, Min:84, Max:88      Pulse Pulse  Av  Min: 49  Max: 64   Respirations Resp  Avg: 15.2  Min: 11  Max: 19   Pulse oximetry SpO2  Av.5 %  Min: 92 %  Max: 100 %         Intake/Output Summary (Last 24 hours) at 2020 0755  Last data filed at 2020 0600  Gross per 24 hour   Intake 240 ml   Output 500 ml   Net -260 ml          Cardiovascular:   Normal apical impulse, regular rate and rhythm, normal S1 and S2, no S3 or S4, and no murmur noted   Chest clear.  No peripheral oedema         Data:     Labs:  Lab Results   Component Value Date     2020    Lab Results   Component Value Date    CHLORIDE 111 2020    Lab Results   Component Value Date    BUN 17 2020      Lab Results   Component Value Date    POTASSIUM 4.2 2020    Lab Results   Component Value Date    CO2 25 2020    Lab Results   Component Value Date    CR 1.11 2020        Lab Results   Component Value Date    WBC 7.4 2020    HGB 14.3 2020    HCT 40.4 2020    MCV 91 2020     2020     Lab Results   Component Value Date    TROPI <0.015 2020           Attestation:  I have reviewed today's vital signs, notes, medications, labs and imaging.         DR ALEK KUMAR MD 2020  7:55 AM

## 2020-08-22 NOTE — DISCHARGE INSTRUCTIONS
A Cardiology follow-up appointment was recommended for you    DUE TO COVID-19 PANDEMIC, MANY CLINICS ARE TEMPORARILY NOT TAKING IN-PERSON APPOINTMENTS. ?  Your clinic was contacted by a hospital care coordinator, and a request was made for a visit. They will determine which type of visit is most appropriate for you. --They will call you so please answer your phone.  If you develop any symptoms or have any followup questions please call [facility] at [phone] option 2.      It is very important to check in with your provider--during the phone visit, talk about your hospital stay. Tell the clinic provider how you feel. Talk about the medications listed on the discharge papers. Your doctor will update records, make sure you are still doing OK, and decide if any tests or medication changes are needed.     Please have your current measured weight and medication list available for review. If possible, please check and record your blood pressures prior to the phone visit for the provider's review.   ______________________________________________________            [facility] will contact you this week to help you schedule an appointment with a Nurse Practitioner/Physicians Assistant.   [facility] [address]  [place] [address]  Phone: [phone]  Or     [facility]  [id number] [place] [address], [address]  [place] [address]  Phone: [phone]

## 2020-08-24 ENCOUNTER — PATIENT OUTREACH (OUTPATIENT)
Dept: CARE COORDINATION | Facility: CLINIC | Age: 70
End: 2020-08-24

## 2020-08-24 ENCOUNTER — TELEPHONE (OUTPATIENT)
Dept: CARDIOLOGY | Facility: CLINIC | Age: 70
End: 2020-08-24

## 2020-08-24 ENCOUNTER — TELEPHONE (OUTPATIENT)
Dept: INTERNAL MEDICINE | Facility: CLINIC | Age: 70
End: 2020-08-24

## 2020-08-24 DIAGNOSIS — R00.1 BRADYCARDIA, SEVERE SINUS: Primary | ICD-10-CM

## 2020-08-24 DIAGNOSIS — Z95.0 CARDIAC PACEMAKER IN SITU: ICD-10-CM

## 2020-08-24 ASSESSMENT — ACTIVITIES OF DAILY LIVING (ADL): DEPENDENT_IADLS:: INDEPENDENT

## 2020-08-24 NOTE — TELEPHONE ENCOUNTER
Post device implant discharge phone call.    Reviewed the following:  No raising arm above shoulder on the side of implant for 3 weeks  Remove outer dressing 3 days after implant. May shower after outer dressing removed. Leave steri-strips in place, will be removed at 1 week device check  Limit driving for: 1 week (PPM)  Watch for redness, drainage, warmth, or fever. Call device clinic if any signs of infection.     1 week device check scheduled: 08/31/2020.    Pt states understanding of all instructions.

## 2020-08-24 NOTE — PROGRESS NOTES
Clinic Care Coordination Contact    Clinic Care Coordination Contact  OUTREACH    Referral Information:  Referral Source: IP Report    Primary Diagnosis: Cardiovascular - other    Chief Complaint   Patient presents with     Clinic Care Coordination - Post Hospital     Clinic Care Coordination - Initial        Universal Utilization: Reviewed  Clinic Utilization  Difficulty keeping appointments:: No  Compliance Concerns: No  No-Show Concerns: No  No PCP office visit in Past Year: No  Utilization    Last refreshed: 8/24/2020  1:18 PM:  Hospital Admissions 4           Last refreshed: 8/24/2020  1:18 PM:  ED Visits 2           Last refreshed: 8/24/2020  1:18 PM:  No Show Count (past year) 0              Current as of: 8/24/2020  1:18 PM              Clinical Concerns:  Patient was hospitalized at [facility] 8/19/2020-8/21/2020- symptomatic bradycardia from probably sick sinus syndrome transferred to [facility] 8/21/2020-8/22/2020 for Syncope, and symptomatic bradycardia s/p pacemaker placement 8/21/2020.    Phone call made to Patient. Patient reports  doing well . Patient indicates he lives at home with his wife and is independent in cares. Patient indicates Patient s wife is a retired nurse and is monitoring him at home. Patient reports he has not heard from Cardiology, which CC encouraged that they call tomorrow if he has not heard from them. Patient endorses having the support he needs and do not need any additional resources.     Current Medical Concerns:    Patient Active Problem List   Diagnosis     Essential hypertension, benign     Allergic rhinitis     Intestinal infection due to Clostridium difficile     CARDIOVASCULAR SCREENING; LDL GOAL LESS THAN 130     Advanced directives, counseling/discussion     Hypercholesteremia     MAN (obstructive sleep apnea)     Vitamin D deficiency     Benign prostatic hyperplasia     S/P cervical spinal fusion     Cervicalgia     Vertebral artery stenosis     Syncope      Bradycardia, severe sinus         Current Behavioral Concerns: no concern    Education Provided to patient: Introduction and CC s role. Encourage follow up care with PCP as well.       Health Maintenance Reviewed:    Clinical Pathway: None    Medication Management:  Patient reports taking medication independently.  Wife is a retired nurse and helps as needed.     Functional Status:  Dependent ADLs:: Independent  Dependent IADLs:: Independent  Mobility Status: Independent    Living Situation:  Current living arrangement:: I live in a private home with spouse    Lifestyle & Psychosocial Needs:        Transportation means:: Family, Regular car     Informal Support system:: Partner   Socioeconomic History     Marital status:      Spouse name: Not on file     Number of children: Not on file     Years of education: Not on file     Highest education level: Not on file     Tobacco Use     Smoking status: Never Smoker     Smokeless tobacco: Never Used   Substance and Sexual Activity     Alcohol use: Yes     Alcohol/week: 0.0 standard drinks     Comment: rarely     Drug use: No     Sexual activity: Yes     Partners: Female        Resources and Interventions:  Current Resources:      Community Resources: None  Supplies used at home:: None  Equipment Currently Used at Home: none    Advance Care Plan/Directive  Advanced Care Plans/Directives on file:: No  Advanced Care Plan/Directive Status: In Process    Referrals Placed: None     Patient/Caregiver understanding: Patient verbalized understanding and denies any additional questions or concerns at this time. RNCC engaged in [employer] communications during encounter.          Future Appointments              Tomorrow Hadley Bruno MD [facility] [place], [facility]          Plan: Patient will schedule Cardiology follow up appointment.  Patient will schedule PCP follow up appointment.   CC will send out introduction letter with  contact information via [employer].  At this time, patient denies outstanding need for connection or referral to resources or assistance navigating recommended follow up care. No further Care Coordination outreaches scheduled at this time, patient provided with this writer's number and encouraged to call with future needs or questions.    Sridevi Roth RN Care Coordinator  [facility] Clinics: [place], [place], [place], [place]  Phone: [phone]  E-Mail: [email or web address]

## 2020-08-24 NOTE — LETTER
[place] CARE COORDINATION  [address]  [facility] [address]    August 24, 2020    Harpreet Vera  [address] [facility] [address]      Dear Harpreet,    I am a clinic care coordinator who works with Lucía Sandhu MD at [facility]. I wanted to thank you for spending the time to talk with me.  Below is a description of clinic care coordination and how I can further assist you.      The clinic care coordination team is made up of a registered nurse,  and community health worker who understand the health care system. The goal of clinic care coordination is to help you manage your health and improve access to the health care system in the most efficient manner. The team can assist you in meeting your health care goals by providing education, coordinating services, strengthening the communication among your providers and supporting you with any resource needs.    Please feel free to contact me at [phone] with any questions or concerns. We are focused on providing you with the highest-quality healthcare experience possible and that all starts with you.     Sincerely,     Sridevi Roth RN Care Coordinator  [facility] Clinics: [place], [place], [place], [place]  Phone: [phone]  E-Mail: [email or web address]

## 2020-08-25 ENCOUNTER — OFFICE VISIT (OUTPATIENT)
Dept: UROLOGY | Facility: CLINIC | Age: 70
End: 2020-08-25
Payer: MEDICARE

## 2020-08-25 VITALS
BODY MASS INDEX: 29.51 KG/M2 | WEIGHT: 188 LBS | HEIGHT: 67 IN | SYSTOLIC BLOOD PRESSURE: 124 MMHG | DIASTOLIC BLOOD PRESSURE: 86 MMHG

## 2020-08-25 DIAGNOSIS — R39.12 BENIGN PROSTATIC HYPERPLASIA WITH WEAK URINARY STREAM: Primary | ICD-10-CM

## 2020-08-25 DIAGNOSIS — R97.20 ELEVATED PROSTATE SPECIFIC ANTIGEN (PSA): ICD-10-CM

## 2020-08-25 DIAGNOSIS — N40.1 BENIGN PROSTATIC HYPERPLASIA WITH WEAK URINARY STREAM: Primary | ICD-10-CM

## 2020-08-25 LAB
ALBUMIN UR-MCNC: NEGATIVE MG/DL
APPEARANCE UR: CLEAR
BILIRUB UR QL STRIP: NEGATIVE
COLOR UR AUTO: YELLOW
GLUCOSE UR STRIP-MCNC: NEGATIVE MG/DL
HGB UR QL STRIP: NEGATIVE
KETONES UR STRIP-MCNC: NEGATIVE MG/DL
LEUKOCYTE ESTERASE UR QL STRIP: NEGATIVE
NITRATE UR QL: NEGATIVE
PH UR STRIP: 6 PH (ref 5–7)
RESIDUAL VOLUME (RV) (EXTERNAL): 22
SOURCE: NORMAL
SP GR UR STRIP: 1.02 (ref 1–1.03)
UROBILINOGEN UR STRIP-ACNC: 0.2 EU/DL (ref 0.2–1)

## 2020-08-25 PROCEDURE — 99212 OFFICE O/P EST SF 10 MIN: CPT | Mod: 25 | Performed by: UROLOGY

## 2020-08-25 PROCEDURE — 81003 URINALYSIS AUTO W/O SCOPE: CPT | Mod: QW | Performed by: UROLOGY

## 2020-08-25 PROCEDURE — 51798 US URINE CAPACITY MEASURE: CPT | Performed by: UROLOGY

## 2020-08-25 ASSESSMENT — PAIN SCALES - GENERAL: PAINLEVEL: NO PAIN (0)

## 2020-08-25 ASSESSMENT — MIFFLIN-ST. JEOR: SCORE: 1561.39

## 2020-08-25 NOTE — PROGRESS NOTES
Mr. Vera is a pleasant 70-year-old male with a strong family history of prostate cancer.  His PSA is 2.72.  Other past medical history: Colonic polyps, brachial plexopathy, chronic infection with C. difficile, hypertension, sinus node dysfunction, sleep apnea, non-smoker.  Several orthopedic surgeries  Medications: Tylenol, allopurinol, regular aspirin, Avodart, furosemide, losartan, multivitamin, vitamin D3  Allergies: Pravastatin  Review of systems: New pacemaker this weekend-patient had several falls, vertebral artery stent  Exam: Alert and oriented, normal appearance, normal vital signs.  Normal respirations, neuro grossly intact.  Normal sphincter tone, no rectal mass or impaction, benign and symmetric prostate, normal seminal vesicles  Assessment: Family history of prostate cancer, increased PSA velocity (1.2 rise considering Avodart use), new pacemaker  Plan: Repeat MRI of prostate if this can be done with his new pacemaker-patient will see his cardiologist next week and let us know.

## 2020-08-25 NOTE — NURSING NOTE
Chief Complaint   Patient presents with     Benign Prostatic Hypertrophy     Review latest PSA     PVR: 22 mL      Nakia Jay, EMT

## 2020-08-25 NOTE — LETTER
8/25/2020       RE: Harpreet Vera  [address] [facility] [address]     Dear Colleague,    Thank you for referring your patient, Harpreet Vera, to the [facility] [place] at [facility]. Please see a copy of my visit note below.    Mr. Vera is a pleasant 70-year-old male with a strong family history of prostate cancer.  His PSA is 2.72.  Other past medical history: Colonic polyps, brachial plexopathy, chronic infection with C. difficile, hypertension, sinus node dysfunction, sleep apnea, non-smoker.  Several orthopedic surgeries  Medications: Tylenol, allopurinol, regular aspirin, Avodart, furosemide, losartan, multivitamin, vitamin D3  Allergies: Pravastatin  Review of systems: New pacemaker this weekend-patient had several falls, vertebral artery stent  Exam: Alert and oriented, normal appearance, normal vital signs.  Normal respirations, neuro grossly intact.  Normal sphincter tone, no rectal mass or impaction, benign and symmetric prostate, normal seminal vesicles  Assessment: Family history of prostate cancer, increased PSA velocity (1.2 rise considering Avodart use), new pacemaker  Plan: Repeat MRI of prostate if this can be done with his new pacemaker-patient will see his cardiologist next week and let us know.      Again, thank you for allowing me to participate in the care of your patient.      Sincerely,    Hadley Bruno MD

## 2020-08-25 NOTE — LETTER
8/25/2020      RE: Harpreet Vera  [address] [facility] [address]       Mr. Vera is a pleasant 70-year-old male with a strong family history of prostate cancer.  His PSA is 2.72.  Other past medical history: Colonic polyps, brachial plexopathy, chronic infection with C. difficile, hypertension, sinus node dysfunction, sleep apnea, non-smoker.  Several orthopedic surgeries  Medications: Tylenol, allopurinol, regular aspirin, Avodart, furosemide, losartan, multivitamin, vitamin D3  Allergies: Pravastatin  Review of systems: New pacemaker this weekend-patient had several falls, vertebral artery stent  Exam: Alert and oriented, normal appearance, normal vital signs.  Normal respirations, neuro grossly intact.  Normal sphincter tone, no rectal mass or impaction, benign and symmetric prostate, normal seminal vesicles  Assessment: Family history of prostate cancer, increased PSA velocity (1.2 rise considering Avodart use), new pacemaker  Plan: Repeat MRI of prostate if this can be done with his new pacemaker-patient will see his cardiologist next week and let us know.      Hadley Bruno MD

## 2020-08-26 ENCOUNTER — OFFICE VISIT (OUTPATIENT)
Dept: INTERNAL MEDICINE | Facility: CLINIC | Age: 70
End: 2020-08-26
Payer: MEDICARE

## 2020-08-26 VITALS
HEIGHT: 67 IN | DIASTOLIC BLOOD PRESSURE: 74 MMHG | OXYGEN SATURATION: 98 % | HEART RATE: 60 BPM | TEMPERATURE: 98.1 F | SYSTOLIC BLOOD PRESSURE: 134 MMHG | BODY MASS INDEX: 30.7 KG/M2 | WEIGHT: 195.6 LBS | RESPIRATION RATE: 16 BRPM

## 2020-08-26 DIAGNOSIS — R00.1 BRADYCARDIA, SEVERE SINUS: Primary | ICD-10-CM

## 2020-08-26 DIAGNOSIS — Z95.0 CARDIAC PACEMAKER IN SITU: ICD-10-CM

## 2020-08-26 PROCEDURE — 99495 TRANSJ CARE MGMT MOD F2F 14D: CPT | Performed by: INTERNAL MEDICINE

## 2020-08-26 ASSESSMENT — MIFFLIN-ST. JEOR: SCORE: 1595.87

## 2020-08-26 NOTE — PROGRESS NOTES
"Subjective     Harpreet Vera is a 70 year old male who presents to clinic today for the following health issues:    [facility]     Hospital Follow-up Visit:    Hospital/Nursing Home/IP Rehab Facility: [facility]  Date of Admission: 8-  Date of Discharge: 8-  Reason(s) for Admission: syncope/pacer      Was your hospitalization related to COVID-19? No   Problems taking medications regularly:  None  Medication changes since discharge: None  Problems adhering to non-medication therapy:  None    Summary of hospitalization:  [facility] discharge summary reviewed  Diagnostic Tests/Treatments reviewed.  Follow up needed: with Cardiology  Other Healthcare Providers Involved in Patient s Care:         None  Update since discharge: improved. He has had no problems with lightheadedness or dizziness.no palpitations,chest pain or shortness of breath. He has some soreness in his left shoulder area where the pacemaker is placed. He is anxious to get back to golf but is following instructions.            Post Discharge Medication Reconciliation: discharge medications reconciled, continue medications without change.  Plan of care communicated with patient                  Review of Systems   Constitutional, HEENT, cardiovascular, pulmonary, GI, , musculoskeletal, neuro, skin, endocrine and psych systems are negative, except as otherwise noted.      Objective    /74 (BP Location: Right arm, Patient Position: Chair, Cuff Size: Adult Large)   Pulse 60   Temp 98.1  F (36.7  C) (Oral)   Resp 16   Ht 1.702 m (5' 7\")   Wt 88.7 kg (195 lb 9.6 oz)   SpO2 98%   BMI 30.64 kg/m    Body mass index is 30.64 kg/m .  Physical Exam   GENERAL: healthy, alert and no distress  NECK: no adenopathy, no asymmetry, masses, or scars and thyroid normal to palpation  RESP: lungs clear to auscultation - no rales, rhonchi or wheezes  CV: regular rate and rhythm, normal S1 S2, no S3 or S4, no murmur, click or rub, no " "peripheral edema and peripheral pulses strong  ABDOMEN: soft, nontender, no hepatosplenomegaly, no masses and bowel sounds normal  MS: no gross musculoskeletal defects noted, no edema          Assessment & Plan     Bradycardia, severe sinus  Doing well with pacemaker and no problems with incision area    Cardiac pacemaker in situ  as above.        BMI:   Estimated body mass index is 30.64 kg/m  as calculated from the following:    Height as of this encounter: 1.702 m (5' 7\").    Weight as of this encounter: 88.7 kg (195 lb 9.6 oz).           No follow-ups on file.    Lucía Sandhu MD  [facility]    "

## 2020-08-28 ENCOUNTER — DOCUMENTATION ONLY (OUTPATIENT)
Dept: CARDIOLOGY | Facility: CLINIC | Age: 70
End: 2020-08-28

## 2020-08-28 NOTE — PROGRESS NOTES
Wellness Screening Tool    Symptom Screening:    Do you have one of the following NEW symptoms:      Fever (subjective or >100.0)?  No    New cough? No    Shortness of breath? No    Chills? No    New loss of taste or smell? No    Generalized body aches? No    New persistent headache? No    New sore throat? No    Nausea, vomiting or diarrhea? No    Within the past 2 weeks, have you been exposed to someone with a known positive illness below?      COVID - 19 (known or suspected) No    Chicken pox?  No    Measles? No    Pertussis? No    Have you had a positive COVID-19 diagnostic test (nasal swab test) in the last 14 days or are you currently   on self-quarantine restrictions (i.e.travel restriction, exposure, etc?) No        Patient notified of visitor restriction: Yes  Patient informed to wear a mask: Yes    Patient's appointment status: Patient will be seen in clinic as scheduled on 8/31/20 @ 9:00am. Andrey DIAS

## 2020-08-31 ENCOUNTER — ANCILLARY PROCEDURE (OUTPATIENT)
Dept: CARDIOLOGY | Facility: CLINIC | Age: 70
End: 2020-08-31
Attending: INTERNAL MEDICINE
Payer: MEDICARE

## 2020-08-31 DIAGNOSIS — Z95.0 CARDIAC PACEMAKER IN SITU: ICD-10-CM

## 2020-08-31 DIAGNOSIS — R00.1 BRADYCARDIA, SEVERE SINUS: ICD-10-CM

## 2020-08-31 PROCEDURE — 93280 PM DEVICE PROGR EVAL DUAL: CPT | Performed by: INTERNAL MEDICINE

## 2020-09-01 ENCOUNTER — TELEPHONE (OUTPATIENT)
Dept: UROLOGY | Facility: CLINIC | Age: 70
End: 2020-09-01

## 2020-09-01 NOTE — TELEPHONE ENCOUNTER
[facility] Call Center    Phone Message    May a detailed message be left on voicemail: yes     Reason for Call: Other: Harpreet calling to pass along some information to Dr. Bruno's care team and to request a call back. Harpreet was told by Dr. Bruno to let him know when Harpreet had spoken with his cardiologist. Harpreet said that his cardiologist told him to not have the MRI until after 10/12/20, and that he should do it at [facility], because they know how to handle pacemakers. Please give Harpreet a call back to discuss further at your earliest convenience.     Action Taken: Message routed to:  Other: [facility]    Travel Screening: Not Applicable

## 2020-09-02 NOTE — TELEPHONE ENCOUNTER
"Per MD-\"He can do 3T MRI at [facility].   His cardiologist can get one of his cardiology colleagues at [facility] to cover for him.\"      "

## 2020-09-02 NOTE — TELEPHONE ENCOUNTER
Informed patient to call to [place] to schedule prostate MRI and inform them he has a pacemake and check with them to see if there is a tech there who knows how to handle pacemakers ect. If not he will need to call our office back, patient expressed understanding. Explained to patient that the sooner he can get his MRI the better, but it's fine if he needs to wait until October.     Nena Hanna LPN

## 2020-09-07 LAB
MDC_IDC_LEAD_IMPLANT_DT: NORMAL
MDC_IDC_LEAD_IMPLANT_DT: NORMAL
MDC_IDC_LEAD_LOCATION: NORMAL
MDC_IDC_LEAD_LOCATION: NORMAL
MDC_IDC_LEAD_LOCATION_DETAIL_1: NORMAL
MDC_IDC_LEAD_LOCATION_DETAIL_1: NORMAL
MDC_IDC_LEAD_MFG: NORMAL
MDC_IDC_LEAD_MFG: NORMAL
MDC_IDC_LEAD_MODEL: NORMAL
MDC_IDC_LEAD_MODEL: NORMAL
MDC_IDC_LEAD_SERIAL: NORMAL
MDC_IDC_LEAD_SERIAL: NORMAL
MDC_IDC_MSMT_BATTERY_STATUS: NORMAL
MDC_IDC_MSMT_LEADCHNL_RA_IMPEDANCE_VALUE: 1032 OHM
MDC_IDC_MSMT_LEADCHNL_RA_PACING_THRESHOLD_AMPLITUDE: 0.9 V
MDC_IDC_MSMT_LEADCHNL_RA_PACING_THRESHOLD_PULSEWIDTH: 0.4 MS
MDC_IDC_MSMT_LEADCHNL_RA_SENSING_INTR_AMPL: 5.2 MV
MDC_IDC_MSMT_LEADCHNL_RV_IMPEDANCE_VALUE: 624 OHM
MDC_IDC_MSMT_LEADCHNL_RV_PACING_THRESHOLD_AMPLITUDE: 0.4 V
MDC_IDC_MSMT_LEADCHNL_RV_PACING_THRESHOLD_PULSEWIDTH: 0.4 MS
MDC_IDC_MSMT_LEADCHNL_RV_SENSING_INTR_AMPL: 14.7 MV
MDC_IDC_PG_IMPLANT_DTM: NORMAL
MDC_IDC_PG_MFG: NORMAL
MDC_IDC_PG_MODEL: NORMAL
MDC_IDC_PG_SERIAL: NORMAL
MDC_IDC_PG_TYPE: NORMAL
MDC_IDC_SESS_CLINIC_NAME: NORMAL
MDC_IDC_SESS_DTM: NORMAL
MDC_IDC_SESS_TYPE: NORMAL
MDC_IDC_SET_BRADY_AT_MODE_SWITCH_MODE: NORMAL
MDC_IDC_SET_BRADY_AT_MODE_SWITCH_RATE: 170 {BEATS}/MIN
MDC_IDC_SET_BRADY_HYSTRATE: NORMAL
MDC_IDC_SET_BRADY_LOWRATE: 60 {BEATS}/MIN
MDC_IDC_SET_BRADY_MAX_SENSOR_RATE: 130 {BEATS}/MIN
MDC_IDC_SET_BRADY_MAX_TRACKING_RATE: 130 {BEATS}/MIN
MDC_IDC_SET_BRADY_MODE: NORMAL
MDC_IDC_SET_BRADY_PAV_DELAY_HIGH: 150 MS
MDC_IDC_SET_BRADY_PAV_DELAY_LOW: 200 MS
MDC_IDC_SET_BRADY_SAV_DELAY_HIGH: 150 MS
MDC_IDC_SET_BRADY_SAV_DELAY_LOW: 200 MS
MDC_IDC_SET_LEADCHNL_RA_PACING_AMPLITUDE: 2 V
MDC_IDC_SET_LEADCHNL_RA_PACING_CAPTURE_MODE: NORMAL
MDC_IDC_SET_LEADCHNL_RA_PACING_POLARITY: NORMAL
MDC_IDC_SET_LEADCHNL_RA_PACING_PULSEWIDTH: 0.4 MS
MDC_IDC_SET_LEADCHNL_RA_SENSING_ADAPTATION_MODE: NORMAL
MDC_IDC_SET_LEADCHNL_RA_SENSING_POLARITY: NORMAL
MDC_IDC_SET_LEADCHNL_RA_SENSING_SENSITIVITY: 0.25 MV
MDC_IDC_SET_LEADCHNL_RV_PACING_AMPLITUDE: 1.1 V
MDC_IDC_SET_LEADCHNL_RV_PACING_CAPTURE_MODE: NORMAL
MDC_IDC_SET_LEADCHNL_RV_PACING_POLARITY: NORMAL
MDC_IDC_SET_LEADCHNL_RV_PACING_PULSEWIDTH: 0.4 MS
MDC_IDC_SET_LEADCHNL_RV_SENSING_ADAPTATION_MODE: NORMAL
MDC_IDC_SET_LEADCHNL_RV_SENSING_POLARITY: NORMAL
MDC_IDC_SET_LEADCHNL_RV_SENSING_SENSITIVITY: 1.5 MV
MDC_IDC_SET_ZONE_DETECTION_INTERVAL: 375 MS
MDC_IDC_SET_ZONE_TYPE: NORMAL
MDC_IDC_SET_ZONE_VENDOR_TYPE: NORMAL
MDC_IDC_STAT_BRADY_RA_PERCENT_PACED: 66 %
MDC_IDC_STAT_BRADY_RV_PERCENT_PACED: 1 %
MDC_IDC_STAT_EPISODE_RECENT_COUNT: 0
MDC_IDC_STAT_EPISODE_RECENT_COUNT_DTM_END: NORMAL
MDC_IDC_STAT_EPISODE_RECENT_COUNT_DTM_START: NORMAL
MDC_IDC_STAT_EPISODE_TOTAL_COUNT: 0
MDC_IDC_STAT_EPISODE_TOTAL_COUNT_DTM_END: NORMAL
MDC_IDC_STAT_EPISODE_TYPE: NORMAL
MDC_IDC_STAT_EPISODE_TYPE: NORMAL
MDC_IDC_STAT_EPISODE_VENDOR_TYPE: NORMAL
MDC_IDC_STAT_EPISODE_VENDOR_TYPE: NORMAL

## 2020-09-14 DIAGNOSIS — I10 ESSENTIAL HYPERTENSION, BENIGN: Primary | ICD-10-CM

## 2020-09-15 NOTE — TELEPHONE ENCOUNTER
Pending Prescriptions:                       Disp   Refills    furosemide (LASIX) 20 MG tablet [Pharmacy *30 tab*0        Sig: TAKE 1/2 TABLET BY MOUTH EVERY DAY    Routing refill request to provider for review/approval because:  Medication is reported/historical

## 2020-09-16 RX ORDER — FUROSEMIDE 20 MG
TABLET ORAL
Qty: 30 TABLET | Refills: 0 | Status: SHIPPED | OUTPATIENT
Start: 2020-09-16 | End: 2022-04-12

## 2020-10-09 ENCOUNTER — DOCUMENTATION ONLY (OUTPATIENT)
Dept: CARDIOLOGY | Facility: CLINIC | Age: 70
End: 2020-10-09

## 2020-10-09 NOTE — PROGRESS NOTES
Wellness Screening Tool    Symptom Screening:    Do you have one of the following NEW symptoms:      Fever (subjective or >100.0)?  No    New cough? No    Shortness of breath? No    Chills? No    New loss of taste or smell? No    Generalized body aches? No    New persistent headache? No    New sore throat? No    Nausea, vomiting or diarrhea? No    Within the past 2 weeks, have you been exposed to someone with a known positive illness below?      COVID - 19 (known or suspected) No    Chicken pox?  No    Measles? No    Pertussis? No    Have you had a positive COVID-19 diagnostic test (nasal swab test) in the last 14 days or are you currently   on self-quarantine restrictions (i.e.travel restriction, exposure, etc?) No        Patient notified of visitor restriction: Yes  Patient informed to wear a mask: Yes    Patient's appointment status: Patient will be seen in clinic as scheduled on 10/12/20 @ 9:00am. Andrey DIAS

## 2020-10-12 ENCOUNTER — ANCILLARY PROCEDURE (OUTPATIENT)
Dept: CARDIOLOGY | Facility: CLINIC | Age: 70
End: 2020-10-12
Attending: INTERNAL MEDICINE
Payer: MEDICARE

## 2020-10-12 DIAGNOSIS — Z95.0 CARDIAC PACEMAKER IN SITU: ICD-10-CM

## 2020-10-12 DIAGNOSIS — I49.5 SSS (SICK SINUS SYNDROME) (H): Primary | ICD-10-CM

## 2020-10-12 PROCEDURE — 93280 PM DEVICE PROGR EVAL DUAL: CPT | Performed by: INTERNAL MEDICINE

## 2020-10-14 ENCOUNTER — HOSPITAL ENCOUNTER (OUTPATIENT)
Dept: MRI IMAGING | Facility: CLINIC | Age: 70
End: 2020-10-14
Attending: UROLOGY
Payer: MEDICARE

## 2020-10-14 ENCOUNTER — ANCILLARY PROCEDURE (OUTPATIENT)
Dept: CARDIOLOGY | Facility: CLINIC | Age: 70
End: 2020-10-14
Attending: NURSE PRACTITIONER
Payer: MEDICARE

## 2020-10-14 DIAGNOSIS — R97.20 ELEVATED PROSTATE SPECIFIC ANTIGEN (PSA): ICD-10-CM

## 2020-10-14 DIAGNOSIS — Z95.0 CARDIAC PACEMAKER IN SITU: ICD-10-CM

## 2020-10-14 LAB
MDC_IDC_LEAD_IMPLANT_DT: NORMAL
MDC_IDC_LEAD_IMPLANT_DT: NORMAL
MDC_IDC_LEAD_LOCATION: NORMAL
MDC_IDC_LEAD_LOCATION: NORMAL
MDC_IDC_LEAD_LOCATION_DETAIL_1: NORMAL
MDC_IDC_LEAD_LOCATION_DETAIL_1: NORMAL
MDC_IDC_LEAD_MFG: NORMAL
MDC_IDC_LEAD_MFG: NORMAL
MDC_IDC_LEAD_MODEL: NORMAL
MDC_IDC_LEAD_MODEL: NORMAL
MDC_IDC_LEAD_SERIAL: NORMAL
MDC_IDC_LEAD_SERIAL: NORMAL
MDC_IDC_MSMT_BATTERY_STATUS: NORMAL
MDC_IDC_MSMT_LEADCHNL_RA_IMPEDANCE_VALUE: 1009 OHM
MDC_IDC_MSMT_LEADCHNL_RA_PACING_THRESHOLD_AMPLITUDE: 0.7 V
MDC_IDC_MSMT_LEADCHNL_RA_PACING_THRESHOLD_PULSEWIDTH: 0.4 MS
MDC_IDC_MSMT_LEADCHNL_RA_SENSING_INTR_AMPL: 5.2 MV
MDC_IDC_MSMT_LEADCHNL_RV_IMPEDANCE_VALUE: 638 OHM
MDC_IDC_MSMT_LEADCHNL_RV_PACING_THRESHOLD_AMPLITUDE: 0.4 V
MDC_IDC_MSMT_LEADCHNL_RV_PACING_THRESHOLD_PULSEWIDTH: 0.4 MS
MDC_IDC_MSMT_LEADCHNL_RV_SENSING_INTR_AMPL: 17.4 MV
MDC_IDC_PG_IMPLANT_DTM: NORMAL
MDC_IDC_PG_MFG: NORMAL
MDC_IDC_PG_MODEL: NORMAL
MDC_IDC_PG_SERIAL: NORMAL
MDC_IDC_PG_TYPE: NORMAL
MDC_IDC_SESS_CLINIC_NAME: NORMAL
MDC_IDC_SESS_DTM: NORMAL
MDC_IDC_SESS_TYPE: NORMAL
MDC_IDC_SET_BRADY_AT_MODE_SWITCH_MODE: NORMAL
MDC_IDC_SET_BRADY_AT_MODE_SWITCH_RATE: 170 {BEATS}/MIN
MDC_IDC_SET_BRADY_LOWRATE: 60 {BEATS}/MIN
MDC_IDC_SET_BRADY_MAX_SENSOR_RATE: 130 {BEATS}/MIN
MDC_IDC_SET_BRADY_MAX_TRACKING_RATE: 130 {BEATS}/MIN
MDC_IDC_SET_BRADY_MODE: NORMAL
MDC_IDC_SET_BRADY_PAV_DELAY_HIGH: 150 MS
MDC_IDC_SET_BRADY_PAV_DELAY_LOW: 200 MS
MDC_IDC_SET_BRADY_SAV_DELAY_HIGH: 150 MS
MDC_IDC_SET_BRADY_SAV_DELAY_LOW: 200 MS
MDC_IDC_SET_LEADCHNL_RA_PACING_AMPLITUDE: 2 V
MDC_IDC_SET_LEADCHNL_RA_PACING_CAPTURE_MODE: NORMAL
MDC_IDC_SET_LEADCHNL_RA_PACING_POLARITY: NORMAL
MDC_IDC_SET_LEADCHNL_RA_PACING_PULSEWIDTH: 0.4 MS
MDC_IDC_SET_LEADCHNL_RA_SENSING_ADAPTATION_MODE: NORMAL
MDC_IDC_SET_LEADCHNL_RA_SENSING_POLARITY: NORMAL
MDC_IDC_SET_LEADCHNL_RA_SENSING_SENSITIVITY: 0.25 MV
MDC_IDC_SET_LEADCHNL_RV_PACING_AMPLITUDE: 1 V
MDC_IDC_SET_LEADCHNL_RV_PACING_CAPTURE_MODE: NORMAL
MDC_IDC_SET_LEADCHNL_RV_PACING_POLARITY: NORMAL
MDC_IDC_SET_LEADCHNL_RV_PACING_PULSEWIDTH: 0.4 MS
MDC_IDC_SET_LEADCHNL_RV_SENSING_ADAPTATION_MODE: NORMAL
MDC_IDC_SET_LEADCHNL_RV_SENSING_POLARITY: NORMAL
MDC_IDC_SET_LEADCHNL_RV_SENSING_SENSITIVITY: 1.5 MV
MDC_IDC_SET_ZONE_DETECTION_INTERVAL: 375 MS
MDC_IDC_SET_ZONE_TYPE: NORMAL
MDC_IDC_SET_ZONE_VENDOR_TYPE: NORMAL
MDC_IDC_STAT_EPISODE_RECENT_COUNT: 0
MDC_IDC_STAT_EPISODE_RECENT_COUNT_DTM_END: NORMAL
MDC_IDC_STAT_EPISODE_RECENT_COUNT_DTM_START: NORMAL
MDC_IDC_STAT_EPISODE_TOTAL_COUNT: 0
MDC_IDC_STAT_EPISODE_TOTAL_COUNT_DTM_END: NORMAL
MDC_IDC_STAT_EPISODE_TYPE: NORMAL
MDC_IDC_STAT_EPISODE_TYPE: NORMAL
MDC_IDC_STAT_EPISODE_VENDOR_TYPE: NORMAL
MDC_IDC_STAT_EPISODE_VENDOR_TYPE: NORMAL

## 2020-10-14 PROCEDURE — 99207 CARDIAC DEVICE CHECK - INPATIENT: CPT | Mod: 26 | Performed by: INTERNAL MEDICINE

## 2020-10-14 PROCEDURE — 93286 PERI-PX EVAL PM/LDLS PM IP: CPT | Mod: 26 | Performed by: INTERNAL MEDICINE

## 2020-10-14 PROCEDURE — 72197 MRI PELVIS W/O & W/DYE: CPT

## 2020-10-14 PROCEDURE — A9585 GADOBUTROL INJECTION: HCPCS | Performed by: UROLOGY

## 2020-10-14 PROCEDURE — 93286 PERI-PX EVAL PM/LDLS PM IP: CPT

## 2020-10-14 PROCEDURE — 72197 MRI PELVIS W/O & W/DYE: CPT | Mod: 26 | Performed by: RADIOLOGY

## 2020-10-14 PROCEDURE — 255N000002 HC RX 255 OP 636: Performed by: UROLOGY

## 2020-10-14 RX ORDER — GADOBUTROL 604.72 MG/ML
10 INJECTION INTRAVENOUS ONCE
Status: COMPLETED | OUTPATIENT
Start: 2020-10-14 | End: 2020-10-14

## 2020-10-14 RX ADMIN — GADOBUTROL 10 ML: 604.72 INJECTION INTRAVENOUS at 10:32

## 2020-10-15 LAB
MDC_IDC_LEAD_IMPLANT_DT: NORMAL
MDC_IDC_LEAD_LOCATION: NORMAL
MDC_IDC_LEAD_LOCATION_DETAIL_1: NORMAL
MDC_IDC_LEAD_MFG: NORMAL
MDC_IDC_LEAD_MODEL: NORMAL
MDC_IDC_LEAD_SERIAL: NORMAL
MDC_IDC_MSMT_BATTERY_DTM: NORMAL
MDC_IDC_MSMT_BATTERY_DTM: NORMAL
MDC_IDC_MSMT_BATTERY_REMAINING_LONGEVITY: 180 MO
MDC_IDC_MSMT_BATTERY_REMAINING_LONGEVITY: 180 MO
MDC_IDC_MSMT_BATTERY_STATUS: NORMAL
MDC_IDC_MSMT_BATTERY_STATUS: NORMAL
MDC_IDC_MSMT_LEADCHNL_RA_IMPEDANCE_VALUE: 865 OHM
MDC_IDC_MSMT_LEADCHNL_RA_IMPEDANCE_VALUE: 865 OHM
MDC_IDC_MSMT_LEADCHNL_RA_PACING_THRESHOLD_AMPLITUDE: 0.6 V
MDC_IDC_MSMT_LEADCHNL_RA_PACING_THRESHOLD_AMPLITUDE: 0.7 V
MDC_IDC_MSMT_LEADCHNL_RA_PACING_THRESHOLD_PULSEWIDTH: 0.4 MS
MDC_IDC_MSMT_LEADCHNL_RA_PACING_THRESHOLD_PULSEWIDTH: 0.4 MS
MDC_IDC_MSMT_LEADCHNL_RA_SENSING_INTR_AMPL: 5.2 MV
MDC_IDC_MSMT_LEADCHNL_RA_SENSING_INTR_AMPL: 5.6 MV
MDC_IDC_MSMT_LEADCHNL_RV_IMPEDANCE_VALUE: 641 OHM
MDC_IDC_MSMT_LEADCHNL_RV_IMPEDANCE_VALUE: 641 OHM
MDC_IDC_MSMT_LEADCHNL_RV_PACING_THRESHOLD_AMPLITUDE: 0.4 V
MDC_IDC_MSMT_LEADCHNL_RV_PACING_THRESHOLD_AMPLITUDE: 0.4 V
MDC_IDC_MSMT_LEADCHNL_RV_PACING_THRESHOLD_PULSEWIDTH: 0.4 MS
MDC_IDC_MSMT_LEADCHNL_RV_PACING_THRESHOLD_PULSEWIDTH: 0.4 MS
MDC_IDC_MSMT_LEADCHNL_RV_SENSING_INTR_AMPL: 16.3 MV
MDC_IDC_MSMT_LEADCHNL_RV_SENSING_INTR_AMPL: 17.4 MV
MDC_IDC_PG_IMPLANT_DTM: NORMAL
MDC_IDC_PG_IMPLANT_DTM: NORMAL
MDC_IDC_PG_MFG: NORMAL
MDC_IDC_PG_MFG: NORMAL
MDC_IDC_PG_MODEL: NORMAL
MDC_IDC_PG_MODEL: NORMAL
MDC_IDC_PG_SERIAL: NORMAL
MDC_IDC_PG_SERIAL: NORMAL
MDC_IDC_PG_TYPE: NORMAL
MDC_IDC_PG_TYPE: NORMAL
MDC_IDC_SESS_CLINIC_NAME: NORMAL
MDC_IDC_SESS_CLINIC_NAME: NORMAL
MDC_IDC_SESS_DTM: NORMAL
MDC_IDC_SESS_DTM: NORMAL
MDC_IDC_SESS_TYPE: NORMAL
MDC_IDC_SESS_TYPE: NORMAL
MDC_IDC_SET_BRADY_AT_MODE_SWITCH_MODE: NORMAL
MDC_IDC_SET_BRADY_AT_MODE_SWITCH_MODE: NORMAL
MDC_IDC_SET_BRADY_AT_MODE_SWITCH_RATE: 170 {BEATS}/MIN
MDC_IDC_SET_BRADY_AT_MODE_SWITCH_RATE: 170 {BEATS}/MIN
MDC_IDC_SET_BRADY_LOWRATE: 60 {BEATS}/MIN
MDC_IDC_SET_BRADY_LOWRATE: 60 {BEATS}/MIN
MDC_IDC_SET_BRADY_MAX_SENSOR_RATE: 130 {BEATS}/MIN
MDC_IDC_SET_BRADY_MAX_SENSOR_RATE: 130 {BEATS}/MIN
MDC_IDC_SET_BRADY_MAX_TRACKING_RATE: 130 {BEATS}/MIN
MDC_IDC_SET_BRADY_MAX_TRACKING_RATE: 130 {BEATS}/MIN
MDC_IDC_SET_BRADY_MODE: NORMAL
MDC_IDC_SET_BRADY_MODE: NORMAL
MDC_IDC_SET_BRADY_PAV_DELAY_HIGH: 150 MS
MDC_IDC_SET_BRADY_PAV_DELAY_HIGH: 150 MS
MDC_IDC_SET_BRADY_PAV_DELAY_LOW: 200 MS
MDC_IDC_SET_BRADY_PAV_DELAY_LOW: 200 MS
MDC_IDC_SET_BRADY_SAV_DELAY_HIGH: 150 MS
MDC_IDC_SET_BRADY_SAV_DELAY_HIGH: 150 MS
MDC_IDC_SET_BRADY_SAV_DELAY_LOW: 200 MS
MDC_IDC_SET_BRADY_SAV_DELAY_LOW: 200 MS
MDC_IDC_SET_LEADCHNL_RA_PACING_AMPLITUDE: 2 V
MDC_IDC_SET_LEADCHNL_RA_PACING_AMPLITUDE: 2 V
MDC_IDC_SET_LEADCHNL_RA_PACING_CAPTURE_MODE: NORMAL
MDC_IDC_SET_LEADCHNL_RA_PACING_CAPTURE_MODE: NORMAL
MDC_IDC_SET_LEADCHNL_RA_PACING_POLARITY: NORMAL
MDC_IDC_SET_LEADCHNL_RA_PACING_POLARITY: NORMAL
MDC_IDC_SET_LEADCHNL_RA_PACING_PULSEWIDTH: 0.4 MS
MDC_IDC_SET_LEADCHNL_RA_PACING_PULSEWIDTH: 0.4 MS
MDC_IDC_SET_LEADCHNL_RA_SENSING_ADAPTATION_MODE: NORMAL
MDC_IDC_SET_LEADCHNL_RA_SENSING_ADAPTATION_MODE: NORMAL
MDC_IDC_SET_LEADCHNL_RA_SENSING_POLARITY: NORMAL
MDC_IDC_SET_LEADCHNL_RA_SENSING_POLARITY: NORMAL
MDC_IDC_SET_LEADCHNL_RA_SENSING_SENSITIVITY: 0.25 MV
MDC_IDC_SET_LEADCHNL_RA_SENSING_SENSITIVITY: 0.25 MV
MDC_IDC_SET_LEADCHNL_RV_PACING_AMPLITUDE: 3.5 V
MDC_IDC_SET_LEADCHNL_RV_PACING_AMPLITUDE: 3.5 V
MDC_IDC_SET_LEADCHNL_RV_PACING_CAPTURE_MODE: NORMAL
MDC_IDC_SET_LEADCHNL_RV_PACING_CAPTURE_MODE: NORMAL
MDC_IDC_SET_LEADCHNL_RV_PACING_POLARITY: NORMAL
MDC_IDC_SET_LEADCHNL_RV_PACING_POLARITY: NORMAL
MDC_IDC_SET_LEADCHNL_RV_PACING_PULSEWIDTH: 0.4 MS
MDC_IDC_SET_LEADCHNL_RV_PACING_PULSEWIDTH: 0.4 MS
MDC_IDC_SET_LEADCHNL_RV_SENSING_ADAPTATION_MODE: NORMAL
MDC_IDC_SET_LEADCHNL_RV_SENSING_ADAPTATION_MODE: NORMAL
MDC_IDC_SET_LEADCHNL_RV_SENSING_POLARITY: NORMAL
MDC_IDC_SET_LEADCHNL_RV_SENSING_POLARITY: NORMAL
MDC_IDC_SET_LEADCHNL_RV_SENSING_SENSITIVITY: 1.5 MV
MDC_IDC_SET_LEADCHNL_RV_SENSING_SENSITIVITY: 1.5 MV
MDC_IDC_SET_ZONE_DETECTION_INTERVAL: 375 MS
MDC_IDC_SET_ZONE_DETECTION_INTERVAL: 375 MS
MDC_IDC_SET_ZONE_TYPE: NORMAL
MDC_IDC_SET_ZONE_TYPE: NORMAL
MDC_IDC_SET_ZONE_VENDOR_TYPE: NORMAL
MDC_IDC_SET_ZONE_VENDOR_TYPE: NORMAL
MDC_IDC_STAT_BRADY_DTM_END: NORMAL
MDC_IDC_STAT_BRADY_DTM_END: NORMAL
MDC_IDC_STAT_BRADY_DTM_START: NORMAL
MDC_IDC_STAT_BRADY_DTM_START: NORMAL
MDC_IDC_STAT_BRADY_RA_PERCENT_PACED: 65 %
MDC_IDC_STAT_BRADY_RA_PERCENT_PACED: 65 %
MDC_IDC_STAT_BRADY_RV_PERCENT_PACED: 0 %
MDC_IDC_STAT_BRADY_RV_PERCENT_PACED: 0 %
MDC_IDC_STAT_EPISODE_RECENT_COUNT: 0
MDC_IDC_STAT_EPISODE_RECENT_COUNT: 0
MDC_IDC_STAT_EPISODE_RECENT_COUNT_DTM_END: NORMAL
MDC_IDC_STAT_EPISODE_RECENT_COUNT_DTM_END: NORMAL
MDC_IDC_STAT_EPISODE_RECENT_COUNT_DTM_START: NORMAL
MDC_IDC_STAT_EPISODE_RECENT_COUNT_DTM_START: NORMAL
MDC_IDC_STAT_EPISODE_TOTAL_COUNT: 0
MDC_IDC_STAT_EPISODE_TOTAL_COUNT: 0
MDC_IDC_STAT_EPISODE_TOTAL_COUNT_DTM_END: NORMAL
MDC_IDC_STAT_EPISODE_TOTAL_COUNT_DTM_END: NORMAL
MDC_IDC_STAT_EPISODE_TYPE: NORMAL
MDC_IDC_STAT_EPISODE_VENDOR_TYPE: NORMAL

## 2020-10-16 DIAGNOSIS — N40.1 BENIGN PROSTATIC HYPERPLASIA WITH WEAK URINARY STREAM: ICD-10-CM

## 2020-10-16 DIAGNOSIS — Z80.42 FAMILY HISTORY OF PROSTATE CANCER: Primary | ICD-10-CM

## 2020-10-16 DIAGNOSIS — R39.12 BENIGN PROSTATIC HYPERPLASIA WITH WEAK URINARY STREAM: ICD-10-CM

## 2020-11-04 ENCOUNTER — TRANSFERRED RECORDS (OUTPATIENT)
Dept: HEALTH INFORMATION MANAGEMENT | Facility: CLINIC | Age: 70
End: 2020-11-04

## 2020-11-04 LAB
ALT SERPL-CCNC: 19 IU/L (ref 5–40)
AST SERPL-CCNC: 19 U/L (ref 5–34)
CREAT SERPL-MCNC: 1.05 MG/DL (ref 0.5–1.3)
GFR SERPL CREATININE-BSD FRML MDRD: 73.8 ML/MIN/1.73M2

## 2020-11-05 ENCOUNTER — TRANSFERRED RECORDS (OUTPATIENT)
Dept: HEALTH INFORMATION MANAGEMENT | Facility: CLINIC | Age: 70
End: 2020-11-05

## 2020-11-06 DIAGNOSIS — Z11.59 ENCOUNTER FOR SCREENING FOR OTHER VIRAL DISEASES: Primary | ICD-10-CM

## 2020-11-13 ENCOUNTER — TELEPHONE (OUTPATIENT)
Dept: CARDIOLOGY | Facility: CLINIC | Age: 70
End: 2020-11-13

## 2020-11-13 NOTE — TELEPHONE ENCOUNTER
11/13/20 Incoming records from [facility] form visit on 11/5/20 received and sent to HIM for scanning.  Jose 1 pm

## 2020-11-30 ENCOUNTER — ANESTHESIA EVENT (OUTPATIENT)
Dept: SURGERY | Facility: CLINIC | Age: 70
End: 2020-11-30
Payer: MEDICARE

## 2020-12-02 ENCOUNTER — OFFICE VISIT (OUTPATIENT)
Dept: CARDIOLOGY | Facility: CLINIC | Age: 70
End: 2020-12-02
Attending: INTERNAL MEDICINE
Payer: MEDICARE

## 2020-12-02 VITALS
HEART RATE: 62 BPM | DIASTOLIC BLOOD PRESSURE: 83 MMHG | HEIGHT: 67 IN | SYSTOLIC BLOOD PRESSURE: 151 MMHG | WEIGHT: 194.8 LBS | BODY MASS INDEX: 30.57 KG/M2

## 2020-12-02 DIAGNOSIS — Z95.0 CARDIAC PACEMAKER IN SITU: ICD-10-CM

## 2020-12-02 DIAGNOSIS — I49.5 SSS (SICK SINUS SYNDROME) (H): ICD-10-CM

## 2020-12-02 PROCEDURE — 99214 OFFICE O/P EST MOD 30 MIN: CPT | Performed by: NURSE PRACTITIONER

## 2020-12-02 ASSESSMENT — MIFFLIN-ST. JEOR: SCORE: 1592.24

## 2020-12-02 NOTE — PATIENT INSTRUCTIONS
If you have problems or questions please call the RNs (Lena Bliss, & Pam) at [phone]    Please check your blood pressure at home prior to seeing Dr. Sandhu.       Call if you have problems prior to seeing Dr. Mccartney in October 2021.

## 2020-12-02 NOTE — LETTER
12/2/2020    Lucía Sandhu MD  [address] [facility] [address]  [facility] [address]    RE: Harpreet Vera       Dear Colleague,    I had the pleasure of seeing Harpreet Vera in the [facility].    HPI:  Harpreet Vera is a 70 year old male who presents after undergoing a permanent pacemaker.  He is a patient of Dr. Primo Laureano  His medical history includes     1. Sick sinus syndrome associated with syncope.  S/p [employer] dual-chamber permanent pacemaker (8/2020)  2. Carotid stenosis.    Stenting of the right vertebral artery with occlusion of the left.  2019  3. Mildly dilated ascending aorta.  4. Arthritis  5. Essential hypertension  6. MAN  7. Mumps  8. Benign neoplasm of the colon    Diagnostics:  ECHO (8/20/2020) revealed EF 55-60%  Device check (10/2020) revealed A-paced 62%  : <1 % Stable leads.  Battery life 15 years.   Atrial Arrhythmia: None     ventricular Arrhythmia none     In 11/2019, patient was noted to be dizzy and lightheaded and found to have a left vertebral artery occlusion and high grade right vertebral artery stenosis and underwent stenting of his vertebral artery.  He was also noted to be bradycardic and his atenolol was discontinued.    In August 2020, he was once again noted to be lightheaded and dizzy and a second later was on the ground and had fallen and lost consciousness.  While in the hospital he was found to have sinus pauses up to 11 seconds and subsequently underwent implantation of a dual chamber permanent pacemaker.    Today he reports feeling well and denies any dizziness.  He does not regularly take his BP at home but will start due to elevated BP in clinic.  He walks 2-3 miles 5-6 times per week and it takes 30-60 minutes and denies CV symptoms..  He went deer hunting this fall and has not had any chest pain, shortness of breath, or dizziness.  He is having right rotator cuff surgery this week.   Yesterday he used a battery [employer]  "and he was wondering if this effected his ppm.        ASSESSMENT AND PLAN    Sick sinus syndrome.    S/p [place] [employer] dual chamber permanent pacemaker (8/2020)    Normal device check    Stable leads    Follow with the device clinic on a quarterly basis.      Asked him to call [employer] regarding questions Of chainsaw and pacemaker    Hypertension    Today his BP is elevated While losartan 100 mg daily.  He will check his blood pressure at home and follow-up with his PCP in 5 days for a preoperative examination.    Carotid artery disease    Stenting of the right vertebral artery with occlusion of the left.    Currently taking aspirin 325 mg daily    Pravastatin is noted to be an allergy as it caused a \"fuzzy brain\"    Plan:    We will establish care with general cardiology (Dr. Mccartney) in 9 to 12 months in [place]    Follow-up with the device clinic on a quarterly basis    Follow-up with PCP regarding elevated blood pressure intake blood pressure at home prior to seeing PCP      Patient expresses understanding and agreement with the plan.     I appreciate the chance to help with Harpreet Vera Please let me know if you have any questions or concerns.    Katie Diego, APRN, CNP    This note was completed in part using Dragon voice recognition software. Although reviewed after completion, some word and grammatical errors may occur.    Orders Placed This Encounter   Procedures     Follow-Up with Cardiologist     No orders of the defined types were placed in this encounter.    There are no discontinued medications.      Encounter Diagnoses   Name Primary?     SSS (sick sinus syndrome) (H)      Cardiac pacemaker in situ        CURRENT MEDICATIONS:  Current Outpatient Medications   Medication Sig Dispense Refill     acetaminophen (TYLENOL) 500 MG tablet Take 500 mg by mouth as needed for mild pain       allopurinol (ZYLOPRIM) 100 MG tablet Take 200 mg by mouth every evening       aspirin - buffered " "(ASCRIPTIN) 325 MG TABS tablet Take 325 mg by mouth every evening       dutasteride (AVODART) 0.5 MG capsule Take 0.5 mg by mouth every evening       furosemide (LASIX) 20 MG tablet TAKE 1/2 TABLET BY MOUTH EVERY DAY (Patient taking differently: Takes 1/2 tablet PRN) 30 tablet 0     losartan (COZAAR) 100 MG tablet TAKE 1 TABLET BY MOUTH ONE TIME DAILY  90 tablet 2     multivitamin w/minerals (THERA-VIT-M) tablet Take 1 tablet by mouth every evening       vitamin D3 (CHOLECALCIFEROL) 50 mcg (2000 units) tablet Take 1 tablet by mouth every evening         ALLERGIES     Allergies   Allergen Reactions     Pravastatin      Fuzzy brain.        PAST MEDICAL HISTORY:  Past Medical History:   Diagnosis Date     Benign neoplasm of colon      Brachial plexopathy      Chronic infection 2007    HX of C Diff     Hypertension      Mumps      Sinus node dysfunction (H)     with syncope     Sleep apnea     Bi-PAP       PAST SURGICAL HISTORY:  Past Surgical History:   Procedure Laterality Date     CHOLECYSTECTOMY       COLONOSCOPY       COLONOSCOPY N/A 4/5/2017    Procedure: COMBINED COLONOSCOPY, SINGLE OR MULTIPLE BIOPSY/POLYPECTOMY BY BIOPSY;  Surgeon: Tylor Rice MD;  Location:  GI     DECOMPRESSION, FUSION CERVICAL ANTERIOR ONE LEVEL, COMBINED N/A 9/9/2016    Procedure: COMBINED DECOMPRESSION, FUSION CERVICAL ANTERIOR ONE LEVEL;  Surgeon: Erick Valles MD;  Location:  OR     ENT SURGERY      tonsilectomy  as a child     EP PACEMAKER N/A 8/21/2020    Procedure: EP Pacemaker;  Surgeon: Alfonso Sigala MD;  Location:  HEART CARDIAC CATH LAB     IR CAROTID CEREBRAL ANGIOGRAM BILATERAL  1/6/2020     IR CAROTID CEREBRAL ANGIOGRAM BILATERAL  7/1/2020     IR DISCONTINUE SHEATH  1/6/2020     ORTHOPEDIC SURGERY      ambar knees scopedrt shoulder,fx rt arm fx     TESTICLE SURGERY       VASECTOMY       ZZC NONSPECIFIC PROCEDURE      Right clavicle fracture, Multiple right sided rib fracture, hairline fracture \"pelvis\", " secondary to fall from tree         ZZC NONSPECIFIC PROCEDURE      Right ulnar/radial fracture        FAMILY HISTORY:  Family History   Problem Relation Age of Onset     Arthritis Mother      Eye Disorder Mother         cateracts     Lipids Mother      Cancer Father         colon/prostate     Eye Disorder Father         cateracts     Lipids Father      Diabetes Father      Cancer Maternal Grandfather         colon     Cancer Paternal Grandmother         colon     Cancer Maternal Aunt         colon     Cancer Maternal Uncle         colon     Prostate Cancer Brother      Colon Cancer Brother 74        liver mets     Prostate Cancer Brother        SOCIAL HISTORY:  Social History     Socioeconomic History     Marital status:      Spouse name: None     Number of children: None     Years of education: None     Highest education level: None   Occupational History     None   Social Needs     Financial resource strain: None     Food insecurity     Worry: None     Inability: None     Transportation needs     Medical: None     Non-medical: None   Tobacco Use     Smoking status: Never Smoker     Smokeless tobacco: Never Used   Substance and Sexual Activity     Alcohol use: Yes     Alcohol/week: 0.0 standard drinks     Comment: rarely     Drug use: No     Sexual activity: Yes     Partners: Female   Lifestyle     Physical activity     Days per week: None     Minutes per session: None     Stress: None   Relationships     Social connections     Talks on phone: None     Gets together: None     Attends Hinduism service: None     Active member of club or organization: None     Attends meetings of clubs or organizations: None     Relationship status: None     Intimate partner violence     Fear of current or ex partner: None     Emotionally abused: None     Physically abused: None     Forced sexual activity: None   Other Topics Concern     Parent/sibling w/ CABG, MI or angioplasty before 65F 55M? Not Asked   Social History  "Narrative     None       Review of Systems:  Skin:  Negative     Eyes:  Positive for glasses  ENT:  Negative    Respiratory:  Positive for sleep apnea  Cardiovascular:    edema;Positive for  Gastroenterology: Negative    Genitourinary:  Negative    Musculoskeletal:  Negative    Neurologic:  Negative    Psychiatric:  Negative    Heme/Lymph/Imm:  Negative    Endocrine:  Negative      Physical Exam:  Vitals: BP (!) 151/83 (BP Location: Right arm, Patient Position: Sitting, Cuff Size: Adult Regular)   Pulse 62   Ht 1.702 m (5' 7\")   Wt 88.4 kg (194 lb 12.8 oz)   BMI 30.51 kg/m      Constitutional:  cooperative, alert and oriented, well developed, well nourished, in no acute distress        Skin:  warm and dry to the touch        Head:           Eyes:  pupils equal and round        ENT:  no pallor or cyanosis        Neck:  carotid pulses are full and equal bilaterally        Chest:  clear to auscultation        Cardiac: regular rhythm                  Abdomen:  abdomen soft        Vascular:         right carotid artery;2+             left carotid artery;2+                Extremities and Back:  no deformities, clubbing, cyanosis, erythema observed;no edema        Neurological:  no gross motor deficits          Recent Lab Results:  LIPID RESULTS:  Lab Results   Component Value Date    CHOL 178 08/07/2020    HDL 33 (L) 08/07/2020     (H) 08/07/2020    TRIG 196 (H) 08/07/2020    CHOLHDLRATIO 6.4 (H) 10/06/2015       LIVER ENZYME RESULTS:  Lab Results   Component Value Date    AST 19 11/04/2020    ALT 19 11/04/2020       CBC RESULTS:  Lab Results   Component Value Date    WBC 7.4 08/22/2020    RBC 4.42 08/22/2020    HGB 14.3 08/22/2020    HCT 40.4 08/22/2020    MCV 91 08/22/2020    MCH 32.4 08/22/2020    MCHC 35.4 08/22/2020    RDW 13.7 08/22/2020     08/22/2020       BMP RESULTS:  Lab Results   Component Value Date     08/22/2020    POTASSIUM 4.2 08/22/2020    CHLORIDE 111 (H) 08/22/2020    CO2 25 " 08/22/2020    ANIONGAP 5 08/22/2020     (H) 08/22/2020    BUN 17 08/22/2020    CR 1.050 11/04/2020    GFRESTIMATED 73.8 11/04/2020    GFRESTBLACK 76 08/22/2020    LATHA 8.8 08/22/2020        A1C RESULTS:  Lab Results   Component Value Date    A1C 5.3 01/06/2020       INR RESULTS:  Lab Results   Component Value Date    INR 1.09 08/21/2020       Thank you for allowing me to participate in the care of your patient.    Sincerely,     SALVATORE Sullivan [facility]

## 2020-12-02 NOTE — PROGRESS NOTES
HPI:  Harpreet Vera is a 70 year old male who presents after undergoing a permanent pacemaker.  He is a patient of Dr. Primo Laureano  His medical history includes     1. Sick sinus syndrome associated with syncope.  S/p [employer] [employer] dual-chamber permanent pacemaker (8/2020)  2. Carotid stenosis.    Stenting of the right vertebral artery with occlusion of the left.  2019  3. Mildly dilated ascending aorta.  4. Arthritis  5. Essential hypertension  6. MAN  7. Mumps  8. Benign neoplasm of the colon    Diagnostics:  ECHO (8/20/2020) revealed EF 55-60%  Device check (10/2020) revealed A-paced 62%  : <1 % Stable leads.  Battery life 15 years.   Atrial Arrhythmia: None     ventricular Arrhythmia none     In 11/2019, patient was noted to be dizzy and lightheaded and found to have a left vertebral artery occlusion and high grade right vertebral artery stenosis and underwent stenting of his vertebral artery.  He was also noted to be bradycardic and his atenolol was discontinued.    In August 2020, he was once again noted to be lightheaded and dizzy and a second later was on the ground and had fallen and lost consciousness.  While in the hospital he was found to have sinus pauses up to 11 seconds and subsequently underwent implantation of a dual chamber permanent pacemaker.    Today he reports feeling well and denies any dizziness.  He does not regularly take his BP at home but will start due to elevated BP in clinic.  He walks 2-3 miles 5-6 times per week and it takes 30-60 minutes and denies CV symptoms..  He went deer hunting this fall and has not had any chest pain, shortness of breath, or dizziness.  He is having right rotator cuff surgery this week.   Yesterday he used a battery chainsaw and he was wondering if this effected his ppm.        ASSESSMENT AND PLAN    Sick sinus syndrome.    S/p [employer] dual chamber permanent pacemaker (8/2020)    Normal device check    Stable leads    Follow with the device  "clinic on a quarterly basis.      Asked him to call [employer] regarding questions Of chainsaw and pacemaker    Hypertension    Today his BP is elevated While losartan 100 mg daily.  He will check his blood pressure at home and follow-up with his PCP in 5 days for a preoperative examination.    Carotid artery disease    Stenting of the right vertebral artery with occlusion of the left.    Currently taking aspirin 325 mg daily    Pravastatin is noted to be an allergy as it caused a \"fuzzy brain\"    Plan:    We will establish care with general cardiology (Dr. Mccartney) in 9 to 12 months in [place]    Follow-up with the device clinic on a quarterly basis    Follow-up with PCP regarding elevated blood pressure intake blood pressure at home prior to seeing PCP      Patient expresses understanding and agreement with the plan.     I appreciate the chance to help with Harpreet Vera Please let me know if you have any questions or concerns.    Katie Diego, APRN, CNP    This note was completed in part using Dragon voice recognition software. Although reviewed after completion, some word and grammatical errors may occur.    Orders Placed This Encounter   Procedures     Follow-Up with Cardiologist     No orders of the defined types were placed in this encounter.    There are no discontinued medications.      Encounter Diagnoses   Name Primary?     SSS (sick sinus syndrome) (H)      Cardiac pacemaker in situ        CURRENT MEDICATIONS:  Current Outpatient Medications   Medication Sig Dispense Refill     acetaminophen (TYLENOL) 500 MG tablet Take 500 mg by mouth as needed for mild pain       allopurinol (ZYLOPRIM) 100 MG tablet Take 200 mg by mouth every evening       aspirin - buffered (ASCRIPTIN) 325 MG TABS tablet Take 325 mg by mouth every evening       dutasteride (AVODART) 0.5 MG capsule Take 0.5 mg by mouth every evening       furosemide (LASIX) 20 MG tablet TAKE 1/2 TABLET BY MOUTH EVERY DAY (Patient taking " "differently: Takes 1/2 tablet PRN) 30 tablet 0     losartan (COZAAR) 100 MG tablet TAKE 1 TABLET BY MOUTH ONE TIME DAILY  90 tablet 2     multivitamin w/minerals (THERA-VIT-M) tablet Take 1 tablet by mouth every evening       vitamin D3 (CHOLECALCIFEROL) 50 mcg (2000 units) tablet Take 1 tablet by mouth every evening         ALLERGIES     Allergies   Allergen Reactions     Pravastatin      Fuzzy brain.        PAST MEDICAL HISTORY:  Past Medical History:   Diagnosis Date     Benign neoplasm of colon      Brachial plexopathy      Chronic infection 2007    HX of C Diff     Hypertension      Mumps      Sinus node dysfunction (H)     with syncope     Sleep apnea     Bi-PAP       PAST SURGICAL HISTORY:  Past Surgical History:   Procedure Laterality Date     CHOLECYSTECTOMY       COLONOSCOPY       COLONOSCOPY N/A 4/5/2017    Procedure: COMBINED COLONOSCOPY, SINGLE OR MULTIPLE BIOPSY/POLYPECTOMY BY BIOPSY;  Surgeon: Tylor Rice MD;  Location:  GI     DECOMPRESSION, FUSION CERVICAL ANTERIOR ONE LEVEL, COMBINED N/A 9/9/2016    Procedure: COMBINED DECOMPRESSION, FUSION CERVICAL ANTERIOR ONE LEVEL;  Surgeon: Erick Valles MD;  Location:  OR     ENT SURGERY      tonsilectomy  as a child     EP PACEMAKER N/A 8/21/2020    Procedure: EP Pacemaker;  Surgeon: Alfonso Sigala MD;  Location:  HEART CARDIAC CATH LAB     IR CAROTID CEREBRAL ANGIOGRAM BILATERAL  1/6/2020     IR CAROTID CEREBRAL ANGIOGRAM BILATERAL  7/1/2020     IR DISCONTINUE SHEATH  1/6/2020     ORTHOPEDIC SURGERY      ambar knees scopedrt shoulder,fx rt arm fx     TESTICLE SURGERY       VASECTOMY       Z NONSPECIFIC PROCEDURE      Right clavicle fracture, Multiple right sided rib fracture, hairline fracture \"pelvis\", secondary to fall from tree         Z NONSPECIFIC PROCEDURE      Right ulnar/radial fracture        FAMILY HISTORY:  Family History   Problem Relation Age of Onset     Arthritis Mother      Eye Disorder Mother         cateracts     " Lipids Mother      Cancer Father         colon/prostate     Eye Disorder Father         cateracts     Lipids Father      Diabetes Father      Cancer Maternal Grandfather         colon     Cancer Paternal Grandmother         colon     Cancer Maternal Aunt         colon     Cancer Maternal Uncle         colon     Prostate Cancer Brother      Colon Cancer Brother 74        liver mets     Prostate Cancer Brother        SOCIAL HISTORY:  Social History     Socioeconomic History     Marital status:      Spouse name: None     Number of children: None     Years of education: None     Highest education level: None   Occupational History     None   Social Needs     Financial resource strain: None     Food insecurity     Worry: None     Inability: None     Transportation needs     Medical: None     Non-medical: None   Tobacco Use     Smoking status: Never Smoker     Smokeless tobacco: Never Used   Substance and Sexual Activity     Alcohol use: Yes     Alcohol/week: 0.0 standard drinks     Comment: rarely     Drug use: No     Sexual activity: Yes     Partners: Female   Lifestyle     Physical activity     Days per week: None     Minutes per session: None     Stress: None   Relationships     Social connections     Talks on phone: None     Gets together: None     Attends Alevism service: None     Active member of club or organization: None     Attends meetings of clubs or organizations: None     Relationship status: None     Intimate partner violence     Fear of current or ex partner: None     Emotionally abused: None     Physically abused: None     Forced sexual activity: None   Other Topics Concern     Parent/sibling w/ CABG, MI or angioplasty before 65F 55M? Not Asked   Social History Narrative     None       Review of Systems:  Skin:  Negative     Eyes:  Positive for glasses  ENT:  Negative    Respiratory:  Positive for sleep apnea  Cardiovascular:    edema;Positive for  Gastroenterology: Negative    Genitourinary:   "Negative    Musculoskeletal:  Negative    Neurologic:  Negative    Psychiatric:  Negative    Heme/Lymph/Imm:  Negative    Endocrine:  Negative      Physical Exam:  Vitals: BP (!) 151/83 (BP Location: Right arm, Patient Position: Sitting, Cuff Size: Adult Regular)   Pulse 62   Ht 1.702 m (5' 7\")   Wt 88.4 kg (194 lb 12.8 oz)   BMI 30.51 kg/m      Constitutional:  cooperative, alert and oriented, well developed, well nourished, in no acute distress        Skin:  warm and dry to the touch        Head:           Eyes:  pupils equal and round        ENT:  no pallor or cyanosis        Neck:  carotid pulses are full and equal bilaterally        Chest:  clear to auscultation        Cardiac: regular rhythm                  Abdomen:  abdomen soft        Vascular:         right carotid artery;2+             left carotid artery;2+                Extremities and Back:  no deformities, clubbing, cyanosis, erythema observed;no edema        Neurological:  no gross motor deficits          Recent Lab Results:  LIPID RESULTS:  Lab Results   Component Value Date    CHOL 178 08/07/2020    HDL 33 (L) 08/07/2020     (H) 08/07/2020    TRIG 196 (H) 08/07/2020    CHOLHDLRATIO 6.4 (H) 10/06/2015       LIVER ENZYME RESULTS:  Lab Results   Component Value Date    AST 19 11/04/2020    ALT 19 11/04/2020       CBC RESULTS:  Lab Results   Component Value Date    WBC 7.4 08/22/2020    RBC 4.42 08/22/2020    HGB 14.3 08/22/2020    HCT 40.4 08/22/2020    MCV 91 08/22/2020    MCH 32.4 08/22/2020    MCHC 35.4 08/22/2020    RDW 13.7 08/22/2020     08/22/2020       BMP RESULTS:  Lab Results   Component Value Date     08/22/2020    POTASSIUM 4.2 08/22/2020    CHLORIDE 111 (H) 08/22/2020    CO2 25 08/22/2020    ANIONGAP 5 08/22/2020     (H) 08/22/2020    BUN 17 08/22/2020    CR 1.050 11/04/2020    GFRESTIMATED 73.8 11/04/2020    GFRESTBLACK 76 08/22/2020    LATHA 8.8 08/22/2020        A1C RESULTS:  Lab Results   Component Value " Date    A1C 5.3 01/06/2020       INR RESULTS:  Lab Results   Component Value Date    INR 1.09 08/21/2020           CC  Lucía Sandhu MD  [address] [facility]  [address]  [place] [address]

## 2020-12-02 NOTE — LETTER
12/2/2020    Lucía Sandhu MD  [address] [facility] [address]  [facility] [place] [address]    RE: Harpreet Vera       Dear Colleague,    I had the pleasure of seeing Harpreet Vera in the [facility].    HPI:  Harpreet Vera is a 70 year old male who presents after undergoing a permanent pacemaker.  He is a patient of Dr. Primo Laureano  His medical history includes     1. Sick sinus syndrome associated with syncope.  S/p [employer] dual-chamber permanent pacemaker (8/2020)  2. Carotid stenosis.    Stenting of the right vertebral artery with occlusion of the left.  2019  3. Mildly dilated ascending aorta.  4. Arthritis  5. Essential hypertension  6. MAN  7. Mumps  8. Benign neoplasm of the colon    Diagnostics:  ECHO (8/20/2020) revealed EF 55-60%  Device check (10/2020) revealed A-paced 62%  : <1 % Stable leads.  Battery life 15 years.   Atrial Arrhythmia: None     ventricular Arrhythmia none     In 11/2019, patient was noted to be dizzy and lightheaded and found to have a left vertebral artery occlusion and high grade right vertebral artery stenosis and underwent stenting of his vertebral artery.  He was also noted to be bradycardic and his atenolol was discontinued.    In August 2020, he was once again noted to be lightheaded and dizzy and a second later was on the ground and had fallen and lost consciousness.  While in the hospital he was found to have sinus pauses up to 11 seconds and subsequently underwent implantation of a dual chamber permanent pacemaker.    Today he reports feeling well and denies any dizziness.  He does not regularly take his BP at home but will start due to elevated BP in clinic.  He walks 2-3 miles 5-6 times per week and it takes 30-60 minutes and denies CV symptoms..  He went deer hunting this fall and has not had any chest pain, shortness of breath, or dizziness.  He is having right rotator cuff surgery this week.   Yesterday he used a battery [employer]ipl[employer]  "and he was wondering if this effected his ppm.        ASSESSMENT AND PLAN    Sick sinus syndrome.    S/p [employer] dual chamber permanent pacemaker (8/2020)    Normal device check    Stable leads    Follow with the device clinic on a quarterly basis.      Asked him to call [facility] regarding questions Of chainsaw and pacemaker    Hypertension    Today his BP is elevated While losartan 100 mg daily.  He will check his blood pressure at home and follow-up with his PCP in 5 days for a preoperative examination.    Carotid artery disease    Stenting of the right vertebral artery with occlusion of the left.    Currently taking aspirin 325 mg daily    Pravastatin is noted to be an allergy as it caused a \"fuzzy brain\"    Plan:    We will establish care with general cardiology (Dr. Mccartney) in 9 to 12 months in [place]    Follow-up with the device clinic on a quarterly basis    Follow-up with PCP regarding elevated blood pressure intake blood pressure at home prior to seeing PCP      Patient expresses understanding and agreement with the plan.     I appreciate the chance to help with Harpreet Vera Please let me know if you have any questions or concerns.    Katie Diego, APRN, CNP    This note was completed in part using Dragon voice recognition software. Although reviewed after completion, some word and grammatical errors may occur.    Orders Placed This Encounter   Procedures     Follow-Up with Cardiologist     No orders of the defined types were placed in this encounter.    There are no discontinued medications.      Encounter Diagnoses   Name Primary?     SSS (sick sinus syndrome) (H)      Cardiac pacemaker in situ        CURRENT MEDICATIONS:  Current Outpatient Medications   Medication Sig Dispense Refill     acetaminophen (TYLENOL) 500 MG tablet Take 500 mg by mouth as needed for mild pain       allopurinol (ZYLOPRIM) 100 MG tablet Take 200 mg by mouth every evening       aspirin - buffered " "(ASCRIPTIN) 325 MG TABS tablet Take 325 mg by mouth every evening       dutasteride (AVODART) 0.5 MG capsule Take 0.5 mg by mouth every evening       furosemide (LASIX) 20 MG tablet TAKE 1/2 TABLET BY MOUTH EVERY DAY (Patient taking differently: Takes 1/2 tablet PRN) 30 tablet 0     losartan (COZAAR) 100 MG tablet TAKE 1 TABLET BY MOUTH ONE TIME DAILY  90 tablet 2     multivitamin w/minerals (THERA-VIT-M) tablet Take 1 tablet by mouth every evening       vitamin D3 (CHOLECALCIFEROL) 50 mcg (2000 units) tablet Take 1 tablet by mouth every evening         ALLERGIES     Allergies   Allergen Reactions     Pravastatin      Fuzzy brain.        PAST MEDICAL HISTORY:  Past Medical History:   Diagnosis Date     Benign neoplasm of colon      Brachial plexopathy      Chronic infection 2007    HX of C Diff     Hypertension      Mumps      Sinus node dysfunction (H)     with syncope     Sleep apnea     Bi-PAP       PAST SURGICAL HISTORY:  Past Surgical History:   Procedure Laterality Date     CHOLECYSTECTOMY       COLONOSCOPY       COLONOSCOPY N/A 4/5/2017    Procedure: COMBINED COLONOSCOPY, SINGLE OR MULTIPLE BIOPSY/POLYPECTOMY BY BIOPSY;  Surgeon: Tylor Rice MD;  Location:  GI     DECOMPRESSION, FUSION CERVICAL ANTERIOR ONE LEVEL, COMBINED N/A 9/9/2016    Procedure: COMBINED DECOMPRESSION, FUSION CERVICAL ANTERIOR ONE LEVEL;  Surgeon: Erick Valles MD;  Location:  OR     ENT SURGERY      tonsilectomy  as a child     EP PACEMAKER N/A 8/21/2020    Procedure: EP Pacemaker;  Surgeon: Alfonso Sigala MD;  Location:  HEART CARDIAC CATH LAB     IR CAROTID CEREBRAL ANGIOGRAM BILATERAL  1/6/2020     IR CAROTID CEREBRAL ANGIOGRAM BILATERAL  7/1/2020     IR DISCONTINUE SHEATH  1/6/2020     ORTHOPEDIC SURGERY      amabr knees scopedrt shoulder,fx rt arm fx     TESTICLE SURGERY       VASECTOMY       ZZC NONSPECIFIC PROCEDURE      Right clavicle fracture, Multiple right sided rib fracture, hairline fracture \"pelvis\", " secondary to fall from tree         ZZC NONSPECIFIC PROCEDURE      Right ulnar/radial fracture        FAMILY HISTORY:  Family History   Problem Relation Age of Onset     Arthritis Mother      Eye Disorder Mother         cateracts     Lipids Mother      Cancer Father         colon/prostate     Eye Disorder Father         cateracts     Lipids Father      Diabetes Father      Cancer Maternal Grandfather         colon     Cancer Paternal Grandmother         colon     Cancer Maternal Aunt         colon     Cancer Maternal Uncle         colon     Prostate Cancer Brother      Colon Cancer Brother 74        liver mets     Prostate Cancer Brother        SOCIAL HISTORY:  Social History     Socioeconomic History     Marital status:      Spouse name: None     Number of children: None     Years of education: None     Highest education level: None   Occupational History     None   Social Needs     Financial resource strain: None     Food insecurity     Worry: None     Inability: None     Transportation needs     Medical: None     Non-medical: None   Tobacco Use     Smoking status: Never Smoker     Smokeless tobacco: Never Used   Substance and Sexual Activity     Alcohol use: Yes     Alcohol/week: 0.0 standard drinks     Comment: rarely     Drug use: No     Sexual activity: Yes     Partners: Female   Lifestyle     Physical activity     Days per week: None     Minutes per session: None     Stress: None   Relationships     Social connections     Talks on phone: None     Gets together: None     Attends [employer] service: None     Active member of club or organization: None     Attends meetings of clubs or organizations: None     Relationship status: None     Intimate partner violence     Fear of current or ex partner: None     Emotionally abused: None     Physically abused: None     Forced sexual activity: None   Other Topics Concern     Parent/sibling w/ CABG, MI or angioplasty before 65F 55M? Not Asked   Social History  "Narrative     None       Review of Systems:  Skin:  Negative     Eyes:  Positive for glasses  ENT:  Negative    Respiratory:  Positive for sleep apnea  Cardiovascular:    edema;Positive for  Gastroenterology: Negative    Genitourinary:  Negative    Musculoskeletal:  Negative    Neurologic:  Negative    Psychiatric:  Negative    Heme/Lymph/Imm:  Negative    Endocrine:  Negative      Physical Exam:  Vitals: BP (!) 151/83 (BP Location: Right arm, Patient Position: Sitting, Cuff Size: Adult Regular)   Pulse 62   Ht 1.702 m (5' 7\")   Wt 88.4 kg (194 lb 12.8 oz)   BMI 30.51 kg/m      Constitutional:  cooperative, alert and oriented, well developed, well nourished, in no acute distress        Skin:  warm and dry to the touch        Head:           Eyes:  pupils equal and round        ENT:  no pallor or cyanosis        Neck:  carotid pulses are full and equal bilaterally        Chest:  clear to auscultation        Cardiac: regular rhythm                  Abdomen:  abdomen soft        Vascular:         right carotid artery;2+             left carotid artery;2+                Extremities and Back:  no deformities, clubbing, cyanosis, erythema observed;no edema        Neurological:  no gross motor deficits          Recent Lab Results:  LIPID RESULTS:  Lab Results   Component Value Date    CHOL 178 08/07/2020    HDL 33 (L) 08/07/2020     (H) 08/07/2020    TRIG 196 (H) 08/07/2020    CHOLHDLRATIO 6.4 (H) 10/06/2015       LIVER ENZYME RESULTS:  Lab Results   Component Value Date    AST 19 11/04/2020    ALT 19 11/04/2020       CBC RESULTS:  Lab Results   Component Value Date    WBC 7.4 08/22/2020    RBC 4.42 08/22/2020    HGB 14.3 08/22/2020    HCT 40.4 08/22/2020    MCV 91 08/22/2020    MCH 32.4 08/22/2020    MCHC 35.4 08/22/2020    RDW 13.7 08/22/2020     08/22/2020       BMP RESULTS:  Lab Results   Component Value Date     08/22/2020    POTASSIUM 4.2 08/22/2020    CHLORIDE 111 (H) 08/22/2020    CO2 25 " 08/22/2020    ANIONGAP 5 08/22/2020     (H) 08/22/2020    BUN 17 08/22/2020    CR 1.050 11/04/2020    GFRESTIMATED 73.8 11/04/2020    GFRESTBLACK 76 08/22/2020    LATHA 8.8 08/22/2020        A1C RESULTS:  Lab Results   Component Value Date    A1C 5.3 01/06/2020       INR RESULTS:  Lab Results   Component Value Date    INR 1.09 08/21/2020           CC  Lucía Sanduh MD  [address] [place] [address]                    Thank you for allowing me to participate in the care of your patient.      Sincerely,     SALVATORE Sullivan [facility] [place] [facility]    cc:   Lucía Sandhu MD  [address] [facility] [[email or web address]]  [address]  [place] [id number]

## 2020-12-07 ENCOUNTER — OFFICE VISIT (OUTPATIENT)
Dept: INTERNAL MEDICINE | Facility: CLINIC | Age: 70
End: 2020-12-07
Payer: MEDICARE

## 2020-12-07 VITALS
HEART RATE: 71 BPM | BODY MASS INDEX: 30.37 KG/M2 | DIASTOLIC BLOOD PRESSURE: 84 MMHG | SYSTOLIC BLOOD PRESSURE: 146 MMHG | WEIGHT: 193.9 LBS | TEMPERATURE: 98.3 F | OXYGEN SATURATION: 96 %

## 2020-12-07 DIAGNOSIS — M75.101 ROTATOR CUFF SYNDROME, RIGHT: ICD-10-CM

## 2020-12-07 DIAGNOSIS — E78.00 HYPERCHOLESTEREMIA: ICD-10-CM

## 2020-12-07 DIAGNOSIS — Z95.0 S/P PLACEMENT OF CARDIAC PACEMAKER: ICD-10-CM

## 2020-12-07 DIAGNOSIS — I10 ESSENTIAL HYPERTENSION, BENIGN: ICD-10-CM

## 2020-12-07 DIAGNOSIS — I65.09 VERTEBRAL ARTERY STENOSIS, UNSPECIFIED LATERALITY: ICD-10-CM

## 2020-12-07 DIAGNOSIS — Z11.59 ENCOUNTER FOR SCREENING FOR OTHER VIRAL DISEASES: ICD-10-CM

## 2020-12-07 DIAGNOSIS — R00.1 BRADYCARDIA, SEVERE SINUS: ICD-10-CM

## 2020-12-07 DIAGNOSIS — Z01.818 PREOP GENERAL PHYSICAL EXAM: Primary | ICD-10-CM

## 2020-12-07 LAB
ALBUMIN SERPL-MCNC: 4.1 G/DL (ref 3.4–5)
ALP SERPL-CCNC: 74 U/L (ref 40–150)
ALT SERPL W P-5'-P-CCNC: 28 U/L (ref 0–70)
ANION GAP SERPL CALCULATED.3IONS-SCNC: 2 MMOL/L (ref 3–14)
AST SERPL W P-5'-P-CCNC: 20 U/L (ref 0–45)
BASOPHILS # BLD AUTO: 0 10E9/L (ref 0–0.2)
BASOPHILS NFR BLD AUTO: 0.5 %
BILIRUB SERPL-MCNC: 1.3 MG/DL (ref 0.2–1.3)
BUN SERPL-MCNC: 17 MG/DL (ref 7–30)
CALCIUM SERPL-MCNC: 9.3 MG/DL (ref 8.5–10.1)
CHLORIDE SERPL-SCNC: 109 MMOL/L (ref 94–109)
CO2 SERPL-SCNC: 28 MMOL/L (ref 20–32)
CREAT SERPL-MCNC: 1.18 MG/DL (ref 0.66–1.25)
DIFFERENTIAL METHOD BLD: ABNORMAL
EOSINOPHIL # BLD AUTO: 0.4 10E9/L (ref 0–0.7)
EOSINOPHIL NFR BLD AUTO: 5.6 %
ERYTHROCYTE [DISTWIDTH] IN BLOOD BY AUTOMATED COUNT: 12.8 % (ref 10–15)
GFR SERPL CREATININE-BSD FRML MDRD: 61 ML/MIN/{1.73_M2}
GLUCOSE SERPL-MCNC: 94 MG/DL (ref 70–99)
HCT VFR BLD AUTO: 43 % (ref 40–53)
HGB BLD-MCNC: 15.1 G/DL (ref 13.3–17.7)
LYMPHOCYTES # BLD AUTO: 1.6 10E9/L (ref 0.8–5.3)
LYMPHOCYTES NFR BLD AUTO: 24.4 %
MCH RBC QN AUTO: 32 PG (ref 26.5–33)
MCHC RBC AUTO-ENTMCNC: 35.1 G/DL (ref 31.5–36.5)
MCV RBC AUTO: 91 FL (ref 78–100)
MONOCYTES # BLD AUTO: 0.8 10E9/L (ref 0–1.3)
MONOCYTES NFR BLD AUTO: 12 %
NEUTROPHILS # BLD AUTO: 3.7 10E9/L (ref 1.6–8.3)
NEUTROPHILS NFR BLD AUTO: 57.5 %
PLATELET # BLD AUTO: 135 10E9/L (ref 150–450)
POTASSIUM SERPL-SCNC: 4.5 MMOL/L (ref 3.4–5.3)
PROT SERPL-MCNC: 7.2 G/DL (ref 6.8–8.8)
RBC # BLD AUTO: 4.72 10E12/L (ref 4.4–5.9)
SODIUM SERPL-SCNC: 139 MMOL/L (ref 133–144)
WBC # BLD AUTO: 6.4 10E9/L (ref 4–11)

## 2020-12-07 PROCEDURE — U0003 INFECTIOUS AGENT DETECTION BY NUCLEIC ACID (DNA OR RNA); SEVERE ACUTE RESPIRATORY SYNDROME CORONAVIRUS 2 (SARS-COV-2) (CORONAVIRUS DISEASE [COVID-19]), AMPLIFIED PROBE TECHNIQUE, MAKING USE OF HIGH THROUGHPUT TECHNOLOGIES AS DESCRIBED BY CMS-2020-01-R: HCPCS | Performed by: ORTHOPAEDIC SURGERY

## 2020-12-07 PROCEDURE — 85025 COMPLETE CBC W/AUTO DIFF WBC: CPT | Performed by: INTERNAL MEDICINE

## 2020-12-07 PROCEDURE — 93000 ELECTROCARDIOGRAM COMPLETE: CPT | Performed by: INTERNAL MEDICINE

## 2020-12-07 PROCEDURE — 99215 OFFICE O/P EST HI 40 MIN: CPT | Performed by: INTERNAL MEDICINE

## 2020-12-07 PROCEDURE — 80053 COMPREHEN METABOLIC PANEL: CPT | Performed by: INTERNAL MEDICINE

## 2020-12-07 PROCEDURE — 36415 COLL VENOUS BLD VENIPUNCTURE: CPT | Performed by: INTERNAL MEDICINE

## 2020-12-07 NOTE — PATIENT INSTRUCTIONS

## 2020-12-07 NOTE — PROGRESS NOTES
[facility]  [address]  [facility] [address]  [phone]  Dept: [phone]    PRE-OP EVALUATION:  Today's date: 2020    Harpreet Vera (: 1950) presents for pre-operative evaluation assessment as requested by Dr. Joseph Moon.  He requires evaluation and anesthesia risk assessment prior to undergoing surgery/procedure for treatment of right shoulder arthroscopy .    Proposed Surgery/ Procedure: See above  Date of Surgery/ Procedure: 20  Time of Surgery/ Procedure: 7:30 AM  Hospital/Surgical Facility: [facility]  Surgery Fax Number: Note does not need to be faxed, will be available electronically in Epic.  Primary Physician: Lucía Sandhu  Type of Anesthesia Anticipated: Combined general with block    Preoperative Questionnaire:   No - Have you ever had a heart attack or stroke?  Yes- Have you ever had surgery on your heart or blood vessels, such as a stent, coronary (heart) bypass, or surgery on an artery in the head, neck, heart, or legs? Right vertebral artery stent. Left vertebral artery is 100% blocked.   No - Do you have chest pain when you are physically active?  No - Do you have a history of heart failure?  No - Do you currently have a cold, bronchitis, or symptoms of other respiratory (head and chest) infections?  No - Do you have a cough, shortness of breath, or wheezing?  No - Do you or anyone in your family have a history of blood clots?  No - Do you or anyone in your family have a serious bleeding problem, such as long-lasting bleeding after surgeries or cuts?  No - Have you ever had anemia or been told to take iron pills?  No - Have you had any abnormal blood loss such as black, tarry or bloody stools, or abnormal vaginal bleeding?  No - Have you ever had a blood transfusion?  Yes - Are you willing to have a blood transfusion if it is medically needed before, during, or after your surgery?  No - Have you or  anyone in your family ever had problems with anesthesia (sedation for surgery)?  Yes - Do you have sleep apnea, excessive snoring, or daytime drowsiness?   No - Do you have any artifical heart valves or other implanted medical devices, such as a pacemaker, defibrillator, or continuous glucose monitor?  No - Do you have any artifical joints?  No - Are you allergic to latex?  No - Is there any chance that you may be pregnant?    Patient has a Health Care Directive or Living Will:  YES     HPI:     HPI related to upcoming procedure: right rotator cuff tear. Getting worse over the last 5 years. Going in for repair.       See problem list for active medical problems.  Problems all longstanding and stable, except as noted/documented.  See ROS for pertinent symptoms related to these conditions.      MEDICAL HISTORY:     Patient Active Problem List    Diagnosis Date Noted     Bradycardia, severe sinus 08/21/2020     Priority: Medium     Syncope 08/19/2020     Priority: Medium     Vertebral artery stenosis 01/06/2020     Priority: Medium     Cervicalgia 02/23/2017     Priority: Medium     S/P cervical spinal fusion 09/09/2016     Priority: Medium     Vitamin D deficiency 10/06/2015     Priority: Medium     Benign prostatic hyperplasia 10/06/2015     Priority: Medium     MAN (obstructive sleep apnea) 08/01/2013     Priority: Medium     Hypercholesteremia 08/03/2012     Priority: Medium     Advanced directives, counseling/discussion 02/02/2012     Priority: Medium     Discussed Advance Directive planning with patient; however, patient declined at this time.          CARDIOVASCULAR SCREENING; LDL GOAL LESS THAN 130 10/31/2010     Priority: Medium     Intestinal infection due to Clostridium difficile 09/06/2007     Priority: Medium     Allergic rhinitis 06/14/2007     Priority: Medium     Problem list name updated by automated process. Provider to review       Essential hypertension, benign 01/14/2003     Priority: Medium     "  Past Medical History:   Diagnosis Date     Benign neoplasm of colon      Brachial plexopathy      Chronic infection 2007    HX of C Diff     Hypertension      Mumps      Sinus node dysfunction (H)     with syncope     Sleep apnea     Bi-PAP     Past Surgical History:   Procedure Laterality Date     CHOLECYSTECTOMY       COLONOSCOPY       COLONOSCOPY N/A 4/5/2017    Procedure: COMBINED COLONOSCOPY, SINGLE OR MULTIPLE BIOPSY/POLYPECTOMY BY BIOPSY;  Surgeon: Tylor Rice MD;  Location:  GI     DECOMPRESSION, FUSION CERVICAL ANTERIOR ONE LEVEL, COMBINED N/A 9/9/2016    Procedure: COMBINED DECOMPRESSION, FUSION CERVICAL ANTERIOR ONE LEVEL;  Surgeon: Erick Valles MD;  Location:  OR     ENT SURGERY      tonsilectomy  as a child     EP PACEMAKER N/A 8/21/2020    Procedure: EP Pacemaker;  Surgeon: Alfonso Sigala MD;  Location:  HEART CARDIAC CATH LAB     IR CAROTID CEREBRAL ANGIOGRAM BILATERAL  1/6/2020     IR CAROTID CEREBRAL ANGIOGRAM BILATERAL  7/1/2020     IR DISCONTINUE SHEATH  1/6/2020     ORTHOPEDIC SURGERY      ambar knees scopedrt shoulder,fx rt arm fx     TESTICLE SURGERY       VASECTOMY       [facility] NONSPECIFIC PROCEDURE      Right clavicle fracture, Multiple right sided rib fracture, hairline fracture \"pelvis\", secondary to fall from tree         [facility] NONSPECIFIC PROCEDURE      Right ulnar/radial fracture      Current Outpatient Medications   Medication Sig Dispense Refill     acetaminophen (TYLENOL) 500 MG tablet Take 500 mg by mouth as needed for mild pain       allopurinol (ZYLOPRIM) 100 MG tablet Take 200 mg by mouth every evening       aspirin - buffered (ASCRIPTIN) 325 MG TABS tablet Take 325 mg by mouth every evening       dutasteride (AVODART) 0.5 MG capsule Take 0.5 mg by mouth every evening       furosemide (LASIX) 20 MG tablet TAKE 1/2 TABLET BY MOUTH EVERY DAY (Patient taking differently: Takes 1/2 tablet PRN) 30 tablet 0     losartan (COZAAR) 100 MG tablet TAKE 1 TABLET BY MOUTH " ONE TIME DAILY  90 tablet 2     multivitamin w/minerals (THERA-VIT-M) tablet Take 1 tablet by mouth every evening       vitamin D3 (CHOLECALCIFEROL) 50 mcg (2000 units) tablet Take 1 tablet by mouth every evening       OTC products: None, except as noted above    Allergies   Allergen Reactions     Pravastatin      Fuzzy brain.       Latex Allergy: NO    Social History     Tobacco Use     Smoking status: Never Smoker     Smokeless tobacco: Never Used   Substance Use Topics     Alcohol use: Yes     Alcohol/week: 0.0 standard drinks     Comment: rarely     History   Drug Use No       REVIEW OF SYSTEMS:   CONSTITUTIONAL: NEGATIVE for fever, chills, change in weight  INTEGUMENTARY/SKIN: NEGATIVE for worrisome rashes, moles or lesions  EYES: NEGATIVE for vision changes or irritation  ENT/MOUTH: NEGATIVE for ear, mouth and throat problems  RESP: NEGATIVE for significant cough or SOB  BREAST: NEGATIVE for masses, tenderness or discharge  CV: NEGATIVE for chest pain, palpitations or peripheral edema  GI: NEGATIVE for nausea, abdominal pain, heartburn, or change in bowel habits  : NEGATIVE for frequency, dysuria, or hematuria  MUSCULOSKELETAL: NEGATIVE for significant arthralgias or myalgia  NEURO: NEGATIVE for weakness, dizziness or paresthesias  ENDOCRINE: NEGATIVE for temperature intolerance, skin/hair changes  HEME: NEGATIVE for bleeding problems  PSYCHIATRIC: NEGATIVE for changes in mood or affect    EXAM:   BP (!) 146/84 (BP Location: Right arm, Patient Position: Sitting, Cuff Size: Adult Large)   Pulse 71   Temp 98.3  F (36.8  C) (Oral)   Wt 88 kg (193 lb 14.4 oz)   SpO2 96%   BMI 30.37 kg/m      GENERAL APPEARANCE: healthy, alert and no distress     EYES: EOMI,  PERRL     HENT: ear canals and TM's normal and nose and mouth without ulcers or lesions     NECK: no adenopathy, no asymmetry, masses, or scars and thyroid normal to palpation     RESP: lungs clear to auscultation - no rales, rhonchi or wheezes     CV:  regular rates and rhythm, normal S1 S2, no S3 or S4 and no murmur, click or rub     ABDOMEN:  soft, nontender, no HSM or masses and bowel sounds normal     MS: extremities normal- no gross deformities noted, no evidence of inflammation in joints, FROM in all extremities.     SKIN: no suspicious lesions or rashes     NEURO: Normal strength and tone, sensory exam grossly normal, mentation intact and speech normal     PSYCH: mentation appears normal. and affect normal/bright     LYMPHATICS: No cervical adenopathy    DIAGNOSTICS:     EKG: right  bundle branch block , no LVH by voltage criteria, unchanged from previous tracings  Labs Drawn and in Process:   Unresulted Labs Ordered in the Past 30 Days of this Admission     Date and Time Order Name Status Description    12/7/2020 0801 COMPREHENSIVE METABOLIC PANEL In process           Recent Labs   Lab Test 11/04/20 08/22/20  0544 08/21/20  1558 08/21/20  0745 01/06/20  0745 01/06/20  0745   HGB  --  14.3 14.2  --    < > 14.6   PLT  --  169 174  --    < > 167   INR  --   --  1.09  --   --   --    NA  --  141  --  139   < >  --    POTASSIUM  --  4.2  --  4.1   < >  --    CR 1.050 1.11  --  1.18   < > 1.20   A1C  --   --   --   --   --  5.3    < > = values in this interval not displayed.        IMPRESSION:   Reason for surgery/procedure: right rotator cuff repair  Diagnosis/reason for consult: he has vertebral artery disease with left 100% blocked and right with stent in place. I do not want him off ASA for procedure    The proposed surgical procedure is considered INTERMEDIATE risk.    REVISED CARDIAC RISK INDEX  The patient has the following serious cardiovascular risks for perioperative complications such as (MI, PE, VFib and 3  AV Block):  Cerebrovascular Disease (TIA or CVA)  INTERPRETATION: 1 risks: Class II (low risk - 0.9% complication rate)    The patient has the following additional risks for perioperative complications:  No identified additional risks       ICD-10-CM    1. Preop general physical exam  Z01.818        RECOMMENDATIONS:     --Consult hospital rounder / IM to assist post-op medical management    --Patient is to take all scheduled medications on the day of surgery EXCEPT for modifications listed below.    Anticoagulant or Antiplatelet Medication Use  ASPIRIN: Patient is at increased risk of thrombosis (e.g. MI, CVA) and aspirin 81 mg daily should be continued in the perioperative period        APPROVAL GIVEN to proceed with proposed procedure, without further diagnostic evaluation       Signed Electronically by: Lucía Sandhu MD    Copy of this evaluation report is provided to requesting physician.    [facility] Preop Guidelines    Revised Cardiac Risk Index

## 2020-12-08 LAB
SARS-COV-2 RNA SPEC QL NAA+PROBE: NOT DETECTED
SPECIMEN SOURCE: NORMAL

## 2020-12-10 RX ORDER — SENNOSIDES 8.6 MG
650 CAPSULE ORAL EVERY 8 HOURS PRN
COMMUNITY

## 2020-12-10 NOTE — OR NURSING
[employer] rep Suman Castelan notified regarding patients surgery and recommended using a medical magnet over pacemaker and no need for interrogation post op, Dr. Daley notified and reviewed pacemake interrogation summary. Ok'd to proceed to surgery with magnet.

## 2020-12-11 ENCOUNTER — HOSPITAL ENCOUNTER (OUTPATIENT)
Facility: CLINIC | Age: 70
Discharge: HOME | End: 2020-12-11
Attending: ORTHOPAEDIC SURGERY | Admitting: ORTHOPAEDIC SURGERY
Payer: MEDICARE

## 2020-12-11 ENCOUNTER — ANESTHESIA (OUTPATIENT)
Dept: SURGERY | Facility: CLINIC | Age: 70
End: 2020-12-11
Payer: MEDICARE

## 2020-12-11 ENCOUNTER — APPOINTMENT (OUTPATIENT)
Dept: CARDIOLOGY | Facility: CLINIC | Age: 70
End: 2020-12-11
Attending: ANESTHESIOLOGY
Payer: MEDICARE

## 2020-12-11 VITALS
HEIGHT: 67 IN | WEIGHT: 193.6 LBS | BODY MASS INDEX: 30.39 KG/M2 | RESPIRATION RATE: 16 BRPM | DIASTOLIC BLOOD PRESSURE: 92 MMHG | OXYGEN SATURATION: 98 % | HEART RATE: 60 BPM | SYSTOLIC BLOOD PRESSURE: 142 MMHG | TEMPERATURE: 97.8 F

## 2020-12-11 DIAGNOSIS — M75.41 SHOULDER IMPINGEMENT SYNDROME, RIGHT: Primary | ICD-10-CM

## 2020-12-11 LAB
CREAT SERPL-MCNC: 1.23 MG/DL (ref 0.66–1.25)
GFR SERPL CREATININE-BSD FRML MDRD: 58 ML/MIN/{1.73_M2}
POTASSIUM SERPL-SCNC: 4 MMOL/L (ref 3.4–5.3)

## 2020-12-11 PROCEDURE — 250N000011 HC RX IP 250 OP 636: Performed by: ORTHOPAEDIC SURGERY

## 2020-12-11 PROCEDURE — 250N000009 HC RX 250: Performed by: ANESTHESIOLOGY

## 2020-12-11 PROCEDURE — 84132 ASSAY OF SERUM POTASSIUM: CPT | Performed by: ANESTHESIOLOGY

## 2020-12-11 PROCEDURE — 258N000003 HC RX IP 258 OP 636: Performed by: ANESTHESIOLOGY

## 2020-12-11 PROCEDURE — 761N000007 HC RECOVERY PHASE 2 EACH 15 MINS: Performed by: ORTHOPAEDIC SURGERY

## 2020-12-11 PROCEDURE — 272N000001 HC OR GENERAL SUPPLY STERILE: Performed by: ORTHOPAEDIC SURGERY

## 2020-12-11 PROCEDURE — 999N000139 HC STATISTIC PRE-PROCEDURE ASSESSMENT II: Performed by: ORTHOPAEDIC SURGERY

## 2020-12-11 PROCEDURE — 250N000009 HC RX 250: Performed by: ORTHOPAEDIC SURGERY

## 2020-12-11 PROCEDURE — 250N000011 HC RX IP 250 OP 636: Performed by: PHYSICIAN ASSISTANT

## 2020-12-11 PROCEDURE — 36415 COLL VENOUS BLD VENIPUNCTURE: CPT | Performed by: ANESTHESIOLOGY

## 2020-12-11 PROCEDURE — 370N000002 HC ANESTHESIA TECHNICAL FEE, EACH ADDTL 15 MIN: Performed by: ORTHOPAEDIC SURGERY

## 2020-12-11 PROCEDURE — 250N000003 HC SEVOFLURANE, EA 15 MIN: Performed by: ORTHOPAEDIC SURGERY

## 2020-12-11 PROCEDURE — 250N000011 HC RX IP 250 OP 636: Performed by: ANESTHESIOLOGY

## 2020-12-11 PROCEDURE — 250N000009 HC RX 250: Performed by: NURSE ANESTHETIST, CERTIFIED REGISTERED

## 2020-12-11 PROCEDURE — 93288 INTERROG EVL PM/LDLS PM IP: CPT

## 2020-12-11 PROCEDURE — 258N000003 HC RX IP 258 OP 636: Performed by: NURSE ANESTHETIST, CERTIFIED REGISTERED

## 2020-12-11 PROCEDURE — 82565 ASSAY OF CREATININE: CPT | Performed by: ANESTHESIOLOGY

## 2020-12-11 PROCEDURE — 761N000002 HC RECOVERY PHASE 1 LEVEL 1 EA ADDTL HR: Performed by: ORTHOPAEDIC SURGERY

## 2020-12-11 PROCEDURE — 761N000001 HC RECOVERY PHASE 1 LEVEL 1 FIRST HR: Performed by: ORTHOPAEDIC SURGERY

## 2020-12-11 PROCEDURE — 250N000011 HC RX IP 250 OP 636: Performed by: NURSE ANESTHETIST, CERTIFIED REGISTERED

## 2020-12-11 PROCEDURE — 360N000021 HC SURGERY LEVEL 3 EA 15 ADDTL MIN: Performed by: ORTHOPAEDIC SURGERY

## 2020-12-11 PROCEDURE — 360N000020 HC SURGERY LEVEL 3 1ST 30 MIN: Performed by: ORTHOPAEDIC SURGERY

## 2020-12-11 PROCEDURE — 370N000001 HC ANESTHESIA TECHNICAL FEE, 1ST 30 MIN: Performed by: ORTHOPAEDIC SURGERY

## 2020-12-11 RX ORDER — CEFAZOLIN SODIUM 1 G/3ML
1 INJECTION, POWDER, FOR SOLUTION INTRAMUSCULAR; INTRAVENOUS SEE ADMIN INSTRUCTIONS
Status: DISCONTINUED | OUTPATIENT
Start: 2020-12-11 | End: 2020-12-11 | Stop reason: HOSPADM

## 2020-12-11 RX ORDER — HYDROXYZINE HYDROCHLORIDE 25 MG/1
25-50 TABLET, FILM COATED ORAL EVERY 6 HOURS PRN
Qty: 40 TABLET | Refills: 1 | Status: SHIPPED | OUTPATIENT
Start: 2020-12-11 | End: 2021-03-01

## 2020-12-11 RX ORDER — SODIUM CHLORIDE, SODIUM LACTATE, POTASSIUM CHLORIDE, CALCIUM CHLORIDE 600; 310; 30; 20 MG/100ML; MG/100ML; MG/100ML; MG/100ML
INJECTION, SOLUTION INTRAVENOUS CONTINUOUS
Status: DISCONTINUED | OUTPATIENT
Start: 2020-12-11 | End: 2020-12-11 | Stop reason: HOSPADM

## 2020-12-11 RX ORDER — ONDANSETRON 2 MG/ML
4 INJECTION INTRAMUSCULAR; INTRAVENOUS EVERY 30 MIN PRN
Status: DISCONTINUED | OUTPATIENT
Start: 2020-12-11 | End: 2020-12-11 | Stop reason: HOSPADM

## 2020-12-11 RX ORDER — HYDROMORPHONE HYDROCHLORIDE 1 MG/ML
.3-.5 INJECTION, SOLUTION INTRAMUSCULAR; INTRAVENOUS; SUBCUTANEOUS EVERY 10 MIN PRN
Status: DISCONTINUED | OUTPATIENT
Start: 2020-12-11 | End: 2020-12-11 | Stop reason: HOSPADM

## 2020-12-11 RX ORDER — CEPHALEXIN 500 MG/1
500 CAPSULE ORAL 4 TIMES DAILY
Qty: 12 CAPSULE | Refills: 0 | Status: SHIPPED | OUTPATIENT
Start: 2020-12-11 | End: 2020-12-14

## 2020-12-11 RX ORDER — LIDOCAINE 40 MG/G
CREAM TOPICAL
Status: DISCONTINUED | OUTPATIENT
Start: 2020-12-11 | End: 2020-12-11 | Stop reason: HOSPADM

## 2020-12-11 RX ORDER — SENNOSIDES A AND B 8.6 MG/1
1 TABLET, FILM COATED ORAL 2 TIMES DAILY PRN
Qty: 25 TABLET | Refills: 0 | Status: SHIPPED | OUTPATIENT
Start: 2020-12-11 | End: 2021-03-01

## 2020-12-11 RX ORDER — LIDOCAINE HYDROCHLORIDE 20 MG/ML
INJECTION, SOLUTION INFILTRATION; PERINEURAL PRN
Status: DISCONTINUED | OUTPATIENT
Start: 2020-12-11 | End: 2020-12-11

## 2020-12-11 RX ORDER — MAGNESIUM HYDROXIDE 1200 MG/15ML
LIQUID ORAL PRN
Status: DISCONTINUED | OUTPATIENT
Start: 2020-12-11 | End: 2020-12-11 | Stop reason: HOSPADM

## 2020-12-11 RX ORDER — NALOXONE HYDROCHLORIDE 0.4 MG/ML
0.4 INJECTION, SOLUTION INTRAMUSCULAR; INTRAVENOUS; SUBCUTANEOUS
Status: DISCONTINUED | OUTPATIENT
Start: 2020-12-11 | End: 2020-12-11 | Stop reason: HOSPADM

## 2020-12-11 RX ORDER — ONDANSETRON 2 MG/ML
INJECTION INTRAMUSCULAR; INTRAVENOUS PRN
Status: DISCONTINUED | OUTPATIENT
Start: 2020-12-11 | End: 2020-12-11

## 2020-12-11 RX ORDER — FENTANYL CITRATE 50 UG/ML
50-100 INJECTION, SOLUTION INTRAMUSCULAR; INTRAVENOUS
Status: DISCONTINUED | OUTPATIENT
Start: 2020-12-11 | End: 2020-12-11 | Stop reason: HOSPADM

## 2020-12-11 RX ORDER — ONDANSETRON 4 MG/1
4 TABLET, ORALLY DISINTEGRATING ORAL EVERY 30 MIN PRN
Status: DISCONTINUED | OUTPATIENT
Start: 2020-12-11 | End: 2020-12-11 | Stop reason: HOSPADM

## 2020-12-11 RX ORDER — OXYCODONE HYDROCHLORIDE 5 MG/1
5-10 TABLET ORAL
Qty: 40 TABLET | Refills: 0 | Status: SHIPPED | OUTPATIENT
Start: 2020-12-11 | End: 2021-03-01

## 2020-12-11 RX ORDER — FENTANYL CITRATE 50 UG/ML
25-50 INJECTION, SOLUTION INTRAMUSCULAR; INTRAVENOUS
Status: DISCONTINUED | OUTPATIENT
Start: 2020-12-11 | End: 2020-12-11 | Stop reason: HOSPADM

## 2020-12-11 RX ORDER — NALOXONE HYDROCHLORIDE 0.4 MG/ML
0.2 INJECTION, SOLUTION INTRAMUSCULAR; INTRAVENOUS; SUBCUTANEOUS
Status: DISCONTINUED | OUTPATIENT
Start: 2020-12-11 | End: 2020-12-11 | Stop reason: HOSPADM

## 2020-12-11 RX ORDER — PROPOFOL 10 MG/ML
INJECTION, EMULSION INTRAVENOUS PRN
Status: DISCONTINUED | OUTPATIENT
Start: 2020-12-11 | End: 2020-12-11

## 2020-12-11 RX ORDER — CEFAZOLIN SODIUM 2 G/100ML
2 INJECTION, SOLUTION INTRAVENOUS
Status: COMPLETED | OUTPATIENT
Start: 2020-12-11 | End: 2020-12-11

## 2020-12-11 RX ADMIN — MIDAZOLAM HYDROCHLORIDE 1 MG: 1 INJECTION, SOLUTION INTRAMUSCULAR; INTRAVENOUS at 07:04

## 2020-12-11 RX ADMIN — PHENYLEPHRINE HYDROCHLORIDE 150 MCG: 10 INJECTION INTRAVENOUS at 07:37

## 2020-12-11 RX ADMIN — SODIUM CHLORIDE, POTASSIUM CHLORIDE, SODIUM LACTATE AND CALCIUM CHLORIDE: 600; 310; 30; 20 INJECTION, SOLUTION INTRAVENOUS at 07:23

## 2020-12-11 RX ADMIN — ONDANSETRON 4 MG: 2 INJECTION INTRAMUSCULAR; INTRAVENOUS at 08:44

## 2020-12-11 RX ADMIN — SODIUM CHLORIDE, POTASSIUM CHLORIDE, SODIUM LACTATE AND CALCIUM CHLORIDE: 600; 310; 30; 20 INJECTION, SOLUTION INTRAVENOUS at 06:55

## 2020-12-11 RX ADMIN — BUPIVACAINE HYDROCHLORIDE 30 ML GIVEN: 5 INJECTION, SOLUTION EPIDURAL; INTRACAUDAL; PERINEURAL at 07:00

## 2020-12-11 RX ADMIN — CEFAZOLIN SODIUM 2 G: 2 INJECTION, SOLUTION INTRAVENOUS at 07:38

## 2020-12-11 RX ADMIN — PHENYLEPHRINE HYDROCHLORIDE 200 MCG: 10 INJECTION INTRAVENOUS at 07:29

## 2020-12-11 RX ADMIN — LIDOCAINE HYDROCHLORIDE 60 MG: 20 INJECTION, SOLUTION INFILTRATION; PERINEURAL at 07:28

## 2020-12-11 RX ADMIN — PHENYLEPHRINE HYDROCHLORIDE 0.25 MCG/KG/MIN: 10 INJECTION INTRAVENOUS at 07:36

## 2020-12-11 RX ADMIN — PROPOFOL 150 MG: 10 INJECTION, EMULSION INTRAVENOUS at 07:28

## 2020-12-11 RX ADMIN — FENTANYL CITRATE 50 MCG: 0.05 INJECTION, SOLUTION INTRAMUSCULAR; INTRAVENOUS at 07:04

## 2020-12-11 ASSESSMENT — COPD QUESTIONNAIRES: COPD: 0

## 2020-12-11 ASSESSMENT — MIFFLIN-ST. JEOR: SCORE: 1586.79

## 2020-12-11 ASSESSMENT — LIFESTYLE VARIABLES: TOBACCO_USE: 0

## 2020-12-11 NOTE — DISCHARGE INSTRUCTIONS
Same Day Surgery Discharge Instructions for  Sedation and General Anesthesia       It's not unusual to feel dizzy, light-headed or faint for up to 24 hours after surgery or while taking pain medication.  If you have these symptoms: sit for a few minutes before standing and have someone assist you when you get up to walk or use the bathroom.      You should rest and relax for the next 24 hours. We recommend you make arrangements to have an adult stay with you for at least 24 hours after your discharge.  Avoid hazardous and strenuous activity.      DO NOT DRIVE any vehicle or operate mechanical equipment for 24 hours following the end of your surgery.  Even though you may feel normal, your reactions may be affected by the medication you have received.      Do not drink alcoholic beverages for 24 hours following surgery.       Slowly progress to your regular diet as you feel able. It's not unusual to feel nauseated and/or vomit after receiving anesthesia.  If you develop these symptoms, drink clear liquids (apple juice, ginger ale, broth, 7-up, etc. ) until you feel better.  If your nausea and vomiting persists for 24 hours, please notify your surgeon.        All narcotic pain medications, along with inactivity and anesthesia, can cause constipation. Drinking plenty of liquids and increasing fiber intake will help.      For any questions of a medical nature, call your surgeon.      Do not make important decisions for 24 hours.      If you had general anesthesia, you may have a sore throat for a couple of days related to the breathing tube used during surgery.  You may use Cepacol lozenges to help with this discomfort.  If it worsens or if you develop a fever, contact your surgeon.       If you feel your pain is not well managed with the pain medications prescribed by your surgeon, please contact your surgeon's office to let them know so they can address your concerns.       CoVid 19 Information    We want to give you  information regarding Covid. Please consult your primary care provider with any questions you might have.     Patient who have symptoms (cough, fever, or shortness of breath), need to isolate for 7 days from when symptoms started OR 72 hours after fever resolves (without fever reducing medications) AND improvement of respiratory symptoms (whichever is longer).      Isolate yourself at home (in own room/own bathroom if possible)    Do Not allow any visitors    Do Not go to work or school    Do Not go to [employer],  centers, shopping, or other public places.    Do Not shake hands.    Avoid close and intimate contact with others (hugging, kissing).    Follow CDC recommendations for household cleaning of frequently touched services.     After the initial 7 days, continue to isolate yourself from household members as much as possible. To continue decrease the risk of community spread and exposure, you and any members of your household should limit activities in public for 14 days after starting home isolation.     You can reference the following CDC link for helpful home isolation/care tips:  https://www.cdc.gov/coronavirus/2019-ncov/downloads/10Things.pdf    Protect Others:    Cover Your Mouth and Nose with a mask, disposable tissue or wash cloth to avoid spreading germs to others.    Wash your hands and face frequently with soap and water    Call Your Primary Doctor If: Breathing difficulty develops or you become worse.    For more information about COVID19 and options for caring for yourself at home, please visit the CDC website at https://www.cdc.gov/coronavirus/2019-ncov/about/steps-when-sick.html  For more options for care at [facility], please visit our website at [email or web address]        [facility]      DISCHARGE INSTRUCTIONS FOLLOWING   REGIONAL BLOCK ANESTHESIA      Numbness or lack of feeling in the arm/leg that was operated may last up to 24 hours.   "The average time is usually 10-15 hours.  You may not be able to lift or move the arm or leg where the operation was by itself during that time.  Long-acting local anesthetic medicines were used to give you long-lasting pain relief.    Wear a sling until your arm is completely \"awake\"    Avoid bumping your arm, leg or foot while it is numb    Avoid extremes of hot or cold while it is numb    Remain quiet and restful the day of surgery.  Resume normal activities gradually over the next day or so as advise by your surgeon.    Do not drive or operate  Any machinery until your extremity is full  \"awake\"        You will have a tingling and prickly sensation in your arm/leg as the feeling begins to return; you can also expect some discomfort. The amount of discomfort is unpredictable, but if you have more pain that can be controlled with the pain medication you received, you should contact your surgeon.  Start to take your pain pills as soon as you start to feel any discomfort or pain.               Home Care Following Outpatient Shoulder Surgery  MD Bijan Shahid PA-C  [phone]    After your surgery you will have limited use of your affected arm.  Please follow these guidelines to prevent any complications following you surgery.     Diet:  Your diet does not have any restrictions. You should drink plenty of fluids.    Pain Control:    You will have tingling and prickly sensation in your arm as your feeling begins to return.  Make sure you begin taking pain medication(s) before your arm fully awakens.  Taking the prescribed medication before the shoulder becomes painful is very helpful in minimizing any discomfort you might experience.  If your pain is not adequately controlled with the prescription you have have received, contact our office.  Do not take any alcoholic beverages while taking prescription pain medications.     We typically provide you with a narcotic pain medication (OXYCODONE) " and an antihistamine medication ( ATARAX).  These two medications should be taken together on a regular schedule for the first 24-48 hours after surgery to maintain maximum pain control.  You can decrease the frequency of the medication as you initial pain begins to subside.            You may also have been given an antibiotic prescription (KEFLEX). This medication should be taken as instructed until the supply runs out.  If you experience any stomach upset, skin rash, or other adverse reaction, stop taking the medication and contact our office.    Activity:    Follow the physical regimen that has been prescribed for you. This will be explained to you by your physical therapist.  You should have already scheduled you therapy sessions in advance.  If you have not, contact Dr Moon's office at [phone].  They can recommend a physical therapist for you to work with, and/or assist in arranging the necessary appointments.  Please be sure to take the physical therapy referral with you to your first appointment.     If you have seen your physical therapist in advance of surgery, please begin the recommended exercise program according to the recommendation below.  Most often, this involves performing codman (or pendulum) exercises.    Codman's (pendulum) exercises to begin:___POSTOP DAY 1-2 (WHEN BLOCK FULLY WORN OFF)_.    Refrain from driving until you check with your auto insurance company to see if you are covered for driving with one arm.  You should only resume driving when you are comfortable enough to avoid taking narcotic medication at or around driving time.    Assistive Devices:     You should wear sling or shoulder immobilizer for the following amount or time:__2-3 DAYS__.    Clothing:    Wearing a button up blouse or shirt is recommended.  The shoulder sling or immobilizer may be worn over the outside of your clothes.  When getting into the shirt or blouse, slider your affected arm into the sleeve first,  leaving the arm at your side and sliding the sleeve up the arm.  Then, place the unaffected arm into the other sleeve.  When taking off the shirt or blouse, do the opposite: slide your unaffected arm out of the sleeve first, Then, slide the shirt down the surgical arm, leaving the arm at your side while doing so.      Incision Care/Showering Instructions:    Please follow the instructions below, according to the type of surgery you have undergone.  The appropriate box should be marked to indicate the instructions to follow.  If it is not, contact our office for specific instruction.    _X__ All arthroscopic surgery     You may remove your dressing the third day after your surgery.  A small amount of drainage from the incisions is normal. Place band-aids over the incisions. Do not use any ointment or creams on the incision sites unless otherwise instructed to do so.      Please do not begin showering until the third day following surgery.  You should sponge bath until that time.      You may remove your sling for showering.  You should let your arm hand at your side during the shower; do not actively move the arm when out of the sling.  Do not immerse the affected are under water.    When showering, leave band aids over the incisions.  When done showering, you should replace the band-aids with clean, dry ones.  Inspect your incisions at the time of dressing change for increase redness, swelling and drainage.  Notify our office if these develop.       Follow up    You have a follow up appointment scheduled to see Dr. Moon on      ____PT STATES NEXT THURSDAY___.    Any modifications to the above instructions will be made at that time.      If you have any questions about your surgery, please call Dr. Moon office at [phone].    You may also direct questions to Dr Moon care coordinatorLinda, at [phone].    Regional Anesthesia:    Regional anesthesia is injected by an anesthesiologist into or around  appropriate nerves to numb the area having surgery.  It is a type of local anesthesia.    The anesthesia your physician used to numb your arm will wear off in 4 to 12 hours, but it may take even longer.  During that period, you should be careful because it is possible to injure the numbed arm and not be aware of the injury.  While your arm is numb, you should:        Wear a sling for support    Avoid bumping your arm    Avoid extreme hot or cold    Many patients will have a cold cuff provided for you.  It is safe for you to use this.  Recool this as necessary in the hours and days following your surgery.  Follow the instructions provided for you by nursing staff of the .  You may stop using it at your discretion, typically between 1 and 2 weeks following surgery.      **If you have questions or concerns about your procedure,  call Dr. Moon at [phone]**

## 2020-12-11 NOTE — ANESTHESIA CARE TRANSFER NOTE
Patient: Harpreet Vera    Procedure(s):  RIGHT SHOULDER ARTHROSCOPY, ARTHROSCOPIC SUBACROMIAL DECOMPRESSION, DISTAL CLAVICLE AND BICEPS TENOTOMY    Diagnosis: Impingement syndrome of right shoulder [M75.41]  Complete rotator cuff tear or rupture of right shoulder, not specified as traumatic [M75.121]  Arthritis of right shoulder region [M19.011]  Spontaneous rupture of flexor tendon of right upper arm [M66.321]  Diagnosis Additional Information: No value filed.    Anesthesia Type:   General     Note:  Airway :Room Air  Patient transferred to:Phase II  Handoff Report: Identifed the Patient, Identified the Reponsible Provider, Reviewed the pertinent medical history, Discussed the surgical course, Reviewed Intra-OP anesthesia mangement and issues during anesthesia, Set expectations for post-procedure period and Allowed opportunity for questions and acknowledgement of understanding      Vitals: (Last set prior to Anesthesia Care Transfer)    CRNA VITALS  12/11/2020 0829 - 12/11/2020 0919      12/11/2020             Resp Rate (set):  10                Electronically Signed By: SALVATORE Carpio CRNA  December 11, 2020  9:19 AM

## 2020-12-11 NOTE — ANESTHESIA PREPROCEDURE EVALUATION
"Anesthesia Pre-Procedure Evaluation    Patient: Harpreet Vera   MRN: [id number] : 1950          Preoperative Diagnosis: Impingement syndrome of right shoulder [M75.41]  Complete rotator cuff tear or rupture of right shoulder, not specified as traumatic [M75.121]  Arthritis of right shoulder region [M19.011]  Spontaneous rupture of flexor tendon of right upper arm [M66.321]    Procedure(s):  RIGHT SHOULDER ARTHROSCOPY, ARTHROSCOPIC SUBACROMIAL DECOMPRESSION, DISTAL CLAVICLE RESECTION EVALUATE BICEPS AND ROTATOR CUFF    Past Medical History:   Diagnosis Date     Benign neoplasm of colon      Brachial plexopathy      Chronic infection     HX of C Diff     Hypertension      Mumps      Presence of permanent cardiac pacemaker      Sinus node dysfunction (H)     with syncope     Sleep apnea     Bi-PAP     Past Surgical History:   Procedure Laterality Date     CHOLECYSTECTOMY       COLONOSCOPY       COLONOSCOPY N/A 2017    Procedure: COMBINED COLONOSCOPY, SINGLE OR MULTIPLE BIOPSY/POLYPECTOMY BY BIOPSY;  Surgeon: Tylor Rice MD;  Location:  GI     DECOMPRESSION, FUSION CERVICAL ANTERIOR ONE LEVEL, COMBINED N/A 2016    Procedure: COMBINED DECOMPRESSION, FUSION CERVICAL ANTERIOR ONE LEVEL;  Surgeon: Erick Valles MD;  Location:  OR     ENT SURGERY      tonsilectomy  as a child     EP PACEMAKER N/A 2020    Procedure: EP Pacemaker;  Surgeon: Alfonso Sigala MD;  Location:  [facility]     IR CAROTID CEREBRAL ANGIOGRAM BILATERAL  2020     IR CAROTID CEREBRAL ANGIOGRAM BILATERAL  2020     IR DISCONTINUE SHEATH  2020     ORTHOPEDIC SURGERY      ambar knees scopedrt shoulder,fx rt arm fx     TESTICLE SURGERY       VASECTOMY       Z NONSPECIFIC PROCEDURE      Right clavicle fracture, Multiple right sided rib fracture, hairline fracture \"pelvis\", secondary to fall from tree         Z NONSPECIFIC PROCEDURE      Right ulnar/radial fracture        Anesthesia " Evaluation     . Pt has had prior anesthetic.     No history of anesthetic complications          ROS/MED HX    ENT/Pulmonary:     (+)sleep apnea, , . .   (-) tobacco use, asthma and COPD   Neurologic: Comment: Vertebral artery stent on right, 100% occluded on left    (+)neuropathy - brachial plexopathy on left, significant improvement following fusion,     Cardiovascular: Comment: Vertebral artery stent on right, 100% occluded on left    (+) Dyslipidemia, hypertension----. : . . fainting (syncope). pacemaker Reason placed: bradycardia, syncope:. .       METS/Exercise Tolerance:     Hematologic:         Musculoskeletal: Comment: S/p C6-7 fusion        GI/Hepatic:  - neg GI/hepatic ROS       Renal/Genitourinary:     (+) BPH,       Endo:      (-) Type II DM   Psychiatric:         Infectious Disease:         Malignancy:         Other:                          Physical Exam  Normal systems: dental    Airway   Mallampati: II  TM distance: >3 FB  Neck ROM: full    Dental     Cardiovascular   Rhythm and rate: regular and normal      Pulmonary    breath sounds clear to auscultation            Lab Results   Component Value Date    WBC 6.4 12/07/2020    HGB 15.1 12/07/2020    HCT 43.0 12/07/2020     (L) 12/07/2020    CRP 36.0 (H) 01/13/2020    SED 14 01/13/2020     12/07/2020    POTASSIUM 4.0 12/11/2020    CHLORIDE 109 12/07/2020    CO2 28 12/07/2020    BUN 17 12/07/2020    CR 1.23 12/11/2020    GLC 94 12/07/2020    LATHA 9.3 12/07/2020    PHOS 3.5 01/07/2020    MAG 2.1 08/21/2020    ALBUMIN 4.1 12/07/2020    PROTTOTAL 7.2 12/07/2020    ALT 28 12/07/2020    AST 20 12/07/2020    ALKPHOS 74 12/07/2020    BILITOTAL 1.3 12/07/2020    INR 1.09 08/21/2020    TSH 3.26 08/21/2020       Preop Vitals  BP Readings from Last 3 Encounters:   12/11/20 (!) 140/88   12/07/20 (!) 146/84   12/02/20 (!) 151/83    Pulse Readings from Last 3 Encounters:   12/11/20 60   12/07/20 71   12/02/20 62      Resp Readings from Last 3  "Encounters:   12/11/20 16   08/26/20 16   08/22/20 16    SpO2 Readings from Last 3 Encounters:   12/11/20 97%   12/07/20 96%   08/26/20 98%      Temp Readings from Last 1 Encounters:   12/11/20 37.1  C (98.7  F) (Temporal)    Ht Readings from Last 1 Encounters:   12/11/20 1.702 m (5' 7\")      Wt Readings from Last 1 Encounters:   12/11/20 87.8 kg (193 lb 9.6 oz)    Estimated body mass index is 30.32 kg/m  as calculated from the following:    Height as of this encounter: 1.702 m (5' 7\").    Weight as of this encounter: 87.8 kg (193 lb 9.6 oz).       Anesthesia Plan      History & Physical Review  History and physical reviewed and following examination; no interval change.    ASA Status:  3 .    NPO Status:  > 8 hours    Plan for General with Intravenous and Propofol induction. Maintenance will be Balanced.    PONV prophylaxis:  Ondansetron (or other 5HT-3) and Dexamethasone or Solumedrol  Discussed increased risks in setting of previous neuropathy, however reassuring given improvement and affected contralateral side.  Patient wishes to receive PNB.    Plan for magnet mode on PPM and phenylephrine infusion to keep MAP>90        Postoperative Care  Postoperative pain management:  Peripheral nerve block (Single Shot).      Consents  Anesthetic plan, risks, benefits and alternatives discussed with:  Patient..                 Ambrocio Chen MD  "

## 2020-12-11 NOTE — OP NOTE
PREOPERATIVE DIAGNOSIS:  Right coracoacromial impingement syndrome, possible rotator cuff tear, long head biceps tendinopathy, acromioclavicular degenerative joint disease.      POSTOPERATIVE DIAGNOSIS:  Right coracoacromial impingement syndrome, possible rotator cuff tear, long head biceps tendinopathy, acromioclavicular degenerative joint disease with partial articular supraspinatus tear (15%) but no full thickness tearing.      PROCEDURES PERFORMED:   1.  Right arthroscopic subacromial decompression.   2.  Right arthroscopic rotator cuff debridement.   3.  Right arthroscopic distal clavicle resection.   4.  Right arthroscopic long head biceps tenotomy.        SURGEON:  Joseph Moon MD.        ASSISTANT:  Bijan Gr PA-C.      ANESTHESIA:  General endotracheal.      ESTIMATED BLOOD LOSS:  Negligible.      FLUID REPLACEMENT:  One liter crystalloid.      COMPLICATIONS:  No immediate complications.      INDICATIONS:  Please see preoperative clinical notes.      EXAMINATION OF RIGHT SHOULDER UNDER ANESTHESIA FINDINGS:  Passive range of motion 165/90/80/90/50.      ARTHROSCOPIC FINDINGS:  Chondral surfaces were diffusely grade 1 at the humeral head and glenoid vault..  He had circumferential age appropriate labral fraying.  He had extensive longhead biceps tendinopathy with partial tearing.  The articular and bursal aspects of the .  There was a roughly 15% partial articular supraspinatus tear.  There were no loose bodies evident.  He had mild intra-articular synovitis.  There was copious bursal inflammation.  The bursal surface of the rotator cuff was circumferentially intact.  There was a type 2 acromion hypertrophic change at the AC joint.      DESCRIPTION OF PROCEDURE:  The patient was identified in the preoperative holding area and taken to room #33 of the main OR.  Monitors were placed per the Anesthesia Service.  After smooth IV induction, general anesthesia was introduced and his endotracheal tube  secured.  He was given 2 g Ancef IV.  He was then repositioned in the modified beach chair position with the head and neck secured in neutral position and all peripheral extremities thoroughly padded with foam.  Right upper extremity was then widely prepped and draped in the usual sterile fashion.  Pneumo boots were in place and operational during the procedure.      We avoided any epinephrine in any of the irrigant bags due to his cardiac history.      Anatomic landmarks were then outlined with a marking pen.  Diagnostic right glenohumeral arthroscopy was then performed through a posterior viewing portal established in the soft spot and anterior, anterolateral and posterolateral working portals established utilizing outside-in techniques.  Diagnostic findings were as mentioned.  Intra-articular synovectomy was performed with electroablation device on low intermittent setting with constant outflow to avoid increased temperature.  Long head biceps was tenotomized and the intra-articular portion allowed to recess external to the glenohumeral joint.      A marking 2-0 PDS suture was placed after debridement of the partial articular supraspinatus tear back to a stable interface, with a high speed shaver.  We then placed a 2-0 PDS marking suture to allow visualization from the bursal surface.      The subacromial space was then inspected.  A complete bursectomy was performed utilizing arm rotation to deliver all inflamed tissue, the shaver and electroablation device on low intermittent setting.  The coracoacromial ligament was recessed slightly and a conservative acromioplasty performed, resecting roughly 2-3 mm of bone anteriorly and tapering back to a smooth flat interface.  The bursal surface of the rotator cuff was then probed circumferentially and found to be intact.  The marking suture then removed.      I then visualized the subacromial space from lateral and took down the inferior AC joint capsule with  electroablation device on low intermittent setting.  I then resected roughly 2-3 mm of medial acromion and roughly 5-6 mm of the lateral clavicle for 1 cm decompression while maintaining the posterosuperior joint capsule integrity.      A 70-degree arthroscope was inserted to confirm adequate visualization resection.  The subacromial  and acromioclavicular spaces then thoroughly lavaged.  The instruments were removed.  Sharps and sponge counts were correct.  Portal sites were closed simply with 3-0 nylon mattress suture.  Bulky sterile dressing was then applied.  He was placed into a Cryo/Cuff in neutral position, was then reversed, extubated and taken back to the recovery room in stable condition.  There were no immediate complications and he appeared to tolerate the procedure well.      A skilled first assistant was necessary for this procedure for assistance with patient positioning, prepping, draping, surgical visualization, performance of the debridement, and bony decompression, wound closure, dressing and a Cryo/Cuff application.      PQRI MEASURES:   1.  The patient was given 2 g of Ancef IV within 1 hour prior to skin incision.  IV antibiotic therapy was discontinued within 24 hours postoperatively.   2.  Deep venous thrombosis prophylaxis will be with resumption of his baseline aspirin.  3.  Standard phase 1 post-decompression protocol for physical therapy.   4.  He should have a Zanca radiograph of the right shoulder, return to office in 1 week for suture removal.     5.  Temporary arm sling for protection for roughly 2-3 days postoperatively.

## 2020-12-11 NOTE — ANESTHESIA POSTPROCEDURE EVALUATION
Patient: Harpreet Vera    Procedure(s):  RIGHT SHOULDER ARTHROSCOPY, ARTHROSCOPIC SUBACROMIAL DECOMPRESSION, DISTAL CLAVICLE AND BICEPS TENOTOMY    Diagnosis:Impingement syndrome of right shoulder [M75.41]  Complete rotator cuff tear or rupture of right shoulder, not specified as traumatic [M75.121]  Arthritis of right shoulder region [M19.011]  Spontaneous rupture of flexor tendon of right upper arm [M66.321]  Diagnosis Additional Information: No value filed.    Anesthesia Type:  General    Note:  Anesthesia Post Evaluation    Patient location during evaluation: PACU  Patient participation: Able to fully participate in evaluation  Level of consciousness: awake  Pain management: adequate  Airway patency: patent  Cardiovascular status: acceptable  Respiratory status: acceptable  Hydration status: acceptable  PONV: none     Anesthetic complications: None          Last vitals:  Vitals:    12/11/20 1000 12/11/20 1015 12/11/20 1115   BP: 124/81 137/77 (!) 142/92   Pulse: 61 60    Resp: 16 16 16   Temp:  36.6  C (97.8  F)    SpO2: 93% 93% 98%         Electronically Signed By: Ambrocio Chen MD  December 11, 2020  3:59 PM

## 2020-12-11 NOTE — ANESTHESIA PROCEDURE NOTES
Procedure note : Brachial plexus (ISB)      Staff -   Anesthesiologist:  Ambrocio Chen MD  Performed By: anesthesiologist  Pre-Procedure  Performed by Ambrocio Chen MD  Location: pre-op      Pre-Anesthestic Checklist: patient identified, IV checked, site marked, risks and benefits discussed, informed consent, monitors and equipment checked, pre-op evaluation, at physician/surgeon's request and post-op pain management    Timeout  Correct Patient: Yes   Correct Procedure: Yes   Correct Site: Yes   Correct Laterality: Yes   Correct Position: Yes   Site Marked: Yes   .   Procedure Documentation    .    Procedure: Brachial plexus (ISB), right.   Patient Position:supine Local skin infiltrated with 2 mL of 1% lidocaine.    Ultrasound used to identify targeted nerve, plexus, or vascular marker and placed a needle adjacent to it., Ultrasound was used to visualize the spread of the anesthetic in close proximity to the above stated nerve. A permanent image is entered into the patient's record.  Patient Prep/Sterile Barriers; mask, sterile gloves, chlorhexidine gluconate and isopropyl alcohol.  .  Needle: insulated, short bevel   Needle Gauge: 21.  Needle Length (millimeters) 90  Insertion Method: Single Shot.        Assessment/Narrative  Paresthesias: No.  Injection made incrementally with aspirations every 5 mL..  The placement was negative for: blood aspirated, painful injection and site bleeding.  Bolus given via needle..   Secured via.   Complications: none. Comments:  Interscalene PNB. No immediate complications.  Patient tolerated well.     Ultrasound Interpretation, Peripheral Nerve Block    1. Under ultrasound guidance, the needle was inserted and placed in close proximity to the target nerve(s).  2. Ultrasound was also used to visualize the spread of the anesthetic in close proximity to the nerve(s) being blocked.  Local anesthetic was administered in incremental doses, with intermittent negative  aspiration.    3. The nerve(s) appeared anatomically normal.  4. There were no apparent abnormal pathological findings.  5. A permanent ultrasound image was saved in the patient's record.    Pt tolerated well.    No complications.      The surgeon has given a verbal order transferring care of this patient to me for the performance of a regional analgesia block for post-op pain control. It is requested of me because I am uniquely trained and qualified to perform this block and the surgeon is neither trained nor qualified to perform this procedure.

## 2020-12-11 NOTE — PROGRESS NOTES
S. We received an order for an right shoulder Ultrasling III at the main OR.    O/goal. To reduce motion and help with post-op support    A. At this time, I have provided the main OR with a size large Lui Linares Ultrasling III.    P. Staffing have been instructed to contact our facility with any future questions and/or concerns.     Maurice MARISCAL

## 2020-12-15 DIAGNOSIS — R97.20 ELEVATED PSA: Primary | ICD-10-CM

## 2020-12-15 RX ORDER — DUTASTERIDE 0.5 MG/1
0.5 CAPSULE, LIQUID FILLED ORAL DAILY
Qty: 90 CAPSULE | Refills: 0 | Status: SHIPPED | OUTPATIENT
Start: 2020-12-15 | End: 2021-03-16

## 2020-12-17 ENCOUNTER — TRANSFERRED RECORDS (OUTPATIENT)
Dept: HEALTH INFORMATION MANAGEMENT | Facility: CLINIC | Age: 70
End: 2020-12-17

## 2021-01-15 ENCOUNTER — HEALTH MAINTENANCE LETTER (OUTPATIENT)
Age: 71
End: 2021-01-15

## 2021-01-19 ENCOUNTER — ANCILLARY PROCEDURE (OUTPATIENT)
Dept: CARDIOLOGY | Facility: CLINIC | Age: 71
End: 2021-01-19
Attending: INTERNAL MEDICINE
Payer: MEDICARE

## 2021-01-19 DIAGNOSIS — I49.5 SSS (SICK SINUS SYNDROME) (H): ICD-10-CM

## 2021-01-19 DIAGNOSIS — Z95.0 CARDIAC PACEMAKER IN SITU: ICD-10-CM

## 2021-01-19 PROCEDURE — 93294 REM INTERROG EVL PM/LDLS PM: CPT | Performed by: INTERNAL MEDICINE

## 2021-01-19 PROCEDURE — 93296 REM INTERROG EVL PM/IDS: CPT | Performed by: INTERNAL MEDICINE

## 2021-01-21 ENCOUNTER — TRANSFERRED RECORDS (OUTPATIENT)
Dept: HEALTH INFORMATION MANAGEMENT | Facility: CLINIC | Age: 71
End: 2021-01-21

## 2021-01-21 LAB
MDC_IDC_EPISODE_DTM: NORMAL
MDC_IDC_EPISODE_DTM: NORMAL
MDC_IDC_EPISODE_ID: NORMAL
MDC_IDC_EPISODE_ID: NORMAL
MDC_IDC_EPISODE_TYPE: NORMAL
MDC_IDC_EPISODE_TYPE: NORMAL
MDC_IDC_LEAD_IMPLANT_DT: NORMAL
MDC_IDC_LEAD_IMPLANT_DT: NORMAL
MDC_IDC_LEAD_LOCATION: NORMAL
MDC_IDC_LEAD_LOCATION: NORMAL
MDC_IDC_LEAD_LOCATION_DETAIL_1: NORMAL
MDC_IDC_LEAD_LOCATION_DETAIL_1: NORMAL
MDC_IDC_LEAD_MFG: NORMAL
MDC_IDC_LEAD_MFG: NORMAL
MDC_IDC_LEAD_MODEL: NORMAL
MDC_IDC_LEAD_MODEL: NORMAL
MDC_IDC_LEAD_SERIAL: NORMAL
MDC_IDC_LEAD_SERIAL: NORMAL
MDC_IDC_MSMT_BATTERY_DTM: NORMAL
MDC_IDC_MSMT_BATTERY_REMAINING_LONGEVITY: 180 MO
MDC_IDC_MSMT_BATTERY_REMAINING_PERCENTAGE: 100 %
MDC_IDC_MSMT_BATTERY_STATUS: NORMAL
MDC_IDC_MSMT_LEADCHNL_RA_IMPEDANCE_VALUE: 870 OHM
MDC_IDC_MSMT_LEADCHNL_RA_PACING_THRESHOLD_AMPLITUDE: 0.5 V
MDC_IDC_MSMT_LEADCHNL_RA_PACING_THRESHOLD_PULSEWIDTH: 0.4 MS
MDC_IDC_MSMT_LEADCHNL_RV_IMPEDANCE_VALUE: 688 OHM
MDC_IDC_MSMT_LEADCHNL_RV_PACING_THRESHOLD_AMPLITUDE: 0.6 V
MDC_IDC_MSMT_LEADCHNL_RV_PACING_THRESHOLD_PULSEWIDTH: 0.4 MS
MDC_IDC_PG_IMPLANT_DTM: NORMAL
MDC_IDC_PG_MFG: NORMAL
MDC_IDC_PG_MODEL: NORMAL
MDC_IDC_PG_SERIAL: NORMAL
MDC_IDC_PG_TYPE: NORMAL
MDC_IDC_SESS_CLINIC_NAME: NORMAL
MDC_IDC_SESS_DTM: NORMAL
MDC_IDC_SESS_TYPE: NORMAL
MDC_IDC_SET_BRADY_AT_MODE_SWITCH_MODE: NORMAL
MDC_IDC_SET_BRADY_AT_MODE_SWITCH_RATE: 170 {BEATS}/MIN
MDC_IDC_SET_BRADY_LOWRATE: 60 {BEATS}/MIN
MDC_IDC_SET_BRADY_MAX_SENSOR_RATE: 130 {BEATS}/MIN
MDC_IDC_SET_BRADY_MAX_TRACKING_RATE: 130 {BEATS}/MIN
MDC_IDC_SET_BRADY_MODE: NORMAL
MDC_IDC_SET_BRADY_PAV_DELAY_HIGH: 150 MS
MDC_IDC_SET_BRADY_PAV_DELAY_LOW: 200 MS
MDC_IDC_SET_BRADY_SAV_DELAY_HIGH: 150 MS
MDC_IDC_SET_BRADY_SAV_DELAY_LOW: 200 MS
MDC_IDC_SET_LEADCHNL_RA_PACING_AMPLITUDE: 2 V
MDC_IDC_SET_LEADCHNL_RA_PACING_CAPTURE_MODE: NORMAL
MDC_IDC_SET_LEADCHNL_RA_PACING_POLARITY: NORMAL
MDC_IDC_SET_LEADCHNL_RA_PACING_PULSEWIDTH: 0.4 MS
MDC_IDC_SET_LEADCHNL_RA_SENSING_ADAPTATION_MODE: NORMAL
MDC_IDC_SET_LEADCHNL_RA_SENSING_POLARITY: NORMAL
MDC_IDC_SET_LEADCHNL_RA_SENSING_SENSITIVITY: 0.25 MV
MDC_IDC_SET_LEADCHNL_RV_PACING_AMPLITUDE: 1.1 V
MDC_IDC_SET_LEADCHNL_RV_PACING_CAPTURE_MODE: NORMAL
MDC_IDC_SET_LEADCHNL_RV_PACING_POLARITY: NORMAL
MDC_IDC_SET_LEADCHNL_RV_PACING_PULSEWIDTH: 0.4 MS
MDC_IDC_SET_LEADCHNL_RV_SENSING_ADAPTATION_MODE: NORMAL
MDC_IDC_SET_LEADCHNL_RV_SENSING_POLARITY: NORMAL
MDC_IDC_SET_LEADCHNL_RV_SENSING_SENSITIVITY: 1.5 MV
MDC_IDC_SET_ZONE_DETECTION_INTERVAL: 375 MS
MDC_IDC_SET_ZONE_TYPE: NORMAL
MDC_IDC_SET_ZONE_VENDOR_TYPE: NORMAL
MDC_IDC_STAT_AT_BURDEN_PERCENT: 0 %
MDC_IDC_STAT_AT_DTM_END: NORMAL
MDC_IDC_STAT_AT_DTM_START: NORMAL
MDC_IDC_STAT_BRADY_DTM_END: NORMAL
MDC_IDC_STAT_BRADY_DTM_START: NORMAL
MDC_IDC_STAT_BRADY_RA_PERCENT_PACED: 69 %
MDC_IDC_STAT_BRADY_RV_PERCENT_PACED: 0 %
MDC_IDC_STAT_EPISODE_RECENT_COUNT: 0
MDC_IDC_STAT_EPISODE_RECENT_COUNT: 1
MDC_IDC_STAT_EPISODE_RECENT_COUNT_DTM_END: NORMAL
MDC_IDC_STAT_EPISODE_RECENT_COUNT_DTM_START: NORMAL
MDC_IDC_STAT_EPISODE_TYPE: NORMAL
MDC_IDC_STAT_EPISODE_VENDOR_TYPE: NORMAL

## 2021-02-25 ENCOUNTER — TRANSFERRED RECORDS (OUTPATIENT)
Dept: HEALTH INFORMATION MANAGEMENT | Facility: CLINIC | Age: 71
End: 2021-02-25

## 2021-02-26 ASSESSMENT — ENCOUNTER SYMPTOMS
HEADACHES: 0
CONSTIPATION: 0
NERVOUS/ANXIOUS: 0
WEAKNESS: 0
SHORTNESS OF BREATH: 0
FREQUENCY: 1
DIZZINESS: 0
PALPITATIONS: 0
SORE THROAT: 0
DYSURIA: 0
HEMATURIA: 0
HEMATOCHEZIA: 0
FEVER: 0
ABDOMINAL PAIN: 0
COUGH: 0
NAUSEA: 0
HEARTBURN: 0
CHILLS: 0
MYALGIAS: 0
ARTHRALGIAS: 0
JOINT SWELLING: 0
DIARRHEA: 0
PARESTHESIAS: 0
EYE PAIN: 0

## 2021-02-26 ASSESSMENT — ACTIVITIES OF DAILY LIVING (ADL): CURRENT_FUNCTION: NO ASSISTANCE NEEDED

## 2021-03-01 ENCOUNTER — OFFICE VISIT (OUTPATIENT)
Dept: INTERNAL MEDICINE | Facility: CLINIC | Age: 71
End: 2021-03-01
Payer: MEDICARE

## 2021-03-01 VITALS
BODY MASS INDEX: 30.62 KG/M2 | OXYGEN SATURATION: 97 % | HEIGHT: 67 IN | DIASTOLIC BLOOD PRESSURE: 87 MMHG | HEART RATE: 71 BPM | TEMPERATURE: 97.2 F | WEIGHT: 195.1 LBS | SYSTOLIC BLOOD PRESSURE: 137 MMHG

## 2021-03-01 DIAGNOSIS — N40.1 BENIGN PROSTATIC HYPERPLASIA WITH URINARY HESITANCY: ICD-10-CM

## 2021-03-01 DIAGNOSIS — N18.31 STAGE 3A CHRONIC KIDNEY DISEASE (H): ICD-10-CM

## 2021-03-01 DIAGNOSIS — E78.00 HYPERCHOLESTEREMIA: ICD-10-CM

## 2021-03-01 DIAGNOSIS — I10 ESSENTIAL HYPERTENSION, BENIGN: ICD-10-CM

## 2021-03-01 DIAGNOSIS — R39.11 BENIGN PROSTATIC HYPERPLASIA WITH URINARY HESITANCY: ICD-10-CM

## 2021-03-01 DIAGNOSIS — Z00.00 ENCOUNTER FOR MEDICARE ANNUAL WELLNESS EXAM: Primary | ICD-10-CM

## 2021-03-01 PROBLEM — N18.30 CHRONIC KIDNEY DISEASE, STAGE 3 (H): Status: ACTIVE | Noted: 2021-03-01

## 2021-03-01 PROCEDURE — 80061 LIPID PANEL: CPT | Performed by: INTERNAL MEDICINE

## 2021-03-01 PROCEDURE — G0439 PPPS, SUBSEQ VISIT: HCPCS | Performed by: INTERNAL MEDICINE

## 2021-03-01 PROCEDURE — 36415 COLL VENOUS BLD VENIPUNCTURE: CPT | Performed by: INTERNAL MEDICINE

## 2021-03-01 PROCEDURE — 84443 ASSAY THYROID STIM HORMONE: CPT | Performed by: INTERNAL MEDICINE

## 2021-03-01 ASSESSMENT — ENCOUNTER SYMPTOMS
SORE THROAT: 0
EYE PAIN: 0
ARTHRALGIAS: 0
NERVOUS/ANXIOUS: 0
HEMATOCHEZIA: 0
CHILLS: 0
HEMATURIA: 0
PARESTHESIAS: 0
CONSTIPATION: 0
DYSURIA: 0
SHORTNESS OF BREATH: 0
ABDOMINAL PAIN: 0
PALPITATIONS: 0
WEAKNESS: 0
DIARRHEA: 0
NAUSEA: 0
MYALGIAS: 0
HEADACHES: 0
JOINT SWELLING: 0
DIZZINESS: 0
COUGH: 0
FEVER: 0
HEARTBURN: 0
FREQUENCY: 1

## 2021-03-01 ASSESSMENT — ACTIVITIES OF DAILY LIVING (ADL): CURRENT_FUNCTION: NO ASSISTANCE NEEDED

## 2021-03-01 ASSESSMENT — MIFFLIN-ST. JEOR: SCORE: 1593.6

## 2021-03-01 NOTE — PROGRESS NOTES
"SUBJECTIVE:   Harpreet Vera is a 70 year old male who presents for Preventive Visit.      Patient has been advised of split billing requirements and indicates understanding: Yes   Are you in the first 12 months of your Medicare coverage?  No    Healthy Habits:     In general, how would you rate your overall health?  Good    Frequency of exercise:  4-5 days/week    Duration of exercise:  15-30 minutes    Do you usually eat at least 4 servings of fruit and vegetables a day, include whole grains    & fiber and avoid regularly eating high fat or \"junk\" foods?  No    Taking medications regularly:  Yes    Ability to successfully perform activities of daily living:  No assistance needed    Home Safety:  No safety concerns identified    Hearing Impairment:  Difficulty following a conversation in a noisy restaurant or crowded room, feel that people are mumbling or not speaking clearly, difficulty following dialogue in the theater, need to ask people to speak up or repeat themselves, difficulty understanding soft or whispered speech and difficulty understanding speech on the telephone    In the past 6 months, have you been bothered by leaking of urine? Yes    In general, how would you rate your overall mental or emotional health?  Excellent      PHQ-2 Total Score: 0    Additional concerns today:  No    Do you feel safe in your environment? Yes    Have you ever done Advance Care Planning? (For example, a Health Directive, POLST, or a discussion with a medical provider or your loved ones about your wishes): Yes, advance care planning is on file.       Fall risk  Fallen 2 or more times in the past year?: Yes  Any fall with injury in the past year?: Yes    Cognitive Screening   1) Repeat 3 items (Leader, Season, Table)    2) Clock draw: NORMAL  3) 3 item recall: Recalls 2 objects   Results: NORMAL clock, 1-2 items recalled: COGNITIVE IMPAIRMENT LESS LIKELY    Mini-CogTM Copyright S Johnna. Licensed by the author for use in " [facility]; reprinted with permission (haileymazin@[facility]). All rights reserved.      Do you have sleep apnea, excessive snoring or daytime drowsiness?: yes    Reviewed and updated as needed this visit by clinical staff  Tobacco  Allergies  Meds   Med Hx  Surg Hx  Fam Hx  Soc Hx        Reviewed and updated as needed this visit by Provider                Social History     Tobacco Use     Smoking status: Never Smoker     Smokeless tobacco: Never Used   Substance Use Topics     Alcohol use: Yes     Alcohol/week: 0.0 standard drinks     Comment: rarely     If you drink alcohol do you typically have >3 drinks per day or >7 drinks per week? Yes      No flowsheet data found.        Current providers sharing in care for this patient include:   Patient Care Team:  Lucía Sandhu MD as PCP - General  Lucía Sandhu MD as Assigned PCP  Yong Kyle DPM as Assigned Musculoskeletal Provider  Hadley Bruno MD as Assigned Surgical Provider    The following health maintenance items are reviewed in Epic and correct as of today:  Health Maintenance   Topic Date Due     HEPATITIS C SCREENING  [birth year]10/1968     AORTIC ANEURYSM SCREENING (SYSTEM ASSIGNED)  04/10/2013     Pneumococcal Vaccine: 65+ Years (2 of 2 - PPSV23) 10/13/2020     DEXA  09/19/2021     DTAP/TDAP/TD IMMUNIZATION (3 - Td) 10/12/2021     FALL RISK ASSESSMENT  12/07/2021     MEDICARE ANNUAL WELLNESS VISIT  03/01/2022     LIPID  03/01/2026     ADVANCE CARE PLANNING  03/01/2026     COLORECTAL CANCER SCREENING  04/05/2027     PHQ-2  Completed     INFLUENZA VACCINE  Completed     ZOSTER IMMUNIZATION  Completed     Pneumococcal Vaccine: Pediatrics (0 to 5 Years) and At-Risk Patients (6 to 64 Years)  Aged Out     IPV IMMUNIZATION  Aged Out     MENINGITIS IMMUNIZATION  Aged Out     HEPATITIS B IMMUNIZATION  Aged Out     BP Readings from Last 3 Encounters:   03/01/21 137/87   12/11/20 (!) 142/92   12/07/20 (!) 146/84    Wt Readings from Last 3  "Encounters:   03/01/21 88.5 kg (195 lb 1.6 oz)   12/11/20 87.8 kg (193 lb 9.6 oz)   12/07/20 88 kg (193 lb 14.4 oz)                   Review of Systems   Constitutional: Negative for chills and fever.   HENT: Negative for congestion, ear pain, hearing loss and sore throat.    Eyes: Negative for pain and visual disturbance.   Respiratory: Negative for cough and shortness of breath.    Cardiovascular: Positive for peripheral edema. Negative for chest pain and palpitations.   Gastrointestinal: Negative for abdominal pain, constipation, diarrhea, heartburn, hematochezia and nausea.   Genitourinary: Positive for frequency, impotence and urgency. Negative for discharge, dysuria, genital sores and hematuria.   Musculoskeletal: Negative for arthralgias, joint swelling and myalgias.   Skin: Negative for rash.   Neurological: Negative for dizziness, weakness, headaches and paresthesias.   Psychiatric/Behavioral: Negative for mood changes. The patient is not nervous/anxious.        OBJECTIVE:   /87 (BP Location: Right arm, Patient Position: Sitting, Cuff Size: Adult Large)   Pulse 71   Temp 97.2  F (36.2  C) (Oral)   Ht 1.702 m (5' 7\")   Wt 88.5 kg (195 lb 1.6 oz)   SpO2 97%   BMI 30.56 kg/m   Estimated body mass index is 30.56 kg/m  as calculated from the following:    Height as of this encounter: 1.702 m (5' 7\").    Weight as of this encounter: 88.5 kg (195 lb 1.6 oz).  Physical Exam  GENERAL: healthy, alert and no distress  EYES: Eyes grossly normal to inspection, PERRL and conjunctivae and sclerae normal  HENT: ear canals and TM's normal, nose and mouth without ulcers or lesions  NECK: no adenopathy, no asymmetry, masses, or scars and thyroid normal to palpation  RESP: lungs clear to auscultation - no rales, rhonchi or wheezes  CV: regular rate and rhythm, normal S1 S2, no S3 or S4, no murmur, click or rub, no peripheral edema and peripheral pulses strong  ABDOMEN: soft, nontender, no hepatosplenomegaly, no " "masses and bowel sounds normal  MS: no gross musculoskeletal defects noted, no edema  SKIN: no suspicious lesions or rashes  NEURO: Normal strength and tone, mentation intact and speech normal  PSYCH: mentation appears normal, affect normal/bright      ASSESSMENT / PLAN:   1. Encounter for Medicare annual wellness exam       2. Essential hypertension, benign  under reasonable control due for  - TSH with free T4 reflex  - Lipid Profile (Chol, Trig, HDL, LDL calc)    3. Benign prostatic hyperplasia with urinary hesitancy       4. Hypercholesteremia  Due for push fluids   - TSH with free T4 reflex  - Lipid Profile (Chol, Trig, HDL, LDL calc)    5. Stage 3a chronic kidney disease  Labs updated in December      Patient has been advised of split billing requirements and indicates understanding: Yes  COUNSELING:  Reviewed preventive health counseling, as reflected in patient instructions       Regular exercise       Healthy diet/nutrition    Estimated body mass index is 30.56 kg/m  as calculated from the following:    Height as of this encounter: 1.702 m (5' 7\").    Weight as of this encounter: 88.5 kg (195 lb 1.6 oz).    Weight management plan: Discussed healthy diet and exercise guidelines    He reports that he has never smoked. He has never used smokeless tobacco.      Appropriate preventive services were discussed with this patient, including applicable screening as appropriate for cardiovascular disease, diabetes, osteopenia/osteoporosis, and glaucoma.  As appropriate for age/gender, discussed screening for colorectal cancer, prostate cancer, breast cancer, and cervical cancer. Checklist reviewing preventive services available has been given to the patient.    Reviewed patients plan of care and provided an AVS. The Basic Care Plan (routine screening as documented in Health Maintenance) for Harpreet meets the Care Plan requirement. This Care Plan has been established and reviewed with the Patient.    Counseling " Resources:  ATP IV Guidelines  Pooled Cohorts Equation Calculator  Breast Cancer Risk Calculator  Breast Cancer: Medication to Reduce Risk  FRAX Risk Assessment  ICSI Preventive Guidelines  Dietary Guidelines for Americans, 2010  [employer]'s MyPlate  ASA Prophylaxis  Lung CA Screening    Lucía Sandhu MD  [facility]    Identified Health Risks:

## 2021-03-01 NOTE — PATIENT INSTRUCTIONS
Patient Education   Personalized Prevention Plan  You are due for the preventive services outlined below.  Your care team is available to assist you in scheduling these services.  If you have already completed any of these items, please share that information with your care team to update in your medical record.  Health Maintenance Due   Topic Date Due     Hepatitis C Screening  04/10/1968     AORTIC ANEURYSM SCREENING (SYSTEM ASSIGNED)  04/10/2013     Annual Wellness Visit  08/21/2020     Pneumococcal Vaccine (2 of 2 - PPSV23) 10/13/2020       Preventing Falls at Home  A person can fall for many reasons. Older adults may fall because reaction time slows down as we age. Your muscles and joints may get stiff, weak, or less flexible because of illness, medicines, or a physical condition.   Other health problems that make falls more likely include:     Arthritis    Dizziness or lightheadedness when you stand up (orthostatic hypotension)    History of a stroke    Dizziness    Anemia    Certain medicines taken for mental illness or to control blood pressure.    Problems with balance or gait    Bladder or urinary problems    History of falling    Changes in vision (vision impairment)    Changes in thinking skills and memory (cognitive impairment)  Injuries from a fall can include serious injuries such as broken bones, dislocated joints, internal bleeding and cuts. Injuries like these can limit your independence.   Prevention tips  To help prevent falls and fall-related injuries, follow the tips below.    Floors  To make floors safer:     Put nonskid pads under area rugs.    Remove small rugs.    Replace worn floor coverings.    Tack carpets firmly to each step on carpeted stairs. Put nonskid strips on the edges of uncarpeted stairs.    Keep floors and stairs free of clutter and cords.    Arrange furniture so there are clear pathways.    Clean up any spills right away.  Bathrooms    To make bathrooms safer:     Install  grab bars in the tub or shower.    Apply nonskid strips or put a nonskid rubber mat in the tub or shower.    Sit on a bath chair to bathe.    Use bathmats with nonskid backing.  Lighting  To improve visibility in your home:      Keep a flashlight in each room. Or put a lamp next to the bed within easy reach.    Put nightlights in the bedrooms, hallways, kitchen, and bathrooms.    Make sure all stairways have good lighting.    Take your time when going up and down stairs.    Put handrails on both sides of stairs and in walkways for more support. To prevent injury to your wrist or arm, don t use handrails to pull yourself up.    Install grab bars to pull yourself up.    Move or rearrange items that you use often. This will make them easier to find or reach.    Look at your home to find any safety hazards. Especially look at doorways, walkways, and the driveway. Remove or repair any safety problems that you find.  Other changes to make    Look around to find any safety hazards. Look closely at doorways, walkways, and the driveway. Remove or repair any safety problems that you find.    Wear shoes that fit well.    Take your time when going up and down stairs.    Put handrails on both sides of stairs and in walkways for more support. To prevent injury to your wrist or arm, don t use handrails to pull yourself up.    Install grab bars wherever needed to pull yourself up.    Arrange items that you use often. This will make them easier to find or reach.    [employer] last reviewed this educational content on 3/1/2020    5719-1438 [employer]. [address] [place] [address], [place] [address]. All rights reserved. This information is not intended as a substitute for professional medical care. Always follow your healthcare professional's instructions.

## 2021-03-02 LAB
CHOLEST SERPL-MCNC: 212 MG/DL
HDLC SERPL-MCNC: 39 MG/DL
LDLC SERPL CALC-MCNC: 133 MG/DL
NONHDLC SERPL-MCNC: 173 MG/DL
TRIGL SERPL-MCNC: 202 MG/DL
TSH SERPL DL<=0.005 MIU/L-ACNC: 1.22 MU/L (ref 0.4–4)

## 2021-03-16 DIAGNOSIS — R97.20 ELEVATED PSA: ICD-10-CM

## 2021-03-16 RX ORDER — DUTASTERIDE 0.5 MG/1
CAPSULE, LIQUID FILLED ORAL
Qty: 90 CAPSULE | Refills: 0 | Status: SHIPPED | OUTPATIENT
Start: 2021-03-16 | End: 2021-10-12

## 2021-04-20 ENCOUNTER — TRANSFERRED RECORDS (OUTPATIENT)
Dept: HEALTH INFORMATION MANAGEMENT | Facility: CLINIC | Age: 71
End: 2021-04-20

## 2021-05-11 ENCOUNTER — ANCILLARY PROCEDURE (OUTPATIENT)
Dept: CARDIOLOGY | Facility: CLINIC | Age: 71
End: 2021-05-11
Attending: INTERNAL MEDICINE
Payer: MEDICARE

## 2021-05-11 DIAGNOSIS — Z95.0 CARDIAC PACEMAKER IN SITU: ICD-10-CM

## 2021-05-11 PROCEDURE — 93296 REM INTERROG EVL PM/IDS: CPT | Performed by: INTERNAL MEDICINE

## 2021-05-11 PROCEDURE — 93294 REM INTERROG EVL PM/LDLS PM: CPT | Performed by: INTERNAL MEDICINE

## 2021-05-20 LAB
MDC_IDC_EPISODE_DTM: NORMAL
MDC_IDC_EPISODE_ID: NORMAL
MDC_IDC_EPISODE_TYPE: NORMAL
MDC_IDC_LEAD_IMPLANT_DT: NORMAL
MDC_IDC_LEAD_IMPLANT_DT: NORMAL
MDC_IDC_LEAD_LOCATION: NORMAL
MDC_IDC_LEAD_LOCATION: NORMAL
MDC_IDC_LEAD_LOCATION_DETAIL_1: NORMAL
MDC_IDC_LEAD_LOCATION_DETAIL_1: NORMAL
MDC_IDC_LEAD_MFG: NORMAL
MDC_IDC_LEAD_MFG: NORMAL
MDC_IDC_LEAD_MODEL: NORMAL
MDC_IDC_LEAD_MODEL: NORMAL
MDC_IDC_LEAD_SERIAL: NORMAL
MDC_IDC_LEAD_SERIAL: NORMAL
MDC_IDC_MSMT_BATTERY_DTM: NORMAL
MDC_IDC_MSMT_BATTERY_REMAINING_LONGEVITY: 180 MO
MDC_IDC_MSMT_BATTERY_REMAINING_PERCENTAGE: 100 %
MDC_IDC_MSMT_BATTERY_STATUS: NORMAL
MDC_IDC_MSMT_LEADCHNL_RA_IMPEDANCE_VALUE: 816 OHM
MDC_IDC_MSMT_LEADCHNL_RA_PACING_THRESHOLD_AMPLITUDE: 0.5 V
MDC_IDC_MSMT_LEADCHNL_RA_PACING_THRESHOLD_PULSEWIDTH: 0.4 MS
MDC_IDC_MSMT_LEADCHNL_RV_IMPEDANCE_VALUE: 655 OHM
MDC_IDC_MSMT_LEADCHNL_RV_PACING_THRESHOLD_AMPLITUDE: 0.7 V
MDC_IDC_MSMT_LEADCHNL_RV_PACING_THRESHOLD_PULSEWIDTH: 0.4 MS
MDC_IDC_PG_IMPLANT_DTM: NORMAL
MDC_IDC_PG_MFG: NORMAL
MDC_IDC_PG_MODEL: NORMAL
MDC_IDC_PG_SERIAL: NORMAL
MDC_IDC_PG_TYPE: NORMAL
MDC_IDC_SESS_CLINIC_NAME: NORMAL
MDC_IDC_SESS_DTM: NORMAL
MDC_IDC_SESS_TYPE: NORMAL
MDC_IDC_SET_BRADY_AT_MODE_SWITCH_MODE: NORMAL
MDC_IDC_SET_BRADY_AT_MODE_SWITCH_RATE: 170 {BEATS}/MIN
MDC_IDC_SET_BRADY_LOWRATE: 60 {BEATS}/MIN
MDC_IDC_SET_BRADY_MAX_SENSOR_RATE: 130 {BEATS}/MIN
MDC_IDC_SET_BRADY_MAX_TRACKING_RATE: 130 {BEATS}/MIN
MDC_IDC_SET_BRADY_MODE: NORMAL
MDC_IDC_SET_BRADY_PAV_DELAY_HIGH: 150 MS
MDC_IDC_SET_BRADY_PAV_DELAY_LOW: 200 MS
MDC_IDC_SET_BRADY_SAV_DELAY_HIGH: 150 MS
MDC_IDC_SET_BRADY_SAV_DELAY_LOW: 200 MS
MDC_IDC_SET_LEADCHNL_RA_PACING_AMPLITUDE: 2 V
MDC_IDC_SET_LEADCHNL_RA_PACING_CAPTURE_MODE: NORMAL
MDC_IDC_SET_LEADCHNL_RA_PACING_POLARITY: NORMAL
MDC_IDC_SET_LEADCHNL_RA_PACING_PULSEWIDTH: 0.4 MS
MDC_IDC_SET_LEADCHNL_RA_SENSING_ADAPTATION_MODE: NORMAL
MDC_IDC_SET_LEADCHNL_RA_SENSING_POLARITY: NORMAL
MDC_IDC_SET_LEADCHNL_RA_SENSING_SENSITIVITY: 0.25 MV
MDC_IDC_SET_LEADCHNL_RV_PACING_AMPLITUDE: 1.2 V
MDC_IDC_SET_LEADCHNL_RV_PACING_CAPTURE_MODE: NORMAL
MDC_IDC_SET_LEADCHNL_RV_PACING_POLARITY: NORMAL
MDC_IDC_SET_LEADCHNL_RV_PACING_PULSEWIDTH: 0.4 MS
MDC_IDC_SET_LEADCHNL_RV_SENSING_ADAPTATION_MODE: NORMAL
MDC_IDC_SET_LEADCHNL_RV_SENSING_POLARITY: NORMAL
MDC_IDC_SET_LEADCHNL_RV_SENSING_SENSITIVITY: 1.5 MV
MDC_IDC_SET_ZONE_DETECTION_INTERVAL: 375 MS
MDC_IDC_SET_ZONE_TYPE: NORMAL
MDC_IDC_SET_ZONE_VENDOR_TYPE: NORMAL
MDC_IDC_STAT_AT_BURDEN_PERCENT: 0 %
MDC_IDC_STAT_AT_DTM_END: NORMAL
MDC_IDC_STAT_AT_DTM_START: NORMAL
MDC_IDC_STAT_BRADY_DTM_END: NORMAL
MDC_IDC_STAT_BRADY_DTM_START: NORMAL
MDC_IDC_STAT_BRADY_RA_PERCENT_PACED: 70 %
MDC_IDC_STAT_BRADY_RV_PERCENT_PACED: 0 %
MDC_IDC_STAT_EPISODE_RECENT_COUNT: 0
MDC_IDC_STAT_EPISODE_RECENT_COUNT: 1
MDC_IDC_STAT_EPISODE_RECENT_COUNT_DTM_END: NORMAL
MDC_IDC_STAT_EPISODE_RECENT_COUNT_DTM_START: NORMAL
MDC_IDC_STAT_EPISODE_TYPE: NORMAL
MDC_IDC_STAT_EPISODE_VENDOR_TYPE: NORMAL

## 2021-07-07 DIAGNOSIS — I10 ESSENTIAL HYPERTENSION, BENIGN: ICD-10-CM

## 2021-07-08 RX ORDER — LOSARTAN POTASSIUM 100 MG/1
TABLET ORAL
Qty: 90 TABLET | Refills: 0 | Status: SHIPPED | OUTPATIENT
Start: 2021-07-08 | End: 2021-10-26

## 2021-07-08 NOTE — TELEPHONE ENCOUNTER
Pending Prescriptions:                       Disp   Refills    losartan (COZAAR) 100 MG tablet [Pharmacy*90 tab*0            Sig: TAKE 1 TABLET BY MOUTH ONE TIME DAILY     Prescription approved per [facility] Refill Protocol.

## 2021-07-15 ENCOUNTER — TRANSFERRED RECORDS (OUTPATIENT)
Dept: HEALTH INFORMATION MANAGEMENT | Facility: CLINIC | Age: 71
End: 2021-07-15

## 2021-07-15 LAB
ALT SERPL-CCNC: 22 LU/L (ref 5–40)
AST SERPL-CCNC: 18 U/L (ref 5–34)
CREATININE (EXTERNAL): 1.03 MG/DL (ref 0.5–1.3)

## 2021-07-22 ENCOUNTER — TRANSFERRED RECORDS (OUTPATIENT)
Dept: HEALTH INFORMATION MANAGEMENT | Facility: CLINIC | Age: 71
End: 2021-07-22

## 2021-08-17 ENCOUNTER — ANCILLARY PROCEDURE (OUTPATIENT)
Dept: CARDIOLOGY | Facility: CLINIC | Age: 71
End: 2021-08-17
Attending: INTERNAL MEDICINE
Payer: MEDICARE

## 2021-08-17 DIAGNOSIS — Z95.0 CARDIAC PACEMAKER IN SITU: ICD-10-CM

## 2021-08-17 PROCEDURE — 93296 REM INTERROG EVL PM/IDS: CPT | Performed by: INTERNAL MEDICINE

## 2021-08-17 PROCEDURE — 93294 REM INTERROG EVL PM/LDLS PM: CPT | Performed by: INTERNAL MEDICINE

## 2021-08-24 LAB
MDC_IDC_EPISODE_DTM: NORMAL
MDC_IDC_EPISODE_ID: NORMAL
MDC_IDC_EPISODE_TYPE: NORMAL
MDC_IDC_LEAD_IMPLANT_DT: NORMAL
MDC_IDC_LEAD_IMPLANT_DT: NORMAL
MDC_IDC_LEAD_LOCATION: NORMAL
MDC_IDC_LEAD_LOCATION: NORMAL
MDC_IDC_LEAD_LOCATION_DETAIL_1: NORMAL
MDC_IDC_LEAD_LOCATION_DETAIL_1: NORMAL
MDC_IDC_LEAD_MFG: NORMAL
MDC_IDC_LEAD_MFG: NORMAL
MDC_IDC_LEAD_MODEL: NORMAL
MDC_IDC_LEAD_MODEL: NORMAL
MDC_IDC_LEAD_SERIAL: NORMAL
MDC_IDC_LEAD_SERIAL: NORMAL
MDC_IDC_MSMT_BATTERY_DTM: NORMAL
MDC_IDC_MSMT_BATTERY_REMAINING_LONGEVITY: 180 MO
MDC_IDC_MSMT_BATTERY_REMAINING_PERCENTAGE: 100 %
MDC_IDC_MSMT_BATTERY_STATUS: NORMAL
MDC_IDC_MSMT_LEADCHNL_RA_IMPEDANCE_VALUE: 901 OHM
MDC_IDC_MSMT_LEADCHNL_RA_PACING_THRESHOLD_AMPLITUDE: 0.6 V
MDC_IDC_MSMT_LEADCHNL_RA_PACING_THRESHOLD_PULSEWIDTH: 0.4 MS
MDC_IDC_MSMT_LEADCHNL_RV_IMPEDANCE_VALUE: 652 OHM
MDC_IDC_MSMT_LEADCHNL_RV_PACING_THRESHOLD_AMPLITUDE: 0.6 V
MDC_IDC_MSMT_LEADCHNL_RV_PACING_THRESHOLD_PULSEWIDTH: 0.4 MS
MDC_IDC_PG_IMPLANT_DTM: NORMAL
MDC_IDC_PG_MFG: NORMAL
MDC_IDC_PG_MODEL: NORMAL
MDC_IDC_PG_SERIAL: NORMAL
MDC_IDC_PG_TYPE: NORMAL
MDC_IDC_SESS_CLINIC_NAME: NORMAL
MDC_IDC_SESS_DTM: NORMAL
MDC_IDC_SESS_TYPE: NORMAL
MDC_IDC_SET_BRADY_AT_MODE_SWITCH_MODE: NORMAL
MDC_IDC_SET_BRADY_AT_MODE_SWITCH_RATE: 170 {BEATS}/MIN
MDC_IDC_SET_BRADY_LOWRATE: 60 {BEATS}/MIN
MDC_IDC_SET_BRADY_MAX_SENSOR_RATE: 130 {BEATS}/MIN
MDC_IDC_SET_BRADY_MAX_TRACKING_RATE: 130 {BEATS}/MIN
MDC_IDC_SET_BRADY_MODE: NORMAL
MDC_IDC_SET_BRADY_PAV_DELAY_HIGH: 150 MS
MDC_IDC_SET_BRADY_PAV_DELAY_LOW: 200 MS
MDC_IDC_SET_BRADY_SAV_DELAY_HIGH: 150 MS
MDC_IDC_SET_BRADY_SAV_DELAY_LOW: 200 MS
MDC_IDC_SET_LEADCHNL_RA_PACING_AMPLITUDE: 2 V
MDC_IDC_SET_LEADCHNL_RA_PACING_CAPTURE_MODE: NORMAL
MDC_IDC_SET_LEADCHNL_RA_PACING_POLARITY: NORMAL
MDC_IDC_SET_LEADCHNL_RA_PACING_PULSEWIDTH: 0.4 MS
MDC_IDC_SET_LEADCHNL_RA_SENSING_ADAPTATION_MODE: NORMAL
MDC_IDC_SET_LEADCHNL_RA_SENSING_POLARITY: NORMAL
MDC_IDC_SET_LEADCHNL_RA_SENSING_SENSITIVITY: 0.25 MV
MDC_IDC_SET_LEADCHNL_RV_PACING_AMPLITUDE: 1.1 V
MDC_IDC_SET_LEADCHNL_RV_PACING_CAPTURE_MODE: NORMAL
MDC_IDC_SET_LEADCHNL_RV_PACING_POLARITY: NORMAL
MDC_IDC_SET_LEADCHNL_RV_PACING_PULSEWIDTH: 0.4 MS
MDC_IDC_SET_LEADCHNL_RV_SENSING_ADAPTATION_MODE: NORMAL
MDC_IDC_SET_LEADCHNL_RV_SENSING_POLARITY: NORMAL
MDC_IDC_SET_LEADCHNL_RV_SENSING_SENSITIVITY: 1.5 MV
MDC_IDC_SET_ZONE_DETECTION_INTERVAL: 375 MS
MDC_IDC_SET_ZONE_TYPE: NORMAL
MDC_IDC_SET_ZONE_VENDOR_TYPE: NORMAL
MDC_IDC_STAT_AT_BURDEN_PERCENT: 0 %
MDC_IDC_STAT_AT_DTM_END: NORMAL
MDC_IDC_STAT_AT_DTM_START: NORMAL
MDC_IDC_STAT_BRADY_DTM_END: NORMAL
MDC_IDC_STAT_BRADY_DTM_START: NORMAL
MDC_IDC_STAT_BRADY_RA_PERCENT_PACED: 68 %
MDC_IDC_STAT_BRADY_RV_PERCENT_PACED: 0 %
MDC_IDC_STAT_EPISODE_RECENT_COUNT: 0
MDC_IDC_STAT_EPISODE_RECENT_COUNT: 1
MDC_IDC_STAT_EPISODE_RECENT_COUNT_DTM_END: NORMAL
MDC_IDC_STAT_EPISODE_RECENT_COUNT_DTM_START: NORMAL
MDC_IDC_STAT_EPISODE_TYPE: NORMAL
MDC_IDC_STAT_EPISODE_VENDOR_TYPE: NORMAL

## 2021-09-29 ENCOUNTER — LAB (OUTPATIENT)
Dept: LAB | Facility: CLINIC | Age: 71
End: 2021-09-29
Payer: MEDICARE

## 2021-09-29 DIAGNOSIS — Z80.42 FAMILY HISTORY OF PROSTATE CANCER: ICD-10-CM

## 2021-09-29 DIAGNOSIS — R39.12 BENIGN PROSTATIC HYPERPLASIA WITH WEAK URINARY STREAM: ICD-10-CM

## 2021-09-29 DIAGNOSIS — N40.1 BENIGN PROSTATIC HYPERPLASIA WITH WEAK URINARY STREAM: ICD-10-CM

## 2021-09-29 PROCEDURE — 36415 COLL VENOUS BLD VENIPUNCTURE: CPT

## 2021-09-29 PROCEDURE — 84153 ASSAY OF PSA TOTAL: CPT

## 2021-09-30 LAB — PSA SERPL-MCNC: 4.73 UG/L (ref 0–4)

## 2021-10-08 DIAGNOSIS — R97.20 ELEVATED PSA: Primary | ICD-10-CM

## 2021-10-12 ENCOUNTER — OFFICE VISIT (OUTPATIENT)
Dept: UROLOGY | Facility: CLINIC | Age: 71
End: 2021-10-12
Payer: MEDICARE

## 2021-10-12 VITALS
DIASTOLIC BLOOD PRESSURE: 80 MMHG | WEIGHT: 185 LBS | SYSTOLIC BLOOD PRESSURE: 134 MMHG | HEIGHT: 67 IN | BODY MASS INDEX: 29.03 KG/M2

## 2021-10-12 DIAGNOSIS — Z80.42 FAMILY HISTORY OF PROSTATE CANCER: ICD-10-CM

## 2021-10-12 DIAGNOSIS — R97.20 ELEVATED PSA: ICD-10-CM

## 2021-10-12 DIAGNOSIS — N40.1 BENIGN PROSTATIC HYPERPLASIA WITH WEAK URINARY STREAM: Primary | ICD-10-CM

## 2021-10-12 DIAGNOSIS — R39.12 BENIGN PROSTATIC HYPERPLASIA WITH WEAK URINARY STREAM: Primary | ICD-10-CM

## 2021-10-12 LAB
ALBUMIN UR-MCNC: NEGATIVE MG/DL
APPEARANCE UR: CLEAR
BILIRUB UR QL STRIP: NEGATIVE
COLOR UR AUTO: YELLOW
GLUCOSE UR STRIP-MCNC: NEGATIVE MG/DL
HGB UR QL STRIP: NEGATIVE
KETONES UR STRIP-MCNC: NEGATIVE MG/DL
LEUKOCYTE ESTERASE UR QL STRIP: NEGATIVE
NITRATE UR QL: NEGATIVE
PH UR STRIP: 5.5 [PH] (ref 5–7)
SP GR UR STRIP: 1.02 (ref 1–1.03)
UROBILINOGEN UR STRIP-ACNC: 0.2 E.U./DL

## 2021-10-12 PROCEDURE — 99214 OFFICE O/P EST MOD 30 MIN: CPT | Performed by: STUDENT IN AN ORGANIZED HEALTH CARE EDUCATION/TRAINING PROGRAM

## 2021-10-12 PROCEDURE — 81003 URINALYSIS AUTO W/O SCOPE: CPT | Mod: QW | Performed by: STUDENT IN AN ORGANIZED HEALTH CARE EDUCATION/TRAINING PROGRAM

## 2021-10-12 RX ORDER — ALFUZOSIN HYDROCHLORIDE 10 MG/1
10 TABLET, EXTENDED RELEASE ORAL DAILY
Qty: 90 TABLET | Refills: 3 | Status: SHIPPED | OUTPATIENT
Start: 2021-10-12 | End: 2022-09-20

## 2021-10-12 ASSESSMENT — MIFFLIN-ST. JEOR: SCORE: 1542.78

## 2021-10-12 ASSESSMENT — PAIN SCALES - GENERAL: PAINLEVEL: NO PAIN (0)

## 2021-10-12 NOTE — NURSING NOTE
Chief Complaint   Patient presents with     Benign Prostatic Hypertrophy     Here to establish   post void residual 21 ml  Gail Carvajal LPN

## 2021-10-12 NOTE — LETTER
"10/12/2021       RE: Harpreet Vera  [address] [facility] [address]     Dear Colleague,    Thank you for referring your patient, Harpreet Vera, to the [facility] [place] at [facility]. Please see a copy of my visit note below.    CHIEF COMPLAINT   Harpreet Vera who is a 71 year old male returns today for follow-up of BPH with weak stream, + FH prostate cancer, rising PSA.      HPI   Harpreet Vera is a 71 year old male returns today for follow-up of BPH with weak stream, + FH prostate cancer, rising PSA. Has had multiple negative biopsies in the past.    Former [facility] patient. Is on avodart. Last seen 8/25/2020. Recommended prostate MRI given PSA velocity. MRI revlieved 54 gram prostate, PIRADS 2.    He was told in Jan 2021 to stop dutasteride for 6 weeks to see if this improved sporadic soreness in his testes. Has now been off completely for the past 9 months. His sexual function has improved after stopping the dutasteride.    Has a history of a procedure in the past with Dr. Paredes for BPH, awake in the office, unclear what this was exactly.    Still having slow stream, intermittent. Is not that bothered by these symptoms.    AUA symptom score 6-7-7-2-5-0-1 = 16 moderate QOL 2 mostly satisfied.    Had previously was on flomax.     PHYSICAL EXAM  Patient is a 71 year old  male   Vitals: Blood pressure 134/80, height 1.702 m (5' 7\"), weight 83.9 kg (185 lb).  Body mass index is 28.98 kg/m .  General Appearance Adult:   Alert, no acute distress, oriented  HENT: throat/mouth:normal, good dentition  Lungs: no respiratory distress, or pursed lip breathing  Heart: No obvious jugular venous distension present  Abdomen: soft, nontender, no organomegaly or masses  Musculoskeltal: extremities normal, no peripheral edema  Skin: no suspicious lesions or rashes  Neuro: Alert, oriented, speech and mentation normal  Psych: affect and mood " normal  Gait: Normal  : 50 gram prostate, firm, benign feeling    All pertinent imaging reviewed:    MRI prostate 10/14/2020  IMPRESSION:  1. Based on the most suspicious abnormality, this exam is  characterized as PIRADS 2 - Clinically significant cancer is unlikely  to be present.   2. No suspicious adenopathy or evidence of pelvic metastases.  3. Minimal dilation of bilateral distal ureters. Enlarged prostate.       Component PSA PSA Diag Urologic Phys   Latest Ref Rng & Units 0.00 - 4.00 ug/L 0.00 - 4.00 ng/mL   9/9/2003 4.0    4/9/2004 2.67    8/23/2004 3.59    1/17/2005 3.10    11/29/2005 3.32    9/6/2006 3.82    11/30/2006 4.15 (H)    7/3/2007 3.94    1/9/2008 4.57 (H)    1/7/2009 7.96 (H)    1/7/2010 4.19 (H)    9/15/2010 3.55    8/17/2011 4.50 (H)    7/30/2012 3.27    9/9/2014 4.27 (H)    4/3/2017 3.00    7/21/2017  1.90   7/24/2018 1.92    8/21/2019 2.12    8/7/2020 2.72    9/29/2021 4.73 (H)        ASSESSMENT and PLAN  71 year old male returns today for follow-up of BPH with weak stream, + FH prostate cancer, rising PSA. Has had multiple negative biopsies in the past. Is willing to try alpha blocker for his slow stream. PSA is back up to 4.73 ng/ml after stopping 5ARI, appropriate rise. Will need to monitor this, but at this time no role for reimaging given PIRADS 2 MRI previously    Start alfuzosin for BPH with weak stream  Return 6 months with PSA prior, for PVR, AUA symptom score      Bijan Childress MD   [facility]  [facility] Phone: [phone]

## 2021-10-12 NOTE — PROGRESS NOTES
"CHIEF COMPLAINT   Harpreet Vera who is a 71 year old male returns today for follow-up of BPH with weak stream, + FH prostate cancer, rising PSA.      HPI   Harpreet Vera is a 71 year old male returns today for follow-up of BPH with weak stream, + FH prostate cancer, rising PSA. Has had multiple negative biopsies in the past.    Former [facility] patient. Is on avodart. Last seen 8/25/2020. Recommended prostate MRI given PSA velocity. MRI revlieved 54 gram prostate, PIRADS 2.    He was told in Jan 2021 to stop dutasteride for 6 weeks to see if this improved sporadic soreness in his testes. Has now been off completely for the past 9 months. His sexual function has improved after stopping the dutasteride.    Has a history of a procedure in the past with Dr. Paredes for BPH, awake in the office, unclear what this was exactly.    Still having slow stream, intermittent. Is not that bothered by these symptoms.    AUA symptom score 9-9-2-2-5-0-1 = 16 moderate QOL 2 mostly satisfied.    Had previously was on flomax.     PHYSICAL EXAM  Patient is a 71 year old  male   Vitals: Blood pressure 134/80, height 1.702 m (5' 7\"), weight 83.9 kg (185 lb).  Body mass index is 28.98 kg/m .  General Appearance Adult:   Alert, no acute distress, oriented  HENT: throat/mouth:normal, good dentition  Lungs: no respiratory distress, or pursed lip breathing  Heart: No obvious jugular venous distension present  Abdomen: soft, nontender, no organomegaly or masses  Musculoskeltal: extremities normal, no peripheral edema  Skin: no suspicious lesions or rashes  Neuro: Alert, oriented, speech and mentation normal  Psych: affect and mood normal  Gait: Normal  : 50 gram prostate, firm, benign feeling    All pertinent imaging reviewed:    MRI prostate 10/14/2020  IMPRESSION:  1. Based on the most suspicious abnormality, this exam is  characterized as PIRADS 2 - Clinically significant cancer is unlikely  to be present.   2. No suspicious adenopathy or " evidence of pelvic metastases.  3. Minimal dilation of bilateral distal ureters. Enlarged prostate.       Component PSA PSA Diag Urologic Phys   Latest Ref Rng & Units 0.00 - 4.00 ug/L 0.00 - 4.00 ng/mL   9/9/2003 4.0    4/9/2004 2.67    8/23/2004 3.59    1/17/2005 3.10    11/29/2005 3.32    9/6/2006 3.82    11/30/2006 4.15 (H)    7/3/2007 3.94    1/9/2008 4.57 (H)    1/7/2009 7.96 (H)    1/7/2010 4.19 (H)    9/15/2010 3.55    8/17/2011 4.50 (H)    7/30/2012 3.27    9/9/2014 4.27 (H)    4/3/2017 3.00    7/21/2017  1.90   7/24/2018 1.92    8/21/2019 2.12    8/7/2020 2.72    9/29/2021 4.73 (H)        ASSESSMENT and PLAN  71 year old male returns today for follow-up of BPH with weak stream, + FH prostate cancer, rising PSA. Has had multiple negative biopsies in the past. Is willing to try alpha blocker for his slow stream. PSA is back up to 4.73 ng/ml after stopping 5ARI, appropriate rise. Will need to monitor this, but at this time no role for reimaging given PIRADS 2 MRI previously    Start alfuzosin for BPH with weak stream  Return 6 months with PSA prior, for PVR, AUA symptom score      Bijan Childress MD   [facility]  [facility] Phone: [phone]

## 2021-10-12 NOTE — PATIENT INSTRUCTIONS
Return 6 months with PSA prior, for PVR, AUA symptom score    Patient Education     Alfuzosin Oral tablet, extended-release  What is this medicine?  ALFUZOSIN (al FYOO neeraj sin) is used to treat benign prostatic hyperplasia (BPH) in men. This is a condition that causes you to have an enlarged prostate. This medicine is not for use in women.  This medicine may be used for other purposes; ask your health care provider or pharmacist if you have questions.  What should I tell my health care provider before I take this medicine?  They need to know if you have any of the following conditions:    kidney or liver disease    low blood pressure    an unusual or allergic reaction to alfuzosin, other medicines, foods, dyes, or preservatives  How should I use this medicine?  Take this medicine by mouth with a glass of water. Follow the directions on the prescription label. Take this medicine after the same meal every day. This medicine should be taken just after eating food. Do not take on an empty stomach. Swallow whole. Do not cut, crush or chew this medicine. Take your doses at regular intervals. Do not take your medicine more often than directed. Do not stop taking except on the advice of your doctor or health care professional.  Talk to your pediatrician regarding the use of this medicine in children. Special care may be needed.  Overdosage: If you think you have taken too much of this medicine contact a poison control center or emergency room at once.  NOTE: This medicine is only for you. Do not share this medicine with others.  What if I miss a dose?  If you miss a dose, take it as soon as you can. If it is almost time for your next dose, take only that dose. Do not take double or extra doses.  What may interact with this medicine?  Do not take this medicine with any of the following medications:    certain medicines for fungal infections like fluconazole, itraconazole, ketoconazole, posaconazole,  voriconazole    cisapride    dofetilide    dronedarone    droperidol    levomethadyl    other medicines for prostate problems    pimozide    ritonavir    thioridazine    ziprasidone  This medicine may also interact with the following medications:    cimetidine    certain medicines for chest pain or blood pressure    other medicines that prolong the QT interval (cause an abnormal heart rhythm)    sildenafil    tadalafil    vardenafil  This list may not describe all possible interactions. Give your health care provider a list of all the medicines, herbs, non-prescription drugs, or dietary supplements you use. Also tell them if you smoke, drink alcohol, or use illegal drugs. Some items may interact with your medicine.  What should I watch for while using this medicine?  Visit your doctor or health care professional for regular checks on your progress. Check your blood pressure regularly. Ask your doctor or health care professional what your blood pressure should be and when you should contact him or her.  Drowsiness and dizziness are more likely to occur after the first dose, after an increase in dose, or during hot weather or exercise. These effects can decrease once your body adjusts to this medicine. Do not drive, use machinery, or do anything that needs mental alertness until you know how this drug affects you. Do not stand or sit up quickly, especially if you are an older patient. This reduces the risk of dizzy or fainting spells. Alcohol can make you more drowsy and dizzy. Avoid alcoholic drinks.  Contact your doctor or health care professional right away if you have an erection that lasts longer than 4 hours or if it becomes painful. This may be a sign of a serious problem and must be treated right away to prevent permanent damage.  What side effects may I notice from receiving this medicine?  Side effects that you should report to your doctor or health care professional as soon as possible:    allergic  reactions like skin rash, itching or hives, swelling of the face, lips, or tongue    breathing problems    fast, irregular heartbeat    feeling faint or lightheaded, falls    prolonged or painful erection    swelling of ankles or legs    unusually weak or tired    yellowing of eyes or skin  Side effects that usually do not require medical attention (report to your doctor or health care professional if they continue or are bothersome):    constipation or diarrhea    difficulty sleeping    headache    nausea or upset stomach  This list may not describe all possible side effects. Call your doctor for medical advice about side effects. You may report side effects to FDA at 3-416-FDA-8739.  Where should I keep my medicine?  Keep out of the reach of children.  Store at room temperature between 15 and 30 degrees C (59 and 86 degrees F). Protect from light and moisture. Throw away any unused medicine after the expiration date.  NOTE:This sheet is a summary. It may not cover all possible information. If you have questions about this medicine, talk to your doctor, pharmacist, or health care provider. Copyright  2016 Gold Standard

## 2021-10-24 DIAGNOSIS — I10 ESSENTIAL HYPERTENSION, BENIGN: ICD-10-CM

## 2021-10-26 RX ORDER — LOSARTAN POTASSIUM 100 MG/1
TABLET ORAL
Qty: 90 TABLET | Refills: 1 | Status: SHIPPED | OUTPATIENT
Start: 2021-10-26 | End: 2022-04-22

## 2021-10-28 ENCOUNTER — OFFICE VISIT (OUTPATIENT)
Dept: CARDIOLOGY | Facility: CLINIC | Age: 71
End: 2021-10-28
Attending: NURSE PRACTITIONER
Payer: MEDICARE

## 2021-10-28 VITALS
DIASTOLIC BLOOD PRESSURE: 82 MMHG | HEIGHT: 67 IN | OXYGEN SATURATION: 97 % | HEART RATE: 67 BPM | WEIGHT: 200 LBS | SYSTOLIC BLOOD PRESSURE: 144 MMHG | BODY MASS INDEX: 31.39 KG/M2

## 2021-10-28 DIAGNOSIS — I10 ESSENTIAL HYPERTENSION, BENIGN: ICD-10-CM

## 2021-10-28 DIAGNOSIS — Z95.0 CARDIAC PACEMAKER IN SITU: ICD-10-CM

## 2021-10-28 DIAGNOSIS — I65.09 VERTEBRAL ARTERY STENOSIS, UNSPECIFIED LATERALITY: Primary | ICD-10-CM

## 2021-10-28 DIAGNOSIS — I67.9 CEREBROVASCULAR DISEASE: ICD-10-CM

## 2021-10-28 DIAGNOSIS — I77.810 DILATATION OF THORACIC AORTA (H): ICD-10-CM

## 2021-10-28 DIAGNOSIS — I49.5 SSS (SICK SINUS SYNDROME) (H): ICD-10-CM

## 2021-10-28 DIAGNOSIS — E78.00 HYPERCHOLESTEREMIA: ICD-10-CM

## 2021-10-28 PROCEDURE — 99215 OFFICE O/P EST HI 40 MIN: CPT | Performed by: INTERNAL MEDICINE

## 2021-10-28 RX ORDER — ROSUVASTATIN CALCIUM 20 MG/1
20 TABLET, COATED ORAL AT BEDTIME
Qty: 90 TABLET | Refills: 3 | Status: SHIPPED | OUTPATIENT
Start: 2021-10-28 | End: 2022-09-19

## 2021-10-28 ASSESSMENT — MIFFLIN-ST. JEOR: SCORE: 1610.82

## 2021-10-28 NOTE — PATIENT INSTRUCTIONS
October 28, 2021    Thank you for allowing our Cardiology team to participate in your care.     Please note the following changes to your heart treatment plan:     Medication changes:   - start rosuvastatin 20mg at bedtime     - monitor BP at home, goal BP <140 (top number)    Tests to be done:  - FASTING cholesterol labs in 1 year  - TTE (heart ultrasound) in 1 year    Follow up:  - Follow up in 1 year with cardiology CHET and with me in 2 years, or sooner as needed.      Please contact our team at [phone] or [phone] for any questions or concerns.   For scheduling, please call [phone].  If you are having a medical emergency, please call [phone].     Sincerely,    Link Mccartney MD, [facility]  Cardiology    [facility] [place]

## 2021-10-28 NOTE — LETTER
10/28/2021    Lucía Sandhu MD  [address]  [facility] [address]    RE: Harpreet Vera       Dear Colleague,    I had the pleasure of seeing Harpreet Vera in the [facility].        Cardiology Clinic Progress Note:    October 28, 2021   Patient Name: Harpreet Vera  Patient MRN: [id number]     Consult indication: HTN, HL, SSS    HPI:    I had the opportunity to see patient Harpreet Vera in cardiology clinic for a follow up visit. Patient is followed by our colleague Lucía Sandhu MD with Primary Care.     As you know, patient is a pleasant 71-year-old male with a past medical history significant for cerebrovascular disease (chronic left vertebral artery occlusion, right vertebral artery stenosis status post stenting), hypertension, hyperlipidemia, mildly dilated ascending aorta, sleep apnea, sick sinus syndrome with syncope status post pacemaker, who presents to establish care with general cardiology.    Patient had been experiencing dizziness and lightheadedness in 2019, and ultimately was found to have a left vertebral artery occlusion as well as severe right vertebral artery stenosis. He underwent stenting to the right vertebral artery. Subsequently he had been doing reasonably well, however had developed recurrent dizziness/lightheadedness, and had an episode of syncope 8/2020. He was seen in hospital by my colleague Dr. Fonseca, evaluation ultimately revealed significant bradycardia with sick sinus syndrome (pauses up to 11 seconds), and patient underwent pacemaker placement 8/21/2020 (dual-chamber [employer]).    Since then, patient reports that his been doing well. He has been seen by my colleague Katie Diego NP in follow-up. Patient reports that he has also been seen by his Vascular Surgeon, and has had follow-up imaging of his head/neck. Patient reports that his Vascular Surgeon recommends that the patient  "keep his blood pressures higher rather than lower, due to the residual cerebrovascular disease.    Patient denies any chest pain, chest pressure, abnormal shortness of breath. He has no specific complaints or concerns.    TTE 8/20/2020 demonstrates LVEF 55 to 60%, borderline-mildly reduced RV function, ascending aorta 4 cm.    Last cholesterol labs 3/1/2021 demonstrate total cholesterol 212, HDL 39, , triglycerides 202.    Assessment and Plan/Recommendations:    # Hypertension. BP mildly elevated, 144/82 mmHg.  # HL.  3/2021.  # SSS s/p dual chamber [employer] pacemaker 8/2020. Normal function on last device interrogation.   # Ascending aorta 4cm  # Cerebrovascular disease, chronic left vertebral artery stenosis, right vertebral artery stenosis status post stenting, followed by Vascular surgery    -Per patient report, his Vascular surgeon recommended that the patient keep his blood pressures on the higher side rather than the lower side. Given the extensive history of cerebrovascular disease, I would agree with this recommendation. Even so, we would want to avoid persistent significant hypertension. Advised patient to monitor BPs at home, if systolic readings are consistently over 140 mmHg, will plan on starting carvedilol 3.125 mg twice daily, with caution against hypotension.  -Discussed initiation of statin therapy, risk/benefits, patient was amenable.  Previously pravastatin is listed as a \"allergy\" with her reaction being \"fuzzy brain\".  We discussed this, and it is possible that the symptom was actually related to symptomatic bradycardia that was yet undiagnosed.  Patient was amenable to trying statin therapy again.  We will start rosuvastatin 20 mg nightly.  -Continue remainder of cardiac regimen including aspirin per Vascular surgery, losartan, furosemide as needed  -Follow-up with Katie Diego NP in 1 year with fasting lipids and TTE, and with me in 2 years, or sooner as " needed    Thank you for allowing our team to participate in the care of Harpreet Vera.  Please do not hesitate to call or page me with any questions or concerns.    Sincerely,     Link Mccartney MD, [facility]  Cardiology  Text Page   October 28, 2021    cc  Katie Diego, APRN CNP  [address]  [place] [address]    Voice recognition software utilized. Although reviewed after completion, some word and grammatical errors may be present.    Total time spent on this encounter: 45 minutes, providing care in this encounter including, but not limited to, reviewing prior medical records, laboratory data, imaging studies, diagnostic studies, procedure notes, formulating an assessment and plan, recommendations.    Past Medical History:     Past Medical History:   Diagnosis Date     Benign neoplasm of colon      Brachial plexopathy      Chronic infection 2007    HX of C Diff     Hypertension      Mumps      Presence of permanent cardiac pacemaker      Sinus node dysfunction (H)     with syncope     Sleep apnea     Bi-PAP        Past Surgical History:   Past Surgical History:   Procedure Laterality Date     ARTHROSCOPY SHOULDER Right 12/11/2020    Procedure: RIGHT SHOULDER ARTHROSCOPY, ARTHROSCOPIC SUBACROMIAL DECOMPRESSION, DISTAL CLAVICLE AND BICEPS TENOTOMY;  Surgeon: Joseph Moon MD;  Location:  OR     CHOLECYSTECTOMY       COLONOSCOPY       COLONOSCOPY N/A 4/5/2017    Procedure: COMBINED COLONOSCOPY, SINGLE OR MULTIPLE BIOPSY/POLYPECTOMY BY BIOPSY;  Surgeon: Tylor Rice MD;  Location:  GI     DECOMPRESSION, FUSION CERVICAL ANTERIOR ONE LEVEL, COMBINED N/A 9/9/2016    Procedure: COMBINED DECOMPRESSION, FUSION CERVICAL ANTERIOR ONE LEVEL;  Surgeon: Erick Valles MD;  Location:  OR     ENT SURGERY      tonsilectomy  as a child     EP PACEMAKER N/A 8/21/2020    Procedure: EP Pacemaker;  Surgeon: Alfonso Sigala MD;  Location:  HEART CARDIAC CATH LAB     IR CAROTID  "CEREBRAL ANGIOGRAM BILATERAL  1/6/2020     IR CAROTID CEREBRAL ANGIOGRAM BILATERAL  7/1/2020     IR DISCONTINUE SHEATH  1/6/2020     ORTHOPEDIC SURGERY      ambar knees scopedrt shoulder,fx rt arm fx     TESTICLE SURGERY       VASECTOMY       [facility] NONSPECIFIC PROCEDURE      Right clavicle fracture, Multiple right sided rib fracture, hairline fracture \"pelvis\", secondary to fall from tree         [facility] NONSPECIFIC PROCEDURE      Right ulnar/radial fracture        Medications (outpatient):  Current Outpatient Medications   Medication Sig Dispense Refill     acetaminophen (TYLENOL 8 HOUR) 650 MG CR tablet Take 650 mg by mouth every 8 hours as needed for mild pain or fever       alfuzosin ER (UROXATRAL) 10 MG 24 hr tablet Take 1 tablet (10 mg) by mouth daily 90 tablet 3     allopurinol (ZYLOPRIM) 100 MG tablet Take 200 mg by mouth every evening (2 x 100mg)       aspirin - buffered (ASCRIPTIN) 325 MG TABS tablet Take 325 mg by mouth every evening       losartan (COZAAR) 100 MG tablet TAKE 1 TABLET BY MOUTH ONE TIME DAILY  90 tablet 1     multivitamin w/minerals (THERA-VIT-M) tablet Take 1 tablet by mouth every evening       vitamin D3 (CHOLECALCIFEROL) 50 mcg (2000 units) tablet Take 1 tablet by mouth every evening       furosemide (LASIX) 20 MG tablet TAKE 1/2 TABLET BY MOUTH EVERY DAY (Patient taking differently: Take 10 mg by mouth daily as needed Takes 1/2 tablet PRN) 30 tablet 0       Allergies:  Allergies   Allergen Reactions     Pravastatin      Fuzzy brain.        Social History:   History   Drug Use No      History   Smoking Status     Never Smoker   Smokeless Tobacco     Never Used     Social History    Substance and Sexual Activity      Alcohol use: Yes        Alcohol/week: 0.0 standard drinks        Comment: rarely       Family History:  Family History   Problem Relation Age of Onset     Arthritis Mother      Eye Disorder Mother         cateracts     Lipids Mother      Cancer Father         colon/prostate     Eye " "Disorder Father         cateracts     Lipids Father      Diabetes Father      Cancer Maternal Grandfather         colon     Cancer Paternal Grandmother         colon     Cancer Maternal Aunt         colon     Cancer Maternal Uncle         colon     Prostate Cancer Brother      Colon Cancer Brother 74        liver mets     Prostate Cancer Brother        Review of Systems:   A complete review of systems was negative except as mentioned in the History of Present Illness.     Objective & Physical Exam:  BP (!) 144/82 (BP Location: Right arm, Patient Position: Sitting, Cuff Size: Adult Regular)   Pulse 67   Ht 1.702 m (5' 7\")   Wt 90.7 kg (200 lb)   SpO2 97%   BMI 31.32 kg/m    Wt Readings from Last 2 Encounters:   10/28/21 90.7 kg (200 lb)   10/12/21 83.9 kg (185 lb)     Body mass index is 31.32 kg/m .   Body surface area is 2.07 meters squared.    Constitutional: appears stated age, in no apparent distress, appears to be well nourished  Eyes: sclera anicteric, conjunctiva normal  ENT: normocephalic, without obvious abnormality, atraumatic  Pulmonary: clear to auscultation bilaterally, no wheezes, no rales, no increased work of breathing  Cardiovascular: JVP normal, regular rate, regular rhythm, normal S1 and S2, no S3, S4, no murmur appreciated, no lower extremity edema  Gastrointestinal: abdominal exam benign  Neurologic: awake, alert, face symmetrical, moves all extremities  Skin: no jaundice  Psychiatric: affect is normal, answers questions appropriately, oriented to self and place    Data reviewed:  Lab Results   Component Value Date    WBC 6.4 12/07/2020    RBC 4.72 12/07/2020    HGB 15.1 12/07/2020    HCT 43.0 12/07/2020    MCV 91 12/07/2020    MCH 32.0 12/07/2020    MCHC 35.1 12/07/2020    RDW 12.8 12/07/2020     (L) 12/07/2020     Sodium   Date Value Ref Range Status   12/07/2020 139 133 - 144 mmol/L Final     Potassium   Date Value Ref Range Status   12/11/2020 4.0 3.4 - 5.3 mmol/L Final "     Chloride   Date Value Ref Range Status   12/07/2020 109 94 - 109 mmol/L Final     Carbon Dioxide   Date Value Ref Range Status   12/07/2020 28 20 - 32 mmol/L Final     Anion Gap   Date Value Ref Range Status   12/07/2020 2 (L) 3 - 14 mmol/L Final     Glucose   Date Value Ref Range Status   12/07/2020 94 70 - 99 mg/dL Final     Comment:     Non Fasting     Urea Nitrogen   Date Value Ref Range Status   12/07/2020 17 7 - 30 mg/dL Final     Creatinine   Date Value Ref Range Status   12/11/2020 1.23 0.66 - 1.25 mg/dL Final     GFR Estimate   Date Value Ref Range Status   12/11/2020 58 (L) >60 mL/min/[1.73_m2] Final     Comment:     Non  GFR Calc  Starting 12/18/2018, serum creatinine based estimated GFR (eGFR) will be   calculated using the Chronic Kidney Disease Epidemiology Collaboration   (CKD-EPI) equation.       Calcium   Date Value Ref Range Status   12/07/2020 9.3 8.5 - 10.1 mg/dL Final     Bilirubin Total   Date Value Ref Range Status   12/07/2020 1.3 0.2 - 1.3 mg/dL Final     Alkaline Phosphatase   Date Value Ref Range Status   12/07/2020 74 40 - 150 U/L Final     ALT   Date Value Ref Range Status   12/07/2020 28 0 - 70 U/L Final     AST   Date Value Ref Range Status   12/07/2020 20 0 - 45 U/L Final     Recent Labs   Lab Test 03/01/21  0856 08/07/20  1106 12/01/15  0805 10/06/15  0743 09/09/14  0911 09/09/14  0911   CHOL 212* 178   < > 191   < > 189   HDL 39* 33*   < > 30*   < > 38*   * 106*   < > Cannot estimate LDL when triglyceride exceeds 400 mg/dL   < > 86   TRIG 202* 196*   < > 432*   < > 323*   CHOLHDLRATIO  --   --   --  6.4*  --  5.0    < > = values in this interval not displayed.      Lab Results   Component Value Date    A1C 5.3 01/06/2020          Thank you for allowing me to participate in the care of your patient.      Sincerely,     Link Mccartney MD     [facility]  cc:   Katie Diego, APRN CNP  [address]  [place] [address]

## 2021-10-28 NOTE — PROGRESS NOTES
Cardiology Clinic Progress Note:    October 28, 2021   Patient Name: Harpreet Vera  Patient MRN: [id number]     Consult indication: HTN, HL, SSS    HPI:    I had the opportunity to see patient Harpreet Vera in cardiology clinic for a follow up visit. Patient is followed by our colleague Lucía Sandhu MD with Primary Care.     As you know, patient is a pleasant 71-year-old male with a past medical history significant for cerebrovascular disease (chronic left vertebral artery occlusion, right vertebral artery stenosis status post stenting), hypertension, hyperlipidemia, mildly dilated ascending aorta, sleep apnea, sick sinus syndrome with syncope status post pacemaker, who presents to establish care with general cardiology.    Patient had been experiencing dizziness and lightheadedness in 2019, and ultimately was found to have a left vertebral artery occlusion as well as severe right vertebral artery stenosis. He underwent stenting to the right vertebral artery. Subsequently he had been doing reasonably well, however had developed recurrent dizziness/lightheadedness, and had an episode of syncope 8/2020. He was seen in hospital by my colleague Dr. Fonseca, evaluation ultimately revealed significant bradycardia with sick sinus syndrome (pauses up to 11 seconds), and patient underwent pacemaker placement 8/21/2020 (dual-chamber [employer]).    Since then, patient reports that his been doing well. He has been seen by my colleague Katie Diego NP in follow-up. Patient reports that he has also been seen by his Vascular Surgeon, and has had follow-up imaging of his head/neck. Patient reports that his Vascular Surgeon recommends that the patient keep his blood pressures higher rather than lower, due to the residual cerebrovascular disease.    Patient denies any chest pain, chest pressure, abnormal shortness of breath. He has no specific complaints or concerns.    TTE 8/20/2020 demonstrates LVEF 55 to  "60%, borderline-mildly reduced RV function, ascending aorta 4 cm.    Last cholesterol labs 3/1/2021 demonstrate total cholesterol 212, HDL 39, , triglycerides 202.    Assessment and Plan/Recommendations:    # Hypertension. BP mildly elevated, 144/82 mmHg.  # HL.  3/2021.  # SSS s/p dual chamber [employer] pacemaker 8/2020. Normal function on last device interrogation.   # Ascending aorta 4cm  # Cerebrovascular disease, chronic left vertebral artery stenosis, right vertebral artery stenosis status post stenting, followed by Vascular surgery    -Per patient report, his Vascular surgeon recommended that the patient keep his blood pressures on the higher side rather than the lower side. Given the extensive history of cerebrovascular disease, I would agree with this recommendation. Even so, we would want to avoid persistent significant hypertension. Advised patient to monitor BPs at home, if systolic readings are consistently over 140 mmHg, will plan on starting carvedilol 3.125 mg twice daily, with caution against hypotension.  -Discussed initiation of statin therapy, risk/benefits, patient was amenable.  Previously pravastatin is listed as a \"allergy\" with her reaction being \"fuzzy brain\".  We discussed this, and it is possible that the symptom was actually related to symptomatic bradycardia that was yet undiagnosed.  Patient was amenable to trying statin therapy again.  We will start rosuvastatin 20 mg nightly.  -Continue remainder of cardiac regimen including aspirin per Vascular surgery, losartan, furosemide as needed  -Follow-up with Katie Diego NP in 1 year with fasting lipids and TTE, and with me in 2 years, or sooner as needed    Thank you for allowing our team to participate in the care of Harpreet Vera.  Please do not hesitate to call or page me with any questions or concerns.    Sincerely,     Link Mccartney MD, [facility]  Cardiology  Text Page   October 28, " 2021      Katie Diego, APRN CNP  [address]  [place] [address]    Voice recognition software utilized. Although reviewed after completion, some word and grammatical errors may be present.    Total time spent on this encounter: 45 minutes, providing care in this encounter including, but not limited to, reviewing prior medical records, laboratory data, imaging studies, diagnostic studies, procedure notes, formulating an assessment and plan, recommendations.    Past Medical History:     Past Medical History:   Diagnosis Date     Benign neoplasm of colon      Brachial plexopathy      Chronic infection 2007    HX of C Diff     Hypertension      Mumps      Presence of permanent cardiac pacemaker      Sinus node dysfunction (H)     with syncope     Sleep apnea     Bi-PAP        Past Surgical History:   Past Surgical History:   Procedure Laterality Date     ARTHROSCOPY SHOULDER Right 12/11/2020    Procedure: RIGHT SHOULDER ARTHROSCOPY, ARTHROSCOPIC SUBACROMIAL DECOMPRESSION, DISTAL CLAVICLE AND BICEPS TENOTOMY;  Surgeon: Joseph Moon MD;  Location:  OR     CHOLECYSTECTOMY       COLONOSCOPY       COLONOSCOPY N/A 4/5/2017    Procedure: COMBINED COLONOSCOPY, SINGLE OR MULTIPLE BIOPSY/POLYPECTOMY BY BIOPSY;  Surgeon: Tylor Rice MD;  Location:  GI     DECOMPRESSION, FUSION CERVICAL ANTERIOR ONE LEVEL, COMBINED N/A 9/9/2016    Procedure: COMBINED DECOMPRESSION, FUSION CERVICAL ANTERIOR ONE LEVEL;  Surgeon: Erick Valles MD;  Location:  OR     ENT SURGERY      tonsilectomy  as a child     EP PACEMAKER N/A 8/21/2020    Procedure: EP Pacemaker;  Surgeon: Alfonso Sigala MD;  Location:  HEART CARDIAC CATH LAB     IR CAROTID CEREBRAL ANGIOGRAM BILATERAL  1/6/2020     IR CAROTID CEREBRAL ANGIOGRAM BILATERAL  7/1/2020     IR DISCONTINUE SHEATH  1/6/2020     ORTHOPEDIC SURGERY      ambar knees scopedrt shoulder,fx rt arm fx     TESTICLE SURGERY       VASECTOMY       ZZC NONSPECIFIC  "PROCEDURE      Right clavicle fracture, Multiple right sided rib fracture, hairline fracture \"pelvis\", secondary to fall from tree         [facility] NONSPECIFIC PROCEDURE      Right ulnar/radial fracture        Medications (outpatient):  Current Outpatient Medications   Medication Sig Dispense Refill     acetaminophen (TYLENOL 8 HOUR) 650 MG CR tablet Take 650 mg by mouth every 8 hours as needed for mild pain or fever       alfuzosin ER (UROXATRAL) 10 MG 24 hr tablet Take 1 tablet (10 mg) by mouth daily 90 tablet 3     allopurinol (ZYLOPRIM) 100 MG tablet Take 200 mg by mouth every evening (2 x 100mg)       aspirin - buffered (ASCRIPTIN) 325 MG TABS tablet Take 325 mg by mouth every evening       losartan (COZAAR) 100 MG tablet TAKE 1 TABLET BY MOUTH ONE TIME DAILY  90 tablet 1     multivitamin w/minerals (THERA-VIT-M) tablet Take 1 tablet by mouth every evening       vitamin D3 (CHOLECALCIFEROL) 50 mcg (2000 units) tablet Take 1 tablet by mouth every evening       furosemide (LASIX) 20 MG tablet TAKE 1/2 TABLET BY MOUTH EVERY DAY (Patient taking differently: Take 10 mg by mouth daily as needed Takes 1/2 tablet PRN) 30 tablet 0       Allergies:  Allergies   Allergen Reactions     Pravastatin      Fuzzy brain.        Social History:   History   Drug Use No      History   Smoking Status     Never Smoker   Smokeless Tobacco     Never Used     Social History    Substance and Sexual Activity      Alcohol use: Yes        Alcohol/week: 0.0 standard drinks        Comment: rarely       Family History:  Family History   Problem Relation Age of Onset     Arthritis Mother      Eye Disorder Mother         cateracts     Lipids Mother      Cancer Father         colon/prostate     Eye Disorder Father         cateracts     Lipids Father      Diabetes Father      Cancer Maternal Grandfather         colon     Cancer Paternal Grandmother         colon     Cancer Maternal Aunt         colon     Cancer Maternal Uncle         colon     Prostate " "Cancer Brother      Colon Cancer Brother 74        liver mets     Prostate Cancer Brother        Review of Systems:   A complete review of systems was negative except as mentioned in the History of Present Illness.     Objective & Physical Exam:  BP (!) 144/82 (BP Location: Right arm, Patient Position: Sitting, Cuff Size: Adult Regular)   Pulse 67   Ht 1.702 m (5' 7\")   Wt 90.7 kg (200 lb)   SpO2 97%   BMI 31.32 kg/m    Wt Readings from Last 2 Encounters:   10/28/21 90.7 kg (200 lb)   10/12/21 83.9 kg (185 lb)     Body mass index is 31.32 kg/m .   Body surface area is 2.07 meters squared.    Constitutional: appears stated age, in no apparent distress, appears to be well nourished  Eyes: sclera anicteric, conjunctiva normal  ENT: normocephalic, without obvious abnormality, atraumatic  Pulmonary: clear to auscultation bilaterally, no wheezes, no rales, no increased work of breathing  Cardiovascular: JVP normal, regular rate, regular rhythm, normal S1 and S2, no S3, S4, no murmur appreciated, no lower extremity edema  Gastrointestinal: abdominal exam benign  Neurologic: awake, alert, face symmetrical, moves all extremities  Skin: no jaundice  Psychiatric: affect is normal, answers questions appropriately, oriented to self and place    Data reviewed:  Lab Results   Component Value Date    WBC 6.4 12/07/2020    RBC 4.72 12/07/2020    HGB 15.1 12/07/2020    HCT 43.0 12/07/2020    MCV 91 12/07/2020    MCH 32.0 12/07/2020    MCHC 35.1 12/07/2020    RDW 12.8 12/07/2020     (L) 12/07/2020     Sodium   Date Value Ref Range Status   12/07/2020 139 133 - 144 mmol/L Final     Potassium   Date Value Ref Range Status   12/11/2020 4.0 3.4 - 5.3 mmol/L Final     Chloride   Date Value Ref Range Status   12/07/2020 109 94 - 109 mmol/L Final     Carbon Dioxide   Date Value Ref Range Status   12/07/2020 28 20 - 32 mmol/L Final     Anion Gap   Date Value Ref Range Status   12/07/2020 2 (L) 3 - 14 mmol/L Final     Glucose "   Date Value Ref Range Status   12/07/2020 94 70 - 99 mg/dL Final     Comment:     Non Fasting     Urea Nitrogen   Date Value Ref Range Status   12/07/2020 17 7 - 30 mg/dL Final     Creatinine   Date Value Ref Range Status   12/11/2020 1.23 0.66 - 1.25 mg/dL Final     GFR Estimate   Date Value Ref Range Status   12/11/2020 58 (L) >60 mL/min/[1.73_m2] Final     Comment:     Non  GFR Calc  Starting 12/18/2018, serum creatinine based estimated GFR (eGFR) will be   calculated using the Chronic Kidney Disease Epidemiology Collaboration   (CKD-EPI) equation.       Calcium   Date Value Ref Range Status   12/07/2020 9.3 8.5 - 10.1 mg/dL Final     Bilirubin Total   Date Value Ref Range Status   12/07/2020 1.3 0.2 - 1.3 mg/dL Final     Alkaline Phosphatase   Date Value Ref Range Status   12/07/2020 74 40 - 150 U/L Final     ALT   Date Value Ref Range Status   12/07/2020 28 0 - 70 U/L Final     AST   Date Value Ref Range Status   12/07/2020 20 0 - 45 U/L Final     Recent Labs   Lab Test 03/01/21  0856 08/07/20  1106 12/01/15  0805 10/06/15  0743 09/09/14  0911 09/09/14  0911   CHOL 212* 178   < > 191   < > 189   HDL 39* 33*   < > 30*   < > 38*   * 106*   < > Cannot estimate LDL when triglyceride exceeds 400 mg/dL   < > 86   TRIG 202* 196*   < > 432*   < > 323*   CHOLHDLRATIO  --   --   --  6.4*  --  5.0    < > = values in this interval not displayed.      Lab Results   Component Value Date    A1C 5.3 01/06/2020

## 2021-12-01 ENCOUNTER — ANCILLARY PROCEDURE (OUTPATIENT)
Dept: CARDIOLOGY | Facility: CLINIC | Age: 71
End: 2021-12-01
Attending: INTERNAL MEDICINE
Payer: MEDICARE

## 2021-12-01 DIAGNOSIS — I49.5 SSS (SICK SINUS SYNDROME) (H): Primary | ICD-10-CM

## 2021-12-01 DIAGNOSIS — Z95.0 CARDIAC PACEMAKER IN SITU: ICD-10-CM

## 2021-12-01 PROCEDURE — 93280 PM DEVICE PROGR EVAL DUAL: CPT | Performed by: INTERNAL MEDICINE

## 2021-12-07 LAB
MDC_IDC_LEAD_IMPLANT_DT: NORMAL
MDC_IDC_LEAD_IMPLANT_DT: NORMAL
MDC_IDC_LEAD_LOCATION: NORMAL
MDC_IDC_LEAD_LOCATION: NORMAL
MDC_IDC_LEAD_LOCATION_DETAIL_1: NORMAL
MDC_IDC_LEAD_LOCATION_DETAIL_1: NORMAL
MDC_IDC_LEAD_MFG: NORMAL
MDC_IDC_LEAD_MFG: NORMAL
MDC_IDC_LEAD_MODEL: NORMAL
MDC_IDC_LEAD_MODEL: NORMAL
MDC_IDC_LEAD_SERIAL: NORMAL
MDC_IDC_LEAD_SERIAL: NORMAL
MDC_IDC_MSMT_BATTERY_DTM: NORMAL
MDC_IDC_MSMT_BATTERY_REMAINING_LONGEVITY: 174 MO
MDC_IDC_MSMT_BATTERY_REMAINING_PERCENTAGE: 100 %
MDC_IDC_MSMT_BATTERY_STATUS: NORMAL
MDC_IDC_MSMT_LEADCHNL_RA_IMPEDANCE_VALUE: 890 OHM
MDC_IDC_MSMT_LEADCHNL_RA_PACING_THRESHOLD_AMPLITUDE: 0.9 V
MDC_IDC_MSMT_LEADCHNL_RA_PACING_THRESHOLD_PULSEWIDTH: 0.4 MS
MDC_IDC_MSMT_LEADCHNL_RV_IMPEDANCE_VALUE: 646 OHM
MDC_IDC_MSMT_LEADCHNL_RV_PACING_THRESHOLD_AMPLITUDE: 0.6 V
MDC_IDC_MSMT_LEADCHNL_RV_PACING_THRESHOLD_PULSEWIDTH: 0.4 MS
MDC_IDC_PG_IMPLANT_DTM: NORMAL
MDC_IDC_PG_MFG: NORMAL
MDC_IDC_PG_MODEL: NORMAL
MDC_IDC_PG_SERIAL: NORMAL
MDC_IDC_PG_TYPE: NORMAL
MDC_IDC_SESS_CLINIC_NAME: NORMAL
MDC_IDC_SESS_DTM: NORMAL
MDC_IDC_SESS_TYPE: NORMAL
MDC_IDC_SET_BRADY_AT_MODE_SWITCH_MODE: NORMAL
MDC_IDC_SET_BRADY_AT_MODE_SWITCH_RATE: 170 {BEATS}/MIN
MDC_IDC_SET_BRADY_LOWRATE: 60 {BEATS}/MIN
MDC_IDC_SET_BRADY_MAX_SENSOR_RATE: 130 {BEATS}/MIN
MDC_IDC_SET_BRADY_MAX_TRACKING_RATE: 130 {BEATS}/MIN
MDC_IDC_SET_BRADY_MODE: NORMAL
MDC_IDC_SET_BRADY_PAV_DELAY_HIGH: 150 MS
MDC_IDC_SET_BRADY_PAV_DELAY_LOW: 200 MS
MDC_IDC_SET_BRADY_SAV_DELAY_HIGH: 150 MS
MDC_IDC_SET_BRADY_SAV_DELAY_LOW: 200 MS
MDC_IDC_SET_LEADCHNL_RA_PACING_AMPLITUDE: 2 V
MDC_IDC_SET_LEADCHNL_RA_PACING_CAPTURE_MODE: NORMAL
MDC_IDC_SET_LEADCHNL_RA_PACING_POLARITY: NORMAL
MDC_IDC_SET_LEADCHNL_RA_PACING_PULSEWIDTH: 0.4 MS
MDC_IDC_SET_LEADCHNL_RA_SENSING_ADAPTATION_MODE: NORMAL
MDC_IDC_SET_LEADCHNL_RA_SENSING_POLARITY: NORMAL
MDC_IDC_SET_LEADCHNL_RA_SENSING_SENSITIVITY: 0.25 MV
MDC_IDC_SET_LEADCHNL_RV_PACING_AMPLITUDE: 1.2 V
MDC_IDC_SET_LEADCHNL_RV_PACING_CAPTURE_MODE: NORMAL
MDC_IDC_SET_LEADCHNL_RV_PACING_POLARITY: NORMAL
MDC_IDC_SET_LEADCHNL_RV_PACING_PULSEWIDTH: 0.4 MS
MDC_IDC_SET_LEADCHNL_RV_SENSING_ADAPTATION_MODE: NORMAL
MDC_IDC_SET_LEADCHNL_RV_SENSING_POLARITY: NORMAL
MDC_IDC_SET_LEADCHNL_RV_SENSING_SENSITIVITY: 1.5 MV
MDC_IDC_SET_ZONE_DETECTION_INTERVAL: 375 MS
MDC_IDC_SET_ZONE_TYPE: NORMAL
MDC_IDC_SET_ZONE_VENDOR_TYPE: NORMAL
MDC_IDC_STAT_AT_BURDEN_PERCENT: 0 %
MDC_IDC_STAT_AT_DTM_END: NORMAL
MDC_IDC_STAT_AT_DTM_START: NORMAL
MDC_IDC_STAT_BRADY_DTM_END: NORMAL
MDC_IDC_STAT_BRADY_DTM_START: NORMAL
MDC_IDC_STAT_BRADY_RA_PERCENT_PACED: 67 %
MDC_IDC_STAT_BRADY_RV_PERCENT_PACED: 0 %
MDC_IDC_STAT_EPISODE_RECENT_COUNT: 0
MDC_IDC_STAT_EPISODE_RECENT_COUNT: 1
MDC_IDC_STAT_EPISODE_RECENT_COUNT_DTM_END: NORMAL
MDC_IDC_STAT_EPISODE_RECENT_COUNT_DTM_START: NORMAL
MDC_IDC_STAT_EPISODE_TYPE: NORMAL
MDC_IDC_STAT_EPISODE_VENDOR_TYPE: NORMAL

## 2022-01-13 ENCOUNTER — TRANSFERRED RECORDS (OUTPATIENT)
Dept: HEALTH INFORMATION MANAGEMENT | Facility: CLINIC | Age: 72
End: 2022-01-13
Payer: COMMERCIAL

## 2022-01-13 LAB
ALT SERPL-CCNC: 33 IU/L (ref 5–40)
AST SERPL-CCNC: 28 U/L (ref 5–34)
CREATININE (EXTERNAL): 1 MG/DL (ref 0.5–1.3)

## 2022-02-25 ENCOUNTER — TELEPHONE (OUTPATIENT)
Dept: CARDIOLOGY | Facility: CLINIC | Age: 72
End: 2022-02-25

## 2022-02-25 NOTE — TELEPHONE ENCOUNTER
Pt had appt on 4/12/22 BP in range  Verenice Sury CMA, AAMA              [facility]    Phone Message    May a detailed message be left on voicemail: yes     Reason for Call: Other: patient called back from a dropped call     Action Taken: Other: clinical pool    Travel Screening: Not Applicable

## 2022-02-25 NOTE — TELEPHONE ENCOUNTER
[place] Community Measures Blood Pressure guideline reviewed.  Patients recent blood pressure is outside of guideline parameters.  Called pt to review,     ** When asked for Pt and said was from Dr. Mccartney's office was hung up on...**  2/25/22 Verenice Ga CMA

## 2022-03-02 ENCOUNTER — ANCILLARY PROCEDURE (OUTPATIENT)
Dept: CARDIOLOGY | Facility: CLINIC | Age: 72
End: 2022-03-02
Attending: INTERNAL MEDICINE
Payer: COMMERCIAL

## 2022-03-02 DIAGNOSIS — Z95.0 CARDIAC PACEMAKER IN SITU: ICD-10-CM

## 2022-03-02 DIAGNOSIS — I49.5 SSS (SICK SINUS SYNDROME) (H): ICD-10-CM

## 2022-03-02 PROCEDURE — 93296 REM INTERROG EVL PM/IDS: CPT | Performed by: INTERNAL MEDICINE

## 2022-03-02 PROCEDURE — 93294 REM INTERROG EVL PM/LDLS PM: CPT | Performed by: INTERNAL MEDICINE

## 2022-03-11 DIAGNOSIS — Z11.59 ENCOUNTER FOR SCREENING FOR OTHER VIRAL DISEASES: Primary | ICD-10-CM

## 2022-03-14 LAB
MDC_IDC_EPISODE_DTM: NORMAL
MDC_IDC_EPISODE_ID: NORMAL
MDC_IDC_EPISODE_TYPE: NORMAL
MDC_IDC_LEAD_IMPLANT_DT: NORMAL
MDC_IDC_LEAD_IMPLANT_DT: NORMAL
MDC_IDC_LEAD_LOCATION: NORMAL
MDC_IDC_LEAD_LOCATION: NORMAL
MDC_IDC_LEAD_LOCATION_DETAIL_1: NORMAL
MDC_IDC_LEAD_LOCATION_DETAIL_1: NORMAL
MDC_IDC_LEAD_MFG: NORMAL
MDC_IDC_LEAD_MFG: NORMAL
MDC_IDC_LEAD_MODEL: NORMAL
MDC_IDC_LEAD_MODEL: NORMAL
MDC_IDC_LEAD_SERIAL: NORMAL
MDC_IDC_LEAD_SERIAL: NORMAL
MDC_IDC_MSMT_BATTERY_DTM: NORMAL
MDC_IDC_MSMT_BATTERY_REMAINING_LONGEVITY: 168 MO
MDC_IDC_MSMT_BATTERY_REMAINING_PERCENTAGE: 100 %
MDC_IDC_MSMT_BATTERY_STATUS: NORMAL
MDC_IDC_MSMT_LEADCHNL_RA_IMPEDANCE_VALUE: 871 OHM
MDC_IDC_MSMT_LEADCHNL_RA_PACING_THRESHOLD_AMPLITUDE: 0.5 V
MDC_IDC_MSMT_LEADCHNL_RA_PACING_THRESHOLD_PULSEWIDTH: 0.4 MS
MDC_IDC_MSMT_LEADCHNL_RV_IMPEDANCE_VALUE: 631 OHM
MDC_IDC_MSMT_LEADCHNL_RV_PACING_THRESHOLD_AMPLITUDE: 0.6 V
MDC_IDC_MSMT_LEADCHNL_RV_PACING_THRESHOLD_PULSEWIDTH: 0.4 MS
MDC_IDC_PG_IMPLANT_DTM: NORMAL
MDC_IDC_PG_MFG: NORMAL
MDC_IDC_PG_MODEL: NORMAL
MDC_IDC_PG_SERIAL: NORMAL
MDC_IDC_PG_TYPE: NORMAL
MDC_IDC_SESS_CLINIC_NAME: NORMAL
MDC_IDC_SESS_DTM: NORMAL
MDC_IDC_SESS_TYPE: NORMAL
MDC_IDC_SET_BRADY_AT_MODE_SWITCH_MODE: NORMAL
MDC_IDC_SET_BRADY_AT_MODE_SWITCH_RATE: 170 {BEATS}/MIN
MDC_IDC_SET_BRADY_LOWRATE: 60 {BEATS}/MIN
MDC_IDC_SET_BRADY_MAX_SENSOR_RATE: 130 {BEATS}/MIN
MDC_IDC_SET_BRADY_MAX_TRACKING_RATE: 130 {BEATS}/MIN
MDC_IDC_SET_BRADY_MODE: NORMAL
MDC_IDC_SET_BRADY_PAV_DELAY_HIGH: 150 MS
MDC_IDC_SET_BRADY_PAV_DELAY_LOW: 200 MS
MDC_IDC_SET_BRADY_SAV_DELAY_HIGH: 150 MS
MDC_IDC_SET_BRADY_SAV_DELAY_LOW: 200 MS
MDC_IDC_SET_LEADCHNL_RA_PACING_AMPLITUDE: 2 V
MDC_IDC_SET_LEADCHNL_RA_PACING_CAPTURE_MODE: NORMAL
MDC_IDC_SET_LEADCHNL_RA_PACING_POLARITY: NORMAL
MDC_IDC_SET_LEADCHNL_RA_PACING_PULSEWIDTH: 0.4 MS
MDC_IDC_SET_LEADCHNL_RA_SENSING_ADAPTATION_MODE: NORMAL
MDC_IDC_SET_LEADCHNL_RA_SENSING_POLARITY: NORMAL
MDC_IDC_SET_LEADCHNL_RA_SENSING_SENSITIVITY: 0.25 MV
MDC_IDC_SET_LEADCHNL_RV_PACING_AMPLITUDE: 1.2 V
MDC_IDC_SET_LEADCHNL_RV_PACING_CAPTURE_MODE: NORMAL
MDC_IDC_SET_LEADCHNL_RV_PACING_POLARITY: NORMAL
MDC_IDC_SET_LEADCHNL_RV_PACING_PULSEWIDTH: 0.4 MS
MDC_IDC_SET_LEADCHNL_RV_SENSING_ADAPTATION_MODE: NORMAL
MDC_IDC_SET_LEADCHNL_RV_SENSING_POLARITY: NORMAL
MDC_IDC_SET_LEADCHNL_RV_SENSING_SENSITIVITY: 1.5 MV
MDC_IDC_SET_ZONE_DETECTION_INTERVAL: 375 MS
MDC_IDC_SET_ZONE_TYPE: NORMAL
MDC_IDC_SET_ZONE_VENDOR_TYPE: NORMAL
MDC_IDC_STAT_AT_BURDEN_PERCENT: 0 %
MDC_IDC_STAT_AT_DTM_END: NORMAL
MDC_IDC_STAT_AT_DTM_START: NORMAL
MDC_IDC_STAT_BRADY_DTM_END: NORMAL
MDC_IDC_STAT_BRADY_DTM_START: NORMAL
MDC_IDC_STAT_BRADY_RA_PERCENT_PACED: 75 %
MDC_IDC_STAT_BRADY_RV_PERCENT_PACED: 55 %
MDC_IDC_STAT_EPISODE_RECENT_COUNT: 0
MDC_IDC_STAT_EPISODE_RECENT_COUNT_DTM_END: NORMAL
MDC_IDC_STAT_EPISODE_RECENT_COUNT_DTM_START: NORMAL
MDC_IDC_STAT_EPISODE_TYPE: NORMAL
MDC_IDC_STAT_EPISODE_VENDOR_TYPE: NORMAL

## 2022-03-28 ENCOUNTER — LAB (OUTPATIENT)
Dept: LAB | Facility: CLINIC | Age: 72
End: 2022-03-28
Attending: INTERNAL MEDICINE
Payer: COMMERCIAL

## 2022-03-28 DIAGNOSIS — Z11.59 ENCOUNTER FOR SCREENING FOR OTHER VIRAL DISEASES: ICD-10-CM

## 2022-03-28 PROCEDURE — U0003 INFECTIOUS AGENT DETECTION BY NUCLEIC ACID (DNA OR RNA); SEVERE ACUTE RESPIRATORY SYNDROME CORONAVIRUS 2 (SARS-COV-2) (CORONAVIRUS DISEASE [COVID-19]), AMPLIFIED PROBE TECHNIQUE, MAKING USE OF HIGH THROUGHPUT TECHNOLOGIES AS DESCRIBED BY CMS-2020-01-R: HCPCS

## 2022-03-28 PROCEDURE — U0005 INFEC AGEN DETEC AMPLI PROBE: HCPCS

## 2022-03-29 ENCOUNTER — DOCUMENTATION ONLY (OUTPATIENT)
Dept: LAB | Facility: CLINIC | Age: 72
End: 2022-03-29
Payer: COMMERCIAL

## 2022-03-29 DIAGNOSIS — Z80.42 FAMILY HISTORY OF PROSTATE CANCER: ICD-10-CM

## 2022-03-29 DIAGNOSIS — N40.1 BENIGN PROSTATIC HYPERPLASIA WITH WEAK URINARY STREAM: Primary | ICD-10-CM

## 2022-03-29 DIAGNOSIS — R39.12 BENIGN PROSTATIC HYPERPLASIA WITH WEAK URINARY STREAM: Primary | ICD-10-CM

## 2022-03-29 LAB — SARS-COV-2 RNA RESP QL NAA+PROBE: NEGATIVE

## 2022-04-01 ENCOUNTER — HOSPITAL ENCOUNTER (OUTPATIENT)
Facility: CLINIC | Age: 72
Discharge: HOME | End: 2022-04-01
Attending: INTERNAL MEDICINE | Admitting: INTERNAL MEDICINE
Payer: COMMERCIAL

## 2022-04-01 VITALS
RESPIRATION RATE: 16 BRPM | TEMPERATURE: 98.4 F | OXYGEN SATURATION: 92 % | DIASTOLIC BLOOD PRESSURE: 82 MMHG | HEART RATE: 72 BPM | SYSTOLIC BLOOD PRESSURE: 127 MMHG

## 2022-04-01 DIAGNOSIS — Z86.0100 PERSONAL HISTORY OF COLONIC POLYPS: Primary | ICD-10-CM

## 2022-04-01 LAB — COLONOSCOPY: NORMAL

## 2022-04-01 PROCEDURE — 45378 DIAGNOSTIC COLONOSCOPY: CPT | Performed by: INTERNAL MEDICINE

## 2022-04-01 PROCEDURE — G0105 COLORECTAL SCRN; HI RISK IND: HCPCS | Performed by: INTERNAL MEDICINE

## 2022-04-01 PROCEDURE — 250N000011 HC RX IP 250 OP 636: Performed by: INTERNAL MEDICINE

## 2022-04-01 PROCEDURE — G0500 MOD SEDAT ENDO SERVICE >5YRS: HCPCS | Performed by: INTERNAL MEDICINE

## 2022-04-01 RX ORDER — ONDANSETRON 2 MG/ML
4 INJECTION INTRAMUSCULAR; INTRAVENOUS EVERY 6 HOURS PRN
Status: DISCONTINUED | OUTPATIENT
Start: 2022-04-01 | End: 2022-04-01 | Stop reason: HOSPADM

## 2022-04-01 RX ORDER — NALOXONE HYDROCHLORIDE 0.4 MG/ML
0.2 INJECTION, SOLUTION INTRAMUSCULAR; INTRAVENOUS; SUBCUTANEOUS
Status: DISCONTINUED | OUTPATIENT
Start: 2022-04-01 | End: 2022-04-01 | Stop reason: HOSPADM

## 2022-04-01 RX ORDER — LIDOCAINE 40 MG/G
CREAM TOPICAL
Status: DISCONTINUED | OUTPATIENT
Start: 2022-04-01 | End: 2022-04-01 | Stop reason: HOSPADM

## 2022-04-01 RX ORDER — FLUMAZENIL 0.1 MG/ML
0.2 INJECTION, SOLUTION INTRAVENOUS
Status: DISCONTINUED | OUTPATIENT
Start: 2022-04-01 | End: 2022-04-01 | Stop reason: HOSPADM

## 2022-04-01 RX ORDER — ONDANSETRON 4 MG/1
4 TABLET, ORALLY DISINTEGRATING ORAL EVERY 6 HOURS PRN
Status: DISCONTINUED | OUTPATIENT
Start: 2022-04-01 | End: 2022-04-01 | Stop reason: HOSPADM

## 2022-04-01 RX ORDER — ATROPINE SULFATE 0.4 MG/ML
1 AMPUL (ML) INJECTION
Status: DISCONTINUED | OUTPATIENT
Start: 2022-04-01 | End: 2022-04-01 | Stop reason: HOSPADM

## 2022-04-01 RX ORDER — NALOXONE HYDROCHLORIDE 0.4 MG/ML
0.4 INJECTION, SOLUTION INTRAMUSCULAR; INTRAVENOUS; SUBCUTANEOUS
Status: DISCONTINUED | OUTPATIENT
Start: 2022-04-01 | End: 2022-04-01 | Stop reason: HOSPADM

## 2022-04-01 RX ORDER — FENTANYL CITRATE 0.05 MG/ML
50-100 INJECTION, SOLUTION INTRAMUSCULAR; INTRAVENOUS EVERY 5 MIN PRN
Status: DISCONTINUED | OUTPATIENT
Start: 2022-04-01 | End: 2022-04-01 | Stop reason: HOSPADM

## 2022-04-01 RX ORDER — SIMETHICONE 40MG/0.6ML
133 SUSPENSION, DROPS(FINAL DOSAGE FORM)(ML) ORAL
Status: DISCONTINUED | OUTPATIENT
Start: 2022-04-01 | End: 2022-04-01 | Stop reason: HOSPADM

## 2022-04-01 RX ORDER — ONDANSETRON 2 MG/ML
4 INJECTION INTRAMUSCULAR; INTRAVENOUS
Status: DISCONTINUED | OUTPATIENT
Start: 2022-04-01 | End: 2022-04-01 | Stop reason: HOSPADM

## 2022-04-01 RX ORDER — EPINEPHRINE 1 MG/ML
0.1 INJECTION, SOLUTION INTRAMUSCULAR; SUBCUTANEOUS
Status: DISCONTINUED | OUTPATIENT
Start: 2022-04-01 | End: 2022-04-01 | Stop reason: HOSPADM

## 2022-04-01 RX ORDER — DIPHENHYDRAMINE HYDROCHLORIDE 50 MG/ML
25-50 INJECTION INTRAMUSCULAR; INTRAVENOUS
Status: DISCONTINUED | OUTPATIENT
Start: 2022-04-01 | End: 2022-04-01 | Stop reason: HOSPADM

## 2022-04-01 RX ORDER — PROCHLORPERAZINE MALEATE 5 MG
5 TABLET ORAL EVERY 6 HOURS PRN
Status: DISCONTINUED | OUTPATIENT
Start: 2022-04-01 | End: 2022-04-01 | Stop reason: HOSPADM

## 2022-04-01 RX ADMIN — MIDAZOLAM 1 MG: 1 INJECTION INTRAMUSCULAR; INTRAVENOUS at 12:37

## 2022-04-01 RX ADMIN — FENTANYL CITRATE 50 MCG: 0.05 INJECTION, SOLUTION INTRAMUSCULAR; INTRAVENOUS at 12:42

## 2022-04-01 RX ADMIN — MIDAZOLAM 1 MG: 1 INJECTION INTRAMUSCULAR; INTRAVENOUS at 12:32

## 2022-04-01 RX ADMIN — MIDAZOLAM 1 MG: 1 INJECTION INTRAMUSCULAR; INTRAVENOUS at 12:42

## 2022-04-01 RX ADMIN — FENTANYL CITRATE 50 MCG: 0.05 INJECTION, SOLUTION INTRAMUSCULAR; INTRAVENOUS at 12:32

## 2022-04-01 RX ADMIN — FENTANYL CITRATE 50 MCG: 0.05 INJECTION, SOLUTION INTRAMUSCULAR; INTRAVENOUS at 12:38

## 2022-04-01 NOTE — LETTER
March 11, 2022      Harpreet Vera  [address] [facility] [address]        Dear Harpreet,         Thank you for choosing [facility]. You are scheduled for the following service(s).   Please be aware that coverage of these services is subject to the terms and limitations of your health insurance plan.  Call member services at your health plan with any benefit or coverage questions.  Date:  4/1/22       Procedure: COLONOSCOPY  Doctor:   Tylor Esteban         Arrival Time:  12:15 PM  *Enter and check in at the Main Hospital Entrance  Procedure Time:  01:00 PM    Location:   [facility]        Endoscopy Department, First Floor *         [address] [place], [place] [address]      [phone] or [phone] ([facility]) to reschedule              NuLYTELY  DIVIDED PREP  Colonoscopy is the most accurate test to detect colon polyps and colon cancer; and the only test where polyps can be removed. During this procedure, a doctor examines the lining of your large intestine and rectum through a flexible tube.         Transportation  You must arrange for a ride for the day of your procedure with a responsible adult. A taxi , Uber, etc, is not an option unless you are accompanied by a responsible adult. If you fail to arrange transportation with a responsible adult, your procedure will be cancelled and rescheduled.    We have sent your prescription to the pharmacy we have on file. Please call our office at [phone] if you have questions.    COLONOSCOPY PRE-PROCEDURE CHECKLIST  If you have diabetes, ask your regular doctor for diet and medication restrictions.  If you take an anticoagulant or anti-platelet medication (such as Coumadin , Lovenox , Pradaxa , Xarelto , Eliquis , etc.), please call your primary doctor for advice on holding this medication.  If you take aspirin you may continue to do so.  If you are or may be pregnant, please discuss the risks  and benefits of this procedure with your doctor.    PREPARATION FOR COLONOSCOPY    7 days before:    Discontinue fiber supplements and medications containing iron. This includes Metamucil  and Fibercon ; and multivitamins with iron.  3 days before:    Begin a low-fiber diet. A low-fiber diet helps making the cleanout more effective. For additional details on low-fiber diet, please refer to the table on the last page.  2 days before:    Continue the low-fiber diet.     Drink at least 8 glasses of water throughout the day.     Stop eating solid foods at 11:45 pm.  1 day before:    In the morning: begin a clear liquid diet (liquids you can see through).     Examples of a clear liquid diet include: water, clear broth or bouillon, Gatorade, Pedialyte or Powerade, carbonated and non-carbonated soft drinks (Sprite , 7-Up , ginger ale), strained fruit juices without pulp (apple, white grape, white cranberry), Jell-O  and popsicles.     The following are not allowed on a clear liquid diet: red liquids, alcoholic beverages,  dairy products (milk, creamer, and yogurt), protein shakes,  juice with pulp and chewing tobacco.    At 4pm: drink 1 (one) 8 oz glass of NuLYTELY  solution every 15 minutes (approximately 8 glasses of 8 oz or half the bottle). Keep the solution refrigerated. Do not drink any other liquids while you are drinking the NuLYTELY  solution.    Over the course of the evening, drink an additional   liter of clear liquids and continue clear liquid diet.    Day of procedure:    6 hours before the procedure: drink 1 (one) 8 oz glass of NuLYTELY  solution every 15 minutes until the last half of the bottle (approximately 8 glasses of 8 oz) is gone. Keep the solution refrigerated. Do not drink any other liquids while you are drinking the NuLYTELY  solution. After that, you may continue the clear liquid diet until 4 hours before the procedure.      You may take all of your morning medications including blood pressure  medications, methadone, and anti-seizure medications with sips of water 4 hours prior to your procedure or earlier. Do not take insulin, blood thinners (unless specifically told to do so by your primary care provider), or vitamins prior to your procedure.       4 hours prior:   o STOP consuming all liquids   o Do not take anything by mouth during this time.   o Allow extra time to travel to your procedure as you may need to stop and use a restroom along the way.  You are ready for the procedure, if you followed all instructions and your stool is no longer formed, but clear or yellow liquid. If you are unsure whether your colon is clean, please call our office at [phone] before you leave for your appointment.    COLON CLEANSING TIPS: drink adequate amounts of fluids before and after your colon cleansing to prevent dehydration. Stay near a toilet because you will have diarrhea. Even if you are sitting on the toilet, continue to drink the cleansing solution every 15 minutes. If you feel nauseous or vomit, rinse your mouth with water, take a 15 to 30-minute-break and then continue drinking the solution. You will be uncomfortable until the stool has flushed from your colon (in about 2 to 4 hours). You may feel chilled.    Bring the following to your procedure:  - Insurance Card/Photo ID.   - List of current medications including over-the-counter medications and supplements.   - Your rescue inhaler if you currently use one to control asthma.    Canceling or rescheduling your appointment:   If you must cancel or reschedule your appointment, please call [phone] as soon as possible. If you are canceling with 24 hours please call [phone]      What happens during a colonoscopy?    Plan to spend up to two hours, starting at registration time, at the endoscopy center the day of your procedure. The colonoscopy takes an average of 15 to 30 minutes. Recovery time is about 30 minutes.    Before the exam:    You will  change into a gown.    Your medical history and medication list will be reviewed with you, unless that has been done over the phone prior to the procedure.     A nurse will insert an intravenous (IV) line into your hand or arm.    The doctor will meet with you and will give you a consent form to sign.    During the exam:     Medicine will be given through the IV line to help you relax.     Your heart rate and oxygen levels will be monitored. If your blood pressure is low, you may be given fluids through the IV line.     The doctor will insert a flexible hollow tube, called a colonoscope, into your rectum. The scope will be advanced slowly through the large intestine (colon).    You may have a feeling of fullness or pressure.     If an abnormal tissue or a polyp is found, the doctor may remove it through the endoscope for closer examination, or biopsy. Tissue removal is painless.    After the exam:           Any tissue samples removed during the exam will be sent to a lab for evaluation. It may take 5-7 working days for you to be notified of the results.     A nurse will provide you with complete discharge instructions before you leave the endoscopy center. Be sure to ask the nurse for specific instructions if you take blood thinners such as Aspirin, Coumadin or Plavix.     The doctor will prepare a full report for you and for the physician who referred you for the procedure.     Your doctor will talk with you about the initial results of your exam.      Medication given during the exam will prohibit you from driving for the rest of the day.     Following the exam, you may resume your normal diet. Your first meal should be light, no greasy foods. Avoid alcohol until the next day.     You may resume your regular activities the day after the procedure.     LOW-FIBER DIET  Foods RECOMMENDED Foods to AVOID   Breads, Cereal, Rice and Pasta:   White bread, rolls, biscuits, croissant and johnie toast.   Waffles, Yemeni toast  and pancakes.   White rice, noodles, pasta, macaroni and peeled cooked potatoes.   Plain crackers and saltines.   Cooked cereals: farina, cream of rice.   Cold cereals: Puffed Rice , Rice Krispies , Corn Flakes  and Special K    Breads, Cereal, Rice and Pasta:   Breads or rolls with nuts, seeds or fruit.   Whole wheat, pumpernickel, rye breads and cornbread.   Potatoes with skin, brown or wild rice, and kasha (buckwheat).     Vegetables:   Tender cooked and canned vegetables without seeds: carrots, asparagus tips, green or wax beans, pumpkin, spinach, lima beans. Vegetables:   Raw or steamed vegetables.   Vegetables with seeds.   Sauerkraut.   Winter squash, peas, broccoli, Brussel sprouts, cabbage, onions, cauliflower, baked beans, peas and corn.   Fruits:   Strained fruit juice.   Canned fruit, except pineapple.   Ripe bananas and melon. Fruits:   Prunes and prune juice.   Raw fruits.   Dried fruits: figs, dates and raisins.   Milk/Dairy:   Milk: plain or flavored.   Yogurt, custard and ice cream.   Cheese and cottage cheese Milk/Dairy:     Meat and other proteins:   ground, well-cooked tender beef, lamb, ham, veal, pork, fish, poultry and organ meats.   Eggs.   Peanut butter without nuts. Meat and other proteins:   Tough, fibrous meats with gristle.   Dry beans, peas and lentils.   Peanut butter with nuts.   Tofu.   Fats, Snack, Sweets, Condiments and Beverages:   Margarine, butter, oils, mayonnaise, sour cream and salad dressing, plain gravy.   Sugar, hard candy, clear jelly, honey and syrup.   Spices, cooked herbs, bouillon, broth and soups made with allowed vegetable, ketchup and mustard.   Coffee, tea and carbonated drinks.   Plain cakes, cookies and pretzels.   Gelatin, plain puddings, custard, ice cream, sherbet and popsicles. Fats, Snack, Sweets, Condiments and Beverages:   Nuts, seeds and coconut.   Jam, marmalade and preserves.   Pickles, olives, relish and horseradish.   All desserts containing nuts,  seeds, dried fruit and coconut; or made from whole grains or bran.   Candy made with nuts or seeds.   Popcorn.     DIRECTIONS TO THE ENDOSCOPY DEPARTMENT    From the north ([place], [place], [place])  Take [address] South, exit on [address] [address]. Get into the left hand codey, turn left (east), go one-half mile to [address] and turn left. Take [address] to the Main Entrance  From the south ([facility])  Take 35N to the [address]E split and exit on [address] [address]. On [address] , turn left (west) to [address]. Turn right (north) on [address]. Go north to the second  stoplight, take a right and follow [address] to the Main Entrance.  From the east via 35E ([facility])  Take [address]E south to [address] [address] exit. Turn right on [address] . Go west to [address]. Turn right (north) on [address]. Go to the first stoplight, take a right on [address]  to the Main Entrance   From the east via [address] ([facility])ollow on [address]  to the Main Entrance  Take [address] to [address]. Turn left (south) on [address] to [address]. Turn left (east) on on [address]  to the Main Entrance  From the west via [address] ([place])  Take [address] east to [address]. Turn right (south) on [address] to [address]. Turn left (east) on [address]  to the Main Entrance

## 2022-04-01 NOTE — H&P
Pre-Endoscopy History and Physical     Harpreet Vera MRN# [id number]   YOB: 1950 Age: 71 year old     Date of Procedure: 4/1/2022  Primary care provider: Lucía Sandhu  Type of Endoscopy: Colonoscopy with possible biopsy, possible polypectomy  Reason for Procedure: polyp  Type of Anesthesia Anticipated: Conscious Sedation    HPI:    Harpreet is a 71 year old male who will be undergoing the above procedure.      A history and physical has been performed. The patient's medications and allergies have been reviewed. The risks and benefits of the procedure and the sedation options and risks were discussed with the patient.  All questions were answered and informed consent was obtained.      He denies a personal or family history of anesthesia complications or bleeding disorders.     Patient Active Problem List   Diagnosis     Essential hypertension, benign     Allergic rhinitis     Intestinal infection due to Clostridium difficile     CARDIOVASCULAR SCREENING; LDL GOAL LESS THAN 130     Advanced directives, counseling/discussion     Hypercholesteremia     MAN (obstructive sleep apnea)     Vitamin D deficiency     Benign prostatic hyperplasia     S/P cervical spinal fusion     Cervicalgia     Vertebral artery stenosis     Syncope     Bradycardia, severe sinus     Chronic kidney disease, stage 3 (H)        Past Medical History:   Diagnosis Date     Benign neoplasm of colon      Brachial plexopathy      Chronic infection 2007    HX of C Diff     Hypertension      Mumps      Presence of permanent cardiac pacemaker      Sinus node dysfunction (H)     with syncope     Sleep apnea     Bi-PAP        Past Surgical History:   Procedure Laterality Date     ARTHROSCOPY SHOULDER Right 12/11/2020    Procedure: RIGHT SHOULDER ARTHROSCOPY, ARTHROSCOPIC SUBACROMIAL DECOMPRESSION, DISTAL CLAVICLE AND BICEPS TENOTOMY;  Surgeon: Joseph Moon MD;  Location: [facility] OR     CHOLECYSTECTOMY       COLONOSCOPY        "COLONOSCOPY N/A 4/5/2017    Procedure: COMBINED COLONOSCOPY, SINGLE OR MULTIPLE BIOPSY/POLYPECTOMY BY BIOPSY;  Surgeon: Tylor Rice MD;  Location:  GI     DECOMPRESSION, FUSION CERVICAL ANTERIOR ONE LEVEL, COMBINED N/A 9/9/2016    Procedure: COMBINED DECOMPRESSION, FUSION CERVICAL ANTERIOR ONE LEVEL;  Surgeon: Erick Valles MD;  Location: [facility] OR     ENT SURGERY      tonsilectomy  as a child     EP PACEMAKER N/A 8/21/2020    Procedure: EP Pacemaker;  Surgeon: Alfonso Sigala MD;  Location:  [facility] CATH LAB     IR CAROTID CEREBRAL ANGIOGRAM BILATERAL  1/6/2020     IR CAROTID CEREBRAL ANGIOGRAM BILATERAL  7/1/2020     IR DISCONTINUE SHEATH  1/6/2020     ORTHOPEDIC SURGERY      ambar knees scopedrt shoulder,fx rt arm fx     TESTICLE SURGERY       VASECTOMY       Z NONSPECIFIC PROCEDURE      Right clavicle fracture, Multiple right sided rib fracture, hairline fracture \"pelvis\", secondary to fall from tree         Z NONSPECIFIC PROCEDURE      Right ulnar/radial fracture        Social History     Tobacco Use     Smoking status: Never Smoker     Smokeless tobacco: Never Used   Substance Use Topics     Alcohol use: Yes     Alcohol/week: 0.0 standard drinks     Comment: rarely       Family History   Problem Relation Age of Onset     Arthritis Mother      Eye Disorder Mother         cateracts     Lipids Mother      Cancer Father         colon/prostate     Eye Disorder Father         cateracts     Lipids Father      Diabetes Father      Cancer Maternal Grandfather         colon     Cancer Paternal Grandmother         colon     Cancer Maternal Aunt         colon     Cancer Maternal Uncle         colon     Prostate Cancer Brother      Colon Cancer Brother 74        liver mets     Prostate Cancer Brother        Prior to Admission medications    Medication Sig Start Date End Date Taking? Authorizing Provider   polyethylene glycol (GOLYTELY) 236 g suspension Take 4,000 mLs by mouth See Admin Instructions " "3/11/22  Yes Andrew Martinez MD   acetaminophen (TYLENOL 8 HOUR) 650 MG CR tablet Take 650 mg by mouth every 8 hours as needed for mild pain or fever    Reported, Patient   alfuzosin ER (UROXATRAL) 10 MG 24 hr tablet Take 1 tablet (10 mg) by mouth daily 10/12/21   Bijan Childress MD   allopurinol (ZYLOPRIM) 100 MG tablet Take 200 mg by mouth every evening (2 x 100mg)    Unknown, Entered By History   aspirin - buffered (ASCRIPTIN) 325 MG TABS tablet Take 325 mg by mouth every evening    Unknown, Entered By History   furosemide (LASIX) 20 MG tablet TAKE 1/2 TABLET BY MOUTH EVERY DAY  Patient taking differently: Take 10 mg by mouth daily as needed Takes 1/2 tablet PRN 9/16/20   Lucía Sandhu MD   losartan (COZAAR) 100 MG tablet TAKE 1 TABLET BY MOUTH ONE TIME DAILY  10/26/21   Lucía Sandhu MD   multivitamin w/minerals (THERA-VIT-M) tablet Take 1 tablet by mouth every evening    Unknown, Entered By History   rosuvastatin (CRESTOR) 20 MG tablet Take 1 tablet (20 mg) by mouth At Bedtime 10/28/21   Link Mccartney MD   vitamin D3 (CHOLECALCIFEROL) 50 mcg (2000 units) tablet Take 1 tablet by mouth every evening    Unknown, Entered By History       Allergies   Allergen Reactions     Pravastatin      Fuzzy brain.         REVIEW OF SYSTEMS:   5 point ROS negative except as noted above in HPI, including Gen., Resp., CV, GI &  system review.    PHYSICAL EXAM:   There were no vitals taken for this visit. Estimated body mass index is 31.32 kg/m  as calculated from the following:    Height as of 10/28/21: 1.702 m (5' 7\").    Weight as of 10/28/21: 90.7 kg (200 lb).   GENERAL APPEARANCE: alert, and oriented  MENTAL STATUS: alert  AIRWAY EXAM: Mallampatti Class I (visualization of the soft palate, fauces, uvula, anterior and posterior pillars)  RESP: lungs clear to auscultation - no rales, rhonchi or wheezes  CV: regular rates and rhythm  DIAGNOSTICS:    Not indicated    IMPRESSION   ASA Class 2 - Mild systemic " disease    PLAN:   Plan for Colonoscopy with possible biopsy, possible polypectomy. We discussed the risks, benefits and alternatives and the patient wished to proceed.    The above has been forwarded to the consulting provider.      Signed Electronically by: Tylor Rice MD  April 1, 2022

## 2022-04-01 NOTE — LETTER
March 11, 2022      Harpreet Vera  [address] [facility] [address]        Dear Harpreet,         Please be aware that coverage of these services is subject to the terms and limitations of your health insurance plan.  Call member services at your health plan with any benefit or coverage questions.    Thank you for choosing [facility]. You are scheduled for the following service(s):    Date:  4/1/22             Procedure:  COLONOSCOPY  Doctor:        Tylor Esteban   Arrival Time:  12:15 PM *Enter and check in at the Main Hospital Entrance*  Procedure Time:  01:00 PM      Location:   [facility]        Endoscopy Department, First Floor         [address] [place], [place] [address] [phone] or [phone] ([facility]) to reschedule      MIRALAX -GATORADE  PREP  Colonoscopy is the most accurate test to detect colon polyps and colon cancer; and the only test where polyps can be removed. During this procedure, a doctor examines the lining of your large intestine and rectum through a flexible tube.   Transportation  You must arrange for a ride for the day of your procedure with a responsible adult. A taxi , Uber, etc, is not an option unless you are accompanied by a responsible adult. If you fail to arrange transportation with a responsible adult, your procedure will be cancelled and rescheduled.    Purchase the  following supplies at your local pharmacy:  - 2 (two) bisacodyl tablets: each tablet contains 5 mg.  (Dulcolax  laxative NOT Dulcolax  stool softener)   - 1 (one) 8.3 oz bottle of Polyethylene Glycol (PEG) 3350 Powder   (MiraLAX , Smooth LAX , ClearLAX  or equivalent)  - 64 oz Gatorade    Regular Gatorade, Gatorade G2 , Powerade , Powerade Zero  or Pedialyte  is acceptable. Red colored flavors are not allowed; all other colors (yellow, green, orange, purple and blue) are okay. It is also okay to buy two 2.12 oz packets of powdered  Gatorade that can be mixed with water to a total volume of 64 oz of liquid.  - 1 (one) 10 oz bottle of Magnesium Citrate (Red colored flavors are not allowed)  It is also okay for you to use a 0.5 oz package of powdered magnesium citrate (17 g) mixed with 10 oz of water.      PREPARATION FOR COLONOSCOPY    7 days before:    Discontinue fiber supplements and medications containing iron. This includes Metamucil  and Fibercon ; and multivitamins with iron.    3 days before:    Begin a low-fiber diet. A low-fiber diet helps making the cleanout more effective.     Examples of a low-fiber diet include (but are not limited to): white bread, white rice, pasta, crackers, fish, chicken, eggs, ground beef, creamy peanut butter, cooked/steamed/boiled vegetables, canned fruit, bananas, melons, milk, plain yogurt cheese, salad dressing and other condiments.     The following are not allowed on a low-fiber diet: seeds, nuts, popcorn, bran, whole wheat, corn, quinoa, raw fruits and vegetables, berries and dried fruit, beans and lentils.    For additional details on low-fiber diet, please refer to the table on the last page.    2 days before:    Continue the low-fiber diet.     Drink at least 8 glasses of water throughout the day.     Stop eating solid foods at 11:45 pm.    1 day before:    In the morning: begin a clear liquid diet (liquids you can see through).     Examples of a clear liquid diet include: water, clear broth or bouillon, Gatorade, Pedialyte or Powerade, carbonated and non-carbonated soft drinks (Sprite , 7-Up , ginger ale), strained fruit juices without pulp (apple, white grape, white cranberry), Jell-O  and popsicles.     The following are not allowed on a clear liquid diet: red liquids, alcoholic beverages, dairy products (milk, creamer, and yogurt), protein shakes, creamy broths, juice with pulp and chewing tobacco.    At noon: take 2 (two) bisacodyl tablets     At 4 (and no later than 6pm): start drinking the  Miralax-Gatorade preparation (8.3 oz of Miralax mixed with 64 oz of Gatorade in a large pitcher). Drink 1(one) 8 oz glass every 15 minutes thereafter, until the mixture is gone.    COLON CLEANSING TIPS: drink adequate amounts of fluids before and after your colon cleansing to prevent dehydration. Stay near a toilet because you will have diarrhea. Even if you are sitting on the toilet, continue to drink the cleansing solution every 15 minutes. If you feel nauseous or vomit, rinse your mouth with water, take a 15 to 30-minute-break and then continue drinking the solution. You will be uncomfortable until the stool has flushed from your colon (in about 2 to 4 hours). You may feel chilled.    Day of your procedure  You may take your morning medications including blood pressure medications, methadone, anti-seizure medications with sips of water 3 hours prior to your procedure or earlier. Do not take insulin, blood thinners (unless specifically told by your primary care provider) or vitamins prior to your procedure. Continue the clear liquid diet.       4 hours prior: drink 10 oz of magnesium citrate. It may be easier to drink it with a straw.    STOP consuming all liquids after that.     Do not take anything by mouth during this time.     Allow extra time to travel to your procedure as you may need to stop and use a restroom along the way.    You are ready for the procedure, if you followed all instructions and your stool is no longer formed, but clear or yellow liquid. If you are unsure whether your colon is clean, please call our office at [phone] before you leave for your appointment.    Bring the following to your procedure:  - Insurance Card/Photo ID.   - List of current medications including over-the-counter medications and supplements.   - Your rescue inhaler if you currently use one to control asthma.    Canceling or rescheduling your appointment:   If you must cancel or reschedule your appointment, please  call [phone] as soon as possible.      COLONOSCOPY PRE-PROCEDURE CHECKLIST    If you have diabetes, ask your regular doctor for diet and medication restrictions.  If you take an anticoagulant or anti-platelet medication (such as Coumadin , Lovenox , Pradaxa , Xarelto , Eliquis , etc.), please call your primary doctor for advice on holding this medication.  If you take aspirin you may continue to do so.  If you are or may be pregnant, please discuss the risks and benefits of this procedure with your doctor.        What happens during a colonoscopy?    Plan to spend up to two hours, starting at registration time, at the endoscopy center the day of your procedure. The colonoscopy takes an average of 15 to 30 minutes. Recovery time is about 30 minutes.      Before the exam:    You will change into a gown.    Your medical history and medication list will be reviewed with you, unless that has been done over the phone prior to the procedure.     A nurse will insert an intravenous (IV) line into your hand or arm.    The doctor will meet with you and will give you a consent form to sign.  During the exam:     Medicine will be given through the IV line to help you relax.     Your heart rate and oxygen levels will be monitored. If your blood pressure is low, you may be given fluids through the IV line.     The doctor will insert a flexible hollow tube, called a colonoscope, into your rectum. The scope will be advanced slowly through the large intestine (colon).    You may have a feeling of fullness or pressure.     If an abnormal tissue or a polyp is found, the doctor may remove it through the endoscope for closer examination, or biopsy. Tissue removal is painless    After the exam:           Any tissue samples removed during the exam will be sent to a lab for evaluation. It may take 5-7 working days for you to be notified of the results.     A nurse will provide you with complete discharge instructions before you leave  the endoscopy center. Be sure to ask the nurse for specific instructions if you take blood thinners such as Aspirin, Coumadin or Plavix.     The doctor will prepare a full report for you and for the physician who referred you for the procedure.     Your doctor will talk with you about the initial results of your exam.      Medication given during the exam will prohibit you from driving for the rest of the day.     Following the exam, you may resume your normal diet. Your first meal should be light, no greasy foods. Avoid alcohol until the next day.     You may resume your regular activities the day after the procedure.         LOW-FIBER DIET    Foods RECOMMENDED Foods to AVOID   Breads, Cereal, Rice and Pasta:   White bread, rolls, biscuits, croissant and johnie toast.   Waffles, Maldivian toast and pancakes.   White rice, noodles, pasta, macaroni and peeled cooked potatoes.   Plain crackers and saltines.   Cooked cereals: farina, cream of rice.   Cold cereals: Puffed Rice , Rice Krispies , Corn Flakes  and Special K    Breads, Cereal, Rice and Pasta:   Breads or rolls with nuts, seeds or fruit.   Whole wheat, pumpernickel, rye breads and cornbread.   Potatoes with skin, brown or wild rice, and kasha (buckwheat).     Vegetables:   Tender cooked and canned vegetables without seeds: carrots, asparagus tips, green or wax beans, pumpkin, spinach, lima beans. Vegetables:   Raw or steamed vegetables.   Vegetables with seeds.   Sauerkraut.   Winter squash, peas, broccoli, Brussel sprouts, cabbage, onions, cauliflower, baked beans, peas and corn.   Fruits:   Strained fruit juice.   Canned fruit, except pineapple.   Ripe bananas and melon. Fruits:   Prunes and prune juice.   Raw fruits.   Dried fruits: figs, dates and raisins.   Milk/Dairy:   Milk: plain or flavored.   Yogurt, custard and ice cream.   Cheese and cottage cheese Milk/Dairy:     Meat and other proteins:   ground, well-cooked tender beef, lamb, ham, veal, pork,  fish, poultry and organ meats.   Eggs.   Peanut butter without nuts. Meat and other proteins:   Tough, fibrous meats with gristle.   Dry beans, peas and lentils.   Peanut butter with nuts.   Tofu.   Fats, Snack, Sweets, Condiments and Beverages:   Margarine, butter, oils, mayonnaise, sour cream and salad dressing, plain gravy.   Sugar, hard candy, clear jelly, honey and syrup.   Spices, cooked herbs, bouillon, broth and soups made with allowed vegetable, ketchup and mustard.   Coffee, tea and carbonated drinks.   Plain cakes, cookies and pretzels.   Gelatin, plain puddings, custard, ice cream, sherbet and popsicles. Fats, Snack, Sweets, Condiments and Beverages:   Nuts, seeds and coconut.   Jam, marmalade and preserves.   Pickles, olives, relish and horseradish.   All desserts containing nuts, seeds, dried fruit and coconut; or made from whole grains or bran.   Candy made with nuts or seeds.   Popcorn.         DIRECTIONS TO THE ENDOSCOPY DEPARTMENT    From the north ([facility])  Take [address] South, exit on [address] [address]. Get into the left hand codey, turn left (east), go one-half mile to [address] and turn left. Go north to the second stoplight, take a right on [address] and follow it to the Main Hospital entrance.    From the south ([facility])  Take 35N to the 35E split and exit on [address] [address]. On [address] [address], turn left (west) to [address]. Turn right (north) on [address]. Go north to the second stoplight, take a right on [address] and follow it to the Main Hospital entrance.    From the east via 35E ([facility])  Take 35E south to [address] [address] exit. Turn right on [address] [address]. Go west to [address]. Turn right (north) on [address]. Go to the second stoplight, take a right on [address] to the Main Hospital entrance.    From the east via [address] ([facility])  Take [address] to [address]. Turn  left (south) on [address] to [address], turn left (east) on [address] and follow it to the Main Hospital entrance.    From the west via [address] 13 ([place], [place])  Take [address] east to [address]. Turn right (south) on [address] to [address], turn left (east) on [address] and follow it to the Main Hospital entrance.

## 2022-04-05 ENCOUNTER — LAB (OUTPATIENT)
Dept: LAB | Facility: CLINIC | Age: 72
End: 2022-04-05
Payer: COMMERCIAL

## 2022-04-05 DIAGNOSIS — R39.12 BENIGN PROSTATIC HYPERPLASIA WITH WEAK URINARY STREAM: ICD-10-CM

## 2022-04-05 DIAGNOSIS — Z80.42 FAMILY HISTORY OF PROSTATE CANCER: ICD-10-CM

## 2022-04-05 DIAGNOSIS — N40.1 BENIGN PROSTATIC HYPERPLASIA WITH WEAK URINARY STREAM: ICD-10-CM

## 2022-04-05 PROCEDURE — 84153 ASSAY OF PSA TOTAL: CPT

## 2022-04-05 PROCEDURE — 36415 COLL VENOUS BLD VENIPUNCTURE: CPT

## 2022-04-06 LAB — PSA SERPL-MCNC: 6.71 UG/L (ref 0–4)

## 2022-04-10 ENCOUNTER — HEALTH MAINTENANCE LETTER (OUTPATIENT)
Age: 72
End: 2022-04-10

## 2022-04-11 DIAGNOSIS — R97.20 ELEVATED PSA: Primary | ICD-10-CM

## 2022-04-12 ENCOUNTER — OFFICE VISIT (OUTPATIENT)
Dept: UROLOGY | Facility: CLINIC | Age: 72
End: 2022-04-12
Payer: COMMERCIAL

## 2022-04-12 VITALS
SYSTOLIC BLOOD PRESSURE: 122 MMHG | HEIGHT: 67 IN | BODY MASS INDEX: 29.82 KG/M2 | DIASTOLIC BLOOD PRESSURE: 78 MMHG | WEIGHT: 190 LBS

## 2022-04-12 DIAGNOSIS — R39.12 BENIGN PROSTATIC HYPERPLASIA WITH WEAK URINARY STREAM: Primary | ICD-10-CM

## 2022-04-12 DIAGNOSIS — Z80.42 FAMILY HISTORY OF PROSTATE CANCER: ICD-10-CM

## 2022-04-12 DIAGNOSIS — R97.20 ELEVATED PSA: ICD-10-CM

## 2022-04-12 DIAGNOSIS — N40.1 BENIGN PROSTATIC HYPERPLASIA WITH WEAK URINARY STREAM: Primary | ICD-10-CM

## 2022-04-12 LAB — RESIDUAL VOLUME (RV) (EXTERNAL): 13

## 2022-04-12 PROCEDURE — 99213 OFFICE O/P EST LOW 20 MIN: CPT | Mod: 25 | Performed by: STUDENT IN AN ORGANIZED HEALTH CARE EDUCATION/TRAINING PROGRAM

## 2022-04-12 PROCEDURE — 51798 US URINE CAPACITY MEASURE: CPT | Performed by: STUDENT IN AN ORGANIZED HEALTH CARE EDUCATION/TRAINING PROGRAM

## 2022-04-12 ASSESSMENT — PAIN SCALES - GENERAL: PAINLEVEL: NO PAIN (0)

## 2022-04-12 NOTE — NURSING NOTE
Chief Complaint   Patient presents with     Benign Prostatic Hypertrophy     Here for AUA and PVR.   post void residual 12 ml  Gail Carvajal LPN

## 2022-04-12 NOTE — LETTER
"4/12/2022       RE: Harpreet Vera  [address] [facility] [facility] [address]     Dear Colleague,    Thank you for referring your patient, Harpreet Vera, to the [facility] [place] at [facility]. Please see a copy of my visit note below.    CHIEF COMPLAINT   Harpreet Vera who is a 72 year old male returns today for follow-up of BPH with weak stream, + FH prostate cancer, rising PSA with multiple negative biopsies in the past    HPI   Harpreet Vera is a 72 year old male returns today for follow-up of BPH with weak stream, + FH prostate cancer, rising PSA with multiple negative biopsies in the past    Last prostate MRI 10/14/2020 with 54 gram PIRADS 2 prostate    Urinary symptoms are stable, maybe a bit better over the past 2-3 weeks. Still on alfuzosin. Has not been on dutasteride for the past 8 months.    AUA symptom score 1-9-4-1-5-2-1 =16 QOL mostly satisfied    Denies hematuria.    PHYSICAL EXAM  Patient is a 72 year old  male   Vitals: Blood pressure 122/78, height 1.702 m (5' 7\"), weight 86.2 kg (190 lb).  Body mass index is 29.76 kg/m .  General Appearance Adult:   Alert, no acute distress, oriented  HENT: throat/mouth:normal, good dentition  Lungs: no respiratory distress, or pursed lip breathing  Heart: No obvious jugular venous distension present  Abdomen: soft, nontender, no organomegaly or masses  Musculoskeltal: extremities normal, no peripheral edema  Skin: no suspicious lesions or rashes  Neuro: Alert, oriented, speech and mentation normal  Psych: affect and mood normal  Gait: Normal      Component PSA PSA Diag Urologic Phys   Latest Ref Rng & Units 0.00 - 4.00 ug/L 0.00 - 4.00 ng/mL   9/9/2003 4.0    4/9/2004 2.67    8/23/2004 3.59    1/17/2005 3.10    11/29/2005 3.32    9/6/2006 3.82    11/30/2006 4.15 (H)    7/3/2007 3.94    1/9/2008 4.57 (H)    1/7/2009 7.96 (H)    1/7/2010 4.19 (H)    9/15/2010 3.55    8/17/2011 4.50 (H)  "   7/30/2012 3.27    9/9/2014 4.27 (H)    4/3/2017 3.00    7/21/2017  1.90   7/24/2018 1.92    8/21/2019 2.12    8/7/2020 2.72    9/29/2021 4.73 (H)    4/5/2022 6.71 (H)        ASSESSMENT and PLAN  72 year old male returns today for follow-up of BPH with weak stream, + FH prostate cancer, rising PSA with multiple negative biopsies in the past    PSA now 6.71 ng/ml, this is after he stopped dutasteride about 8 months ago. Concerning rise. Will get repeat imaging and if concerning will biopsy. Risks of biopsy include bleeding, infection, false negative. Small chance of false negative prostate MRI discussed    Repeat prostate MRI    If PIRADS 3-4-5 -> uronav prostate biopsy  If PIRADS 1-2 -> recheck PSA in 6 months    Continue alfuzosin for BPH with LUTS      Bijan Childress MD   [facility]  [facility] Phone: [phone]

## 2022-04-12 NOTE — PROGRESS NOTES
"CHIEF COMPLAINT   Harpreet Vera who is a 72 year old male returns today for follow-up of BPH with weak stream, + FH prostate cancer, rising PSA with multiple negative biopsies in the past    HPI   Harpreet Vera is a 72 year old male returns today for follow-up of BPH with weak stream, + FH prostate cancer, rising PSA with multiple negative biopsies in the past    Last prostate MRI 10/14/2020 with 54 gram PIRADS 2 prostate    Urinary symptoms are stable, maybe a bit better over the past 2-3 weeks. Still on alfuzosin. Has not been on dutasteride for the past 8 months.    AUA symptom score 8-2-9-1-5-2-1 =16 QOL mostly satisfied    Denies hematuria.    PHYSICAL EXAM  Patient is a 72 year old  male   Vitals: Blood pressure 122/78, height 1.702 m (5' 7\"), weight 86.2 kg (190 lb).  Body mass index is 29.76 kg/m .  General Appearance Adult:   Alert, no acute distress, oriented  HENT: throat/mouth:normal, good dentition  Lungs: no respiratory distress, or pursed lip breathing  Heart: No obvious jugular venous distension present  Abdomen: soft, nontender, no organomegaly or masses  Musculoskeltal: extremities normal, no peripheral edema  Skin: no suspicious lesions or rashes  Neuro: Alert, oriented, speech and mentation normal  Psych: affect and mood normal  Gait: Normal      Component PSA PSA Diag Urologic Phys   Latest Ref Rng & Units 0.00 - 4.00 ug/L 0.00 - 4.00 ng/mL   9/9/2003 4.0    4/9/2004 2.67    8/23/2004 3.59    1/17/2005 3.10    11/29/2005 3.32    9/6/2006 3.82    11/30/2006 4.15 (H)    7/3/2007 3.94    1/9/2008 4.57 (H)    1/7/2009 7.96 (H)    1/7/2010 4.19 (H)    9/15/2010 3.55    8/17/2011 4.50 (H)    7/30/2012 3.27    9/9/2014 4.27 (H)    4/3/2017 3.00    7/21/2017  1.90   7/24/2018 1.92    8/21/2019 2.12    8/7/2020 2.72    9/29/2021 4.73 (H)    4/5/2022 6.71 (H)        ASSESSMENT and PLAN  72 year old male returns today for follow-up of BPH with weak stream, + FH prostate cancer, rising PSA with multiple " negative biopsies in the past    PSA now 6.71 ng/ml, this is after he stopped dutasteride about 8 months ago. Concerning rise. Will get repeat imaging and if concerning will biopsy. Risks of biopsy include bleeding, infection, false negative. Small chance of false negative prostate MRI discussed    Repeat prostate MRI    If PIRADS 3-4-5 -> uronav prostate biopsy  If PIRADS 1-2 -> recheck PSA in 6 months    Continue alfuzosin for BPH with LUTS      Bijan Childress MD   [facility]  [facility] Phone: [phone]

## 2022-04-12 NOTE — PATIENT INSTRUCTIONS
Repeat prostate MRI    If PIRADS 3-4-5 -> uronav prostate biopsy  If PIRADS 1-2 -> recheck PSA in 6 months    Please call [phone] to schedule MRI

## 2022-04-20 DIAGNOSIS — I10 ESSENTIAL HYPERTENSION, BENIGN: ICD-10-CM

## 2022-04-22 RX ORDER — LOSARTAN POTASSIUM 100 MG/1
TABLET ORAL
Qty: 90 TABLET | Refills: 0 | Status: SHIPPED | OUTPATIENT
Start: 2022-04-22 | End: 2022-08-02

## 2022-04-22 NOTE — TELEPHONE ENCOUNTER
Losartan (Cozaar) 100 mg  Medication is being filled for 1 time refill only due to:  Patient needs labs all. Patient needs to be seen because it has been more than one year since last visit.     [employer] message sent requesting appointment.

## 2022-05-02 ENCOUNTER — ANCILLARY PROCEDURE (OUTPATIENT)
Dept: CARDIOLOGY | Facility: CLINIC | Age: 72
End: 2022-05-02
Attending: INTERNAL MEDICINE
Payer: COMMERCIAL

## 2022-05-02 ENCOUNTER — HOSPITAL ENCOUNTER (OUTPATIENT)
Dept: MRI IMAGING | Facility: CLINIC | Age: 72
Discharge: HOME | End: 2022-05-02
Attending: STUDENT IN AN ORGANIZED HEALTH CARE EDUCATION/TRAINING PROGRAM
Payer: COMMERCIAL

## 2022-05-02 VITALS — HEART RATE: 80 BPM | OXYGEN SATURATION: 93 %

## 2022-05-02 DIAGNOSIS — Z95.0 CARDIAC PACEMAKER IN SITU: Primary | ICD-10-CM

## 2022-05-02 DIAGNOSIS — Z95.0 CARDIAC PACEMAKER IN SITU: ICD-10-CM

## 2022-05-02 DIAGNOSIS — Z45.02 ENCOUNTER FOR ADJUSTMENT AND MANAGEMENT OF AUTOMATIC IMPLANTABLE CARDIAC DEFIBRILLATOR: ICD-10-CM

## 2022-05-02 DIAGNOSIS — I49.5 SSS (SICK SINUS SYNDROME) (H): ICD-10-CM

## 2022-05-02 DIAGNOSIS — R97.20 ELEVATED PSA: ICD-10-CM

## 2022-05-02 PROCEDURE — 93286 PERI-PX EVAL PM/LDLS PM IP: CPT | Mod: 26 | Performed by: INTERNAL MEDICINE

## 2022-05-02 PROCEDURE — 93286 PERI-PX EVAL PM/LDLS PM IP: CPT

## 2022-05-02 PROCEDURE — 72197 MRI PELVIS W/O & W/DYE: CPT

## 2022-05-02 PROCEDURE — A9585 GADOBUTROL INJECTION: HCPCS | Performed by: STUDENT IN AN ORGANIZED HEALTH CARE EDUCATION/TRAINING PROGRAM

## 2022-05-02 PROCEDURE — 72197 MRI PELVIS W/O & W/DYE: CPT | Mod: 26 | Performed by: RADIOLOGY

## 2022-05-02 PROCEDURE — 255N000002 HC RX 255 OP 636: Performed by: STUDENT IN AN ORGANIZED HEALTH CARE EDUCATION/TRAINING PROGRAM

## 2022-05-02 RX ORDER — GADOBUTROL 604.72 MG/ML
10 INJECTION INTRAVENOUS ONCE
Status: COMPLETED | OUTPATIENT
Start: 2022-05-02 | End: 2022-05-02

## 2022-05-02 RX ADMIN — GADOBUTROL 9 ML: 604.72 INJECTION INTRAVENOUS at 09:59

## 2022-05-02 NOTE — PROGRESS NOTES
Pt arrived for MRI procedure. Device RN adjusted pacemaker for MRI. RN present for HR and O2 sat monitoring per policy. See flowsheet. Pt tolerated procedure well, no incident.

## 2022-05-05 LAB
MDC_IDC_LEAD_IMPLANT_DT: NORMAL
MDC_IDC_LEAD_LOCATION: NORMAL
MDC_IDC_LEAD_LOCATION_DETAIL_1: NORMAL
MDC_IDC_LEAD_MFG: NORMAL
MDC_IDC_LEAD_MODEL: NORMAL
MDC_IDC_LEAD_POLARITY_TYPE: NORMAL
MDC_IDC_LEAD_SERIAL: NORMAL
MDC_IDC_MSMT_BATTERY_DTM: NORMAL
MDC_IDC_MSMT_BATTERY_DTM: NORMAL
MDC_IDC_MSMT_BATTERY_REMAINING_LONGEVITY: 162 MO
MDC_IDC_MSMT_BATTERY_REMAINING_LONGEVITY: 162 MO
MDC_IDC_MSMT_BATTERY_REMAINING_PERCENTAGE: 100 %
MDC_IDC_MSMT_BATTERY_REMAINING_PERCENTAGE: 100 %
MDC_IDC_MSMT_BATTERY_STATUS: NORMAL
MDC_IDC_MSMT_BATTERY_STATUS: NORMAL
MDC_IDC_MSMT_LEADCHNL_RA_IMPEDANCE_VALUE: 789 OHM
MDC_IDC_MSMT_LEADCHNL_RA_IMPEDANCE_VALUE: 853 OHM
MDC_IDC_MSMT_LEADCHNL_RA_PACING_THRESHOLD_AMPLITUDE: 0.7 V
MDC_IDC_MSMT_LEADCHNL_RA_PACING_THRESHOLD_AMPLITUDE: 0.9 V
MDC_IDC_MSMT_LEADCHNL_RA_PACING_THRESHOLD_PULSEWIDTH: 0.4 MS
MDC_IDC_MSMT_LEADCHNL_RA_PACING_THRESHOLD_PULSEWIDTH: 0.4 MS
MDC_IDC_MSMT_LEADCHNL_RV_IMPEDANCE_VALUE: 608 OHM
MDC_IDC_MSMT_LEADCHNL_RV_IMPEDANCE_VALUE: 619 OHM
MDC_IDC_MSMT_LEADCHNL_RV_PACING_THRESHOLD_AMPLITUDE: 0.6 V
MDC_IDC_MSMT_LEADCHNL_RV_PACING_THRESHOLD_AMPLITUDE: 0.6 V
MDC_IDC_MSMT_LEADCHNL_RV_PACING_THRESHOLD_PULSEWIDTH: 0.4 MS
MDC_IDC_MSMT_LEADCHNL_RV_PACING_THRESHOLD_PULSEWIDTH: 0.4 MS
MDC_IDC_PG_IMPLANT_DTM: NORMAL
MDC_IDC_PG_IMPLANT_DTM: NORMAL
MDC_IDC_PG_MFG: NORMAL
MDC_IDC_PG_MFG: NORMAL
MDC_IDC_PG_MODEL: NORMAL
MDC_IDC_PG_MODEL: NORMAL
MDC_IDC_PG_SERIAL: NORMAL
MDC_IDC_PG_SERIAL: NORMAL
MDC_IDC_PG_TYPE: NORMAL
MDC_IDC_PG_TYPE: NORMAL
MDC_IDC_SESS_CLINIC_NAME: NORMAL
MDC_IDC_SESS_CLINIC_NAME: NORMAL
MDC_IDC_SESS_DTM: NORMAL
MDC_IDC_SESS_DTM: NORMAL
MDC_IDC_SESS_TYPE: NORMAL
MDC_IDC_SESS_TYPE: NORMAL
MDC_IDC_SET_BRADY_AT_MODE_SWITCH_MODE: NORMAL
MDC_IDC_SET_BRADY_AT_MODE_SWITCH_MODE: NORMAL
MDC_IDC_SET_BRADY_AT_MODE_SWITCH_RATE: 170 {BEATS}/MIN
MDC_IDC_SET_BRADY_AT_MODE_SWITCH_RATE: 170 {BEATS}/MIN
MDC_IDC_SET_BRADY_LOWRATE: 60 {BEATS}/MIN
MDC_IDC_SET_BRADY_LOWRATE: 80 {BEATS}/MIN
MDC_IDC_SET_BRADY_MAX_SENSOR_RATE: 130 {BEATS}/MIN
MDC_IDC_SET_BRADY_MAX_TRACKING_RATE: 130 {BEATS}/MIN
MDC_IDC_SET_BRADY_MODE: NORMAL
MDC_IDC_SET_BRADY_MODE: NORMAL
MDC_IDC_SET_BRADY_PAV_DELAY_HIGH: 150 MS
MDC_IDC_SET_BRADY_PAV_DELAY_HIGH: 150 MS
MDC_IDC_SET_BRADY_PAV_DELAY_LOW: 200 MS
MDC_IDC_SET_BRADY_PAV_DELAY_LOW: 200 MS
MDC_IDC_SET_BRADY_SAV_DELAY_HIGH: 150 MS
MDC_IDC_SET_BRADY_SAV_DELAY_HIGH: 150 MS
MDC_IDC_SET_BRADY_SAV_DELAY_LOW: 200 MS
MDC_IDC_SET_BRADY_SAV_DELAY_LOW: 200 MS
MDC_IDC_SET_LEADCHNL_RA_PACING_AMPLITUDE: 2 V
MDC_IDC_SET_LEADCHNL_RA_PACING_AMPLITUDE: 5 V
MDC_IDC_SET_LEADCHNL_RA_PACING_CAPTURE_MODE: NORMAL
MDC_IDC_SET_LEADCHNL_RA_PACING_CAPTURE_MODE: NORMAL
MDC_IDC_SET_LEADCHNL_RA_PACING_POLARITY: NORMAL
MDC_IDC_SET_LEADCHNL_RA_PACING_POLARITY: NORMAL
MDC_IDC_SET_LEADCHNL_RA_PACING_PULSEWIDTH: 0.4 MS
MDC_IDC_SET_LEADCHNL_RA_PACING_PULSEWIDTH: 1 MS
MDC_IDC_SET_LEADCHNL_RA_SENSING_ADAPTATION_MODE: NORMAL
MDC_IDC_SET_LEADCHNL_RA_SENSING_ADAPTATION_MODE: NORMAL
MDC_IDC_SET_LEADCHNL_RA_SENSING_POLARITY: NORMAL
MDC_IDC_SET_LEADCHNL_RA_SENSING_POLARITY: NORMAL
MDC_IDC_SET_LEADCHNL_RA_SENSING_SENSITIVITY: 0.25 MV
MDC_IDC_SET_LEADCHNL_RA_SENSING_SENSITIVITY: 0.25 MV
MDC_IDC_SET_LEADCHNL_RV_PACING_AMPLITUDE: 3.5 V
MDC_IDC_SET_LEADCHNL_RV_PACING_AMPLITUDE: 5 V
MDC_IDC_SET_LEADCHNL_RV_PACING_CAPTURE_MODE: NORMAL
MDC_IDC_SET_LEADCHNL_RV_PACING_CAPTURE_MODE: NORMAL
MDC_IDC_SET_LEADCHNL_RV_PACING_POLARITY: NORMAL
MDC_IDC_SET_LEADCHNL_RV_PACING_POLARITY: NORMAL
MDC_IDC_SET_LEADCHNL_RV_PACING_PULSEWIDTH: 0.4 MS
MDC_IDC_SET_LEADCHNL_RV_PACING_PULSEWIDTH: 1 MS
MDC_IDC_SET_LEADCHNL_RV_SENSING_ADAPTATION_MODE: NORMAL
MDC_IDC_SET_LEADCHNL_RV_SENSING_ADAPTATION_MODE: NORMAL
MDC_IDC_SET_LEADCHNL_RV_SENSING_POLARITY: NORMAL
MDC_IDC_SET_LEADCHNL_RV_SENSING_POLARITY: NORMAL
MDC_IDC_SET_LEADCHNL_RV_SENSING_SENSITIVITY: 1.5 MV
MDC_IDC_SET_LEADCHNL_RV_SENSING_SENSITIVITY: 1.5 MV
MDC_IDC_SET_ZONE_DETECTION_INTERVAL: 375 MS
MDC_IDC_SET_ZONE_DETECTION_INTERVAL: 375 MS
MDC_IDC_SET_ZONE_TYPE: NORMAL
MDC_IDC_SET_ZONE_TYPE: NORMAL
MDC_IDC_SET_ZONE_VENDOR_TYPE: NORMAL
MDC_IDC_SET_ZONE_VENDOR_TYPE: NORMAL
MDC_IDC_STAT_AT_BURDEN_PERCENT: 0 %
MDC_IDC_STAT_AT_BURDEN_PERCENT: 0 %
MDC_IDC_STAT_AT_DTM_END: NORMAL
MDC_IDC_STAT_AT_DTM_END: NORMAL
MDC_IDC_STAT_AT_DTM_START: NORMAL
MDC_IDC_STAT_AT_DTM_START: NORMAL
MDC_IDC_STAT_BRADY_DTM_END: NORMAL
MDC_IDC_STAT_BRADY_DTM_END: NORMAL
MDC_IDC_STAT_BRADY_DTM_START: NORMAL
MDC_IDC_STAT_BRADY_DTM_START: NORMAL
MDC_IDC_STAT_BRADY_RA_PERCENT_PACED: 76 %
MDC_IDC_STAT_BRADY_RA_PERCENT_PACED: 76 %
MDC_IDC_STAT_BRADY_RV_PERCENT_PACED: 73 %
MDC_IDC_STAT_BRADY_RV_PERCENT_PACED: 73 %
MDC_IDC_STAT_EPISODE_RECENT_COUNT: 0
MDC_IDC_STAT_EPISODE_RECENT_COUNT: 1
MDC_IDC_STAT_EPISODE_RECENT_COUNT_DTM_END: NORMAL
MDC_IDC_STAT_EPISODE_RECENT_COUNT_DTM_START: NORMAL
MDC_IDC_STAT_EPISODE_TYPE: NORMAL
MDC_IDC_STAT_EPISODE_VENDOR_TYPE: NORMAL

## 2022-06-08 ENCOUNTER — ANCILLARY PROCEDURE (OUTPATIENT)
Dept: CARDIOLOGY | Facility: CLINIC | Age: 72
End: 2022-06-08
Attending: INTERNAL MEDICINE
Payer: COMMERCIAL

## 2022-06-08 DIAGNOSIS — Z95.0 CARDIAC PACEMAKER IN SITU: Primary | ICD-10-CM

## 2022-06-08 DIAGNOSIS — I49.5 SSS (SICK SINUS SYNDROME) (H): ICD-10-CM

## 2022-06-08 DIAGNOSIS — Z95.0 CARDIAC PACEMAKER IN SITU: ICD-10-CM

## 2022-06-08 PROCEDURE — 93294 REM INTERROG EVL PM/LDLS PM: CPT | Performed by: INTERNAL MEDICINE

## 2022-06-08 PROCEDURE — 93296 REM INTERROG EVL PM/IDS: CPT | Performed by: INTERNAL MEDICINE

## 2022-06-14 LAB
MDC_IDC_EPISODE_DTM: NORMAL
MDC_IDC_EPISODE_ID: NORMAL
MDC_IDC_EPISODE_TYPE: NORMAL
MDC_IDC_LEAD_IMPLANT_DT: NORMAL
MDC_IDC_LEAD_IMPLANT_DT: NORMAL
MDC_IDC_LEAD_LOCATION: NORMAL
MDC_IDC_LEAD_LOCATION: NORMAL
MDC_IDC_LEAD_LOCATION_DETAIL_1: NORMAL
MDC_IDC_LEAD_LOCATION_DETAIL_1: NORMAL
MDC_IDC_LEAD_MFG: NORMAL
MDC_IDC_LEAD_MFG: NORMAL
MDC_IDC_LEAD_MODEL: NORMAL
MDC_IDC_LEAD_MODEL: NORMAL
MDC_IDC_LEAD_POLARITY_TYPE: NORMAL
MDC_IDC_LEAD_POLARITY_TYPE: NORMAL
MDC_IDC_LEAD_SERIAL: NORMAL
MDC_IDC_LEAD_SERIAL: NORMAL
MDC_IDC_MSMT_BATTERY_DTM: NORMAL
MDC_IDC_MSMT_BATTERY_REMAINING_LONGEVITY: 162 MO
MDC_IDC_MSMT_BATTERY_REMAINING_PERCENTAGE: 100 %
MDC_IDC_MSMT_BATTERY_STATUS: NORMAL
MDC_IDC_MSMT_LEADCHNL_RA_IMPEDANCE_VALUE: 928 OHM
MDC_IDC_MSMT_LEADCHNL_RA_PACING_THRESHOLD_AMPLITUDE: 0.5 V
MDC_IDC_MSMT_LEADCHNL_RA_PACING_THRESHOLD_PULSEWIDTH: 0.4 MS
MDC_IDC_MSMT_LEADCHNL_RV_IMPEDANCE_VALUE: 608 OHM
MDC_IDC_MSMT_LEADCHNL_RV_PACING_THRESHOLD_AMPLITUDE: 0.6 V
MDC_IDC_MSMT_LEADCHNL_RV_PACING_THRESHOLD_PULSEWIDTH: 0.4 MS
MDC_IDC_PG_IMPLANT_DTM: NORMAL
MDC_IDC_PG_MFG: NORMAL
MDC_IDC_PG_MODEL: NORMAL
MDC_IDC_PG_SERIAL: NORMAL
MDC_IDC_PG_TYPE: NORMAL
MDC_IDC_SESS_CLINIC_NAME: NORMAL
MDC_IDC_SESS_DTM: NORMAL
MDC_IDC_SESS_TYPE: NORMAL
MDC_IDC_SET_BRADY_AT_MODE_SWITCH_MODE: NORMAL
MDC_IDC_SET_BRADY_AT_MODE_SWITCH_RATE: 170 {BEATS}/MIN
MDC_IDC_SET_BRADY_LOWRATE: 60 {BEATS}/MIN
MDC_IDC_SET_BRADY_MAX_SENSOR_RATE: 130 {BEATS}/MIN
MDC_IDC_SET_BRADY_MAX_TRACKING_RATE: 130 {BEATS}/MIN
MDC_IDC_SET_BRADY_MODE: NORMAL
MDC_IDC_SET_BRADY_PAV_DELAY_HIGH: 150 MS
MDC_IDC_SET_BRADY_PAV_DELAY_LOW: 200 MS
MDC_IDC_SET_BRADY_SAV_DELAY_HIGH: 150 MS
MDC_IDC_SET_BRADY_SAV_DELAY_LOW: 200 MS
MDC_IDC_SET_LEADCHNL_RA_PACING_AMPLITUDE: 2 V
MDC_IDC_SET_LEADCHNL_RA_PACING_CAPTURE_MODE: NORMAL
MDC_IDC_SET_LEADCHNL_RA_PACING_POLARITY: NORMAL
MDC_IDC_SET_LEADCHNL_RA_PACING_PULSEWIDTH: 0.4 MS
MDC_IDC_SET_LEADCHNL_RA_SENSING_ADAPTATION_MODE: NORMAL
MDC_IDC_SET_LEADCHNL_RA_SENSING_POLARITY: NORMAL
MDC_IDC_SET_LEADCHNL_RA_SENSING_SENSITIVITY: 0.25 MV
MDC_IDC_SET_LEADCHNL_RV_PACING_AMPLITUDE: 1.2 V
MDC_IDC_SET_LEADCHNL_RV_PACING_CAPTURE_MODE: NORMAL
MDC_IDC_SET_LEADCHNL_RV_PACING_POLARITY: NORMAL
MDC_IDC_SET_LEADCHNL_RV_PACING_PULSEWIDTH: 0.4 MS
MDC_IDC_SET_LEADCHNL_RV_SENSING_ADAPTATION_MODE: NORMAL
MDC_IDC_SET_LEADCHNL_RV_SENSING_POLARITY: NORMAL
MDC_IDC_SET_LEADCHNL_RV_SENSING_SENSITIVITY: 1.5 MV
MDC_IDC_SET_ZONE_DETECTION_INTERVAL: 375 MS
MDC_IDC_SET_ZONE_TYPE: NORMAL
MDC_IDC_SET_ZONE_VENDOR_TYPE: NORMAL
MDC_IDC_STAT_AT_BURDEN_PERCENT: 0 %
MDC_IDC_STAT_AT_DTM_END: NORMAL
MDC_IDC_STAT_AT_DTM_START: NORMAL
MDC_IDC_STAT_BRADY_DTM_END: NORMAL
MDC_IDC_STAT_BRADY_DTM_START: NORMAL
MDC_IDC_STAT_BRADY_RA_PERCENT_PACED: 75 %
MDC_IDC_STAT_BRADY_RV_PERCENT_PACED: 78 %
MDC_IDC_STAT_EPISODE_RECENT_COUNT: 0
MDC_IDC_STAT_EPISODE_RECENT_COUNT: 1
MDC_IDC_STAT_EPISODE_RECENT_COUNT_DTM_END: NORMAL
MDC_IDC_STAT_EPISODE_RECENT_COUNT_DTM_START: NORMAL
MDC_IDC_STAT_EPISODE_TYPE: NORMAL
MDC_IDC_STAT_EPISODE_VENDOR_TYPE: NORMAL

## 2022-07-13 ENCOUNTER — TRANSFERRED RECORDS (OUTPATIENT)
Dept: HEALTH INFORMATION MANAGEMENT | Facility: CLINIC | Age: 72
End: 2022-07-13

## 2022-07-13 LAB
ALT SERPL-CCNC: 18 LU/L (ref 5–40)
AST SERPL-CCNC: 25 U/L (ref 5–34)
CREATININE (EXTERNAL): 1.19 MG/DL (ref 0.5–1.3)

## 2022-07-30 DIAGNOSIS — I10 ESSENTIAL HYPERTENSION, BENIGN: ICD-10-CM

## 2022-08-01 NOTE — TELEPHONE ENCOUNTER
Losartan 100 mg    Routing refill request to provider for review/approval because:  Deana given x1 and patient did not follow up, please advise  Labs not current:  All  Patient needs to be seen because it has been more than 1 year since last office visit.    LOV 03/2021

## 2022-08-02 RX ORDER — LOSARTAN POTASSIUM 100 MG/1
TABLET ORAL
Qty: 30 TABLET | Refills: 0 | Status: SHIPPED | OUTPATIENT
Start: 2022-08-02 | End: 2022-08-23

## 2022-08-23 ENCOUNTER — VIRTUAL VISIT (OUTPATIENT)
Dept: INTERNAL MEDICINE | Facility: CLINIC | Age: 72
End: 2022-08-23
Payer: COMMERCIAL

## 2022-08-23 DIAGNOSIS — M10.9 GOUT, UNSPECIFIED CAUSE, UNSPECIFIED CHRONICITY, UNSPECIFIED SITE: ICD-10-CM

## 2022-08-23 DIAGNOSIS — Z11.59 NEED FOR HEPATITIS C SCREENING TEST: ICD-10-CM

## 2022-08-23 DIAGNOSIS — I49.5 SSS (SICK SINUS SYNDROME) (H): ICD-10-CM

## 2022-08-23 DIAGNOSIS — N18.31 STAGE 3A CHRONIC KIDNEY DISEASE (H): ICD-10-CM

## 2022-08-23 DIAGNOSIS — I10 ESSENTIAL HYPERTENSION, BENIGN: Primary | ICD-10-CM

## 2022-08-23 PROBLEM — I77.810 DILATATION OF THORACIC AORTA (H): Status: ACTIVE | Noted: 2022-08-23

## 2022-08-23 PROBLEM — I77.810 DILATATION OF THORACIC AORTA (H): Status: RESOLVED | Noted: 2022-08-23 | Resolved: 2022-08-23

## 2022-08-23 PROCEDURE — 99214 OFFICE O/P EST MOD 30 MIN: CPT | Mod: 95 | Performed by: INTERNAL MEDICINE

## 2022-08-23 RX ORDER — LOSARTAN POTASSIUM 100 MG/1
TABLET ORAL
Qty: 90 TABLET | Refills: 3 | Status: SHIPPED | OUTPATIENT
Start: 2022-08-23 | End: 2022-10-26

## 2022-08-23 NOTE — PROGRESS NOTES
"Harpreet is a 72 year old who is being evaluated via a billable video visit.      How would you like to obtain your AVS? MyChart  If the video visit is dropped, the invitation should be resent by: Text to cell phone: [phone]   Will anyone else be joining your video visit? No          Assessment & Plan     Essential hypertension, benign  under good control will update labs  - Albumin Random Urine Quantitative with Creat Ratio; Future  - losartan (COZAAR) 100 MG tablet; TAKE 1 TABLET BY MOUTH ONE TIME DAILY  - CBC with platelets and differential; Future  - Comprehensive metabolic panel (BMP + Alb, Alk Phos, ALT, AST, Total. Bili, TP); Future  - Lipid Profile (Chol, Trig, HDL, LDL calc); Future  - TSH with free T4 reflex; Future    Stage 3a chronic kidney disease (H)  Will update labs  - Comprehensive metabolic panel (BMP + Alb, Alk Phos, ALT, AST, Total. Bili, TP); Future    Need for hepatitis C screening test  Due for   - Hepatitis C Screen Reflex to HCV RNA Quant and Genotype; Future    SSS (sick sinus syndrome) (H)       Gout, unspecified cause, unspecified chronicity, unspecified site  Has been off allopurinol Will check   - Uric acid; Future         BMI:   Estimated body mass index is 29.76 kg/m  as calculated from the following:    Height as of 4/12/22: 1.702 m (5' 7\").    Weight as of 4/12/22: 86.2 kg (190 lb).       Return in about 6 months (around 2/23/2023).    Lucía Sandhu MD  [facility]    Zhanna Mullins is a 72 year old, presenting for the following health issues:  RECHECK, Lipids, Hypertension, and Chronic Disease Management      HPI     Hyperlipidemia Follow-Up      Are you regularly taking any medication or supplement to lower your cholesterol?   Yes- Crestor    Are you having muscle aches or other side effects that you think could be caused by your cholesterol lowering medication?  No    Hypertension Follow-up      Do you check your blood pressure regularly outside " of the clinic? No     Are you following a low salt diet? Yes    Are your blood pressures ever more than 140 on the top number (systolic) OR more   than 90 on the bottom number (diastolic), for example 140/90? No    Chronic Kidney Disease Follow-up    Do you take any over the counter pain medicine?: Yes  What over the counter medicine are you taking for your pain?:  Tylenol 500 mg 1 tid prn      How often do you take this medicine?:  Two times daily or 3 times daily      How many servings of fruits and vegetables do you eat daily?  0-1    On average, how many sweetened beverages do you drink each day (Examples: soda, juice, sweet tea, etc.  Do NOT count diet or artificially sweetened beverages)?   0    How many days per week do you exercise enough to make your heart beat faster? 3 or less    How many minutes a day do you exercise enough to make your heart beat faster? 9 or less    How many days per week do you miss taking your medication? 0    He has a history of gout but has not been active for several years. Went off his allopurinol and wants to know if he should go back on it.     Review of Systems   Constitutional, HEENT, cardiovascular, pulmonary, GI, , musculoskeletal, neuro, skin, endocrine and psych systems are negative, except as otherwise noted.      Objective           Vitals:  No vitals were obtained today due to virtual visit.    Physical Exam   GENERAL: Healthy, alert and no distress  EYES: Eyes grossly normal to inspection.  No discharge or erythema, or obvious scleral/conjunctival abnormalities.  RESP: No audible wheeze, cough, or visible cyanosis.  No visible retractions or increased work of breathing.    SKIN: Visible skin clear. No significant rash, abnormal pigmentation or lesions.  NEURO: Cranial nerves grossly intact.  Mentation and speech appropriate for age.  PSYCH: Mentation appears normal, affect normal/bright, judgement and insight intact, normal speech and appearance well-groomed.           Video-Visit Details    Video Start Time: 12:40 PM    Type of service:  Video Visit    Video End Time:1:01 PM    Originating Location (pt. Location): Home    Distant Location (provider location):  [facility]     Platform used for Video Visit: Merari    .  Jesus.

## 2022-08-26 ENCOUNTER — LAB (OUTPATIENT)
Dept: LAB | Facility: CLINIC | Age: 72
End: 2022-08-26
Payer: COMMERCIAL

## 2022-08-26 DIAGNOSIS — N18.31 STAGE 3A CHRONIC KIDNEY DISEASE (H): ICD-10-CM

## 2022-08-26 DIAGNOSIS — I10 ESSENTIAL HYPERTENSION, BENIGN: ICD-10-CM

## 2022-08-26 DIAGNOSIS — M10.9 GOUT, UNSPECIFIED CAUSE, UNSPECIFIED CHRONICITY, UNSPECIFIED SITE: ICD-10-CM

## 2022-08-26 DIAGNOSIS — Z11.59 NEED FOR HEPATITIS C SCREENING TEST: ICD-10-CM

## 2022-08-26 LAB
ALBUMIN SERPL-MCNC: 4.3 G/DL (ref 3.4–5)
ALP SERPL-CCNC: 62 U/L (ref 40–150)
ALT SERPL W P-5'-P-CCNC: 29 U/L (ref 0–70)
ANION GAP SERPL CALCULATED.3IONS-SCNC: 5 MMOL/L (ref 3–14)
AST SERPL W P-5'-P-CCNC: 23 U/L (ref 0–45)
BASOPHILS # BLD AUTO: 0 10E3/UL (ref 0–0.2)
BASOPHILS NFR BLD AUTO: 1 %
BILIRUB SERPL-MCNC: 1.4 MG/DL (ref 0.2–1.3)
BUN SERPL-MCNC: 19 MG/DL (ref 7–30)
CALCIUM SERPL-MCNC: 9.5 MG/DL (ref 8.5–10.1)
CHLORIDE BLD-SCNC: 110 MMOL/L (ref 94–109)
CHOLEST SERPL-MCNC: 109 MG/DL
CO2 SERPL-SCNC: 25 MMOL/L (ref 20–32)
CREAT SERPL-MCNC: 1.14 MG/DL (ref 0.66–1.25)
CREAT UR-MCNC: 173 MG/DL
EOSINOPHIL # BLD AUTO: 0.3 10E3/UL (ref 0–0.7)
EOSINOPHIL NFR BLD AUTO: 5 %
ERYTHROCYTE [DISTWIDTH] IN BLOOD BY AUTOMATED COUNT: 12.5 % (ref 10–15)
FASTING STATUS PATIENT QL REPORTED: YES
GFR SERPL CREATININE-BSD FRML MDRD: 67 ML/MIN/1.73M2
GLUCOSE BLD-MCNC: 103 MG/DL (ref 70–99)
HCT VFR BLD AUTO: 40.1 % (ref 40–53)
HCV AB SERPL QL IA: NONREACTIVE
HDLC SERPL-MCNC: 41 MG/DL
HGB BLD-MCNC: 13.9 G/DL (ref 13.3–17.7)
LDLC SERPL CALC-MCNC: 40 MG/DL
LYMPHOCYTES # BLD AUTO: 1.2 10E3/UL (ref 0.8–5.3)
LYMPHOCYTES NFR BLD AUTO: 22 %
MCH RBC QN AUTO: 32.3 PG (ref 26.5–33)
MCHC RBC AUTO-ENTMCNC: 34.7 G/DL (ref 31.5–36.5)
MCV RBC AUTO: 93 FL (ref 78–100)
MICROALBUMIN UR-MCNC: 18 MG/L
MICROALBUMIN/CREAT UR: 10.4 MG/G CR (ref 0–17)
MONOCYTES # BLD AUTO: 0.6 10E3/UL (ref 0–1.3)
MONOCYTES NFR BLD AUTO: 11 %
NEUTROPHILS # BLD AUTO: 3.5 10E3/UL (ref 1.6–8.3)
NEUTROPHILS NFR BLD AUTO: 62 %
NONHDLC SERPL-MCNC: 68 MG/DL
PLATELET # BLD AUTO: 143 10E3/UL (ref 150–450)
POTASSIUM BLD-SCNC: 4.4 MMOL/L (ref 3.4–5.3)
PROT SERPL-MCNC: 7 G/DL (ref 6.8–8.8)
RBC # BLD AUTO: 4.31 10E6/UL (ref 4.4–5.9)
SODIUM SERPL-SCNC: 140 MMOL/L (ref 133–144)
TRIGL SERPL-MCNC: 138 MG/DL
TSH SERPL DL<=0.005 MIU/L-ACNC: 1.72 MU/L (ref 0.4–4)
URATE SERPL-MCNC: 5 MG/DL (ref 3.5–7.2)
WBC # BLD AUTO: 5.7 10E3/UL (ref 4–11)

## 2022-08-26 PROCEDURE — 80061 LIPID PANEL: CPT

## 2022-08-26 PROCEDURE — 85025 COMPLETE CBC W/AUTO DIFF WBC: CPT

## 2022-08-26 PROCEDURE — 86803 HEPATITIS C AB TEST: CPT

## 2022-08-26 PROCEDURE — 84550 ASSAY OF BLOOD/URIC ACID: CPT

## 2022-08-26 PROCEDURE — 80053 COMPREHEN METABOLIC PANEL: CPT

## 2022-08-26 PROCEDURE — 84443 ASSAY THYROID STIM HORMONE: CPT

## 2022-08-26 PROCEDURE — 36415 COLL VENOUS BLD VENIPUNCTURE: CPT

## 2022-08-26 PROCEDURE — 82043 UR ALBUMIN QUANTITATIVE: CPT

## 2022-09-14 ENCOUNTER — ANCILLARY PROCEDURE (OUTPATIENT)
Dept: CARDIOLOGY | Facility: CLINIC | Age: 72
End: 2022-09-14
Attending: INTERNAL MEDICINE
Payer: COMMERCIAL

## 2022-09-14 DIAGNOSIS — Z95.0 CARDIAC PACEMAKER IN SITU: ICD-10-CM

## 2022-09-14 DIAGNOSIS — I49.5 SSS (SICK SINUS SYNDROME) (H): ICD-10-CM

## 2022-09-14 DIAGNOSIS — Z95.0 CARDIAC PACEMAKER IN SITU: Primary | ICD-10-CM

## 2022-09-14 PROCEDURE — 93294 REM INTERROG EVL PM/LDLS PM: CPT | Performed by: INTERNAL MEDICINE

## 2022-09-14 PROCEDURE — 93296 REM INTERROG EVL PM/IDS: CPT | Performed by: INTERNAL MEDICINE

## 2022-09-15 LAB
MDC_IDC_EPISODE_DTM: NORMAL
MDC_IDC_EPISODE_ID: NORMAL
MDC_IDC_EPISODE_TYPE: NORMAL
MDC_IDC_LEAD_IMPLANT_DT: NORMAL
MDC_IDC_LEAD_IMPLANT_DT: NORMAL
MDC_IDC_LEAD_LOCATION: NORMAL
MDC_IDC_LEAD_LOCATION: NORMAL
MDC_IDC_LEAD_LOCATION_DETAIL_1: NORMAL
MDC_IDC_LEAD_LOCATION_DETAIL_1: NORMAL
MDC_IDC_LEAD_MFG: NORMAL
MDC_IDC_LEAD_MFG: NORMAL
MDC_IDC_LEAD_MODEL: NORMAL
MDC_IDC_LEAD_MODEL: NORMAL
MDC_IDC_LEAD_POLARITY_TYPE: NORMAL
MDC_IDC_LEAD_POLARITY_TYPE: NORMAL
MDC_IDC_LEAD_SERIAL: NORMAL
MDC_IDC_LEAD_SERIAL: NORMAL
MDC_IDC_MSMT_BATTERY_DTM: NORMAL
MDC_IDC_MSMT_BATTERY_REMAINING_LONGEVITY: 156 MO
MDC_IDC_MSMT_BATTERY_REMAINING_PERCENTAGE: 100 %
MDC_IDC_MSMT_BATTERY_STATUS: NORMAL
MDC_IDC_MSMT_LEADCHNL_RA_IMPEDANCE_VALUE: 858 OHM
MDC_IDC_MSMT_LEADCHNL_RA_PACING_THRESHOLD_AMPLITUDE: 0.5 V
MDC_IDC_MSMT_LEADCHNL_RA_PACING_THRESHOLD_PULSEWIDTH: 0.4 MS
MDC_IDC_MSMT_LEADCHNL_RV_IMPEDANCE_VALUE: 616 OHM
MDC_IDC_MSMT_LEADCHNL_RV_PACING_THRESHOLD_AMPLITUDE: 0.7 V
MDC_IDC_MSMT_LEADCHNL_RV_PACING_THRESHOLD_PULSEWIDTH: 0.4 MS
MDC_IDC_PG_IMPLANT_DTM: NORMAL
MDC_IDC_PG_MFG: NORMAL
MDC_IDC_PG_MODEL: NORMAL
MDC_IDC_PG_SERIAL: NORMAL
MDC_IDC_PG_TYPE: NORMAL
MDC_IDC_SESS_CLINIC_NAME: NORMAL
MDC_IDC_SESS_DTM: NORMAL
MDC_IDC_SESS_TYPE: NORMAL
MDC_IDC_SET_BRADY_AT_MODE_SWITCH_MODE: NORMAL
MDC_IDC_SET_BRADY_AT_MODE_SWITCH_RATE: 170 {BEATS}/MIN
MDC_IDC_SET_BRADY_LOWRATE: 60 {BEATS}/MIN
MDC_IDC_SET_BRADY_MAX_SENSOR_RATE: 130 {BEATS}/MIN
MDC_IDC_SET_BRADY_MAX_TRACKING_RATE: 130 {BEATS}/MIN
MDC_IDC_SET_BRADY_MODE: NORMAL
MDC_IDC_SET_BRADY_PAV_DELAY_HIGH: 150 MS
MDC_IDC_SET_BRADY_PAV_DELAY_LOW: 200 MS
MDC_IDC_SET_BRADY_SAV_DELAY_HIGH: 150 MS
MDC_IDC_SET_BRADY_SAV_DELAY_LOW: 200 MS
MDC_IDC_SET_LEADCHNL_RA_PACING_AMPLITUDE: 2 V
MDC_IDC_SET_LEADCHNL_RA_PACING_CAPTURE_MODE: NORMAL
MDC_IDC_SET_LEADCHNL_RA_PACING_POLARITY: NORMAL
MDC_IDC_SET_LEADCHNL_RA_PACING_PULSEWIDTH: 0.4 MS
MDC_IDC_SET_LEADCHNL_RA_SENSING_ADAPTATION_MODE: NORMAL
MDC_IDC_SET_LEADCHNL_RA_SENSING_POLARITY: NORMAL
MDC_IDC_SET_LEADCHNL_RA_SENSING_SENSITIVITY: 0.25 MV
MDC_IDC_SET_LEADCHNL_RV_PACING_AMPLITUDE: 1.2 V
MDC_IDC_SET_LEADCHNL_RV_PACING_CAPTURE_MODE: NORMAL
MDC_IDC_SET_LEADCHNL_RV_PACING_POLARITY: NORMAL
MDC_IDC_SET_LEADCHNL_RV_PACING_PULSEWIDTH: 0.4 MS
MDC_IDC_SET_LEADCHNL_RV_SENSING_ADAPTATION_MODE: NORMAL
MDC_IDC_SET_LEADCHNL_RV_SENSING_POLARITY: NORMAL
MDC_IDC_SET_LEADCHNL_RV_SENSING_SENSITIVITY: 1.5 MV
MDC_IDC_SET_ZONE_DETECTION_INTERVAL: 375 MS
MDC_IDC_SET_ZONE_TYPE: NORMAL
MDC_IDC_SET_ZONE_VENDOR_TYPE: NORMAL
MDC_IDC_STAT_AT_BURDEN_PERCENT: 0 %
MDC_IDC_STAT_AT_DTM_END: NORMAL
MDC_IDC_STAT_AT_DTM_START: NORMAL
MDC_IDC_STAT_BRADY_DTM_END: NORMAL
MDC_IDC_STAT_BRADY_DTM_START: NORMAL
MDC_IDC_STAT_BRADY_RA_PERCENT_PACED: 74 %
MDC_IDC_STAT_BRADY_RV_PERCENT_PACED: 86 %
MDC_IDC_STAT_EPISODE_RECENT_COUNT: 0
MDC_IDC_STAT_EPISODE_RECENT_COUNT: 1
MDC_IDC_STAT_EPISODE_RECENT_COUNT_DTM_END: NORMAL
MDC_IDC_STAT_EPISODE_RECENT_COUNT_DTM_START: NORMAL
MDC_IDC_STAT_EPISODE_TYPE: NORMAL
MDC_IDC_STAT_EPISODE_VENDOR_TYPE: NORMAL

## 2022-09-19 DIAGNOSIS — I67.9 CEREBROVASCULAR DISEASE: ICD-10-CM

## 2022-09-19 DIAGNOSIS — E78.00 HYPERCHOLESTEREMIA: ICD-10-CM

## 2022-09-19 RX ORDER — ROSUVASTATIN CALCIUM 20 MG/1
20 TABLET, COATED ORAL AT BEDTIME
Qty: 90 TABLET | Refills: 0 | Status: SHIPPED | OUTPATIENT
Start: 2022-09-19 | End: 2022-12-30

## 2022-09-20 DIAGNOSIS — N40.1 BENIGN PROSTATIC HYPERPLASIA WITH WEAK URINARY STREAM: ICD-10-CM

## 2022-09-20 DIAGNOSIS — R39.12 BENIGN PROSTATIC HYPERPLASIA WITH WEAK URINARY STREAM: ICD-10-CM

## 2022-09-20 RX ORDER — ALFUZOSIN HYDROCHLORIDE 10 MG/1
10 TABLET, EXTENDED RELEASE ORAL DAILY
Qty: 90 TABLET | Refills: 3 | Status: SHIPPED | OUTPATIENT
Start: 2022-09-20 | End: 2023-04-06

## 2022-09-21 ENCOUNTER — DOCUMENTATION ONLY (OUTPATIENT)
Dept: LAB | Facility: CLINIC | Age: 72
End: 2022-09-21

## 2022-09-21 DIAGNOSIS — Z80.42 FAMILY HISTORY OF PROSTATE CANCER: Primary | ICD-10-CM

## 2022-09-21 DIAGNOSIS — R97.20 ELEVATED PROSTATE SPECIFIC ANTIGEN (PSA): ICD-10-CM

## 2022-10-03 ENCOUNTER — LAB (OUTPATIENT)
Dept: LAB | Facility: CLINIC | Age: 72
End: 2022-10-03
Payer: COMMERCIAL

## 2022-10-03 DIAGNOSIS — R97.20 ELEVATED PROSTATE SPECIFIC ANTIGEN (PSA): ICD-10-CM

## 2022-10-03 DIAGNOSIS — Z80.42 FAMILY HISTORY OF PROSTATE CANCER: ICD-10-CM

## 2022-10-03 PROCEDURE — 36415 COLL VENOUS BLD VENIPUNCTURE: CPT

## 2022-10-03 PROCEDURE — 84153 ASSAY OF PSA TOTAL: CPT

## 2022-10-04 LAB — PSA SERPL-MCNC: 7.57 UG/L (ref 0–4)

## 2022-10-05 ENCOUNTER — TRANSFERRED RECORDS (OUTPATIENT)
Dept: HEALTH INFORMATION MANAGEMENT | Facility: CLINIC | Age: 72
End: 2022-10-05

## 2022-10-06 ENCOUNTER — OFFICE VISIT (OUTPATIENT)
Dept: UROLOGY | Facility: CLINIC | Age: 72
End: 2022-10-06
Payer: COMMERCIAL

## 2022-10-06 VITALS
DIASTOLIC BLOOD PRESSURE: 80 MMHG | WEIGHT: 190 LBS | SYSTOLIC BLOOD PRESSURE: 134 MMHG | HEIGHT: 67 IN | BODY MASS INDEX: 29.82 KG/M2

## 2022-10-06 DIAGNOSIS — R39.198 SLOWING OF URINARY STREAM: Primary | ICD-10-CM

## 2022-10-06 DIAGNOSIS — R97.20 ELEVATED PSA: ICD-10-CM

## 2022-10-06 DIAGNOSIS — Z80.42 FAMILY HISTORY OF PROSTATE CANCER: ICD-10-CM

## 2022-10-06 LAB
ALBUMIN UR-MCNC: NEGATIVE MG/DL
APPEARANCE UR: CLEAR
BILIRUB UR QL STRIP: NEGATIVE
COLOR UR AUTO: YELLOW
GLUCOSE UR STRIP-MCNC: NEGATIVE MG/DL
HGB UR QL STRIP: NEGATIVE
KETONES UR STRIP-MCNC: NEGATIVE MG/DL
LEUKOCYTE ESTERASE UR QL STRIP: NEGATIVE
NITRATE UR QL: NEGATIVE
PH UR STRIP: 5.5 [PH] (ref 5–7)
RESIDUAL VOLUME (RV) (EXTERNAL): 41
SP GR UR STRIP: 1.02 (ref 1–1.03)
UROBILINOGEN UR STRIP-ACNC: 0.2 E.U./DL

## 2022-10-06 PROCEDURE — 51798 US URINE CAPACITY MEASURE: CPT | Performed by: STUDENT IN AN ORGANIZED HEALTH CARE EDUCATION/TRAINING PROGRAM

## 2022-10-06 PROCEDURE — 81003 URINALYSIS AUTO W/O SCOPE: CPT | Mod: QW | Performed by: STUDENT IN AN ORGANIZED HEALTH CARE EDUCATION/TRAINING PROGRAM

## 2022-10-06 PROCEDURE — 99214 OFFICE O/P EST MOD 30 MIN: CPT | Mod: 25 | Performed by: STUDENT IN AN ORGANIZED HEALTH CARE EDUCATION/TRAINING PROGRAM

## 2022-10-06 RX ORDER — FINASTERIDE 5 MG/1
5 TABLET, FILM COATED ORAL DAILY
Qty: 90 TABLET | Refills: 3 | Status: SHIPPED | OUTPATIENT
Start: 2022-10-06 | End: 2023-04-06

## 2022-10-06 ASSESSMENT — PAIN SCALES - GENERAL: PAINLEVEL: NO PAIN (0)

## 2022-10-06 NOTE — PROGRESS NOTES
"CHIEF COMPLAINT   Harpreet Vera who is a 72 year old male returns today for follow-up of BPH with weak stream, + FH prostate cancer, rising PSA with multiple negative biopsies in the past     HPI   Harpreet Vera is a 72 year old male returns today for follow-up of BPH with weak stream, + FH prostate cancer, rising PSA with multiple negative biopsies in the distant past     Symptoms about the same as before. Still taking alfuzosin. Has stopped dutasteride for several years at this point, he felt like he was \"in a fog\" when he was on it and he wasn't sure if it was due to this or due to something else. Does seem like he improved after stopping it but he also stopped some other meds at the same time so not sure. Also had some problems with sexual function on it    AUA symptom score 9-9-7-1-5-2-1 = 14 QOL mixed      PHYSICAL EXAM  Patient is a 72 year old  male   Vitals: Blood pressure 134/80, height 1.702 m (5' 7\"), weight 86.2 kg (190 lb).  Body mass index is 29.76 kg/m .  General Appearance Adult:   Alert, no acute distress, oriented  HENT: throat/mouth:normal, good dentition  Lungs: no respiratory distress, or pursed lip breathing  Heart: No obvious jugular venous distension present  Abdomen: nondistended  Musculoskeltal: extremities normal, no peripheral edema  Skin: no suspicious lesions or rashes  Neuro: Alert, oriented, speech and mentation normal  Psych: affect and mood normal  Gait: Normal  : benign enlarged prostate at least 70 g    Component PSA PSA Diag Urologic Phys   Latest Ref Rng & Units 0.00 - 4.00 ug/L 0.00 - 4.00 ng/mL   9/9/2003 4.0    4/9/2004 2.67    8/23/2004 3.59    1/17/2005 3.10    11/29/2005 3.32    9/6/2006 3.82    11/30/2006 4.15 (H)    7/3/2007 3.94    1/9/2008 4.57 (H)    1/7/2009 7.96 (H)    1/7/2010 4.19 (H)    9/15/2010 3.55    8/17/2011 4.50 (H)    7/30/2012 3.27    9/9/2014 4.27 (H)    4/3/2017 3.00    7/21/2017  1.90   7/24/2018 1.92    8/21/2019 2.12    8/7/2020 2.72  " "  9/29/2021 4.73 (H)    4/5/2022 6.71 (H)    10/3/2022 7.57 (H)        All pertinent imaging reviewed:    MRI prostate 5/2/2022  Size: 81 grams  IMPRESSION:  1. Based on the most suspicious abnormality, this exam is  characterized as PIRADS 2 - Clinically significant cancer is unlikely  to be present.    2. No suspicious adenopathy or evidence of pelvic metastases.        PVR 41 ml    ASSESSMENT and PLAN  72 year old male returns today for follow-up of BPH with weak stream, + FH prostate cancer, rising PSA with multiple negative biopsies in the distant past     PSA continues to rise, now 7.57, almost the highest it has ever been (7.96 was the highest in 2009). PIRADS 2 MRI in May 2022 is reassuring. Benign exam today    He has significant bother with weak stream. Emptying well with PVR 41 ml. Reviewed MRI images personally, has enlarged prostate without a significant median lobe. He is on alfuzosin alone, after having stopped dutasteride due to vague feelings of being in a \"fog.\" offered starting alternative 5ARI finasteride instead of dutasteride. Side effects discussed    - return 6 months with UA, PVR, AUA symptom score, PSA prior  - continue alfuzosin  - start finasteride    Bijan Childress MD   [facility]  [facility] Phone: [phone]    "

## 2022-10-06 NOTE — LETTER
"10/6/2022       RE: Harpreet Vera  [address] [facility] [address]     Dear Colleague,    Thank you for referring your patient, Harpreet Vera, to the [facility] [place] [facility]. Please see a copy of my visit note below.    CHIEF COMPLAINT   Harpreet Vera who is a 72 year old male returns today for follow-up of BPH with weak stream, + FH prostate cancer, rising PSA with multiple negative biopsies in the past     HPI   Harpreet Vera is a 72 year old male returns today for follow-up of BPH with weak stream, + FH prostate cancer, rising PSA with multiple negative biopsies in the distant past     Symptoms about the same as before. Still taking alfuzosin. Has stopped dutasteride for several years at this point, he felt like he was \"in a fog\" when he was on it and he wasn't sure if it was due to this or due to something else. Does seem like he improved after stopping it but he also stopped some other meds at the same time so not sure. Also had some problems with sexual function on it    AUA symptom score 4-0-4-1-5-2-1 = 14 QOL mixed      PHYSICAL EXAM  Patient is a 72 year old  male   Vitals: Blood pressure 134/80, height 1.702 m (5' 7\"), weight 86.2 kg (190 lb).  Body mass index is 29.76 kg/m .  General Appearance Adult:   Alert, no acute distress, oriented  HENT: throat/mouth:normal, good dentition  Lungs: no respiratory distress, or pursed lip breathing  Heart: No obvious jugular venous distension present  Abdomen: nondistended  Musculoskeltal: extremities normal, no peripheral edema  Skin: no suspicious lesions or rashes  Neuro: Alert, oriented, speech and mentation normal  Psych: affect and mood normal  Gait: Normal  : benign enlarged prostate at least 70 g    Component PSA PSA Diag Urologic Phys   Latest Ref Rng & Units 0.00 - 4.00 ug/L 0.00 - 4.00 ng/mL   9/9/2003 4.0    4/9/2004 2.67    8/23/2004 3.59    1/17/2005 3.10  " "  11/29/2005 3.32    9/6/2006 3.82    11/30/2006 4.15 (H)    7/3/2007 3.94    1/9/2008 4.57 (H)    1/7/2009 7.96 (H)    1/7/2010 4.19 (H)    9/15/2010 3.55    8/17/2011 4.50 (H)    7/30/2012 3.27    9/9/2014 4.27 (H)    4/3/2017 3.00    7/21/2017  1.90   7/24/2018 1.92    8/21/2019 2.12    8/7/2020 2.72    9/29/2021 4.73 (H)    4/5/2022 6.71 (H)    10/3/2022 7.57 (H)        All pertinent imaging reviewed:    MRI prostate 5/2/2022  Size: 81 grams  IMPRESSION:  1. Based on the most suspicious abnormality, this exam is  characterized as PIRADS 2 - Clinically significant cancer is unlikely  to be present.    2. No suspicious adenopathy or evidence of pelvic metastases.        PVR 41 ml    ASSESSMENT and PLAN  72 year old male returns today for follow-up of BPH with weak stream, + FH prostate cancer, rising PSA with multiple negative biopsies in the distant past     PSA continues to rise, now 7.57, almost the highest it has ever been (7.96 was the highest in 2009). PIRADS 2 MRI in May 2022 is reassuring. Benign exam today    He has significant bother with weak stream. Emptying well with PVR 41 ml. Reviewed MRI images personally, has enlarged prostate without a significant median lobe. He is on alfuzosin alone, after having stopped dutasteride due to vague feelings of being in a \"fog.\" offered starting alternative 5ARI finasteride instead of dutasteride. Side effects discussed    - return 6 months with UA, PVR, AUA symptom score, PSA prior  - continue alfuzosin  - start finasteride    Bijan Childress MD   [facility]   [facility] Phone: [phone]  "

## 2022-10-06 NOTE — NURSING NOTE
"Chief Complaint   Patient presents with     Benign Prostatic Hypertrophy     Patient states his urinary symptoms my be getting a little worse since last visit, slowing of the stream and \"dribbling\".    PVR: 41 mL    Nakia Jay, EMT    "

## 2022-10-10 ENCOUNTER — HOSPITAL ENCOUNTER (OUTPATIENT)
Dept: CARDIOLOGY | Facility: CLINIC | Age: 72
Discharge: HOME | End: 2022-10-10
Attending: INTERNAL MEDICINE | Admitting: INTERNAL MEDICINE
Payer: COMMERCIAL

## 2022-10-10 DIAGNOSIS — I10 ESSENTIAL HYPERTENSION, BENIGN: ICD-10-CM

## 2022-10-10 DIAGNOSIS — I77.810 DILATATION OF THORACIC AORTA (H): ICD-10-CM

## 2022-10-10 LAB — LVEF ECHO: NORMAL

## 2022-10-10 PROCEDURE — 93306 TTE W/DOPPLER COMPLETE: CPT | Mod: 26 | Performed by: INTERNAL MEDICINE

## 2022-10-10 PROCEDURE — 93306 TTE W/DOPPLER COMPLETE: CPT

## 2022-10-17 ENCOUNTER — LAB (OUTPATIENT)
Dept: LAB | Facility: CLINIC | Age: 72
End: 2022-10-17
Payer: COMMERCIAL

## 2022-10-17 DIAGNOSIS — E78.00 HYPERCHOLESTEREMIA: ICD-10-CM

## 2022-10-17 LAB
ALT SERPL W P-5'-P-CCNC: 17 U/L (ref 10–50)
CHOLEST SERPL-MCNC: 120 MG/DL
HDLC SERPL-MCNC: 44 MG/DL
LDLC SERPL CALC-MCNC: 46 MG/DL
NONHDLC SERPL-MCNC: 76 MG/DL
TRIGL SERPL-MCNC: 148 MG/DL

## 2022-10-17 PROCEDURE — 80061 LIPID PANEL: CPT | Performed by: INTERNAL MEDICINE

## 2022-10-17 PROCEDURE — 36415 COLL VENOUS BLD VENIPUNCTURE: CPT | Performed by: INTERNAL MEDICINE

## 2022-10-17 PROCEDURE — 84460 ALANINE AMINO (ALT) (SGPT): CPT | Performed by: INTERNAL MEDICINE

## 2022-10-24 NOTE — PROGRESS NOTES
HISTORY OF PRESENT ILLNESS:    This is a 72 year old male who follows with Dr Mccartney at [facility].  His past medical history includes: sick sinus syndrome s/p PPM, hypertension, hyperlipidemia, sleep apnea, stroke, stage III CKD, BPH    Mr Vera suffered a stroke suffering a left vertebral artery occlusion and subsequently underwent stenting to his right vertebral artery stenosis (2019)  It has been recommended by Vascular to keep his blood pressures higher rather than lower due to his residual cerebrovascular disease.    He suffered syncope (8/2020) and was found to have significant bradycardia with pauses up to 11 seconds  He subsequently underwent a dual-chamber PPM  ECHO showed LVEF 55%, borderline-mild RV dysfunction, ascending aorta size 4 cm.    Device interrogation (6/2022) showed 74% A-paced  86% V-paced  No arrhythmias    He returns for reassessment labs and ECHO    Mr Vera is active without any significant limitations  He walks with his wife or walks the golf course almost daily  He denies any significant shortness of breath, chest pain or palpitations.  He complains of fatigue which has been ongoing for several years. He wears his BIPAP faithfully  He denies any near-syncope    We talked about his hypertension.  We discussed starting Coreg vs changing Losartan to Olmesartan  He prefers the latter.      His ECHO (10/10/22) showed LVEF 55%, mild-moderate concentric LVH, normal RV function, 1+ mitral regurgitation, RVSP 16 mmHg, ascending aorta 4.2 cm    Labs (10/17/22)  Total Cholesterol: 120  Triglycerides: 148  HDL: 44  LDL: 46  ALT: 17    VITAL SIGNS:  BP: 156/88  Pulse: 67  Weight: 204 lbs (BMI: 31)      IMPRESSION AND PLAN:    Sick Sinus Syndrome s/p PPM (8/2020)  -has been V-paced more over the past year:  74% A-Paced 86% V-paced  -continue device cares    Remote Stroke (2019)  -s/p right vertebral artery stenting and known occluded left vertebral artery occlusion  -on   mg/day    Hypertension:  -on Losartan 100 mg  -consider adding Coreg with persistent SBP > 140 mmHg  -BP elevated  -will change Losartan to Olmesartan 40 mg  -return in 1 month with BMP    Hyperlipidemia:  -on Crestor 20 mg  -LDL  46    Moderate Ascending Aorta Dilatation:  -4.2 cm (was 4.0 cm in 2020)    Sleep Apnea  -uses BiPAP    Obesity:  -encouraged weight reduction and Mediterranean diet    The total time for the visit today was 35 minutes which includes patient visit, reviewing of records, discussion, and placing of orders of the outpatient coordination of cardiovascular care as described.  The level of medical decision making during this visit was of moderate complexity.  Thank you for allowing me to participate in their care.    Orders Placed This Encounter   Procedures     Basic metabolic panel     Follow-Up with Cardiology       Orders Placed This Encounter   Medications     olmesartan (BENICAR) 40 MG tablet     Sig: Take 1 tablet (40 mg) by mouth daily     Dispense:  90 tablet     Refill:  3       Medications Discontinued During This Encounter   Medication Reason     losartan (COZAAR) 100 MG tablet          Encounter Diagnosis   Name Primary?     Essential hypertension, benign        CURRENT MEDICATIONS:  Current Outpatient Medications   Medication Sig Dispense Refill     acetaminophen (TYLENOL) 650 MG CR tablet Take 650 mg by mouth every 8 hours as needed for mild pain or fever       alfuzosin ER (UROXATRAL) 10 MG 24 hr tablet Take 1 tablet (10 mg) by mouth daily 90 tablet 3     allopurinol (ZYLOPRIM) 100 MG tablet Take 200 mg by mouth every evening (2 x 100mg)       aspirin - buffered (ASCRIPTIN) 325 MG TABS tablet Take 325 mg by mouth every evening       finasteride (PROSCAR) 5 MG tablet Take 1 tablet (5 mg) by mouth daily 90 tablet 3     multivitamin w/minerals (THERA-VIT-M) tablet Take 1 tablet by mouth every evening       olmesartan (BENICAR) 40 MG tablet Take 1 tablet (40 mg) by mouth daily 90  tablet 3     rosuvastatin (CRESTOR) 20 MG tablet Take 1 tablet (20 mg) by mouth At Bedtime 90 tablet 0     vitamin D3 (CHOLECALCIFEROL) 50 mcg (2000 units) tablet Take 1 tablet by mouth every evening         ALLERGIES   No Known Allergies    PAST MEDICAL HISTORY:  Past Medical History:   Diagnosis Date     Benign neoplasm of colon      Brachial plexopathy      Chronic infection 2007    HX of C Diff     Hypertension      Mumps      MAN (obstructive sleep apnea)      Presence of permanent cardiac pacemaker      Sinus node dysfunction (H)     with syncope     Sleep apnea     Bi-PAP     Syncope        PAST SURGICAL HISTORY:  Past Surgical History:   Procedure Laterality Date     ARTHROSCOPY SHOULDER Right 12/11/2020    Procedure: RIGHT SHOULDER ARTHROSCOPY, ARTHROSCOPIC SUBACROMIAL DECOMPRESSION, DISTAL CLAVICLE AND BICEPS TENOTOMY;  Surgeon: Joseph Moon MD;  Location:  OR     CHOLECYSTECTOMY       COLONOSCOPY       COLONOSCOPY N/A 4/5/2017    Procedure: COMBINED COLONOSCOPY, SINGLE OR MULTIPLE BIOPSY/POLYPECTOMY BY BIOPSY;  Surgeon: Tylor Rice MD;  Location:  GI     COLONOSCOPY N/A 4/1/2022    Procedure: COLONOSCOPY (FV);  Surgeon: Tylor Rice MD;  Location:  GI     DECOMPRESSION, FUSION CERVICAL ANTERIOR ONE LEVEL, COMBINED N/A 9/9/2016    Procedure: COMBINED DECOMPRESSION, FUSION CERVICAL ANTERIOR ONE LEVEL;  Surgeon: Erick Valles MD;  Location:  OR     ENT SURGERY      tonsilectomy  as a child     EP PACEMAKER N/A 8/21/2020    Procedure: EP Pacemaker;  Surgeon: Alfonso Sigala MD;  Location:  HEART CARDIAC CATH LAB     IR CAROTID CEREBRAL ANGIOGRAM BILATERAL  1/6/2020     IR CAROTID CEREBRAL ANGIOGRAM BILATERAL  7/1/2020     IR DISCONTINUE SHEATH  1/6/2020     ORTHOPEDIC SURGERY      ambar knees scopedrt shoulder,fx rt arm fx     TESTICLE SURGERY       VASECTOMY       ZZC NONSPECIFIC PROCEDURE      Right clavicle fracture, Multiple right sided rib fracture, hairline fracture  "\"pelvis\", secondary to fall from tree         ZZC NONSPECIFIC PROCEDURE      Right ulnar/radial fracture        FAMILY HISTORY:  Family History   Problem Relation Age of Onset     Arthritis Mother      Eye Disorder Mother         cateracts     Lipids Mother      Cancer Father         colon/prostate     Eye Disorder Father         cateracts     Lipids Father      Diabetes Father      Cancer Maternal Grandfather         colon     Cancer Paternal Grandmother         colon     Cancer Maternal Aunt         colon     Cancer Maternal Uncle         colon     Prostate Cancer Brother      Colon Cancer Brother 74        liver mets     Prostate Cancer Brother        SOCIAL HISTORY:  Social History     Socioeconomic History     Marital status:      Spouse name: None     Number of children: None     Years of education: None     Highest education level: None   Tobacco Use     Smoking status: Never     Smokeless tobacco: Never   Vaping Use     Vaping Use: Never used   Substance and Sexual Activity     Alcohol use: Yes     Alcohol/week: 1.0 standard drink     Types: 1 Glasses of wine per week     Comment: 1-2 weekly     Drug use: No     Sexual activity: Yes     Partners: Female       Review of Systems:  Skin:          Eyes:         ENT:         Respiratory:  Positive for sleep apnea;CPAP;dyspnea on exertion     Cardiovascular:    Positive for;palpitations;lightheadedness;dizziness;edema;fatigue    Gastroenterology:        Genitourinary:         Musculoskeletal:         Neurologic:         Psychiatric:         Heme/Lymph/Imm:         Endocrine:           Physical Exam:  Vitals: BP (!) 156/88 (BP Location: Right arm, Patient Position: Sitting, Cuff Size: Adult Large)   Pulse 67   Ht 1.702 m (5' 7\")   Wt 92.5 kg (204 lb)   SpO2 95%   BMI 31.95 kg/m      Constitutional:  cooperative obese      Skin:  warm and dry to the touch   pacemaker incision in the left infraclavicular area was well-healed      Head:  normocephalic    "     Eyes:  pupils equal and round        Lymph:      ENT:  no pallor or cyanosis        Neck:  JVP normal        Respiratory:  clear to auscultation;normal respiratory excursion         Cardiac: regular rhythm     no presence of murmur          pulses full and equal                                        GI:  abdomen soft obese      Extremities and Muscular Skeletal:  no edema              Neurological:  no gross motor deficits        Psych:  Alert and Oriented x 3          CC  Link Mccartney MD  [address]  [place] [address]

## 2022-10-26 ENCOUNTER — OFFICE VISIT (OUTPATIENT)
Dept: CARDIOLOGY | Facility: CLINIC | Age: 72
End: 2022-10-26
Attending: INTERNAL MEDICINE
Payer: COMMERCIAL

## 2022-10-26 VITALS
OXYGEN SATURATION: 95 % | DIASTOLIC BLOOD PRESSURE: 88 MMHG | BODY MASS INDEX: 32.02 KG/M2 | SYSTOLIC BLOOD PRESSURE: 156 MMHG | HEIGHT: 67 IN | WEIGHT: 204 LBS | HEART RATE: 67 BPM

## 2022-10-26 DIAGNOSIS — I10 ESSENTIAL HYPERTENSION, BENIGN: ICD-10-CM

## 2022-10-26 PROCEDURE — 99214 OFFICE O/P EST MOD 30 MIN: CPT | Performed by: NURSE PRACTITIONER

## 2022-10-26 RX ORDER — OLMESARTAN MEDOXOMIL 40 MG/1
40 TABLET ORAL DAILY
Qty: 90 TABLET | Refills: 3 | Status: SHIPPED | OUTPATIENT
Start: 2022-10-26 | End: 2023-05-25

## 2022-10-26 NOTE — PATIENT INSTRUCTIONS
Stop Losartan   Start Olmesartan 40 mg once a day  Return in 1 month with labs    It was a pleasure seeing you today     Please do not hesitate to call my nurse team with any questions or concerns:  [phone]    Scheduling number:  [phone]    SALVATORE Davis, CNP

## 2022-10-26 NOTE — LETTER
10/26/2022    Lucía Sandhu MD  [address] [facility] [address]  [facility] [address]    RE: Harpreet Vera       Dear Colleague,     I had the pleasure of seeing Harpreet Vera in the [facility].  HISTORY OF PRESENT ILLNESS:    This is a 72 year old male who follows with Dr Mccartney at [facility].  His past medical history includes: sick sinus syndrome s/p PPM, hypertension, hyperlipidemia, sleep apnea, stroke, stage III CKD, BPH    Mr Vera suffered a stroke suffering a left vertebral artery occlusion and subsequently underwent stenting to his right vertebral artery stenosis (2019)  It has been recommended by Vascular to keep his blood pressures higher rather than lower due to his residual cerebrovascular disease.    He suffered syncope (8/2020) and was found to have significant bradycardia with pauses up to 11 seconds  He subsequently underwent a dual-chamber PPM  ECHO showed LVEF 55%, borderline-mild RV dysfunction, ascending aorta size 4 cm.    Device interrogation (6/2022) showed 74% A-paced  86% V-paced  No arrhythmias    He returns for reassessment labs and ECHO    Mr Vera is active without any significant limitations  He walks with his wife or walks the golf course almost daily  He denies any significant shortness of breath, chest pain or palpitations.  He complains of fatigue which has been ongoing for several years. He wears his BIPAP faithfully  He denies any near-syncope    We talked about his hypertension.  We discussed starting Coreg vs changing Losartan to Olmesartan  He prefers the latter.      His ECHO (10/10/22) showed LVEF 55%, mild-moderate concentric LVH, normal RV function, 1+ mitral regurgitation, RVSP 16 mmHg, ascending aorta 4.2 cm    Labs (10/17/22)  Total Cholesterol: 120  Triglycerides: 148  HDL: 44  LDL: 46  ALT: 17    VITAL SIGNS:  BP: 156/88  Pulse: 67  Weight: 204 lbs (BMI: 31)      IMPRESSION AND PLAN:    Sick Sinus Syndrome s/p PPM (8/2020)  -has been  V-paced more over the past year:  74% A-Paced 86% V-paced  -continue device cares    Remote Stroke (2019)  -s/p right vertebral artery stenting and known occluded left vertebral artery occlusion  -on  mg/day    Hypertension:  -on Losartan 100 mg  -consider adding Coreg with persistent SBP > 140 mmHg  -BP elevated  -will change Losartan to Olmesartan 40 mg  -return in 1 month with BMP    Hyperlipidemia:  -on Crestor 20 mg  -LDL  46    Moderate Ascending Aorta Dilatation:  -4.2 cm (was 4.0 cm in 2020)    Sleep Apnea  -uses BiPAP    Obesity:  -encouraged weight reduction and Mediterranean diet    The total time for the visit today was 35 minutes which includes patient visit, reviewing of records, discussion, and placing of orders of the outpatient coordination of cardiovascular care as described.  The level of medical decision making during this visit was of moderate complexity.  Thank you for allowing me to participate in their care.    Orders Placed This Encounter   Procedures     Basic metabolic panel     Follow-Up with Cardiology       Orders Placed This Encounter   Medications     olmesartan (BENICAR) 40 MG tablet     Sig: Take 1 tablet (40 mg) by mouth daily     Dispense:  90 tablet     Refill:  3       Medications Discontinued During This Encounter   Medication Reason     losartan (COZAAR) 100 MG tablet          Encounter Diagnosis   Name Primary?     Essential hypertension, benign        CURRENT MEDICATIONS:  Current Outpatient Medications   Medication Sig Dispense Refill     acetaminophen (TYLENOL) 650 MG CR tablet Take 650 mg by mouth every 8 hours as needed for mild pain or fever       alfuzosin ER (UROXATRAL) 10 MG 24 hr tablet Take 1 tablet (10 mg) by mouth daily 90 tablet 3     allopurinol (ZYLOPRIM) 100 MG tablet Take 200 mg by mouth every evening (2 x 100mg)       aspirin - buffered (ASCRIPTIN) 325 MG TABS tablet Take 325 mg by mouth every evening       finasteride (PROSCAR) 5 MG tablet Take 1  tablet (5 mg) by mouth daily 90 tablet 3     multivitamin w/minerals (THERA-VIT-M) tablet Take 1 tablet by mouth every evening       olmesartan (BENICAR) 40 MG tablet Take 1 tablet (40 mg) by mouth daily 90 tablet 3     rosuvastatin (CRESTOR) 20 MG tablet Take 1 tablet (20 mg) by mouth At Bedtime 90 tablet 0     vitamin D3 (CHOLECALCIFEROL) 50 mcg (2000 units) tablet Take 1 tablet by mouth every evening         ALLERGIES   No Known Allergies    PAST MEDICAL HISTORY:  Past Medical History:   Diagnosis Date     Benign neoplasm of colon      Brachial plexopathy      Chronic infection 2007    HX of C Diff     Hypertension      Mumps      MAN (obstructive sleep apnea)      Presence of permanent cardiac pacemaker      Sinus node dysfunction (H)     with syncope     Sleep apnea     Bi-PAP     Syncope        PAST SURGICAL HISTORY:  Past Surgical History:   Procedure Laterality Date     ARTHROSCOPY SHOULDER Right 12/11/2020    Procedure: RIGHT SHOULDER ARTHROSCOPY, ARTHROSCOPIC SUBACROMIAL DECOMPRESSION, DISTAL CLAVICLE AND BICEPS TENOTOMY;  Surgeon: Joseph Moon MD;  Location:  OR     CHOLECYSTECTOMY       COLONOSCOPY       COLONOSCOPY N/A 4/5/2017    Procedure: COMBINED COLONOSCOPY, SINGLE OR MULTIPLE BIOPSY/POLYPECTOMY BY BIOPSY;  Surgeon: Tylor Rice MD;  Location:  GI     COLONOSCOPY N/A 4/1/2022    Procedure: COLONOSCOPY (FV);  Surgeon: Tylor Rice MD;  Location:  GI     DECOMPRESSION, FUSION CERVICAL ANTERIOR ONE LEVEL, COMBINED N/A 9/9/2016    Procedure: COMBINED DECOMPRESSION, FUSION CERVICAL ANTERIOR ONE LEVEL;  Surgeon: Erick Valles MD;  Location:  OR     ENT SURGERY      tonsilectomy  as a child     EP PACEMAKER N/A 8/21/2020    Procedure: EP Pacemaker;  Surgeon: Alfonso Sigala MD;  Location:  HEART CARDIAC CATH LAB     IR CAROTID CEREBRAL ANGIOGRAM BILATERAL  1/6/2020     IR CAROTID CEREBRAL ANGIOGRAM BILATERAL  7/1/2020     IR DISCONTINUE SHEATH  1/6/2020     ORTHOPEDIC  "SURGERY      ambar knees scopedrt shoulder,fx rt arm fx     TESTICLE SURGERY       VASECTOMY       Z NONSPECIFIC PROCEDURE      Right clavicle fracture, Multiple right sided rib fracture, hairline fracture \"pelvis\", secondary to fall from tree         ZZC NONSPECIFIC PROCEDURE      Right ulnar/radial fracture        FAMILY HISTORY:  Family History   Problem Relation Age of Onset     Arthritis Mother      Eye Disorder Mother         cateracts     Lipids Mother      Cancer Father         colon/prostate     Eye Disorder Father         cateracts     Lipids Father      Diabetes Father      Cancer Maternal Grandfather         colon     Cancer Paternal Grandmother         colon     Cancer Maternal Aunt         colon     Cancer Maternal Uncle         colon     Prostate Cancer Brother      Colon Cancer Brother 74        liver mets     Prostate Cancer Brother        SOCIAL HISTORY:  Social History     Socioeconomic History     Marital status:      Spouse name: None     Number of children: None     Years of education: None     Highest education level: None   Tobacco Use     Smoking status: Never     Smokeless tobacco: Never   Vaping Use     Vaping Use: Never used   Substance and Sexual Activity     Alcohol use: Yes     Alcohol/week: 1.0 standard drink     Types: 1 Glasses of wine per week     Comment: 1-2 weekly     Drug use: No     Sexual activity: Yes     Partners: Female       Review of Systems:  Skin:          Eyes:         ENT:         Respiratory:  Positive for sleep apnea;CPAP;dyspnea on exertion     Cardiovascular:    Positive for;palpitations;lightheadedness;dizziness;edema;fatigue    Gastroenterology:        Genitourinary:         Musculoskeletal:         Neurologic:         Psychiatric:         Heme/Lymph/Imm:         Endocrine:           Physical Exam:  Vitals: BP (!) 156/88 (BP Location: Right arm, Patient Position: Sitting, Cuff Size: Adult Large)   Pulse 67   Ht 1.702 m (5' 7\")   Wt 92.5 kg (204 lb)   " SpO2 95%   BMI 31.95 kg/m      Constitutional:  cooperative obese      Skin:  warm and dry to the touch   pacemaker incision in the left infraclavicular area was well-healed      Head:  normocephalic        Eyes:  pupils equal and round        Lymph:      ENT:  no pallor or cyanosis        Neck:  JVP normal        Respiratory:  clear to auscultation;normal respiratory excursion         Cardiac: regular rhythm     no presence of murmur          pulses full and equal                                        GI:  abdomen soft obese      Extremities and Muscular Skeletal:  no edema              Neurological:  no gross motor deficits        Psych:  Alert and Oriented x 3      CC  Link Mccartney MD  [address], [address]  [place] [address]    Thank you for allowing me to participate in the care of your patient.      Sincerely,     SALVATORE Echevarria [facility]

## 2022-11-07 ENCOUNTER — TELEPHONE (OUTPATIENT)
Dept: INTERNAL MEDICINE | Facility: CLINIC | Age: 72
End: 2022-11-07

## 2022-11-07 NOTE — TELEPHONE ENCOUNTER
Patient Quality Outreach    Patient is due for the following:   Hypertension -  BP check      Topic Date Due     Hepatitis B Vaccine (1 of 3 - 3-dose series) Never done     Pneumococcal Vaccine (3 - PPSV23 if available, else PCV20) 10/13/2020     Diptheria Tetanus Pertussis (DTAP/TDAP/TD) Vaccine (3 - Td or Tdap) 10/12/2021       Next Steps:   No follow up needed at this time. Patient is working with cardiology on BP.    Type of outreach:    Chart review performed, no outreach needed.      Questions for provider review:         Ofelia Nguyen LPN

## 2022-12-03 DIAGNOSIS — I10 ESSENTIAL HYPERTENSION, BENIGN: ICD-10-CM

## 2022-12-05 NOTE — PROGRESS NOTES
HISTORY OF PRESENT ILLNESS:     This is a 72 year old male who follows with Dr Mccartney at [facility].  His past medical history includes: sick sinus syndrome s/p PPM, hypertension, hyperlipidemia, sleep apnea, stroke, stage III CKD, BPH     Mr Vera suffered a stroke due to a left vertebral artery occlusion and subsequently underwent stenting to his right vertebral artery stenosis (2019)  It has been recommended by Vascular to keep his blood pressures higher rather than lower due to his residual cerebrovascular disease.     He suffered syncope (8/2020) and was found to have significant bradycardia with pauses up to 11 seconds  He subsequently underwent a dual-chamber PPM  ECHO showed LVEF 55%, borderline-mild RV dysfunction, ascending aorta size 4 cm.    Device interrogation (9/2022) showed 74% A-paced  86% V-paced  No arrhythmias      ECHO (10/10/22) showed LVEF 55%, mild-moderate concentric LVH, normal RV function, 1+ mitral regurgitation, RVSP 16 mmHg, ascending aorta 4.2 cm    His Losartan was changed to Olmesartan last month to help better control his blood pressure  Our visit today is for further review and labs    Mr Vera is active and denies any significant limitations.  He specifically denies any chest pain, shortness of breath, palpitations, orthopnea, or peripheral edema.  He rarely checks his BP at home, but did get a systolic reading of 140 mmHg when he checked it.  He has chronic fatigue, but states that he sleeps well with his BiPAP         Labs (12/6/22) Sodium: 140  Potassium: 4.4  BUN: 13  Creatinine: 1.12      VITAL SIGNS:  BP: 158/80  Pulse: 65  Weight:  199 lbs (down 5 lbs)  BMI: 31        IMPRESSION AND PLAN:     Sick Sinus Syndrome s/p PPM (8/2020)  -has been V-paced more over the past year:  74% A-Paced 86% V-paced  -continue device cares     Remote Stroke (2019)  -s/p right vertebral artery stenting and known occluded left vertebral artery occlusion  -on   mg/day     Hypertension:  -on Olmesartan 40 mg  -BP elevated  -will add Coreg 3.125 mg BID  -return in 1 month with BP check with RN  Discussed side effects of Coreg and he will call with any intolerance  -advised cutting down on caffeine      Hyperlipidemia:  -on Crestor 20 mg  -LDL  46     Moderate Ascending Aorta Dilatation:  -4.2 cm (was 4.0 cm in 2020)     Sleep Apnea  -uses BiPAP     Obesity:  -encouraged weight reduction and Mediterranean diet    The total time for the visit today was 30 minutes which includes patient visit, reviewing of records, discussion, and placing of orders of the outpatient coordination of cardiovascular care as described.  The level of medical decision making during this visit was of moderate complexity.  Thank you for allowing me to participate in their care.    Orders Placed This Encounter   Procedures     Follow-Up with Cardiology Nurse       Orders Placed This Encounter   Medications     carvedilol (COREG) 3.125 MG tablet     Sig: Take 1 tablet (3.125 mg) by mouth 2 times daily (with meals)     Dispense:  180 tablet     Refill:  3       There are no discontinued medications.      Encounter Diagnosis   Name Primary?     Essential hypertension, benign        CURRENT MEDICATIONS:  Current Outpatient Medications   Medication Sig Dispense Refill     acetaminophen (TYLENOL) 650 MG CR tablet Take 650 mg by mouth every 8 hours as needed for mild pain or fever       alfuzosin ER (UROXATRAL) 10 MG 24 hr tablet Take 1 tablet (10 mg) by mouth daily 90 tablet 3     allopurinol (ZYLOPRIM) 100 MG tablet Take 200 mg by mouth every evening (2 x 100mg)       aspirin - buffered (ASCRIPTIN) 325 MG TABS tablet Take 325 mg by mouth every evening       carvedilol (COREG) 3.125 MG tablet Take 1 tablet (3.125 mg) by mouth 2 times daily (with meals) 180 tablet 3     finasteride (PROSCAR) 5 MG tablet Take 1 tablet (5 mg) by mouth daily 90 tablet 3     multivitamin w/minerals (THERA-VIT-M) tablet Take 1  tablet by mouth every evening       olmesartan (BENICAR) 40 MG tablet Take 1 tablet (40 mg) by mouth daily 90 tablet 3     rosuvastatin (CRESTOR) 20 MG tablet Take 1 tablet (20 mg) by mouth At Bedtime 90 tablet 0     vitamin D3 (CHOLECALCIFEROL) 50 mcg (2000 units) tablet Take 1 tablet by mouth every evening         ALLERGIES   No Known Allergies    PAST MEDICAL HISTORY:  Past Medical History:   Diagnosis Date     Benign neoplasm of colon      Brachial plexopathy      Chronic infection 2007    HX of C Diff     Hypertension      Mumps      MAN (obstructive sleep apnea)      Presence of permanent cardiac pacemaker      Sinus node dysfunction (H)     with syncope     Sleep apnea     Bi-PAP     Syncope        PAST SURGICAL HISTORY:  Past Surgical History:   Procedure Laterality Date     ARTHROSCOPY SHOULDER Right 12/11/2020    Procedure: RIGHT SHOULDER ARTHROSCOPY, ARTHROSCOPIC SUBACROMIAL DECOMPRESSION, DISTAL CLAVICLE AND BICEPS TENOTOMY;  Surgeon: Joseph Moon MD;  Location:  OR     CHOLECYSTECTOMY       COLONOSCOPY       COLONOSCOPY N/A 4/5/2017    Procedure: COMBINED COLONOSCOPY, SINGLE OR MULTIPLE BIOPSY/POLYPECTOMY BY BIOPSY;  Surgeon: Tylor Rice MD;  Location:  GI     COLONOSCOPY N/A 4/1/2022    Procedure: COLONOSCOPY (FV);  Surgeon: Tylor Rice MD;  Location:  GI     DECOMPRESSION, FUSION CERVICAL ANTERIOR ONE LEVEL, COMBINED N/A 9/9/2016    Procedure: COMBINED DECOMPRESSION, FUSION CERVICAL ANTERIOR ONE LEVEL;  Surgeon: Erick aVlles MD;  Location:  OR     ENT SURGERY      tonsilectomy  as a child     EP PACEMAKER N/A 8/21/2020    Procedure: EP Pacemaker;  Surgeon: Alfonso Sigala MD;  Location:  HEART CARDIAC CATH LAB     IR CAROTID CEREBRAL ANGIOGRAM BILATERAL  1/6/2020     IR CAROTID CEREBRAL ANGIOGRAM BILATERAL  7/1/2020     IR DISCONTINUE SHEATH  1/6/2020     ORTHOPEDIC SURGERY      ambar knees scopedrt shoulder,fx rt arm fx     TESTICLE SURGERY       VASECTOMY        "ZZC NONSPECIFIC PROCEDURE      Right clavicle fracture, Multiple right sided rib fracture, hairline fracture \"pelvis\", secondary to fall from tree         ZZC NONSPECIFIC PROCEDURE      Right ulnar/radial fracture        FAMILY HISTORY:  Family History   Problem Relation Age of Onset     Arthritis Mother      Eye Disorder Mother         cateracts     Lipids Mother      Cancer Father         colon/prostate     Eye Disorder Father         cateracts     Lipids Father      Diabetes Father      Cancer Maternal Grandfather         colon     Cancer Paternal Grandmother         colon     Cancer Maternal Aunt         colon     Cancer Maternal Uncle         colon     Prostate Cancer Brother      Colon Cancer Brother 74        liver mets     Prostate Cancer Brother        SOCIAL HISTORY:  Social History     Socioeconomic History     Marital status:      Spouse name: None     Number of children: None     Years of education: None     Highest education level: None   Tobacco Use     Smoking status: Never     Smokeless tobacco: Never   Vaping Use     Vaping Use: Never used   Substance and Sexual Activity     Alcohol use: Yes     Alcohol/week: 1.0 standard drink     Types: 1 Glasses of wine per week     Comment: 1-2 weekly     Drug use: No     Sexual activity: Yes     Partners: Female       Review of Systems:  Skin:          Eyes:         ENT:         Respiratory:  Positive for sleep apnea;CPAP     Cardiovascular:    edema;Positive for;dizziness;lightheadedness    Gastroenterology:        Genitourinary:         Musculoskeletal:         Neurologic:         Psychiatric:         Heme/Lymph/Imm:         Endocrine:           Physical Exam:  Vitals: BP (!) 158/80 (BP Location: Right arm, Patient Position: Sitting, Cuff Size: Adult Regular)   Pulse 65   Ht 1.702 m (5' 7\")   Wt 90.4 kg (199 lb 4.8 oz)   SpO2 98%   BMI 31.21 kg/m      Constitutional:  cooperative obese      Skin:  warm and dry to the touch   pacemaker incision in " the left infraclavicular area was well-healed      Head:  normocephalic        Eyes:  pupils equal and round        Lymph:      ENT:  no pallor or cyanosis        Neck:  JVP normal        Respiratory:  clear to auscultation;normal respiratory excursion         Cardiac: regular rhythm     no presence of murmur          pulses full and equal                                        GI:  abdomen soft obese      Extremities and Muscular Skeletal:  no edema              Neurological:  no gross motor deficits        Psych:  Alert and Oriented x 3          CC  SALVATORE Briceno CNP  [address]  [place] [id number]

## 2022-12-06 ENCOUNTER — LAB (OUTPATIENT)
Dept: LAB | Facility: CLINIC | Age: 72
End: 2022-12-06
Payer: COMMERCIAL

## 2022-12-06 DIAGNOSIS — I10 ESSENTIAL HYPERTENSION, BENIGN: ICD-10-CM

## 2022-12-06 LAB
ANION GAP SERPL CALCULATED.3IONS-SCNC: 8 MMOL/L (ref 7–15)
BUN SERPL-MCNC: 13.4 MG/DL (ref 8–23)
CALCIUM SERPL-MCNC: 9.4 MG/DL (ref 8.8–10.2)
CHLORIDE SERPL-SCNC: 107 MMOL/L (ref 98–107)
CREAT SERPL-MCNC: 1.12 MG/DL (ref 0.67–1.17)
DEPRECATED HCO3 PLAS-SCNC: 25 MMOL/L (ref 22–29)
GFR SERPL CREATININE-BSD FRML MDRD: 69 ML/MIN/1.73M2
GLUCOSE SERPL-MCNC: 118 MG/DL (ref 70–99)
POTASSIUM SERPL-SCNC: 4.4 MMOL/L (ref 3.4–5.3)
SODIUM SERPL-SCNC: 140 MMOL/L (ref 136–145)

## 2022-12-06 PROCEDURE — 80048 BASIC METABOLIC PNL TOTAL CA: CPT | Performed by: NURSE PRACTITIONER

## 2022-12-06 PROCEDURE — 36415 COLL VENOUS BLD VENIPUNCTURE: CPT | Performed by: NURSE PRACTITIONER

## 2022-12-06 RX ORDER — LOSARTAN POTASSIUM 100 MG/1
TABLET ORAL
Qty: 30 TABLET | Refills: 0 | OUTPATIENT
Start: 2022-12-06

## 2022-12-06 NOTE — TELEPHONE ENCOUNTER
discontinued on 8/23/2022 by Lucía Sandhu MD   Pt was placed on olmesartan  Justa VALDEZ RN, BSN

## 2022-12-07 ENCOUNTER — OFFICE VISIT (OUTPATIENT)
Dept: CARDIOLOGY | Facility: CLINIC | Age: 72
End: 2022-12-07
Attending: NURSE PRACTITIONER
Payer: COMMERCIAL

## 2022-12-07 VITALS
OXYGEN SATURATION: 98 % | HEART RATE: 65 BPM | SYSTOLIC BLOOD PRESSURE: 158 MMHG | WEIGHT: 199.3 LBS | DIASTOLIC BLOOD PRESSURE: 80 MMHG | HEIGHT: 67 IN | BODY MASS INDEX: 31.28 KG/M2

## 2022-12-07 DIAGNOSIS — I10 ESSENTIAL HYPERTENSION, BENIGN: ICD-10-CM

## 2022-12-07 PROCEDURE — 99214 OFFICE O/P EST MOD 30 MIN: CPT | Performed by: NURSE PRACTITIONER

## 2022-12-07 RX ORDER — CARVEDILOL 3.12 MG/1
3.12 TABLET ORAL 2 TIMES DAILY WITH MEALS
Qty: 180 TABLET | Refills: 3 | Status: SHIPPED | OUTPATIENT
Start: 2022-12-07 | End: 2023-01-05

## 2022-12-07 NOTE — PATIENT INSTRUCTIONS
Start Carvedilol 3.125 mg twice a day  Return in 1 month with BP check with RN    It was a pleasure seeing you today     Please do not hesitate to call my nurse team with any questions or concerns:  [phone]    Scheduling number:  [phone]    SALVATORE Davis, CNP

## 2022-12-07 NOTE — LETTER
12/7/2022    Lucía Sandhu MD  [address]  [facility] [address]    RE: Harpreet Vera       Dear Colleague,     I had the pleasure of seeing Harpreet Vera in the [facility].  HISTORY OF PRESENT ILLNESS:     This is a 72 year old male who follows with Dr Mccartney at [facility].  His past medical history includes: sick sinus syndrome s/p PPM, hypertension, hyperlipidemia, sleep apnea, stroke, stage III CKD, BPH     Mr Vera suffered a stroke due to a left vertebral artery occlusion and subsequently underwent stenting to his right vertebral artery stenosis (2019)  It has been recommended by Vascular to keep his blood pressures higher rather than lower due to his residual cerebrovascular disease.     He suffered syncope (8/2020) and was found to have significant bradycardia with pauses up to 11 seconds  He subsequently underwent a dual-chamber PPM  ECHO showed LVEF 55%, borderline-mild RV dysfunction, ascending aorta size 4 cm.    Device interrogation (9/2022) showed 74% A-paced  86% V-paced  No arrhythmias      ECHO (10/10/22) showed LVEF 55%, mild-moderate concentric LVH, normal RV function, 1+ mitral regurgitation, RVSP 16 mmHg, ascending aorta 4.2 cm    His Losartan was changed to Olmesartan last month to help better control his blood pressure  Our visit today is for further review and labs    Mr Vera is active and denies any significant limitations.  He specifically denies any chest pain, shortness of breath, palpitations, orthopnea, or peripheral edema.  He rarely checks his BP at home, but did get a systolic reading of 140 mmHg when he checked it.  He has chronic fatigue, but states that he sleeps well with his BiPAP         Labs (12/6/22) Sodium: 140  Potassium: 4.4  BUN: 13  Creatinine: 1.12      VITAL SIGNS:  BP: 158/80  Pulse: 65  Weight:  199 lbs (down 5 lbs)  BMI: 31        IMPRESSION AND PLAN:     Sick Sinus Syndrome s/p PPM (8/2020)  -has been V-paced  more over the past year:  74% A-Paced 86% V-paced  -continue device cares     Remote Stroke (2019)  -s/p right vertebral artery stenting and known occluded left vertebral artery occlusion  -on  mg/day     Hypertension:  -on Olmesartan 40 mg  -BP elevated  -will add Coreg 3.125 mg BID  -return in 1 month with BP check with RN  Discussed side effects of Coreg and he will call with any intolerance  -advised cutting down on caffeine      Hyperlipidemia:  -on Crestor 20 mg  -LDL  46     Moderate Ascending Aorta Dilatation:  -4.2 cm (was 4.0 cm in 2020)     Sleep Apnea  -uses BiPAP     Obesity:  -encouraged weight reduction and Mediterranean diet    The total time for the visit today was 30 minutes which includes patient visit, reviewing of records, discussion, and placing of orders of the outpatient coordination of cardiovascular care as described.  The level of medical decision making during this visit was of moderate complexity.  Thank you for allowing me to participate in their care.    Orders Placed This Encounter   Procedures     Follow-Up with Cardiology Nurse       Orders Placed This Encounter   Medications     carvedilol (COREG) 3.125 MG tablet     Sig: Take 1 tablet (3.125 mg) by mouth 2 times daily (with meals)     Dispense:  180 tablet     Refill:  3       There are no discontinued medications.      Encounter Diagnosis   Name Primary?     Essential hypertension, benign        CURRENT MEDICATIONS:  Current Outpatient Medications   Medication Sig Dispense Refill     acetaminophen (TYLENOL) 650 MG CR tablet Take 650 mg by mouth every 8 hours as needed for mild pain or fever       alfuzosin ER (UROXATRAL) 10 MG 24 hr tablet Take 1 tablet (10 mg) by mouth daily 90 tablet 3     allopurinol (ZYLOPRIM) 100 MG tablet Take 200 mg by mouth every evening (2 x 100mg)       aspirin - buffered (ASCRIPTIN) 325 MG TABS tablet Take 325 mg by mouth every evening       carvedilol (COREG) 3.125 MG tablet Take 1 tablet  (3.125 mg) by mouth 2 times daily (with meals) 180 tablet 3     finasteride (PROSCAR) 5 MG tablet Take 1 tablet (5 mg) by mouth daily 90 tablet 3     multivitamin w/minerals (THERA-VIT-M) tablet Take 1 tablet by mouth every evening       olmesartan (BENICAR) 40 MG tablet Take 1 tablet (40 mg) by mouth daily 90 tablet 3     rosuvastatin (CRESTOR) 20 MG tablet Take 1 tablet (20 mg) by mouth At Bedtime 90 tablet 0     vitamin D3 (CHOLECALCIFEROL) 50 mcg (2000 units) tablet Take 1 tablet by mouth every evening         ALLERGIES   No Known Allergies    PAST MEDICAL HISTORY:  Past Medical History:   Diagnosis Date     Benign neoplasm of colon      Brachial plexopathy      Chronic infection 2007    HX of C Diff     Hypertension      Mumps      MAN (obstructive sleep apnea)      Presence of permanent cardiac pacemaker      Sinus node dysfunction (H)     with syncope     Sleep apnea     Bi-PAP     Syncope        PAST SURGICAL HISTORY:  Past Surgical History:   Procedure Laterality Date     ARTHROSCOPY SHOULDER Right 12/11/2020    Procedure: RIGHT SHOULDER ARTHROSCOPY, ARTHROSCOPIC SUBACROMIAL DECOMPRESSION, DISTAL CLAVICLE AND BICEPS TENOTOMY;  Surgeon: Joseph Moon MD;  Location:  OR     CHOLECYSTECTOMY       COLONOSCOPY       COLONOSCOPY N/A 4/5/2017    Procedure: COMBINED COLONOSCOPY, SINGLE OR MULTIPLE BIOPSY/POLYPECTOMY BY BIOPSY;  Surgeon: Tylor Rice MD;  Location:  GI     COLONOSCOPY N/A 4/1/2022    Procedure: COLONOSCOPY (FV);  Surgeon: Tylor Rice MD;  Location:  GI     DECOMPRESSION, FUSION CERVICAL ANTERIOR ONE LEVEL, COMBINED N/A 9/9/2016    Procedure: COMBINED DECOMPRESSION, FUSION CERVICAL ANTERIOR ONE LEVEL;  Surgeon: Erick Valles MD;  Location:  OR     ENT SURGERY      tonsilectomy  as a child     EP PACEMAKER N/A 8/21/2020    Procedure: EP Pacemaker;  Surgeon: Alfonso Sigala MD;  Location:  HEART CARDIAC CATH LAB     IR CAROTID CEREBRAL ANGIOGRAM BILATERAL  1/6/2020  "    IR CAROTID CEREBRAL ANGIOGRAM BILATERAL  7/1/2020     IR DISCONTINUE SHEATH  1/6/2020     ORTHOPEDIC SURGERY      ambar knees scopedrt shoulder,fx rt arm fx     TESTICLE SURGERY       VASECTOMY       ZZC NONSPECIFIC PROCEDURE      Right clavicle fracture, Multiple right sided rib fracture, hairline fracture \"pelvis\", secondary to fall from tree         ZZC NONSPECIFIC PROCEDURE      Right ulnar/radial fracture        FAMILY HISTORY:  Family History   Problem Relation Age of Onset     Arthritis Mother      Eye Disorder Mother         cateracts     Lipids Mother      Cancer Father         colon/prostate     Eye Disorder Father         cateracts     Lipids Father      Diabetes Father      Cancer Maternal Grandfather         colon     Cancer Paternal Grandmother         colon     Cancer Maternal Aunt         colon     Cancer Maternal Uncle         colon     Prostate Cancer Brother      Colon Cancer Brother 74        liver mets     Prostate Cancer Brother        SOCIAL HISTORY:  Social History     Socioeconomic History     Marital status:      Spouse name: None     Number of children: None     Years of education: None     Highest education level: None   Tobacco Use     Smoking status: Never     Smokeless tobacco: Never   Vaping Use     Vaping Use: Never used   Substance and Sexual Activity     Alcohol use: Yes     Alcohol/week: 1.0 standard drink     Types: 1 Glasses of wine per week     Comment: 1-2 weekly     Drug use: No     Sexual activity: Yes     Partners: Female       Review of Systems:  Skin:          Eyes:         ENT:         Respiratory:  Positive for sleep apnea;CPAP     Cardiovascular:    edema;Positive for;dizziness;lightheadedness    Gastroenterology:        Genitourinary:         Musculoskeletal:         Neurologic:         Psychiatric:         Heme/Lymph/Imm:         Endocrine:           Physical Exam:  Vitals: BP (!) 158/80 (BP Location: Right arm, Patient Position: Sitting, Cuff Size: Adult " "Regular)   Pulse 65   Ht 1.702 m (5' 7\")   Wt 90.4 kg (199 lb 4.8 oz)   SpO2 98%   BMI 31.21 kg/m      Constitutional:  cooperative obese      Skin:  warm and dry to the touch   pacemaker incision in the left infraclavicular area was well-healed      Head:  normocephalic        Eyes:  pupils equal and round        Lymph:      ENT:  no pallor or cyanosis        Neck:  JVP normal        Respiratory:  clear to auscultation;normal respiratory excursion         Cardiac: regular rhythm     no presence of murmur          pulses full and equal                                        GI:  abdomen soft obese      Extremities and Muscular Skeletal:  no edema              Neurological:  no gross motor deficits        Psych:  Alert and Oriented x 3            Thank you for allowing me to participate in the care of your patient.      Sincerely,     SALVATORE Echevarria [facility]    "

## 2022-12-14 ENCOUNTER — ANCILLARY PROCEDURE (OUTPATIENT)
Dept: CARDIOLOGY | Facility: CLINIC | Age: 72
End: 2022-12-14
Attending: INTERNAL MEDICINE
Payer: COMMERCIAL

## 2022-12-14 DIAGNOSIS — Z95.0 CARDIAC PACEMAKER IN SITU: ICD-10-CM

## 2022-12-14 DIAGNOSIS — I49.5 SSS (SICK SINUS SYNDROME) (H): ICD-10-CM

## 2022-12-14 LAB
MDC_IDC_LEAD_IMPLANT_DT: NORMAL
MDC_IDC_LEAD_IMPLANT_DT: NORMAL
MDC_IDC_LEAD_LOCATION: NORMAL
MDC_IDC_LEAD_LOCATION: NORMAL
MDC_IDC_LEAD_LOCATION_DETAIL_1: NORMAL
MDC_IDC_LEAD_LOCATION_DETAIL_1: NORMAL
MDC_IDC_LEAD_MFG: NORMAL
MDC_IDC_LEAD_MFG: NORMAL
MDC_IDC_LEAD_MODEL: NORMAL
MDC_IDC_LEAD_MODEL: NORMAL
MDC_IDC_LEAD_POLARITY_TYPE: NORMAL
MDC_IDC_LEAD_POLARITY_TYPE: NORMAL
MDC_IDC_LEAD_SERIAL: NORMAL
MDC_IDC_LEAD_SERIAL: NORMAL
MDC_IDC_MSMT_BATTERY_STATUS: NORMAL
MDC_IDC_MSMT_LEADCHNL_RA_IMPEDANCE_VALUE: 906 OHM
MDC_IDC_MSMT_LEADCHNL_RA_PACING_THRESHOLD_AMPLITUDE: 0.7 V
MDC_IDC_MSMT_LEADCHNL_RA_PACING_THRESHOLD_PULSEWIDTH: 0.4 MS
MDC_IDC_MSMT_LEADCHNL_RA_SENSING_INTR_AMPL: 5.8 MV
MDC_IDC_MSMT_LEADCHNL_RV_IMPEDANCE_VALUE: 631 OHM
MDC_IDC_MSMT_LEADCHNL_RV_PACING_THRESHOLD_AMPLITUDE: 0.7 V
MDC_IDC_MSMT_LEADCHNL_RV_PACING_THRESHOLD_PULSEWIDTH: 0.4 MS
MDC_IDC_MSMT_LEADCHNL_RV_SENSING_INTR_AMPL: 24 MV
MDC_IDC_PG_IMPLANT_DTM: NORMAL
MDC_IDC_PG_MFG: NORMAL
MDC_IDC_PG_MODEL: NORMAL
MDC_IDC_PG_SERIAL: NORMAL
MDC_IDC_PG_TYPE: NORMAL
MDC_IDC_SESS_CLINIC_NAME: NORMAL
MDC_IDC_SESS_DTM: NORMAL
MDC_IDC_SESS_TYPE: NORMAL
MDC_IDC_SET_BRADY_AT_MODE_SWITCH_MODE: NORMAL
MDC_IDC_SET_BRADY_AT_MODE_SWITCH_RATE: 170 {BEATS}/MIN
MDC_IDC_SET_BRADY_LOWRATE: 60 {BEATS}/MIN
MDC_IDC_SET_BRADY_MAX_SENSOR_RATE: 130 {BEATS}/MIN
MDC_IDC_SET_BRADY_MAX_TRACKING_RATE: 130 {BEATS}/MIN
MDC_IDC_SET_BRADY_MODE: NORMAL
MDC_IDC_SET_BRADY_PAV_DELAY_HIGH: 150 MS
MDC_IDC_SET_BRADY_PAV_DELAY_LOW: 200 MS
MDC_IDC_SET_BRADY_SAV_DELAY_HIGH: 150 MS
MDC_IDC_SET_BRADY_SAV_DELAY_LOW: 200 MS
MDC_IDC_SET_LEADCHNL_RA_PACING_AMPLITUDE: 2 V
MDC_IDC_SET_LEADCHNL_RA_PACING_CAPTURE_MODE: NORMAL
MDC_IDC_SET_LEADCHNL_RA_PACING_POLARITY: NORMAL
MDC_IDC_SET_LEADCHNL_RA_PACING_PULSEWIDTH: 0.4 MS
MDC_IDC_SET_LEADCHNL_RA_SENSING_ADAPTATION_MODE: NORMAL
MDC_IDC_SET_LEADCHNL_RA_SENSING_POLARITY: NORMAL
MDC_IDC_SET_LEADCHNL_RA_SENSING_SENSITIVITY: 0.25 MV
MDC_IDC_SET_LEADCHNL_RV_PACING_AMPLITUDE: 1.2 V
MDC_IDC_SET_LEADCHNL_RV_PACING_CAPTURE_MODE: NORMAL
MDC_IDC_SET_LEADCHNL_RV_PACING_POLARITY: NORMAL
MDC_IDC_SET_LEADCHNL_RV_PACING_PULSEWIDTH: 0.4 MS
MDC_IDC_SET_LEADCHNL_RV_SENSING_ADAPTATION_MODE: NORMAL
MDC_IDC_SET_LEADCHNL_RV_SENSING_POLARITY: NORMAL
MDC_IDC_SET_LEADCHNL_RV_SENSING_SENSITIVITY: 1.5 MV
MDC_IDC_SET_ZONE_DETECTION_INTERVAL: 375 MS
MDC_IDC_SET_ZONE_TYPE: NORMAL
MDC_IDC_SET_ZONE_VENDOR_TYPE: NORMAL
MDC_IDC_STAT_EPISODE_RECENT_COUNT: 0
MDC_IDC_STAT_EPISODE_RECENT_COUNT_DTM_END: NORMAL
MDC_IDC_STAT_EPISODE_RECENT_COUNT_DTM_START: NORMAL
MDC_IDC_STAT_EPISODE_TOTAL_COUNT: 0
MDC_IDC_STAT_EPISODE_TOTAL_COUNT_DTM_END: NORMAL
MDC_IDC_STAT_EPISODE_TYPE: NORMAL
MDC_IDC_STAT_EPISODE_TYPE: NORMAL
MDC_IDC_STAT_EPISODE_VENDOR_TYPE: NORMAL
MDC_IDC_STAT_EPISODE_VENDOR_TYPE: NORMAL

## 2022-12-14 PROCEDURE — 93280 PM DEVICE PROGR EVAL DUAL: CPT | Performed by: INTERNAL MEDICINE

## 2022-12-21 NOTE — PROGRESS NOTES
[facility]    Medicine Progress Note - Hospitalist Service       Date of Admission:  8/19/2020  Length of stay: 0 days    Assessment & Plan   Harpreet Vera is a 70 year old male with a MAN on BiPAP therapy, hypertension, hyper lipidemia, recent vertebral artery stenosis with complete occlusion of left and high-grade stenosis on the right status post stent placement in January 2020 who presented to the ER with syncope.    Patient mentioned 2 episodes of syncope with preceding lightheadedness over the past week.  He had not been having any chest pain or palpitations prior to that.  EKG on admission here showed sinus bradycardia with rates in the high 40s.  CTA of the head and neck showed a patent vertebral artery stent.  After admission, he had pauses on the monitor up to 2.9 seconds.  Cardiology was consulted.  They discussed empiric pacemaker placement versus outpatient cardiac monitoring.  Patient prefers to avoid pacemaker placement if possible.  He will remain hospitalized tonight for stress echo tomorrow to evaluate for chronotropic competence.    Syncope  Unclear etiology right now.  As outlined, CTA of the head and neck showed patent vertebral artery stent and chronic left-sided total occlusion.  Telemetry showed pauses up to 2.9 seconds.  Clinically doubt PE.  Orthostatics will be checked with every vital sign.  Patient is on Lasix at home which he only takes every few days.  Stress echocardiogram pending.  Appreciate consultation from cardiology.  Bradycardia does not seem severe enough to cause this.  Anticipate event monitor on discharge.    Vertebral artery occlusion  Known chronically occluded left vertebral artery, previous 80% stenosis of right vertebral artery s/p right vertebral artery stent in 01/2020 that appears patent on CTA today. Unlikely contributing to presenting syncope. Previously on DAPT with ASA and Plavix, recently stopped Plavix on 7/2/20.    HTN  Used to be on  chlorthalidone but this was stopped due to concern that was contributing to gout flareups.  Patient takes Lasix every few days to control blood pressure and volume status.  Continuing losartan, holding Lasix.    BPH  Continue dutasteride    Gout  Continue allopurinol    MAN  Continue BiPAP    COVID 19 negative    Diet: Regular  DVT Prophylaxis: Low Risk/Ambulatory with no VTE prophylaxis indicated  Malnutrition: No  Reis Catheter: No  Code Status: Full Code     Disposition Plan   Expected discharge:   Tomorrow after stress echo.    Adriel Piper MD  Hospitalist Service  [facility]  ______________________________________________________________________    Interval History   Overnight patient noted to have a pause up to 2.9 seconds. This morning, he feels a little woozy but not too bad. No chest pain or shortness of breath.    Data reviewed today: I reviewed all medications, new labs and imaging results over the last 24 hours.     Physical Exam   /75 (BP Location: Left arm)   Pulse 61   Temp 97.7  F (36.5  C) (Oral)   Resp 16   Wt 87.1 kg (192 lb)   SpO2 96%   BMI 30.07 kg/m    192 lbs 0 oz       General: Sitting up in the chair, looks comfortable.     HEENT: No scleral icterus. Oropharynx moist.     Neck: Supple. Normal range of motion.     Pulmonary: Normal work of breathing. Clear to auscultation bilaterally.    Cardiovascular: Slightly slow rate, regular rhythm without murmur or extra heart sounds.    Abdomen: Soft and non-tender.    Extremities: No peripheral edema. No clubbing or cyanosis.     Neurologic: Awake, alert, appropriate.    Skin: Warm and dry.    Psychiatric: Normal affect and mood.     Data    Recent Labs   Lab 08/19/20  1202   WBC 6.8   HGB 14.5   MCV 93         POTASSIUM 4.2   CHLORIDE 107   CO2 27   BUN 16   CR 1.17   ANIONGAP 5   LATHA 9.1   GLC 95   TROPI <0.015     Recent Results (from the past 24 hour(s))   Echocardiogram Complete    Narrative     [id number]  [id number]  [id number]  [id number]^BERNNEN^SEDRICK^SIRI           [facility]  Echocardiography Laboratory  [address] [place] [address]        Name: ALVAREZ JESUS  MRN: [id number]  : 1950  Study Date: 2020 10:47 AM  Age: 70 yrs  Gender: Male  Patient Location: [facility]  Reason For Study: Syncope, Bradycardia - Sinus  Ordering Physician: SEDRICK HUTTON  Referring Physician: Lucía Sandhu  Performed By: Andre Styles RDCS     BSA: 2.0 m2  Height: 67 in  Weight: 192 lb  HR: 58  BP: 142/89 mmHg  _____________________________________________________________________________  __        Procedure  Complete Echo Adult.  _____________________________________________________________________________  __        Interpretation Summary     The left ventricle is normal in size. There is normal left ventricular wall  thickness. Left ventricular systolic function is normal. The visual ejection  fraction is estimated at 55-60%. Diastolic Doppler findings (E/E' ratio and/or  other parameters) suggest left ventricular filling pressures are normal. No  regional wall motion abnormalities noted.  The right ventricle is normal size. Mildly decreased right ventricular  systolic function.  Trace mitral and tricuspid regurgitation.  No pericardial effusion.  In comparison to the previous report dated 2007, the findings are  similar.  _____________________________________________________________________________  __        Left Ventricle  The left ventricle is normal in size. There is normal left ventricular wall  thickness. Left ventricular systolic function is normal. The visual ejection  fraction is estimated at 55-60%. Diastolic Doppler findings (E/E' ratio and/or  other parameters) suggest left ventricular filling pressures are normal. No  regional wall motion abnormalities noted.     Right Ventricle  The right ventricle is normal size. Mildly decreased right ventricular  systolic function.      Atria  Normal left atrial size. Right atrial size is normal. There is no color  Doppler evidence of an atrial shunt.     Mitral Valve  There is trace mitral regurgitation.        Tricuspid Valve  There is trace tricuspid regurgitation. The right ventricular systolic  pressure is approximated at 15.6 mmHg plus the right atrial pressure.     Aortic Valve  There is mild trileaflet aortic sclerosis. No aortic regurgitation is present.  No aortic stenosis is present.     Pulmonic Valve  There is trace pulmonic valvular regurgitation. There is no pulmonic valvular  stenosis.     Vessels  The aortic root is normal size. The ascending aorta is Mildly dilated.  Descending aortic velocity normal. The inferior vena cava is normal.     Pericardium  There is no pericardial effusion.        Rhythm  Sinus rhythm was noted.  _____________________________________________________________________________  __  MMode/2D Measurements & Calculations  IVSd: 1.00 cm     LVIDd: 5.0 cm  LVIDs: 2.2 cm  LVPWd: 0.98 cm  FS: 55.8 %  LV mass(C)d: 177.8 grams  LV mass(C)dI: 89.4 grams/m2  Ao root diam: 3.9 cm  LA dimension: 4.4 cm  asc Aorta Diam: 4.0 cm  LA/Ao: 1.1  LVOT diam: 2.2 cm  LVOT area: 3.9 cm2  LA Volume (BP): 62.0 ml  LA Volume Index (BP): 31.2 ml/m2  RWT: 0.40           Doppler Measurements & Calculations  MV E max samuel: 49.9 cm/sec  MV A max samuel: 67.1 cm/sec  MV E/A: 0.74  MV dec time: 0.30 sec  LV V1 max PG: 3.0 mmHg  LV V1 max: 86.9 cm/sec  LV V1 VTI: 19.9 cm  CO(LVOT): 4.0 l/min  CI(LVOT): 2.0 l/min/m2  SV(LVOT): 78.3 ml  SI(LVOT): 39.4 ml/m2  PA acc time: 0.11 sec  TR max samuel: 197.8 cm/sec  TR max PG: 15.6 mmHg  E/E' av.3  Lateral E/e': 7.2  Medial E/e': 11.4              _____________________________________________________________________________  __        Report approved by: Emily Bermudez 2020 12:41 PM          Medications      Current Facility-Administered Medications   Medication Dose Route Frequency      allopurinol  200 mg Oral QPM     aspirin - buffered  325 mg Oral QPM     dutasteride  0.5 mg Oral QPM     losartan  100 mg Oral QPM     sodium chloride (PF)  3 mL Intracatheter Q8H        DASH Diet

## 2022-12-30 DIAGNOSIS — E78.00 HYPERCHOLESTEREMIA: ICD-10-CM

## 2022-12-30 DIAGNOSIS — I67.9 CEREBROVASCULAR DISEASE: ICD-10-CM

## 2022-12-30 RX ORDER — ROSUVASTATIN CALCIUM 20 MG/1
20 TABLET, COATED ORAL AT BEDTIME
Qty: 90 TABLET | Refills: 3 | Status: SHIPPED | OUTPATIENT
Start: 2022-12-30 | End: 2023-05-25

## 2023-01-05 ENCOUNTER — DOCUMENTATION ONLY (OUTPATIENT)
Dept: CARDIOLOGY | Facility: CLINIC | Age: 73
End: 2023-01-05

## 2023-01-05 ENCOUNTER — ALLIED HEALTH/NURSE VISIT (OUTPATIENT)
Dept: CARDIOLOGY | Facility: CLINIC | Age: 73
End: 2023-01-05
Payer: COMMERCIAL

## 2023-01-05 VITALS — HEART RATE: 62 BPM | SYSTOLIC BLOOD PRESSURE: 179 MMHG | DIASTOLIC BLOOD PRESSURE: 91 MMHG

## 2023-01-05 DIAGNOSIS — I10 ESSENTIAL HYPERTENSION, BENIGN: Primary | ICD-10-CM

## 2023-01-05 PROCEDURE — 99207 PR NO CHARGE LOS: CPT

## 2023-01-05 RX ORDER — METOPROLOL SUCCINATE 25 MG/1
25 TABLET, EXTENDED RELEASE ORAL DAILY
Qty: 30 TABLET | Refills: 4 | Status: SHIPPED | OUTPATIENT
Start: 2023-01-05 | End: 2023-03-02

## 2023-01-05 NOTE — PROGRESS NOTES
ALLIED HEALTH BLOOD PRESSURE CHECK     Last office visit: 12/7/22    Previous blood pressure: 158/80 mm Hg  Previous heart rate: 65 bpm      Time of visit: 1:04 pm    Morning medications were taken at: Yes     Today's blood pressure: 163/88 mm Hg  Today's heart rate: 60 bpm   Left arm: 179/9  Pulse: 62    Home monitor blood pressure:  mmHg  Home monitor heart rate:  bpm      Additional Comments: After taking carvedilol, half hour or 45 minutes later; patient feels more fatigue than usual. Patient mentioned his blood pressure readings at home are close to what he seen today here in clinic. Leaving [place] to [facility]. Won't be back till 1/10/23.      Results routed to: Emilia Laureano , Karina Huang, RN       Ordering Provider: Emilia Laureano   In clinic Provider: Dr. Frey

## 2023-01-05 NOTE — PROGRESS NOTES
ALLIED HEALTH BLOOD PRESSURE CHECK     Last office visit: 12/7/22    Previous blood pressure: 158/80 mm Hg  Previous heart rate: 65 bpm      Time of visit: 1:04 pm    Morning medications were taken at: Yes     Today's blood pressure: 163/88 mm Hg  Today's heart rate: 60 bpm   Left arm: 179/91  Pulse: 62    Home monitor blood pressure:  mmHg  Home monitor heart rate:  bpm      Additional Comments: After taking carvedilol, half hour or 45 minutes later; patient feels more fatigue than usual. Patient mentioned his blood pressure readings at home are close to what he seen today here in clinic.       Results routed to: Karina Davis, RN       Ordering Provider: Emilia Laureano   In clinic Provider: Dr. Frey

## 2023-01-05 NOTE — PROGRESS NOTES
Patient notified of Marce's recommendations and will forward to June as a FYI.  Patient leaving for [place] this evening and wanted to change RX due to Carvedilol was not working.  Will call if BP < 140/90.  Patient verbalized understanding.        In review of patients medications he has not tolerated amlodipine or hydrochlorothiazide in the past. He is on high dose Olmesartan. Worsening fatigue with carvedilol. Recommend stopping carvedilol and switching to Toprol 25 mg and take in the evening. Monitor BP with a goal of < 140/90.       Thanks,   Marce

## 2023-01-12 ENCOUNTER — TELEPHONE (OUTPATIENT)
Dept: INTERNAL MEDICINE | Facility: CLINIC | Age: 73
End: 2023-01-12

## 2023-01-12 NOTE — TELEPHONE ENCOUNTER
RN COVID TREATMENT VISIT  01/12/23    Harpreet Vera  72 year old  Current weight? 188.5 lbs with no clothes on    Has the patient been seen by a primary care provider at an [facility] or [facility] within the past two years? Yes.   Have you been in close proximity to/do you have a known exposure to a person with a confirmed case of influenza? No.     Date of positive COVID test (PCR or at home)?  1/12/23    Current COVID symptoms: cough, fatigue, muscle or body aches and congestion or runny nose    Date COVID symptoms began: 1/10/23    Do you have any of the following conditions that place you at risk of being very sick from COVID-19? 65 years or older, heart conditions and stroke    Is patient eligible to continue? Yes, established patient, 12 years or older weighing at least 88.2 lbs, who has COVID symptoms that started in the past 5 days and is at risk for being very sick from COVID-19.       Have you received monoclonal antibodies or oral antiviral medications since testing positive to COVID-19? No    Are you currently hospitalized for COVID-19? No    Do you have a history of hepatitis? No    Are you currently pregnant or nursing? No    Do you have a clinically significant hypersensitivity to nirmatrelvir, ritonavir, or molnupiravir? No    Do you have any history of severe renal impairment (eGFR < 30mL/min)? No    Do you have any history of hepatic impairment or abnormalities (e.g. hepatic panel, ALT, AST, ALK Phos, bilirubin)? No    Have you had a coronary stent placed in the previous 6 months? No    Is patient eligible to continue?   Yes, patient meets all eligibility requirements for the RN COVID treatment (as denoted by all no responses above).     Current Outpatient Medications   Medication Sig Dispense Refill     acetaminophen (TYLENOL) 650 MG CR tablet Take 650 mg by mouth every 8 hours as needed for mild pain or fever       alfuzosin ER (UROXATRAL) 10 MG 24 hr tablet Take 1  tablet (10 mg) by mouth daily 90 tablet 3     allopurinol (ZYLOPRIM) 100 MG tablet Take 200 mg by mouth every evening (2 x 100mg)       aspirin - buffered (ASCRIPTIN) 325 MG TABS tablet Take 325 mg by mouth every evening       finasteride (PROSCAR) 5 MG tablet Take 1 tablet (5 mg) by mouth daily 90 tablet 3     metoprolol succinate ER (TOPROL XL) 25 MG 24 hr tablet Take 1 tablet (25 mg) by mouth daily 30 tablet 4     multivitamin w/minerals (THERA-VIT-M) tablet Take 1 tablet by mouth every evening       olmesartan (BENICAR) 40 MG tablet Take 1 tablet (40 mg) by mouth daily 90 tablet 3     rosuvastatin (CRESTOR) 20 MG tablet Take 1 tablet (20 mg) by mouth At Bedtime 90 tablet 3     vitamin D3 (CHOLECALCIFEROL) 50 mcg (2000 units) tablet Take 1 tablet by mouth every evening         Medications from List 1 of the standing order (on medications that exclude the use of Paxlovid) that patient is taking: NONE. Is patient taking Andrew's Wort? No  Is patient taking Andrew's Wort or any meds from List 1? No.   Medications from List 2 of the standing order (on meds that provider needs to adjust) that patient is taking: NONE. Is patient on any of the meds from List 2? No.   Medications from List 3 of standing order (on meds that a RN needs to adjust) that patient is taking: alfuzosin (Uroxatral): Instructed patient to stop taking alfuzosin while taking Paxlovid and restart alfuzosin 3 days after the completion of Paxlovid.   rosuvastatin (Crestor): Instructed patient to stop rosuvastatin while taking Paxlovid and restart rosuvastatin 1 day after the completion of Paxlovid.  Is patient on any meds from List 3? Yes. Patient is on meds from list 3. No meds require a provider visit and at least one med required RN to adjust.   In order of efficacy, Paxlovid has an approximate 90% reduction in hospitalization. Paxlovid can possibly cause altered sense of taste, diarrhea (loose, watery stools), high blood pressure, muscle aches.   The other option is molnupiravir which has an approximate 30% reduction in hospitalization. Molnupirarivr can possibly cause diarrhea (loose, watery stools), nausea (feeling sick to your stomach), dizziness, headaches.    Which treatment option does the patient prefer?   Patient does not wish to be treated at this time.  He is aware that Paxlovid not recommended after 5 days of symptoms.     He will treat symptoms at home and follow up as needed.

## 2023-02-28 ENCOUNTER — TELEPHONE (OUTPATIENT)
Dept: CARDIOLOGY | Facility: CLINIC | Age: 73
End: 2023-02-28
Payer: COMMERCIAL

## 2023-02-28 DIAGNOSIS — I10 ESSENTIAL HYPERTENSION, BENIGN: Primary | ICD-10-CM

## 2023-03-02 RX ORDER — METOPROLOL SUCCINATE 25 MG/1
50 TABLET, EXTENDED RELEASE ORAL DAILY
Qty: 180 TABLET | Refills: 0
Start: 2023-03-02 | End: 2023-03-03

## 2023-03-03 DIAGNOSIS — I10 ESSENTIAL HYPERTENSION, BENIGN: ICD-10-CM

## 2023-03-03 RX ORDER — METOPROLOL SUCCINATE 25 MG/1
50 TABLET, EXTENDED RELEASE ORAL DAILY
Qty: 180 TABLET | Refills: 0 | Status: SHIPPED | OUTPATIENT
Start: 2023-03-03 | End: 2023-04-14

## 2023-03-19 RX ORDER — AMLODIPINE BESYLATE 2.5 MG/1
2.5 TABLET ORAL DAILY
Qty: 30 TABLET | Refills: 0 | Status: SHIPPED | OUTPATIENT
Start: 2023-03-19 | End: 2023-04-14

## 2023-03-20 NOTE — TELEPHONE ENCOUNTER
Call placed to pt to review BP readings.   No answer - LVM / ACB 03/17/23  2:52 PM     Pt reports he has been monitoring home BP and has noted some improvement. Readings 150's / 80's at this time. Pt has not had any 160's or greater at this time.  Pt feels generally well but does note some increased fatigue on the higher dose of the metoprolol and would not be interested in increase. Pt continues to take metoprolol succinate 50mg daily at HS.     Next OV 5/25/2023 with Dr. Mccartney.   Routing to care team to review if any recommendations at this time.   LEE Young RN, BSN.     
Call placed to pt to review. Pt reports BP consistently 160's on home monitor.     Pt reports he feels as though he is tolerating the metoprolol well at this time. Pt does endorse fatigue / lack of energy but reports this has been progressive over severall years. Pt denies any about change to energy level since change from coreg to metoprolol. Pt agreeable to increased dosage. Pt will continue to monitor BP will reach out in 1 weeks to review readings and tolerance of increased dosage.   LEE Young RN, BSN. 03/02/23 3:54 PM   
Call placed to pt. Pt did  medication from pharmacy as prescribed. Reviewed with pt this is in addition to current medications. Pt verbalized understanding. Reviewed with pt some BP management efforts as he is unsure if he wants to be on medications for long term. Pt verbalized understanding. Pt to start medication as advised by provider. Pt advised to continue to monitor BP readings and will bring updated log with to next OV.   LEE Young RN, BSN. 03/20/23 3:57 PM   
Called patient to review recent elevated blood pressure reading.  Per [place] Community Measures guidelines, patients blood pressure is out of parameters and recheck blood pressure is recommended.Will call back once BP machine is located.    Last Blood Pressure: 179/91  Last Heart Rate: 62  Date: 1/5/23  Location: [facility]    Today's Blood Pressure: 164/94  Today's Heart Rate: 58  Location: Home BP    Patient reported blood pressure updated in Epic. Blood pressure continues to fall outside of the [place] Community Me[place] guidelines.  Message sent to primary cardiology team for further review.  
How is he feeling on Metoprolol XR?  If tolerating, would try increasing to 50 mg/HS   Follow up in 1 month Thanks, IRMA  
SBP goal <140 mmHg  Recommend initiation of Amlodipine 2.5 mg in am  Need to avoid hypotension given his carotid artery disease.   Pls call to see if he is open to this  Thanks, IRMA  
~ LOV with Emilia Laureano CNP 12/7/2022:   Hypertension:  -on Olmesartan 40 mg  -BP elevated  -will add Coreg 3.125 mg BID  -return in 1 month with BP check with RN  Discussed side effects of Coreg and he will call with any intolerance  -advised cutting down on caffeine     ~ RN BP check 1/5/23:   Today's blood pressure: 163/88 mm Hg  Today's heart rate: 60 bpm   Left arm: 179/9  Pulse: 62  - Worsening fatigue with carvedilol. Recommend stopping carvedilol and switching to Toprol 25 mg and take in the evening. Monitor BP with a goal of < 140/90.     Routing to provider to review recent BP call on Pt. Pt has continued elevation in BP with start of toprol 25mg daily.   LEE Young RN, BSN.   
normal...

## 2023-03-27 ENCOUNTER — ANCILLARY PROCEDURE (OUTPATIENT)
Dept: CARDIOLOGY | Facility: CLINIC | Age: 73
End: 2023-03-27
Attending: INTERNAL MEDICINE
Payer: COMMERCIAL

## 2023-03-27 DIAGNOSIS — Z95.0 CARDIAC PACEMAKER IN SITU: ICD-10-CM

## 2023-03-27 DIAGNOSIS — I49.5 SSS (SICK SINUS SYNDROME) (H): ICD-10-CM

## 2023-03-27 PROCEDURE — 93294 REM INTERROG EVL PM/LDLS PM: CPT | Performed by: INTERNAL MEDICINE

## 2023-03-27 PROCEDURE — 93296 REM INTERROG EVL PM/IDS: CPT | Performed by: INTERNAL MEDICINE

## 2023-04-03 ENCOUNTER — LAB (OUTPATIENT)
Dept: LAB | Facility: CLINIC | Age: 73
End: 2023-04-03
Payer: COMMERCIAL

## 2023-04-03 ENCOUNTER — MYC MEDICAL ADVICE (OUTPATIENT)
Dept: CARDIOLOGY | Facility: CLINIC | Age: 73
End: 2023-04-03

## 2023-04-03 DIAGNOSIS — R97.20 ELEVATED PSA: ICD-10-CM

## 2023-04-03 LAB — PSA SERPL DL<=0.01 NG/ML-MCNC: 2.47 NG/ML (ref 0–6.5)

## 2023-04-03 PROCEDURE — 84153 ASSAY OF PSA TOTAL: CPT

## 2023-04-03 PROCEDURE — 36415 COLL VENOUS BLD VENIPUNCTURE: CPT

## 2023-04-04 NOTE — TELEPHONE ENCOUNTER
Patient Quality Outreach    Patient is due for the following:   Hypertension -  BP check  Physical Annual Wellness Visit      Topic Date Due     Pneumococcal Vaccine (3 - PPSV23 if available, else PCV20) 10/13/2020     Diptheria Tetanus Pertussis (DTAP/TDAP/TD) Vaccine (2 - Td or Tdap) 10/12/2021       Next Steps:   Schedule a Annual Wellness Visit   Working with cardio on BP.    Type of outreach:    Phone, spoke to patient/parent. Agrees to plan.      Questions for provider review:    None     Ofelia Nguyen LPN

## 2023-04-06 ENCOUNTER — OFFICE VISIT (OUTPATIENT)
Dept: UROLOGY | Facility: CLINIC | Age: 73
End: 2023-04-06
Payer: COMMERCIAL

## 2023-04-06 VITALS
DIASTOLIC BLOOD PRESSURE: 90 MMHG | BODY MASS INDEX: 29.82 KG/M2 | SYSTOLIC BLOOD PRESSURE: 138 MMHG | HEIGHT: 67 IN | WEIGHT: 190 LBS

## 2023-04-06 DIAGNOSIS — R39.12 BENIGN PROSTATIC HYPERPLASIA WITH WEAK URINARY STREAM: Primary | ICD-10-CM

## 2023-04-06 DIAGNOSIS — N40.1 BENIGN PROSTATIC HYPERPLASIA WITH WEAK URINARY STREAM: ICD-10-CM

## 2023-04-06 DIAGNOSIS — R97.20 ELEVATED PSA: ICD-10-CM

## 2023-04-06 DIAGNOSIS — R39.12 BENIGN PROSTATIC HYPERPLASIA WITH WEAK URINARY STREAM: ICD-10-CM

## 2023-04-06 DIAGNOSIS — N40.1 BENIGN PROSTATIC HYPERPLASIA WITH WEAK URINARY STREAM: Primary | ICD-10-CM

## 2023-04-06 DIAGNOSIS — R39.198 SLOWING OF URINARY STREAM: ICD-10-CM

## 2023-04-06 LAB
ALBUMIN UR-MCNC: NEGATIVE MG/DL
APPEARANCE UR: CLEAR
BILIRUB UR QL STRIP: NEGATIVE
COLOR UR AUTO: YELLOW
GLUCOSE UR STRIP-MCNC: NEGATIVE MG/DL
HGB UR QL STRIP: NEGATIVE
KETONES UR STRIP-MCNC: NEGATIVE MG/DL
LEUKOCYTE ESTERASE UR QL STRIP: NEGATIVE
NITRATE UR QL: NEGATIVE
PH UR STRIP: 5 [PH] (ref 5–7)
RESIDUAL VOLUME (RV) (EXTERNAL): 37
SP GR UR STRIP: 1.02 (ref 1–1.03)
UROBILINOGEN UR STRIP-ACNC: 0.2 E.U./DL

## 2023-04-06 PROCEDURE — 99214 OFFICE O/P EST MOD 30 MIN: CPT | Mod: 25 | Performed by: STUDENT IN AN ORGANIZED HEALTH CARE EDUCATION/TRAINING PROGRAM

## 2023-04-06 PROCEDURE — 81003 URINALYSIS AUTO W/O SCOPE: CPT | Mod: QW | Performed by: STUDENT IN AN ORGANIZED HEALTH CARE EDUCATION/TRAINING PROGRAM

## 2023-04-06 PROCEDURE — 51798 US URINE CAPACITY MEASURE: CPT | Performed by: STUDENT IN AN ORGANIZED HEALTH CARE EDUCATION/TRAINING PROGRAM

## 2023-04-06 RX ORDER — FINASTERIDE 5 MG/1
5 TABLET, FILM COATED ORAL DAILY
Qty: 90 TABLET | Refills: 3 | Status: SHIPPED | OUTPATIENT
Start: 2023-04-06 | End: 2024-04-23

## 2023-04-06 RX ORDER — ALFUZOSIN HYDROCHLORIDE 10 MG/1
10 TABLET, EXTENDED RELEASE ORAL DAILY
Qty: 90 TABLET | Refills: 3 | Status: SHIPPED | OUTPATIENT
Start: 2023-04-06 | End: 2024-04-23

## 2023-04-06 ASSESSMENT — PAIN SCALES - GENERAL: PAINLEVEL: NO PAIN (0)

## 2023-04-06 NOTE — PROGRESS NOTES
"CHIEF COMPLAINT   Harpreet Vera who is a 72 year old male returns today for follow-up of BPH with weak stream, + FH prostate cancer, rising PSA with multiple negative biopsies in the past     HPI   Harpreet Vera is a 72 year old male returns today for follow-up of BPH with weak stream, + FH prostate cancer, rising PSA with multiple negative biopsies in the past     AUA symptom score 2-6-5-2-5-1-1 = 19 mod qol mixed     At last visit we continued alfuzosin and started finasteride    Symptoms slightly improved from prior    PHYSICAL EXAM  Patient is a 72 year old  male   Vitals: Blood pressure (!) 138/90, height 1.702 m (5' 7\"), weight 86.2 kg (190 lb).  Body mass index is 29.76 kg/m .  General Appearance Adult:   Alert, no acute distress, oriented  HENT: throat/mouth:normal, good dentition  Lungs: no respiratory distress, or pursed lip breathing  Heart: No obvious jugular venous distension present  Abdomen: nondistended  Musculoskeltal: extremities normal, no peripheral edema  Skin: no suspicious lesions or rashes  Neuro: Alert, oriented, speech and mentation normal  Psych: affect and mood normal  Gait: Normal  : deferred       PSA PSA Diag Urologic Phys PSA Tumor Marker   Latest Ref Rng 0.00 - 4.00 ug/L 0.00 - 4.00 ng/mL 0.00 - 6.50 ng/mL   9/9/2003 4.0      4/9/2004 2.67      8/23/2004 3.59      1/17/2005 3.10      11/29/2005 3.32      9/6/2006 3.82      11/30/2006 4.15 (H)      7/3/2007 3.94      1/9/2008 4.57 (H)      1/7/2009 7.96 (H)      1/7/2010 4.19 (H)      9/15/2010 3.55      8/17/2011 4.50 (H)      7/30/2012 3.27      9/9/2014 4.27 (H)      4/3/2017 3.00      7/21/2017  1.90     7/24/2018 1.92      8/21/2019 2.12      8/7/2020 2.72      9/29/2021 4.73 (H)      4/5/2022 6.71 (H)      10/3/2022 7.57 (H)      4/3/2023   2.47       Legend:  (H) High    ASSESSMENT and PLAN  72 year old male returns today for follow-up of BPH with weak stream, + FH prostate cancer, rising PSA with multiple negative biopsies " in the past     PSA appropriately reduced after starting finasteride  Urinary symptoms stable/slightly better  He wishes to defer any further discussion of bladder outlet procedures. I briefly discussed Rezum and Greenlight laser PVP. He wants to stay on medical therapy      - continue alfuzosin and finasteride  - return 1 year with PSA, UA, PVR, AUA symptom score        Bijan Childress MD   [facility] Urology  [facility] Phone: [phone]

## 2023-04-06 NOTE — LETTER
"4/6/2023       RE: Harpreet Vera  [address] [facility] [address]     Dear Colleague,    Thank you for referring your patient, Harpreet Vera, to the [facility] UROLOGY CL[facility]IC [place] at [facility]. Please see a copy of my visit note below.    CHIEF COMPLAINT   Harpreet Vera who is a 72 year old male returns today for follow-up of BPH with weak stream, + FH prostate cancer, rising PSA with multiple negative biopsies in the past     HPI   Harpreet Vera is a 72 year old male returns today for follow-up of BPH with weak stream, + FH prostate cancer, rising PSA with multiple negative biopsies in the past     AUA symptom score 5-1-8-2-5-1-1 = 19 mod qol mixed     At last visit we continued alfuzosin and started finasteride    Symptoms slightly improved from prior    PHYSICAL EXAM  Patient is a 72 year old  male   Vitals: Blood pressure (!) 138/90, height 1.702 m (5' 7\"), weight 86.2 kg (190 lb).  Body mass index is 29.76 kg/m .  General Appearance Adult:   Alert, no acute distress, oriented  HENT: throat/mouth:normal, good dentition  Lungs: no respiratory distress, or pursed lip breathing  Heart: No obvious jugular venous distension present  Abdomen: nondistended  Musculoskeltal: extremities normal, no peripheral edema  Skin: no suspicious lesions or rashes  Neuro: Alert, oriented, speech and mentation normal  Psych: affect and mood normal  Gait: Normal  : deferred       PSA PSA Diag Urologic Phys PSA Tumor Marker   Latest Ref Rng 0.00 - 4.00 ug/L 0.00 - 4.00 ng/mL 0.00 - 6.50 ng/mL   9/9/2003 4.0      4/9/2004 2.67      8/23/2004 3.59      1/17/2005 3.10      11/29/2005 3.32      9/6/2006 3.82      11/30/2006 4.15 (H)      7/3/2007 3.94      1/9/2008 4.57 (H)      1/7/2009 7.96 (H)      1/7/2010 4.19 (H)      9/15/2010 3.55      8/17/2011 4.50 (H)      7/30/2012 3.27      9/9/2014 4.27 (H)      4/3/2017 3.00      7/21/2017  1.90     7/24/2018 1.92    "   8/21/2019 2.12      8/7/2020 2.72      9/29/2021 4.73 (H)      4/5/2022 6.71 (H)      10/3/2022 7.57 (H)      4/3/2023   2.47       Legend:  (H) High    ASSESSMENT and PLAN  72 year old male returns today for follow-up of BPH with weak stream, + FH prostate cancer, rising PSA with multiple negative biopsies in the past     PSA appropriately reduced after starting finasteride  Urinary symptoms stable/slightly better  He wishes to defer any further discussion of bladder outlet procedures. I briefly discussed Rezum and Greenlight laser PVP. He wants to stay on medical therapy      - continue alfuzosin and finasteride  - return 1 year with PSA, UA, PVR, AUA symptom score    Bijan Childress MD   [facility] Urology  [facility] Phone: [phone]

## 2023-04-07 LAB
MDC_IDC_EPISODE_DTM: NORMAL
MDC_IDC_EPISODE_ID: NORMAL
MDC_IDC_EPISODE_TYPE: NORMAL
MDC_IDC_LEAD_IMPLANT_DT: NORMAL
MDC_IDC_LEAD_IMPLANT_DT: NORMAL
MDC_IDC_LEAD_LOCATION: NORMAL
MDC_IDC_LEAD_LOCATION: NORMAL
MDC_IDC_LEAD_LOCATION_DETAIL_1: NORMAL
MDC_IDC_LEAD_LOCATION_DETAIL_1: NORMAL
MDC_IDC_LEAD_MFG: NORMAL
MDC_IDC_LEAD_MFG: NORMAL
MDC_IDC_LEAD_MODEL: NORMAL
MDC_IDC_LEAD_MODEL: NORMAL
MDC_IDC_LEAD_POLARITY_TYPE: NORMAL
MDC_IDC_LEAD_POLARITY_TYPE: NORMAL
MDC_IDC_LEAD_SERIAL: NORMAL
MDC_IDC_LEAD_SERIAL: NORMAL
MDC_IDC_MSMT_BATTERY_DTM: NORMAL
MDC_IDC_MSMT_BATTERY_REMAINING_LONGEVITY: 162 MO
MDC_IDC_MSMT_BATTERY_REMAINING_PERCENTAGE: 100 %
MDC_IDC_MSMT_BATTERY_STATUS: NORMAL
MDC_IDC_MSMT_LEADCHNL_RA_IMPEDANCE_VALUE: 777 OHM
MDC_IDC_MSMT_LEADCHNL_RA_PACING_THRESHOLD_AMPLITUDE: 0.4 V
MDC_IDC_MSMT_LEADCHNL_RA_PACING_THRESHOLD_PULSEWIDTH: 0.4 MS
MDC_IDC_MSMT_LEADCHNL_RV_IMPEDANCE_VALUE: 627 OHM
MDC_IDC_MSMT_LEADCHNL_RV_PACING_THRESHOLD_AMPLITUDE: 0.7 V
MDC_IDC_MSMT_LEADCHNL_RV_PACING_THRESHOLD_PULSEWIDTH: 0.4 MS
MDC_IDC_PG_IMPLANT_DTM: NORMAL
MDC_IDC_PG_MFG: NORMAL
MDC_IDC_PG_MODEL: NORMAL
MDC_IDC_PG_SERIAL: NORMAL
MDC_IDC_PG_TYPE: NORMAL
MDC_IDC_SESS_CLINIC_NAME: NORMAL
MDC_IDC_SESS_DTM: NORMAL
MDC_IDC_SESS_TYPE: NORMAL
MDC_IDC_SET_BRADY_AT_MODE_SWITCH_MODE: NORMAL
MDC_IDC_SET_BRADY_AT_MODE_SWITCH_RATE: 170 {BEATS}/MIN
MDC_IDC_SET_BRADY_LOWRATE: 60 {BEATS}/MIN
MDC_IDC_SET_BRADY_MAX_SENSOR_RATE: 130 {BEATS}/MIN
MDC_IDC_SET_BRADY_MAX_TRACKING_RATE: 130 {BEATS}/MIN
MDC_IDC_SET_BRADY_MODE: NORMAL
MDC_IDC_SET_BRADY_PAV_DELAY_HIGH: 150 MS
MDC_IDC_SET_BRADY_PAV_DELAY_LOW: 200 MS
MDC_IDC_SET_BRADY_SAV_DELAY_HIGH: 150 MS
MDC_IDC_SET_BRADY_SAV_DELAY_LOW: 200 MS
MDC_IDC_SET_LEADCHNL_RA_PACING_AMPLITUDE: 2 V
MDC_IDC_SET_LEADCHNL_RA_PACING_CAPTURE_MODE: NORMAL
MDC_IDC_SET_LEADCHNL_RA_PACING_POLARITY: NORMAL
MDC_IDC_SET_LEADCHNL_RA_PACING_PULSEWIDTH: 0.4 MS
MDC_IDC_SET_LEADCHNL_RA_SENSING_ADAPTATION_MODE: NORMAL
MDC_IDC_SET_LEADCHNL_RA_SENSING_POLARITY: NORMAL
MDC_IDC_SET_LEADCHNL_RA_SENSING_SENSITIVITY: 0.25 MV
MDC_IDC_SET_LEADCHNL_RV_PACING_AMPLITUDE: 1.2 V
MDC_IDC_SET_LEADCHNL_RV_PACING_CAPTURE_MODE: NORMAL
MDC_IDC_SET_LEADCHNL_RV_PACING_POLARITY: NORMAL
MDC_IDC_SET_LEADCHNL_RV_PACING_PULSEWIDTH: 0.4 MS
MDC_IDC_SET_LEADCHNL_RV_SENSING_ADAPTATION_MODE: NORMAL
MDC_IDC_SET_LEADCHNL_RV_SENSING_POLARITY: NORMAL
MDC_IDC_SET_LEADCHNL_RV_SENSING_SENSITIVITY: 1.5 MV
MDC_IDC_SET_ZONE_DETECTION_INTERVAL: 375 MS
MDC_IDC_SET_ZONE_TYPE: NORMAL
MDC_IDC_SET_ZONE_VENDOR_TYPE: NORMAL
MDC_IDC_STAT_AT_BURDEN_PERCENT: 0 %
MDC_IDC_STAT_AT_DTM_END: NORMAL
MDC_IDC_STAT_AT_DTM_START: NORMAL
MDC_IDC_STAT_BRADY_DTM_END: NORMAL
MDC_IDC_STAT_BRADY_DTM_START: NORMAL
MDC_IDC_STAT_BRADY_RA_PERCENT_PACED: 92 %
MDC_IDC_STAT_BRADY_RV_PERCENT_PACED: 3 %
MDC_IDC_STAT_EPISODE_RECENT_COUNT: 0
MDC_IDC_STAT_EPISODE_RECENT_COUNT_DTM_END: NORMAL
MDC_IDC_STAT_EPISODE_RECENT_COUNT_DTM_START: NORMAL
MDC_IDC_STAT_EPISODE_TYPE: NORMAL
MDC_IDC_STAT_EPISODE_VENDOR_TYPE: NORMAL

## 2023-04-10 ENCOUNTER — TELEPHONE (OUTPATIENT)
Dept: CARDIOLOGY | Facility: CLINIC | Age: 73
End: 2023-04-10
Payer: COMMERCIAL

## 2023-04-14 DIAGNOSIS — I10 ESSENTIAL HYPERTENSION, BENIGN: ICD-10-CM

## 2023-04-14 RX ORDER — METOPROLOL SUCCINATE 25 MG/1
50 TABLET, EXTENDED RELEASE ORAL DAILY
Qty: 180 TABLET | Refills: 0 | Status: SHIPPED | OUTPATIENT
Start: 2023-04-14 | End: 2023-05-25

## 2023-04-14 RX ORDER — AMLODIPINE BESYLATE 2.5 MG/1
2.5 TABLET ORAL DAILY
Qty: 90 TABLET | Refills: 0 | Status: SHIPPED | OUTPATIENT
Start: 2023-04-14 | End: 2023-05-25

## 2023-04-19 ENCOUNTER — TRANSFERRED RECORDS (OUTPATIENT)
Dept: HEALTH INFORMATION MANAGEMENT | Facility: CLINIC | Age: 73
End: 2023-04-19
Payer: COMMERCIAL

## 2023-04-19 LAB
ALT SERPL-CCNC: 23 IU/L (ref 5–40)
AST SERPL-CCNC: 25 U/L (ref 5–34)
CREATININE (EXTERNAL): 1.17 MG/DL (ref 0.5–1.3)
GFR ESTIMATED (EXTERNAL): NORMAL ML/MIN/1.73M2
GFR ESTIMATED (IF AFRICAN AMERICAN) (EXTERNAL): NORMAL ML/MIN/1.73M2

## 2023-05-25 ENCOUNTER — OFFICE VISIT (OUTPATIENT)
Dept: CARDIOLOGY | Facility: CLINIC | Age: 73
End: 2023-05-25
Attending: INTERNAL MEDICINE
Payer: COMMERCIAL

## 2023-05-25 VITALS
HEIGHT: 67 IN | DIASTOLIC BLOOD PRESSURE: 78 MMHG | OXYGEN SATURATION: 97 % | BODY MASS INDEX: 31.27 KG/M2 | WEIGHT: 199.2 LBS | HEART RATE: 61 BPM | SYSTOLIC BLOOD PRESSURE: 140 MMHG

## 2023-05-25 DIAGNOSIS — I49.5 SSS (SICK SINUS SYNDROME) (H): ICD-10-CM

## 2023-05-25 DIAGNOSIS — I77.810 DILATATION OF THORACIC AORTA (H): ICD-10-CM

## 2023-05-25 DIAGNOSIS — I10 ESSENTIAL HYPERTENSION, BENIGN: ICD-10-CM

## 2023-05-25 DIAGNOSIS — I67.9 CEREBROVASCULAR DISEASE: ICD-10-CM

## 2023-05-25 DIAGNOSIS — E78.00 HYPERCHOLESTEREMIA: ICD-10-CM

## 2023-05-25 PROCEDURE — 99214 OFFICE O/P EST MOD 30 MIN: CPT | Performed by: INTERNAL MEDICINE

## 2023-05-25 RX ORDER — METOPROLOL SUCCINATE 25 MG/1
25 TABLET, EXTENDED RELEASE ORAL 2 TIMES DAILY
Qty: 180 TABLET | Refills: 3 | Status: SHIPPED | OUTPATIENT
Start: 2023-05-25 | End: 2024-05-21

## 2023-05-25 RX ORDER — OLMESARTAN MEDOXOMIL 40 MG/1
40 TABLET ORAL DAILY
Qty: 90 TABLET | Refills: 3 | Status: SHIPPED | OUTPATIENT
Start: 2023-05-25 | End: 2024-05-21

## 2023-05-25 RX ORDER — ROSUVASTATIN CALCIUM 20 MG/1
20 TABLET, COATED ORAL AT BEDTIME
Qty: 90 TABLET | Refills: 3 | Status: SHIPPED | OUTPATIENT
Start: 2023-05-25 | End: 2024-05-21

## 2023-05-25 RX ORDER — AMLODIPINE BESYLATE 2.5 MG/1
2.5 TABLET ORAL DAILY
Qty: 90 TABLET | Refills: 3 | Status: SHIPPED | OUTPATIENT
Start: 2023-05-25 | End: 2024-05-21

## 2023-05-25 NOTE — PATIENT INSTRUCTIONS
May 25, 2023    Thank you for allowing our Cardiology team to participate in your care.     Please note the following changes to your heart treatment plan:     Medication changes:   - change metoprolol succinate from 50mg daily to 25mg twice daily     Tests to be done:  - FASTING cholesterol labs in 1 year    Follow up:  - Follow up in 1 year with Emilia Laureano NP, or sooner as needed.      For scheduling, please call [phone].      Please contact our team through [employer] or our Nurse Team Voicemail service [phone], or the General Clinic [phone] for any questions or concerns.     If you are having a medical emergency, please call [phone].     Sincerely,    Link Mccartney MD, FACC  Cardiology    [facility]

## 2023-05-25 NOTE — LETTER
5/25/2023    Lucía Sandhu MD  [address]  [place] [facility] [address]    RE: Harpreet CINTRON Vera       Dear Colleague,     I had the pleasure of seeing Harpreet Vera in the [facility].      Cardiology Clinic Progress Note:    May 25, 2023   Patient Name: Harpreet Vera  Patient MRN: [id number]     Consult indication: HTN, DENISE    HPI:    I had the opportunity to see patient Harpreet Vera in cardiology clinic for a follow up visit. Patient is followed by our colleague Lucía Sandhu MD with Primary Care.     As you know, patient is a very pleasant 73-year-old male with a past medical history significant for cerebrovascular disease (chronic left vertebral artery occlusion, right vertebral artery stenosis status post stenting), hypertension, hyperlipidemia, mildly dilated ascending aorta, sleep apnea, sick sinus syndrome with syncope status post pacemaker, who presents for follow up.    Patient had been experiencing dizziness and lightheadedness in 2019, and ultimately was found to have a left vertebral artery occlusion as well as severe right vertebral artery stenosis. He underwent stenting to the right vertebral artery. Subsequently he had been doing reasonably well, however had developed recurrent dizziness/lightheadedness, and had an episode of syncope 8/2020. He was seen in hospital by my colleague Dr. Fonseca, evaluation ultimately revealed significant bradycardia with sick sinus syndrome (pauses up to 11 seconds), and patient underwent pacemaker placement 8/21/2020 (dual-chamber [employer]).    I had met him to establish care 10/20/2021.  At that time, his blood pressures are mildly elevated, though patient reported that his Vascular Surgery team had recommended that he keep his blood pressures on the higher side rather than the lower side, given his complex history of cerebrovascular disease.  We also started statin therapy.  Since then, he has been followed closely  by my colleague Emilia Laureano NP, he was trialed on carvedilol, and then later switched to metoprolol.  Overall patient reports that he has been doing well.  He does note some dizziness/lightheadedness, particularly when standing up quickly from a bending over or sitting/lying position.  He does note that his wife has remarked that he likely does not consume enough fluids throughout the day.    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  He does not exercise regularly, however he is outside on the course almost every day.  He denies any chest pain, chest pressure.  He does walk the course carrying his bags.  He denies symptoms of orthopnea/PND.    TTE 10/10/2022 demonstrates LVEF 55 to 60%, normal RV function, aortic valve sclerosis without stenosis, ascending aorta 4.2 cm (within normal limits when indexed to age, BSA, sex).    Assessment and Plan/Recommendations:    # Hypertension. BP mildly elevated, 140/78 mmHg.  Blood pressures at home generally in the 130 mmHg range systolic.  # HL.  LDL 46 10/17/2022.  On statin therapy.  # SSS s/p dual chamber [employer] pacemaker 8/2020. Normal function on last device interrogation.   # Ascending aorta 4.2 cm (within normal limits when indexed to age, BSA, sex)  # Cerebrovascular disease, chronic left vertebral artery stenosis, right vertebral artery stenosis status post stenting, followed by Vascular surgery    -Overall patient is in stable cardiac health without symptoms concerning for angina or decompensated heart failure  -Per patient report, his Vascular surgeon recommended that the patient keep his blood pressures on the higher side rather than the lower side. Given the extensive history of cerebrovascular disease, I would agree with this recommendation. Even so, we would want to avoid persistent significant hypertension.  It seems that his blood pressures are well controlled on current regimen.  -  Recommend continuing current regimen of aspirin, rosuvastatin, amlodipine, telmisartan.  We will change metoprolol succinate 50 mg daily to 25 mg twice daily to mitigate risk of side effects from peaks/troughs.  - Follow-up in 1 year with fasting lipids with my colleague Emilia Laureano NP, or sooner as needed      Thank you for allowing our team to participate in the care of Harpreet Vera.  Please do not hesitate to call or page me with any questions or concerns.    Sincerely,     Link Mccartney MD, [facility]  Cardiology  Text Page   May 25, 2023    Voice recognition software utilized.     Total time spent on this encounter today: 22 minutes, providing care in this encounter including, but not limited to, reviewing prior medical records, laboratory data, imaging studies, diagnostic studies, procedure notes, formulating an assessment and plan, recommendations, discussion and counseling with patient face to face, dictation.    Past Medical History:     Past Medical History:   Diagnosis Date    Benign neoplasm of colon     Brachial plexopathy     Chronic infection 2007    HX of C Diff    Hypertension     Mumps     MAN (obstructive sleep apnea)     Presence of permanent cardiac pacemaker     Sinus node dysfunction (H)     with syncope    Sleep apnea     Bi-PAP    Syncope         Past Surgical History:   Past Surgical History:   Procedure Laterality Date    ARTHROSCOPY SHOULDER Right 12/11/2020    Procedure: RIGHT SHOULDER ARTHROSCOPY, ARTHROSCOPIC SUBACROMIAL DECOMPRESSION, DISTAL CLAVICLE AND BICEPS TENOTOMY;  Surgeon: Joseph Moon MD;  Location: [facility] OR    CHOLECYSTECTOMY      COLONOSCOPY      COLONOSCOPY N/A 4/5/2017    Procedure: COMBINED COLONOSCOPY, SINGLE OR MULTIPLE BIOPSY/POLYPECTOMY BY BIOPSY;  Surgeon: Tylor Rice MD;  Location: [facility] GI    COLONOSCOPY N/A 4/1/2022    Procedure: COLONOSCOPY (FV);  Surgeon: Tylor Rice MD;  Location: [facility] GI    DECOMPRESSION, FUSION CERVICAL ANTERIOR ONE  "LEVEL, COMBINED N/A 9/9/2016    Procedure: COMBINED DECOMPRESSION, FUSION CERVICAL ANTERIOR ONE LEVEL;  Surgeon: Erick Valles MD;  Location: [facility] OR    ENT SURGERY      tonsilectomy  as a child    EP PACEMAKER N/A 8/21/2020    Procedure: EP Pacemaker;  Surgeon: Alfonso Sigala MD;  Location:  HEART CARDIAC CATH LAB    IR CAROTID CEREBRAL ANGIOGRAM BILATERAL  1/6/2020    IR CAROTID CEREBRAL ANGIOGRAM BILATERAL  7/1/2020    IR DISCONTINUE SHEATH  1/6/2020    ORTHOPEDIC SURGERY      ambar knees scopedrt shoulder,fx rt arm fx    TESTICLE SURGERY      VASECTOMY      Z NONSPECIFIC PROCEDURE      Right clavicle fracture, Multiple right sided rib fracture, hairline fracture \"pelvis\", secondary to fall from tree        Z NONSPECIFIC PROCEDURE      Right ulnar/radial fracture        Medications (outpatient):  Current Outpatient Medications   Medication Sig Dispense Refill    acetaminophen (TYLENOL) 650 MG CR tablet Take 650 mg by mouth every 8 hours as needed for mild pain or fever      alfuzosin ER (UROXATRAL) 10 MG 24 hr tablet Take 1 tablet (10 mg) by mouth daily 90 tablet 3    allopurinol (ZYLOPRIM) 100 MG tablet Take 200 mg by mouth every evening (2 x 100mg)      amLODIPine (NORVASC) 2.5 MG tablet Take 1 tablet (2.5 mg) by mouth daily 90 tablet 3    aspirin - buffered (ASCRIPTIN) 325 MG TABS tablet Take 325 mg by mouth every evening      finasteride (PROSCAR) 5 MG tablet Take 1 tablet (5 mg) by mouth daily 90 tablet 3    metoprolol succinate ER (TOPROL XL) 25 MG 24 hr tablet Take 1 tablet (25 mg) by mouth 2 times daily 180 tablet 3    multivitamin w/minerals (THERA-VIT-M) tablet Take 1 tablet by mouth every evening      olmesartan (BENICAR) 40 MG tablet Take 1 tablet (40 mg) by mouth daily 90 tablet 3    rosuvastatin (CRESTOR) 20 MG tablet Take 1 tablet (20 mg) by mouth At Bedtime 90 tablet 3    vitamin D3 (CHOLECALCIFEROL) 50 mcg (2000 units) tablet Take 1 tablet by mouth every evening         Allergies:  No " "Known Allergies    Social History:   History   Drug Use No      History   Smoking Status    Never   Smokeless Tobacco    Never     Social History    Substance and Sexual Activity      Alcohol use: Yes        Alcohol/week: 1.0 standard drink of alcohol        Types: 1 Glasses of wine per week        Comment: 1-2 weekly       Family History:  Family History   Problem Relation Age of Onset    Arthritis Mother     Eye Disorder Mother         cateracts    Lipids Mother     Cancer Father         colon/prostate    Eye Disorder Father         cateracts    Lipids Father     Diabetes Father     Cancer Maternal Grandfather         colon    Cancer Paternal Grandmother         colon    Cancer Maternal Aunt         colon    Cancer Maternal Uncle         colon    Prostate Cancer Brother     Colon Cancer Brother 74        liver mets    Prostate Cancer Brother        Review of Systems:   A complete review of systems was negative except as mentioned in the History of Present Illness.     Objective & Physical Exam:  BP (!) 140/78 (BP Location: Right arm, Patient Position: Sitting, Cuff Size: Adult Large)   Pulse 61   Ht 1.702 m (5' 7\")   Wt 90.4 kg (199 lb 3.2 oz)   SpO2 97%   BMI 31.20 kg/m    Wt Readings from Last 2 Encounters:   05/25/23 90.4 kg (199 lb 3.2 oz)   04/06/23 86.2 kg (190 lb)     Body mass index is 31.2 kg/m .   Body surface area is 2.07 meters squared.    Constitutional: appears stated age, in no apparent distress, appears to be well nourished  Head: normocephalic, atraumatic  Neck: supple, trachea midline, no bruit bilaterally   Pulmonary: clear to auscultation bilaterally, no wheezes, no rales, no increased work of breathing  Cardiovascular: JVP normal, regular rate, regular rhythm, normal S1 and S2, no S3, S4, 1/6 JULISSA at the RUSB, no lower extremity edema  Gastrointestinal: no guarding, non-rigid   Neurologic: awake, alert, moves all extremities  Skin: no jaundice, warm on limited exam  Psychiatric: affect is " normal, answers questions appropriately, oriented to self and place    Data reviewed:  Lab Results   Component Value Date    WBC 5.7 08/26/2022    WBC 6.4 12/07/2020    RBC 4.31 (L) 08/26/2022    RBC 4.72 12/07/2020    HGB 13.9 08/26/2022    HGB 15.1 12/07/2020    HCT 40.1 08/26/2022    HCT 43.0 12/07/2020    MCV 93 08/26/2022    MCV 91 12/07/2020    MCH 32.3 08/26/2022    MCH 32.0 12/07/2020    MCHC 34.7 08/26/2022    MCHC 35.1 12/07/2020    RDW 12.5 08/26/2022    RDW 12.8 12/07/2020     (L) 08/26/2022     (L) 12/07/2020     Sodium   Date Value Ref Range Status   12/06/2022 140 136 - 145 mmol/L Final   12/07/2020 139 133 - 144 mmol/L Final     Potassium   Date Value Ref Range Status   12/06/2022 4.4 3.4 - 5.3 mmol/L Final   08/26/2022 4.4 3.4 - 5.3 mmol/L Final   12/11/2020 4.0 3.4 - 5.3 mmol/L Final     Chloride   Date Value Ref Range Status   12/06/2022 107 98 - 107 mmol/L Final   08/26/2022 110 (H) 94 - 109 mmol/L Final   12/07/2020 109 94 - 109 mmol/L Final     Carbon Dioxide   Date Value Ref Range Status   12/07/2020 28 20 - 32 mmol/L Final     Carbon Dioxide (CO2)   Date Value Ref Range Status   12/06/2022 25 22 - 29 mmol/L Final   08/26/2022 25 20 - 32 mmol/L Final     Anion Gap   Date Value Ref Range Status   12/06/2022 8 7 - 15 mmol/L Final   08/26/2022 5 3 - 14 mmol/L Final   12/07/2020 2 (L) 3 - 14 mmol/L Final     Glucose   Date Value Ref Range Status   12/06/2022 118 (H) 70 - 99 mg/dL Final   08/26/2022 103 (H) 70 - 99 mg/dL Final   12/07/2020 94 70 - 99 mg/dL Final     Comment:     Non Fasting     Urea Nitrogen   Date Value Ref Range Status   12/06/2022 13.4 8.0 - 23.0 mg/dL Final   08/26/2022 19 7 - 30 mg/dL Final   12/07/2020 17 7 - 30 mg/dL Final     Creatinine   Date Value Ref Range Status   12/06/2022 1.12 0.67 - 1.17 mg/dL Final   12/11/2020 1.23 0.66 - 1.25 mg/dL Final     GFR Estimate   Date Value Ref Range Status   12/06/2022 69 >60 mL/min/1.73m2 Final     Comment:     Effective  December 21, 2021 eGFRcr in adults is calculated using the 2021 CKD-EPI creatinine equation which includes age and gender (Dolly et al., NEJ, DOI: 10.1056/GHEMzl7962614)   12/11/2020 58 (L) >60 mL/min/[1.73_m2] Final     Comment:     Non  GFR Calc  Starting 12/18/2018, serum creatinine based estimated GFR (eGFR) will be   calculated using the Chronic Kidney Disease Epidemiology Collaboration   (CKD-EPI) equation.       Calcium   Date Value Ref Range Status   12/06/2022 9.4 8.8 - 10.2 mg/dL Final   12/07/2020 9.3 8.5 - 10.1 mg/dL Final     Bilirubin Total   Date Value Ref Range Status   08/26/2022 1.4 (H) 0.2 - 1.3 mg/dL Final   12/07/2020 1.3 0.2 - 1.3 mg/dL Final     Alkaline Phosphatase   Date Value Ref Range Status   08/26/2022 62 40 - 150 U/L Final   12/07/2020 74 40 - 150 U/L Final     ALT   Date Value Ref Range Status   10/17/2022 17 10 - 50 U/L Final   12/07/2020 28 0 - 70 U/L Final     AST   Date Value Ref Range Status   08/26/2022 23 0 - 45 U/L Final   12/07/2020 20 0 - 45 U/L Final     Recent Labs   Lab Test 10/17/22  0828 08/26/22  0844 12/01/15  0805 10/06/15  0743   CHOL 120 109   < > 191   HDL 44 41   < > 30*   LDL 46 40   < > Cannot estimate LDL when triglyceride exceeds 400 mg/dL   TRIG 148 138   < > 432*   CHOLHDLRATIO  --   --   --  6.4*    < > = values in this interval not displayed.      Lab Results   Component Value Date    A1C 5.3 01/06/2020            Thank you for allowing me to participate in the care of your patient.      Sincerely,     Link Mccartney MD     [facility]  cc:   Link Mccartney MD  [address]  [place] [address]

## 2023-05-25 NOTE — PROGRESS NOTES
Cardiology Clinic Progress Note:    May 25, 2023   Patient Name: Harpreet Vera  Patient MRN: [id number]     Consult indication: HTN, DENISE    HPI:    I had the opportunity to see patient Harpreet Vera in cardiology clinic for a follow up visit. Patient is followed by our colleague Lucía Sandhu MD with Primary Care.     As you know, patient is a very pleasant 73-year-old male with a past medical history significant for cerebrovascular disease (chronic left vertebral artery occlusion, right vertebral artery stenosis status post stenting), hypertension, hyperlipidemia, mildly dilated ascending aorta, sleep apnea, sick sinus syndrome with syncope status post pacemaker, who presents for follow up.    Patient had been experiencing dizziness and lightheadedness in 2019, and ultimately was found to have a left vertebral artery occlusion as well as severe right vertebral artery stenosis. He underwent stenting to the right vertebral artery. Subsequently he had been doing reasonably well, however had developed recurrent dizziness/lightheadedness, and had an episode of syncope 8/2020. He was seen in hospital by my colleague Dr. Fonseca, evaluation ultimately revealed significant bradycardia with sick sinus syndrome (pauses up to 11 seconds), and patient underwent pacemaker placement 8/21/2020 (dual-chamber [employer]).    I had met him to establish care 10/20/2021.  At that time, his blood pressures are mildly elevated, though patient reported that his Vascular Surgery team had recommended that he keep his blood pressures on the higher side rather than the lower side, given his complex history of cerebrovascular disease.  We also started statin therapy.  Since then, he has been followed closely by my colleague Emilia Laureano NP, he was trialed on carvedilol, and then later switched to metoprolol.  Overall patient reports that he has been doing well.  He does note some dizziness/lightheadedness, particularly  when standing up quickly from a bending over or sitting/lying position.  He does note that his wife has remarked that he likely does not consume enough fluids throughout the day.    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  He does not exercise regularly, however he is outside on the course almost every day.  He denies any chest pain, chest pressure.  He does walk the course carrying his bags.  He denies symptoms of orthopnea/PND.    TTE 10/10/2022 demonstrates LVEF 55 to 60%, normal RV function, aortic valve sclerosis without stenosis, ascending aorta 4.2 cm (within normal limits when indexed to age, BSA, sex).    Assessment and Plan/Recommendations:    # Hypertension. BP mildly elevated, 140/78 mmHg.  Blood pressures at home generally in the 130 mmHg range systolic.  # HL.  LDL 46 10/17/2022.  On statin therapy.  # SSS s/p dual chamber [employer] pacemaker 8/2020. Normal function on last device interrogation.   # Ascending aorta 4.2 cm (within normal limits when indexed to age, BSA, sex)  # Cerebrovascular disease, chronic left vertebral artery stenosis, right vertebral artery stenosis status post stenting, followed by Vascular surgery    -Overall patient is in stable cardiac health without symptoms concerning for angina or decompensated heart failure  -Per patient report, his Vascular surgeon recommended that the patient keep his blood pressures on the higher side rather than the lower side. Given the extensive history of cerebrovascular disease, I would agree with this recommendation. Even so, we would want to avoid persistent significant hypertension.  It seems that his blood pressures are well controlled on current regimen.  - Recommend continuing current regimen of aspirin, rosuvastatin, amlodipine, telmisartan.  We will change metoprolol succinate 50 mg daily to 25 mg twice daily to mitigate risk of side effects from peaks/troughs.  -  Follow-up in 1 year with fasting lipids with my colleague Emilia Laureano NP, or sooner as needed      Thank you for allowing our team to participate in the care of Harpreet Vera.  Please do not hesitate to call or page me with any questions or concerns.    Sincerely,     Link Mccartney MD, [facility]  Cardiology  Text Page   May 25, 2023    Voice recognition software utilized.     Total time spent on this encounter today: 22 minutes, providing care in this encounter including, but not limited to, reviewing prior medical records, laboratory data, imaging studies, diagnostic studies, procedure notes, formulating an assessment and plan, recommendations, discussion and counseling with patient face to face, dictation.    Past Medical History:     Past Medical History:   Diagnosis Date     Benign neoplasm of colon      Brachial plexopathy      Chronic infection 2007    HX of C Diff     Hypertension      Mumps      MAN (obstructive sleep apnea)      Presence of permanent cardiac pacemaker      Sinus node dysfunction (H)     with syncope     Sleep apnea     Bi-PAP     Syncope         Past Surgical History:   Past Surgical History:   Procedure Laterality Date     ARTHROSCOPY SHOULDER Right 12/11/2020    Procedure: RIGHT SHOULDER ARTHROSCOPY, ARTHROSCOPIC SUBACROMIAL DECOMPRESSION, DISTAL CLAVICLE AND BICEPS TENOTOMY;  Surgeon: Joseph Moon MD;  Location: [facility] OR     CHOLECYSTECTOMY       COLONOSCOPY       COLONOSCOPY N/A 4/5/2017    Procedure: COMBINED COLONOSCOPY, SINGLE OR MULTIPLE BIOPSY/POLYPECTOMY BY BIOPSY;  Surgeon: Tylor Rice MD;  Location:  GI     COLONOSCOPY N/A 4/1/2022    Procedure: COLONOSCOPY (FV);  Surgeon: Tylor Rice MD;  Location:  GI     DECOMPRESSION, FUSION CERVICAL ANTERIOR ONE LEVEL, COMBINED N/A 9/9/2016    Procedure: COMBINED DECOMPRESSION, FUSION CERVICAL ANTERIOR ONE LEVEL;  Surgeon: Erick Valles MD;  Location:  OR     ENT SURGERY      tonsilectomy  as  "a child     EP PACEMAKER N/A 8/21/2020    Procedure: EP Pacemaker;  Surgeon: Alfonso Sigala MD;  Location:  HEART CARDIAC CATH LAB     IR CAROTID CEREBRAL ANGIOGRAM BILATERAL  1/6/2020     IR CAROTID CEREBRAL ANGIOGRAM BILATERAL  7/1/2020     IR DISCONTINUE SHEATH  1/6/2020     ORTHOPEDIC SURGERY      ambar knees scopedrt shoulder,fx rt arm fx     TESTICLE SURGERY       VASECTOMY       [facility] NONSPECIFIC PROCEDURE      Right clavicle fracture, Multiple right sided rib fracture, hairline fracture \"pelvis\", secondary to fall from tree         Z NONSPECIFIC PROCEDURE      Right ulnar/radial fracture        Medications (outpatient):  Current Outpatient Medications   Medication Sig Dispense Refill     acetaminophen (TYLENOL) 650 MG CR tablet Take 650 mg by mouth every 8 hours as needed for mild pain or fever       alfuzosin ER (UROXATRAL) 10 MG 24 hr tablet Take 1 tablet (10 mg) by mouth daily 90 tablet 3     allopurinol (ZYLOPRIM) 100 MG tablet Take 200 mg by mouth every evening (2 x 100mg)       amLODIPine (NORVASC) 2.5 MG tablet Take 1 tablet (2.5 mg) by mouth daily 90 tablet 3     aspirin - buffered (ASCRIPTIN) 325 MG TABS tablet Take 325 mg by mouth every evening       finasteride (PROSCAR) 5 MG tablet Take 1 tablet (5 mg) by mouth daily 90 tablet 3     metoprolol succinate ER (TOPROL XL) 25 MG 24 hr tablet Take 1 tablet (25 mg) by mouth 2 times daily 180 tablet 3     multivitamin w/minerals (THERA-VIT-M) tablet Take 1 tablet by mouth every evening       olmesartan (BENICAR) 40 MG tablet Take 1 tablet (40 mg) by mouth daily 90 tablet 3     rosuvastatin (CRESTOR) 20 MG tablet Take 1 tablet (20 mg) by mouth At Bedtime 90 tablet 3     vitamin D3 (CHOLECALCIFEROL) 50 mcg (2000 units) tablet Take 1 tablet by mouth every evening         Allergies:  No Known Allergies    Social History:   History   Drug Use No      History   Smoking Status     Never   Smokeless Tobacco     Never     Social History    Substance and Sexual " "Activity      Alcohol use: Yes        Alcohol/week: 1.0 standard drink of alcohol        Types: 1 Glasses of wine per week        Comment: 1-2 weekly       Family History:  Family History   Problem Relation Age of Onset     Arthritis Mother      Eye Disorder Mother         cateracts     Lipids Mother      Cancer Father         colon/prostate     Eye Disorder Father         cateracts     Lipids Father      Diabetes Father      Cancer Maternal Grandfather         colon     Cancer Paternal Grandmother         colon     Cancer Maternal Aunt         colon     Cancer Maternal Uncle         colon     Prostate Cancer Brother      Colon Cancer Brother 74        liver mets     Prostate Cancer Brother        Review of Systems:   A complete review of systems was negative except as mentioned in the History of Present Illness.     Objective & Physical Exam:  BP (!) 140/78 (BP Location: Right arm, Patient Position: Sitting, Cuff Size: Adult Large)   Pulse 61   Ht 1.702 m (5' 7\")   Wt 90.4 kg (199 lb 3.2 oz)   SpO2 97%   BMI 31.20 kg/m    Wt Readings from Last 2 Encounters:   05/25/23 90.4 kg (199 lb 3.2 oz)   04/06/23 86.2 kg (190 lb)     Body mass index is 31.2 kg/m .   Body surface area is 2.07 meters squared.    Constitutional: appears stated age, in no apparent distress, appears to be well nourished  Head: normocephalic, atraumatic  Neck: supple, trachea midline, no bruit bilaterally   Pulmonary: clear to auscultation bilaterally, no wheezes, no rales, no increased work of breathing  Cardiovascular: JVP normal, regular rate, regular rhythm, normal S1 and S2, no S3, S4, 1/6 JULISSA at the RUSB, no lower extremity edema  Gastrointestinal: no guarding, non-rigid   Neurologic: awake, alert, moves all extremities  Skin: no jaundice, warm on limited exam  Psychiatric: affect is normal, answers questions appropriately, oriented to self and place    Data reviewed:  Lab Results   Component Value Date    WBC 5.7 08/26/2022    WBC 6.4 " 12/07/2020    RBC 4.31 (L) 08/26/2022    RBC 4.72 12/07/2020    HGB 13.9 08/26/2022    HGB 15.1 12/07/2020    HCT 40.1 08/26/2022    HCT 43.0 12/07/2020    MCV 93 08/26/2022    MCV 91 12/07/2020    MCH 32.3 08/26/2022    MCH 32.0 12/07/2020    MCHC 34.7 08/26/2022    MCHC 35.1 12/07/2020    RDW 12.5 08/26/2022    RDW 12.8 12/07/2020     (L) 08/26/2022     (L) 12/07/2020     Sodium   Date Value Ref Range Status   12/06/2022 140 136 - 145 mmol/L Final   12/07/2020 139 133 - 144 mmol/L Final     Potassium   Date Value Ref Range Status   12/06/2022 4.4 3.4 - 5.3 mmol/L Final   08/26/2022 4.4 3.4 - 5.3 mmol/L Final   12/11/2020 4.0 3.4 - 5.3 mmol/L Final     Chloride   Date Value Ref Range Status   12/06/2022 107 98 - 107 mmol/L Final   08/26/2022 110 (H) 94 - 109 mmol/L Final   12/07/2020 109 94 - 109 mmol/L Final     Carbon Dioxide   Date Value Ref Range Status   12/07/2020 28 20 - 32 mmol/L Final     Carbon Dioxide (CO2)   Date Value Ref Range Status   12/06/2022 25 22 - 29 mmol/L Final   08/26/2022 25 20 - 32 mmol/L Final     Anion Gap   Date Value Ref Range Status   12/06/2022 8 7 - 15 mmol/L Final   08/26/2022 5 3 - 14 mmol/L Final   12/07/2020 2 (L) 3 - 14 mmol/L Final     Glucose   Date Value Ref Range Status   12/06/2022 118 (H) 70 - 99 mg/dL Final   08/26/2022 103 (H) 70 - 99 mg/dL Final   12/07/2020 94 70 - 99 mg/dL Final     Comment:     Non Fasting     Urea Nitrogen   Date Value Ref Range Status   12/06/2022 13.4 8.0 - 23.0 mg/dL Final   08/26/2022 19 7 - 30 mg/dL Final   12/07/2020 17 7 - 30 mg/dL Final     Creatinine   Date Value Ref Range Status   12/06/2022 1.12 0.67 - 1.17 mg/dL Final   12/11/2020 1.23 0.66 - 1.25 mg/dL Final     GFR Estimate   Date Value Ref Range Status   12/06/2022 69 >60 mL/min/1.73m2 Final     Comment:     Effective December 21, 2021 eGFRcr in adults is calculated using the 2021 CKD-EPI creatinine equation which includes age and gender (Dolly et al., NEJM, DOI:  10.1056/EUSNsi0132254)   12/11/2020 58 (L) >60 mL/min/[1.73_m2] Final     Comment:     Non  GFR Calc  Starting 12/18/2018, serum creatinine based estimated GFR (eGFR) will be   calculated using the Chronic Kidney Disease Epidemiology Collaboration   (CKD-EPI) equation.       Calcium   Date Value Ref Range Status   12/06/2022 9.4 8.8 - 10.2 mg/dL Final   12/07/2020 9.3 8.5 - 10.1 mg/dL Final     Bilirubin Total   Date Value Ref Range Status   08/26/2022 1.4 (H) 0.2 - 1.3 mg/dL Final   12/07/2020 1.3 0.2 - 1.3 mg/dL Final     Alkaline Phosphatase   Date Value Ref Range Status   08/26/2022 62 40 - 150 U/L Final   12/07/2020 74 40 - 150 U/L Final     ALT   Date Value Ref Range Status   10/17/2022 17 10 - 50 U/L Final   12/07/2020 28 0 - 70 U/L Final     AST   Date Value Ref Range Status   08/26/2022 23 0 - 45 U/L Final   12/07/2020 20 0 - 45 U/L Final     Recent Labs   Lab Test 10/17/22  0828 08/26/22  0844 12/01/15  0805 10/06/15  0743   CHOL 120 109   < > 191   HDL 44 41   < > 30*   LDL 46 40   < > Cannot estimate LDL when triglyceride exceeds 400 mg/dL   TRIG 148 138   < > 432*   CHOLHDLRATIO  --   --   --  6.4*    < > = values in this interval not displayed.      Lab Results   Component Value Date    A1C 5.3 01/06/2020

## 2023-06-01 ENCOUNTER — HEALTH MAINTENANCE LETTER (OUTPATIENT)
Age: 73
End: 2023-06-01

## 2023-06-19 ENCOUNTER — TELEPHONE (OUTPATIENT)
Dept: CARDIOLOGY | Facility: CLINIC | Age: 73
End: 2023-06-19
Payer: COMMERCIAL

## 2023-06-19 DIAGNOSIS — I49.5 SSS (SICK SINUS SYNDROME) (H): Primary | ICD-10-CM

## 2023-06-19 DIAGNOSIS — Z95.0 CARDIAC PACEMAKER IN SITU: ICD-10-CM

## 2023-06-19 NOTE — TELEPHONE ENCOUNTER
Patient was mistaken on days. He had written down that his remote check was scheduled for today but it is actually scheduled for 7/3. He will be out of town that day as well but plans to bring his remote monitor with so the transmission should still work.  GINGER MIRZA

## 2023-06-19 NOTE — TELEPHONE ENCOUNTER
[facility] Call Center    Phone Message    May a detailed message be left on voicemail: yes     Reason for Call: Other: Patient called and stated around this time is when they have a remote device check done, but they are out of town and not near the machine. Patient wants to know if one is needed, if so they will be back home late Thursday evening and can schedule anytime after that. Please call patient back to further discuss.      Action Taken: Other: Cardiology    Travel Screening: Not Applicable     Thank you!  [facility]

## 2023-07-03 ENCOUNTER — ANCILLARY PROCEDURE (OUTPATIENT)
Dept: CARDIOLOGY | Facility: CLINIC | Age: 73
End: 2023-07-03
Attending: INTERNAL MEDICINE
Payer: COMMERCIAL

## 2023-07-03 DIAGNOSIS — Z95.0 CARDIAC PACEMAKER IN SITU: ICD-10-CM

## 2023-07-03 DIAGNOSIS — I49.5 SSS (SICK SINUS SYNDROME) (H): ICD-10-CM

## 2023-07-03 PROCEDURE — 93296 REM INTERROG EVL PM/IDS: CPT | Performed by: INTERNAL MEDICINE

## 2023-07-03 PROCEDURE — 93294 REM INTERROG EVL PM/LDLS PM: CPT | Performed by: INTERNAL MEDICINE

## 2023-07-22 LAB
MDC_IDC_EPISODE_DTM: NORMAL
MDC_IDC_EPISODE_ID: NORMAL
MDC_IDC_EPISODE_TYPE: NORMAL
MDC_IDC_LEAD_IMPLANT_DT: NORMAL
MDC_IDC_LEAD_IMPLANT_DT: NORMAL
MDC_IDC_LEAD_LOCATION: NORMAL
MDC_IDC_LEAD_LOCATION: NORMAL
MDC_IDC_LEAD_LOCATION_DETAIL_1: NORMAL
MDC_IDC_LEAD_LOCATION_DETAIL_1: NORMAL
MDC_IDC_LEAD_MFG: NORMAL
MDC_IDC_LEAD_MFG: NORMAL
MDC_IDC_LEAD_MODEL: NORMAL
MDC_IDC_LEAD_MODEL: NORMAL
MDC_IDC_LEAD_POLARITY_TYPE: NORMAL
MDC_IDC_LEAD_POLARITY_TYPE: NORMAL
MDC_IDC_LEAD_SERIAL: NORMAL
MDC_IDC_LEAD_SERIAL: NORMAL
MDC_IDC_MSMT_BATTERY_DTM: NORMAL
MDC_IDC_MSMT_BATTERY_REMAINING_LONGEVITY: 144 MO
MDC_IDC_MSMT_BATTERY_REMAINING_PERCENTAGE: 100 %
MDC_IDC_MSMT_BATTERY_STATUS: NORMAL
MDC_IDC_MSMT_LEADCHNL_RA_IMPEDANCE_VALUE: 758 OHM
MDC_IDC_MSMT_LEADCHNL_RA_PACING_THRESHOLD_AMPLITUDE: 0.4 V
MDC_IDC_MSMT_LEADCHNL_RA_PACING_THRESHOLD_PULSEWIDTH: 0.4 MS
MDC_IDC_MSMT_LEADCHNL_RV_IMPEDANCE_VALUE: 600 OHM
MDC_IDC_MSMT_LEADCHNL_RV_PACING_THRESHOLD_AMPLITUDE: 0.7 V
MDC_IDC_MSMT_LEADCHNL_RV_PACING_THRESHOLD_PULSEWIDTH: 0.4 MS
MDC_IDC_PG_IMPLANT_DTM: NORMAL
MDC_IDC_PG_MFG: NORMAL
MDC_IDC_PG_MODEL: NORMAL
MDC_IDC_PG_SERIAL: NORMAL
MDC_IDC_PG_TYPE: NORMAL
MDC_IDC_SESS_CLINIC_NAME: NORMAL
MDC_IDC_SESS_DTM: NORMAL
MDC_IDC_SESS_TYPE: NORMAL
MDC_IDC_SET_BRADY_AT_MODE_SWITCH_MODE: NORMAL
MDC_IDC_SET_BRADY_AT_MODE_SWITCH_RATE: 170 {BEATS}/MIN
MDC_IDC_SET_BRADY_LOWRATE: 60 {BEATS}/MIN
MDC_IDC_SET_BRADY_MAX_SENSOR_RATE: 130 {BEATS}/MIN
MDC_IDC_SET_BRADY_MAX_TRACKING_RATE: 130 {BEATS}/MIN
MDC_IDC_SET_BRADY_MODE: NORMAL
MDC_IDC_SET_BRADY_PAV_DELAY_HIGH: 150 MS
MDC_IDC_SET_BRADY_PAV_DELAY_LOW: 200 MS
MDC_IDC_SET_BRADY_SAV_DELAY_HIGH: 150 MS
MDC_IDC_SET_BRADY_SAV_DELAY_LOW: 200 MS
MDC_IDC_SET_LEADCHNL_RA_PACING_AMPLITUDE: 2 V
MDC_IDC_SET_LEADCHNL_RA_PACING_CAPTURE_MODE: NORMAL
MDC_IDC_SET_LEADCHNL_RA_PACING_POLARITY: NORMAL
MDC_IDC_SET_LEADCHNL_RA_PACING_PULSEWIDTH: 0.4 MS
MDC_IDC_SET_LEADCHNL_RA_SENSING_ADAPTATION_MODE: NORMAL
MDC_IDC_SET_LEADCHNL_RA_SENSING_POLARITY: NORMAL
MDC_IDC_SET_LEADCHNL_RA_SENSING_SENSITIVITY: 0.25 MV
MDC_IDC_SET_LEADCHNL_RV_PACING_AMPLITUDE: 1.2 V
MDC_IDC_SET_LEADCHNL_RV_PACING_CAPTURE_MODE: NORMAL
MDC_IDC_SET_LEADCHNL_RV_PACING_POLARITY: NORMAL
MDC_IDC_SET_LEADCHNL_RV_PACING_PULSEWIDTH: 0.4 MS
MDC_IDC_SET_LEADCHNL_RV_SENSING_ADAPTATION_MODE: NORMAL
MDC_IDC_SET_LEADCHNL_RV_SENSING_POLARITY: NORMAL
MDC_IDC_SET_LEADCHNL_RV_SENSING_SENSITIVITY: 1.5 MV
MDC_IDC_SET_ZONE_DETECTION_INTERVAL: 375 MS
MDC_IDC_SET_ZONE_TYPE: NORMAL
MDC_IDC_SET_ZONE_VENDOR_TYPE: NORMAL
MDC_IDC_STAT_AT_BURDEN_PERCENT: 0 %
MDC_IDC_STAT_AT_DTM_END: NORMAL
MDC_IDC_STAT_AT_DTM_START: NORMAL
MDC_IDC_STAT_BRADY_DTM_END: NORMAL
MDC_IDC_STAT_BRADY_DTM_START: NORMAL
MDC_IDC_STAT_BRADY_RA_PERCENT_PACED: 91 %
MDC_IDC_STAT_BRADY_RV_PERCENT_PACED: 5 %
MDC_IDC_STAT_EPISODE_RECENT_COUNT: 0
MDC_IDC_STAT_EPISODE_RECENT_COUNT_DTM_END: NORMAL
MDC_IDC_STAT_EPISODE_RECENT_COUNT_DTM_START: NORMAL
MDC_IDC_STAT_EPISODE_TYPE: NORMAL
MDC_IDC_STAT_EPISODE_VENDOR_TYPE: NORMAL

## 2023-10-09 ENCOUNTER — ANCILLARY PROCEDURE (OUTPATIENT)
Dept: CARDIOLOGY | Facility: CLINIC | Age: 73
End: 2023-10-09
Attending: INTERNAL MEDICINE
Payer: COMMERCIAL

## 2023-10-09 DIAGNOSIS — I49.5 SSS (SICK SINUS SYNDROME) (H): Primary | ICD-10-CM

## 2023-10-09 DIAGNOSIS — I49.5 SSS (SICK SINUS SYNDROME) (H): ICD-10-CM

## 2023-10-09 DIAGNOSIS — Z95.0 CARDIAC PACEMAKER IN SITU: ICD-10-CM

## 2023-10-09 PROCEDURE — 93294 REM INTERROG EVL PM/LDLS PM: CPT | Performed by: INTERNAL MEDICINE

## 2023-10-09 PROCEDURE — 93296 REM INTERROG EVL PM/IDS: CPT | Performed by: INTERNAL MEDICINE

## 2023-10-18 LAB
MDC_IDC_EPISODE_DTM: NORMAL
MDC_IDC_EPISODE_ID: NORMAL
MDC_IDC_EPISODE_TYPE: NORMAL
MDC_IDC_LEAD_CONNECTION_STATUS: NORMAL
MDC_IDC_LEAD_CONNECTION_STATUS: NORMAL
MDC_IDC_LEAD_IMPLANT_DT: NORMAL
MDC_IDC_LEAD_IMPLANT_DT: NORMAL
MDC_IDC_LEAD_LOCATION: NORMAL
MDC_IDC_LEAD_LOCATION: NORMAL
MDC_IDC_LEAD_LOCATION_DETAIL_1: NORMAL
MDC_IDC_LEAD_LOCATION_DETAIL_1: NORMAL
MDC_IDC_LEAD_MFG: NORMAL
MDC_IDC_LEAD_MFG: NORMAL
MDC_IDC_LEAD_MODEL: NORMAL
MDC_IDC_LEAD_MODEL: NORMAL
MDC_IDC_LEAD_POLARITY_TYPE: NORMAL
MDC_IDC_LEAD_POLARITY_TYPE: NORMAL
MDC_IDC_LEAD_SERIAL: NORMAL
MDC_IDC_LEAD_SERIAL: NORMAL
MDC_IDC_MSMT_BATTERY_DTM: NORMAL
MDC_IDC_MSMT_BATTERY_REMAINING_LONGEVITY: 150 MO
MDC_IDC_MSMT_BATTERY_REMAINING_PERCENTAGE: 100 %
MDC_IDC_MSMT_BATTERY_STATUS: NORMAL
MDC_IDC_MSMT_LEADCHNL_RA_IMPEDANCE_VALUE: 816 OHM
MDC_IDC_MSMT_LEADCHNL_RA_PACING_THRESHOLD_AMPLITUDE: 0.3 V
MDC_IDC_MSMT_LEADCHNL_RA_PACING_THRESHOLD_PULSEWIDTH: 0.4 MS
MDC_IDC_MSMT_LEADCHNL_RV_IMPEDANCE_VALUE: 592 OHM
MDC_IDC_MSMT_LEADCHNL_RV_PACING_THRESHOLD_AMPLITUDE: 0.7 V
MDC_IDC_MSMT_LEADCHNL_RV_PACING_THRESHOLD_PULSEWIDTH: 0.4 MS
MDC_IDC_PG_IMPLANT_DTM: NORMAL
MDC_IDC_PG_MFG: NORMAL
MDC_IDC_PG_MODEL: NORMAL
MDC_IDC_PG_SERIAL: NORMAL
MDC_IDC_PG_TYPE: NORMAL
MDC_IDC_SESS_CLINIC_NAME: NORMAL
MDC_IDC_SESS_DTM: NORMAL
MDC_IDC_SESS_TYPE: NORMAL
MDC_IDC_SET_BRADY_AT_MODE_SWITCH_MODE: NORMAL
MDC_IDC_SET_BRADY_AT_MODE_SWITCH_RATE: 170 {BEATS}/MIN
MDC_IDC_SET_BRADY_LOWRATE: 60 {BEATS}/MIN
MDC_IDC_SET_BRADY_MAX_SENSOR_RATE: 130 {BEATS}/MIN
MDC_IDC_SET_BRADY_MAX_TRACKING_RATE: 130 {BEATS}/MIN
MDC_IDC_SET_BRADY_MODE: NORMAL
MDC_IDC_SET_BRADY_PAV_DELAY_HIGH: 150 MS
MDC_IDC_SET_BRADY_PAV_DELAY_LOW: 200 MS
MDC_IDC_SET_BRADY_SAV_DELAY_HIGH: 150 MS
MDC_IDC_SET_BRADY_SAV_DELAY_LOW: 200 MS
MDC_IDC_SET_LEADCHNL_RA_PACING_AMPLITUDE: 2 V
MDC_IDC_SET_LEADCHNL_RA_PACING_CAPTURE_MODE: NORMAL
MDC_IDC_SET_LEADCHNL_RA_PACING_POLARITY: NORMAL
MDC_IDC_SET_LEADCHNL_RA_PACING_PULSEWIDTH: 0.4 MS
MDC_IDC_SET_LEADCHNL_RA_SENSING_ADAPTATION_MODE: NORMAL
MDC_IDC_SET_LEADCHNL_RA_SENSING_POLARITY: NORMAL
MDC_IDC_SET_LEADCHNL_RA_SENSING_SENSITIVITY: 0.25 MV
MDC_IDC_SET_LEADCHNL_RV_PACING_AMPLITUDE: 1.2 V
MDC_IDC_SET_LEADCHNL_RV_PACING_CAPTURE_MODE: NORMAL
MDC_IDC_SET_LEADCHNL_RV_PACING_POLARITY: NORMAL
MDC_IDC_SET_LEADCHNL_RV_PACING_PULSEWIDTH: 0.4 MS
MDC_IDC_SET_LEADCHNL_RV_SENSING_ADAPTATION_MODE: NORMAL
MDC_IDC_SET_LEADCHNL_RV_SENSING_POLARITY: NORMAL
MDC_IDC_SET_LEADCHNL_RV_SENSING_SENSITIVITY: 1.5 MV
MDC_IDC_SET_ZONE_DETECTION_INTERVAL: 375 MS
MDC_IDC_SET_ZONE_STATUS: NORMAL
MDC_IDC_SET_ZONE_TYPE: NORMAL
MDC_IDC_SET_ZONE_VENDOR_TYPE: NORMAL
MDC_IDC_STAT_AT_BURDEN_PERCENT: 0 %
MDC_IDC_STAT_AT_DTM_END: NORMAL
MDC_IDC_STAT_AT_DTM_START: NORMAL
MDC_IDC_STAT_BRADY_DTM_END: NORMAL
MDC_IDC_STAT_BRADY_DTM_START: NORMAL
MDC_IDC_STAT_BRADY_RA_PERCENT_PACED: 91 %
MDC_IDC_STAT_BRADY_RV_PERCENT_PACED: 36 %
MDC_IDC_STAT_EPISODE_RECENT_COUNT: 0
MDC_IDC_STAT_EPISODE_RECENT_COUNT_DTM_END: NORMAL
MDC_IDC_STAT_EPISODE_RECENT_COUNT_DTM_START: NORMAL
MDC_IDC_STAT_EPISODE_TYPE: NORMAL
MDC_IDC_STAT_EPISODE_VENDOR_TYPE: NORMAL
MDC_IDC_STAT_EPISODE_VENDOR_TYPE: NORMAL

## 2023-10-26 ENCOUNTER — TRANSFERRED RECORDS (OUTPATIENT)
Dept: HEALTH INFORMATION MANAGEMENT | Facility: CLINIC | Age: 73
End: 2023-10-26
Payer: COMMERCIAL

## 2023-10-26 LAB
ALT SERPL-CCNC: 19 IU/L (ref 5–40)
AST SERPL-CCNC: 20 U/L (ref 5–34)
CREATININE (EXTERNAL): 1.15 MG/DL (ref 0.5–1.3)
GFR ESTIMATED (EXTERNAL): 65.9 ML/MIN/1.73M2

## 2024-01-07 NOTE — NURSING NOTE
Chief Complaint   Patient presents with     Benign Prostatic Hypertrophy     PVR: 37 mL by bladder scan    Nakia Jay, EMT    
English

## 2024-01-10 ENCOUNTER — TELEPHONE (OUTPATIENT)
Dept: CARDIOLOGY | Facility: CLINIC | Age: 74
End: 2024-01-10

## 2024-01-10 NOTE — TELEPHONE ENCOUNTER
Harpreet called to apologize for missing his device check today and to reschedule. He had it written down as a remote check. Called patient back and rescheduled in clinic check for 1/24.  Patient also had a question regarding his CPAP mask. He received notice that it has a magnet and should remain 6 inches away from his pacemaker. He states that there definitely is not that much space between the two and wonders if he should get worked up for another mask. Patient has been wearing this mask for years and his PPM was placed in 2020. There have never been any issues so patient is at low risk but did direct him to [employer] for the most updated information.    GINEGR MIRZA

## 2024-01-15 DIAGNOSIS — R97.20 ELEVATED PSA: Primary | ICD-10-CM

## 2024-01-24 ENCOUNTER — ANCILLARY PROCEDURE (OUTPATIENT)
Dept: CARDIOLOGY | Facility: CLINIC | Age: 74
End: 2024-01-24
Attending: INTERNAL MEDICINE
Payer: COMMERCIAL

## 2024-01-24 DIAGNOSIS — I49.5 SSS (SICK SINUS SYNDROME) (H): ICD-10-CM

## 2024-01-24 DIAGNOSIS — Z95.0 CARDIAC PACEMAKER IN SITU: ICD-10-CM

## 2024-01-24 PROCEDURE — 93280 PM DEVICE PROGR EVAL DUAL: CPT | Performed by: INTERNAL MEDICINE

## 2024-02-05 LAB
MDC_IDC_LEAD_CONNECTION_STATUS: NORMAL
MDC_IDC_LEAD_CONNECTION_STATUS: NORMAL
MDC_IDC_LEAD_IMPLANT_DT: NORMAL
MDC_IDC_LEAD_IMPLANT_DT: NORMAL
MDC_IDC_LEAD_LOCATION: NORMAL
MDC_IDC_LEAD_LOCATION: NORMAL
MDC_IDC_LEAD_LOCATION_DETAIL_1: NORMAL
MDC_IDC_LEAD_LOCATION_DETAIL_1: NORMAL
MDC_IDC_LEAD_MFG: NORMAL
MDC_IDC_LEAD_MFG: NORMAL
MDC_IDC_LEAD_MODEL: NORMAL
MDC_IDC_LEAD_MODEL: NORMAL
MDC_IDC_LEAD_POLARITY_TYPE: NORMAL
MDC_IDC_LEAD_POLARITY_TYPE: NORMAL
MDC_IDC_LEAD_SERIAL: NORMAL
MDC_IDC_LEAD_SERIAL: NORMAL
MDC_IDC_MSMT_BATTERY_DTM: NORMAL
MDC_IDC_MSMT_BATTERY_REMAINING_LONGEVITY: 144 MO
MDC_IDC_MSMT_BATTERY_REMAINING_PERCENTAGE: 100 %
MDC_IDC_MSMT_BATTERY_STATUS: NORMAL
MDC_IDC_MSMT_LEADCHNL_RA_IMPEDANCE_VALUE: 818 OHM
MDC_IDC_MSMT_LEADCHNL_RA_PACING_THRESHOLD_AMPLITUDE: 0.4 V
MDC_IDC_MSMT_LEADCHNL_RA_PACING_THRESHOLD_PULSEWIDTH: 0.4 MS
MDC_IDC_MSMT_LEADCHNL_RV_IMPEDANCE_VALUE: 609 OHM
MDC_IDC_MSMT_LEADCHNL_RV_PACING_THRESHOLD_AMPLITUDE: 0.7 V
MDC_IDC_MSMT_LEADCHNL_RV_PACING_THRESHOLD_PULSEWIDTH: 0.4 MS
MDC_IDC_PG_IMPLANT_DTM: NORMAL
MDC_IDC_PG_MFG: NORMAL
MDC_IDC_PG_MODEL: NORMAL
MDC_IDC_PG_SERIAL: NORMAL
MDC_IDC_PG_TYPE: NORMAL
MDC_IDC_SESS_CLINIC_NAME: NORMAL
MDC_IDC_SESS_DTM: NORMAL
MDC_IDC_SESS_TYPE: NORMAL
MDC_IDC_SET_BRADY_AT_MODE_SWITCH_MODE: NORMAL
MDC_IDC_SET_BRADY_AT_MODE_SWITCH_RATE: 170 {BEATS}/MIN
MDC_IDC_SET_BRADY_LOWRATE: 60 {BEATS}/MIN
MDC_IDC_SET_BRADY_MAX_SENSOR_RATE: 130 {BEATS}/MIN
MDC_IDC_SET_BRADY_MAX_TRACKING_RATE: 130 {BEATS}/MIN
MDC_IDC_SET_BRADY_MODE: NORMAL
MDC_IDC_SET_BRADY_PAV_DELAY_HIGH: 150 MS
MDC_IDC_SET_BRADY_PAV_DELAY_LOW: 200 MS
MDC_IDC_SET_BRADY_SAV_DELAY_HIGH: 150 MS
MDC_IDC_SET_BRADY_SAV_DELAY_LOW: 200 MS
MDC_IDC_SET_LEADCHNL_RA_PACING_AMPLITUDE: 2 V
MDC_IDC_SET_LEADCHNL_RA_PACING_CAPTURE_MODE: NORMAL
MDC_IDC_SET_LEADCHNL_RA_PACING_POLARITY: NORMAL
MDC_IDC_SET_LEADCHNL_RA_PACING_PULSEWIDTH: 0.4 MS
MDC_IDC_SET_LEADCHNL_RA_SENSING_ADAPTATION_MODE: NORMAL
MDC_IDC_SET_LEADCHNL_RA_SENSING_POLARITY: NORMAL
MDC_IDC_SET_LEADCHNL_RA_SENSING_SENSITIVITY: 0.25 MV
MDC_IDC_SET_LEADCHNL_RV_PACING_AMPLITUDE: 1.2 V
MDC_IDC_SET_LEADCHNL_RV_PACING_CAPTURE_MODE: NORMAL
MDC_IDC_SET_LEADCHNL_RV_PACING_POLARITY: NORMAL
MDC_IDC_SET_LEADCHNL_RV_PACING_PULSEWIDTH: 0.4 MS
MDC_IDC_SET_LEADCHNL_RV_SENSING_ADAPTATION_MODE: NORMAL
MDC_IDC_SET_LEADCHNL_RV_SENSING_POLARITY: NORMAL
MDC_IDC_SET_LEADCHNL_RV_SENSING_SENSITIVITY: 1.5 MV
MDC_IDC_SET_ZONE_DETECTION_INTERVAL: 375 MS
MDC_IDC_SET_ZONE_STATUS: NORMAL
MDC_IDC_SET_ZONE_TYPE: NORMAL
MDC_IDC_SET_ZONE_VENDOR_TYPE: NORMAL
MDC_IDC_STAT_AT_BURDEN_PERCENT: 0 %
MDC_IDC_STAT_AT_DTM_END: NORMAL
MDC_IDC_STAT_AT_DTM_START: NORMAL
MDC_IDC_STAT_BRADY_DTM_END: NORMAL
MDC_IDC_STAT_BRADY_DTM_START: NORMAL
MDC_IDC_STAT_BRADY_RA_PERCENT_PACED: 92 %
MDC_IDC_STAT_BRADY_RV_PERCENT_PACED: 53 %
MDC_IDC_STAT_EPISODE_RECENT_COUNT: 0
MDC_IDC_STAT_EPISODE_RECENT_COUNT_DTM_END: NORMAL
MDC_IDC_STAT_EPISODE_RECENT_COUNT_DTM_START: NORMAL
MDC_IDC_STAT_EPISODE_TYPE: NORMAL
MDC_IDC_STAT_EPISODE_VENDOR_TYPE: NORMAL
MDC_IDC_STAT_EPISODE_VENDOR_TYPE: NORMAL

## 2024-02-21 ENCOUNTER — TELEPHONE (OUTPATIENT)
Dept: INTERNAL MEDICINE | Facility: CLINIC | Age: 74
End: 2024-02-21
Payer: COMMERCIAL

## 2024-02-21 NOTE — TELEPHONE ENCOUNTER
Pt calling stating that he has been having a lot of gas for many months - farting and burping  - he will have a lot of gas per the rectum and then a day or two later he will have large loose stools and it can be 2-3 stools that day - he does not get an upset stomach he gets a bloated stomach. He sometimes is worried to leave home due to the uncertainty of the loose stools - he has almost had a BM in his pants.     The burping is very loud and comes unexpectedly does not correlate with food     Denies: nausea, vomiting fevers, chills, body aches, cp, sob, palpitations      He also would like to know if Dr. Sandhu is going to stick around or is she is referring pt's to a new provider    Pt would like to know id this is because of his med's or his diet     He has tried gas ex and Tums and it does not help but he started taking alkeselsor heartburn and gas relief chews and It has been helping. He takes 1-3 tabs a day     Best #   Telephone Information:   Mobile [phone] ok       Please review and advise     Thank you     Julia RN, BSN  [facility] [place] Triage

## 2024-03-22 ENCOUNTER — OFFICE VISIT (OUTPATIENT)
Dept: INTERNAL MEDICINE | Facility: CLINIC | Age: 74
End: 2024-03-22
Payer: COMMERCIAL

## 2024-03-22 VITALS
RESPIRATION RATE: 16 BRPM | HEART RATE: 60 BPM | OXYGEN SATURATION: 95 % | DIASTOLIC BLOOD PRESSURE: 77 MMHG | WEIGHT: 198 LBS | SYSTOLIC BLOOD PRESSURE: 134 MMHG | TEMPERATURE: 96 F | HEIGHT: 66 IN | BODY MASS INDEX: 31.82 KG/M2

## 2024-03-22 DIAGNOSIS — R14.0 FLATULENCE/GAS PAIN/BELCHING: Primary | ICD-10-CM

## 2024-03-22 LAB
ERYTHROCYTE [DISTWIDTH] IN BLOOD BY AUTOMATED COUNT: 12.3 % (ref 10–15)
HCT VFR BLD AUTO: 40.1 % (ref 40–53)
HGB BLD-MCNC: 14.5 G/DL (ref 13.3–17.7)
MCH RBC QN AUTO: 31.8 PG (ref 26.5–33)
MCHC RBC AUTO-ENTMCNC: 36.2 G/DL (ref 31.5–36.5)
MCV RBC AUTO: 88 FL (ref 78–100)
PLATELET # BLD AUTO: 140 10E3/UL (ref 150–450)
RBC # BLD AUTO: 4.56 10E6/UL (ref 4.4–5.9)
WBC # BLD AUTO: 6.2 10E3/UL (ref 4–11)

## 2024-03-22 PROCEDURE — G2211 COMPLEX E/M VISIT ADD ON: HCPCS

## 2024-03-22 PROCEDURE — 36415 COLL VENOUS BLD VENIPUNCTURE: CPT

## 2024-03-22 PROCEDURE — 99215 OFFICE O/P EST HI 40 MIN: CPT

## 2024-03-22 PROCEDURE — 84153 ASSAY OF PSA TOTAL: CPT

## 2024-03-22 PROCEDURE — 85027 COMPLETE CBC AUTOMATED: CPT

## 2024-03-22 PROCEDURE — 80053 COMPREHEN METABOLIC PANEL: CPT

## 2024-03-22 NOTE — PROGRESS NOTES
Assessment & Plan     (R14.0) Flatulence/gas pain/belching  (primary encounter diagnosis)  Comment:   Pt presents with symptoms of abdominal bloating, excessive gas, and belching over the past 2-5 months. He does occasionally have regurgitation with spicy foods. He feels all foods make him burp excessively.  He feels symptoms are progressively getting worse, which is why he presents to clinic today.  He endorses excessive gas which occurs almost daily. Also endorses changes in bowel habits.  He had c diff about 10 years ago and feels like his GI system has not been the same since.  He has loose stools about 2x/week.   He had colonoscopy which was negative in 2022. However, he notes strong family history of colon cancer in his mother, brother, maternal aunt and maternal uncle.   He denies use of any carbonated beverages.  He has been using twan seltzer, gas-x and tums, PRN which he feels helps moderately with symptoms    Denies any personal history of malignancy, IBD or IBS. Denies any fevers, chills, hematochezia, rectal bleeding, unintentional weight loss.   Has not tried Pepcid or Omeprazole.   He endorses significant urgency with bowel movements- this has worsened over the past 1-1.5 years.   With significant family history of colon cancer, I do think it is worthwhile to have him repeat colonoscopy with these ongoing symptoms.   He does have mild tenderness to RUQ upon palpation. However, he is s/p cholecystectomy in 2003. Thus, no need for any abdominal imaging of RUQ at this time.         Plan:  Will check CBC and CMP today.  Will send rx for Omeprazole -have advised him to use 1 capsule 1-2 daily to see if this helps with symptoms of potential GERD  If symptoms resolve with PPI, we can forego colonoscopy. He will end me [employer] message in 2- 3 weeks with update.     -could also consider having him see GI in future if needed    Plan: omeprazole (PRILOSEC) 20 MG DR capsule, CBC         with platelets, Adult GI  " Referral -         Procedure Only, Comprehensive metabolic panel         (BMP + Alb, Alk Phos, ALT, AST, Total. Bili,         TP),       40 minutes spent on the date of the encounter doing chart review, history and exam, documentation and further activities per the note        BMI  Estimated body mass index is 31.96 kg/m  as calculated from the following:    Height as of this encounter: 1.676 m (5' 6\").    Weight as of this encounter: 89.8 kg (198 lb).   Weight management plan: Discussed healthy diet and exercise guidelines                  Zhanna Mullins is a 73 year old, presenting for the following health issues:  No chief complaint on file.        3/22/2024     2:41 PM   Additional Questions   Roomed by Dbei     History of Present Illness       Reason for visit:  Gas-stomach issues-  Symptom onset:  More than a month  Symptoms include:  Lots of gas-indigestion- hiccups  Symptom intensity:  Severe  Symptom progression:  Worsening  Had these symptoms before:  No  What makes it worse:  ?  What makes it better:  Anti acid pills    He eats 0-1 servings of fruits and vegetables daily.He consumes 1 sweetened beverage(s) daily.He exercises with enough effort to increase his heart rate 10 to 19 minutes per day.  He exercises with enough effort to increase his heart rate 3 or less days per week.   He is taking medications regularly.         Excessive gas             Objective    /77   Pulse 60   Temp (!) 96  F (35.6  C) (Oral)   Resp 16   Ht 1.676 m (5' 6\")   Wt 89.8 kg (198 lb)   SpO2 95%   BMI 31.96 kg/m    Body mass index is 31.96 kg/m .  Physical Exam  Constitutional:       General: He is not in acute distress.     Appearance: Normal appearance. He is not ill-appearing, toxic-appearing or diaphoretic.   HENT:      Head: Normocephalic and atraumatic.   Cardiovascular:      Rate and Rhythm: Normal rate and regular rhythm.      Heart sounds: Normal heart sounds.   Pulmonary:      Effort: " Pulmonary effort is normal.      Breath sounds: Normal breath sounds.   Abdominal:      General: Bowel sounds are normal.      Palpations: Abdomen is soft.      Tenderness: There is abdominal tenderness in the right upper quadrant. There is no guarding.   Skin:     General: Skin is warm and dry.   Neurological:      Mental Status: He is alert and oriented to person, place, and time.   Psychiatric:         Mood and Affect: Mood normal.         Behavior: Behavior normal.         Thought Content: Thought content normal.         Judgment: Judgment normal.                      Signed Electronically by: SALVATORE Landaverde CNP

## 2024-03-23 LAB
ALBUMIN SERPL BCG-MCNC: 4.7 G/DL (ref 3.5–5.2)
ALP SERPL-CCNC: 61 U/L (ref 40–150)
ALT SERPL W P-5'-P-CCNC: 24 U/L (ref 0–70)
ANION GAP SERPL CALCULATED.3IONS-SCNC: 10 MMOL/L (ref 7–15)
AST SERPL W P-5'-P-CCNC: 27 U/L (ref 0–45)
BILIRUB SERPL-MCNC: 0.9 MG/DL
BUN SERPL-MCNC: 17.4 MG/DL (ref 8–23)
CALCIUM SERPL-MCNC: 9.6 MG/DL (ref 8.8–10.2)
CHLORIDE SERPL-SCNC: 107 MMOL/L (ref 98–107)
CREAT SERPL-MCNC: 1.27 MG/DL (ref 0.67–1.17)
DEPRECATED HCO3 PLAS-SCNC: 25 MMOL/L (ref 22–29)
EGFRCR SERPLBLD CKD-EPI 2021: 59 ML/MIN/1.73M2
GLUCOSE SERPL-MCNC: 96 MG/DL (ref 70–99)
POTASSIUM SERPL-SCNC: 4.6 MMOL/L (ref 3.4–5.3)
PROT SERPL-MCNC: 7 G/DL (ref 6.4–8.3)
PSA SERPL DL<=0.01 NG/ML-MCNC: 1.71 NG/ML (ref 0–6.5)
SODIUM SERPL-SCNC: 142 MMOL/L (ref 135–145)

## 2024-03-25 ENCOUNTER — DOCUMENTATION ONLY (OUTPATIENT)
Facility: CLINIC | Age: 74
End: 2024-03-25
Payer: COMMERCIAL

## 2024-03-25 NOTE — PROGRESS NOTES
"Patient has lab only appt 4/17/24 for \"labs per [facility]/PSA,\" no orders in chart.  Please place FUTURE orders in Epic if appropriate.    Thanks,   [facility]    "

## 2024-04-01 ENCOUNTER — TRANSFERRED RECORDS (OUTPATIENT)
Dept: HEALTH INFORMATION MANAGEMENT | Facility: CLINIC | Age: 74
End: 2024-04-01
Payer: COMMERCIAL

## 2024-04-04 NOTE — PROGRESS NOTES
He doesn't need  a PSA it was just drawn on 3-22-24.  Gail Carvajal LPN @ [facility]'s office

## 2024-04-23 ENCOUNTER — OFFICE VISIT (OUTPATIENT)
Dept: UROLOGY | Facility: CLINIC | Age: 74
End: 2024-04-23
Payer: COMMERCIAL

## 2024-04-23 VITALS
WEIGHT: 198 LBS | BODY MASS INDEX: 31.82 KG/M2 | HEIGHT: 66 IN | SYSTOLIC BLOOD PRESSURE: 130 MMHG | DIASTOLIC BLOOD PRESSURE: 70 MMHG

## 2024-04-23 DIAGNOSIS — R39.12 BENIGN PROSTATIC HYPERPLASIA WITH WEAK URINARY STREAM: Primary | ICD-10-CM

## 2024-04-23 DIAGNOSIS — R97.20 ELEVATED PSA: ICD-10-CM

## 2024-04-23 DIAGNOSIS — R39.198 SLOWING OF URINARY STREAM: ICD-10-CM

## 2024-04-23 DIAGNOSIS — Z80.42 FAMILY HISTORY OF PROSTATE CANCER: ICD-10-CM

## 2024-04-23 DIAGNOSIS — N40.1 BENIGN PROSTATIC HYPERPLASIA WITH WEAK URINARY STREAM: Primary | ICD-10-CM

## 2024-04-23 LAB
ALBUMIN UR-MCNC: NEGATIVE MG/DL
APPEARANCE UR: CLEAR
BILIRUB UR QL STRIP: NEGATIVE
COLOR UR AUTO: YELLOW
GLUCOSE UR STRIP-MCNC: NEGATIVE MG/DL
HGB UR QL STRIP: NEGATIVE
KETONES UR STRIP-MCNC: NEGATIVE MG/DL
LEUKOCYTE ESTERASE UR QL STRIP: NEGATIVE
NITRATE UR QL: NEGATIVE
PH UR STRIP: 5.5 [PH] (ref 5–7)
RESIDUAL VOLUME (RV) (EXTERNAL): 15
SP GR UR STRIP: 1.02 (ref 1–1.03)
UROBILINOGEN UR STRIP-ACNC: 0.2 E.U./DL

## 2024-04-23 PROCEDURE — 81003 URINALYSIS AUTO W/O SCOPE: CPT | Mod: QW | Performed by: STUDENT IN AN ORGANIZED HEALTH CARE EDUCATION/TRAINING PROGRAM

## 2024-04-23 PROCEDURE — 51798 US URINE CAPACITY MEASURE: CPT | Performed by: STUDENT IN AN ORGANIZED HEALTH CARE EDUCATION/TRAINING PROGRAM

## 2024-04-23 PROCEDURE — 99214 OFFICE O/P EST MOD 30 MIN: CPT | Mod: 25 | Performed by: STUDENT IN AN ORGANIZED HEALTH CARE EDUCATION/TRAINING PROGRAM

## 2024-04-23 RX ORDER — FINASTERIDE 5 MG/1
5 TABLET, FILM COATED ORAL DAILY
Qty: 90 TABLET | Refills: 3 | Status: SHIPPED | OUTPATIENT
Start: 2024-04-23

## 2024-04-23 RX ORDER — ALFUZOSIN HYDROCHLORIDE 10 MG/1
10 TABLET, EXTENDED RELEASE ORAL DAILY
Qty: 90 TABLET | Refills: 3 | Status: SHIPPED | OUTPATIENT
Start: 2024-04-23

## 2024-04-23 ASSESSMENT — PAIN SCALES - GENERAL: PAINLEVEL: MILD PAIN (3)

## 2024-04-23 NOTE — PROGRESS NOTES
"CHIEF COMPLAINT   Harpreet Vera who is a 74 year old male returns today for follow-up of BPH with weak stream, + FH prostate cancer, rising PSA with multiple negative biopsies in the past .      HPI   Harpreet Vera is a 74 year old male who presents with a history of BPH with weak stream, + FH prostate cancer, rising PSA with multiple negative biopsies in the past     Last seen a year ago. He was continued on finasteride and alfuzosin. Urinary symptoms are stable. He feels that it empties well but dribbles at the end and takes extra time to get empty    AUA symptom score 2-3-5-2-5-3-1 = 21 qol mixed    PHYSICAL EXAM  Patient is a 74 year old  male   Vitals: Blood pressure 130/70, height 1.676 m (5' 6\"), weight 89.8 kg (198 lb).  Body mass index is 31.96 kg/m .  General Appearance Adult:   Alert, no acute distress, oriented  HENT: throat/mouth:normal, good dentition  Lungs: no respiratory distress, or pursed lip breathing  Heart: No obvious jugular venous distension present  Abdomen: nondistended  Musculoskeltal: extremities normal, no peripheral edema  Skin: no suspicious lesions or rashes  Neuro: Alert, oriented, speech and mentation normal  Psych: affect and mood normal  Gait: Normal  : deferred    Component      Latest Ref Rng 4/23/2024  9:06 AM   Color Urine      Colorless, Straw, Light Yellow, Yellow  Yellow    Appearance Urine      Clear  Clear    Glucose Urine      Negative mg/dL Negative    Bilirubin Urine      Negative  Negative    Ketones Urine      Negative mg/dL Negative    Specific Gravity Urine      1.003 - 1.035  1.025    Blood Urine      Negative  Negative    pH Urine      5.0 - 7.0  5.5    Protein Albumin Urine      Negative mg/dL Negative    Urobilinogen Urine      0.2, 1.0 E.U./dL 0.2    Nitrite Urine      Negative  Negative    Leukocyte Esterase Urine      Negative  Negative         PSA PSA Diag Urologic Phys PSA Tumor Marker   Latest Ref Rng 0.00 - 4.00 ug/L 0.00 - 4.00 ng/mL 0.00 - 6.50 " ng/mL   7/3/2007  2:30 PM 3.94      1/9/2008  9:17 AM 4.57 (H)      1/7/2009  8:33 AM 7.96 (H)      1/7/2010  9:23 AM 4.19 (H)      9/15/2010  11:56 AM 3.55      8/17/2011  9:57 AM 4.50 (H)      7/30/2012  10:26 AM 3.27      9/9/2014  9:11 AM 4.27 (H)      4/3/2017  8:42 AM 3.00      7/21/2017  11:30 AM  1.90     7/24/2018  4:28 PM 1.92      8/21/2019  8:58 AM 2.12      8/7/2020  11:06 AM 2.72      9/29/2021  2:13 PM 4.73 (H)      4/5/2022  8:26 AM 6.71 (H)      10/3/2022  8:25 AM 7.57 (H)      4/3/2023  8:27 AM   2.47    3/22/2024  3:48 PM   1.71       Legend:  (H) High    ASSESSMENT and PLAN  74 year old male who presents with a history of BPH with weak stream, + FH prostate cancer, rising PSA with multiple negative biopsies in the past     Re: h/o elevated PSA and family history of prostate cancer, his PSA today is 1.71 ng/ml which is low even after accounting for long term finasteride use. Defer AGGIE today.    Re: BPH with weak stream, he is emptying well with low PVR. Discussed with the patient bladder outlet procedures including REzum (his prostate is on the large size for REzum), Greenlight laser PVP, Aquablation. He is a  season ends in ~June.    Risks of Aquablation including bleeding, infection, dysuria, low chance of retrograde ejaculation. Plan to keep patient overnight in the hospital     - refill finasteride and tamsulosin for now, but presumably will be able to stop after bladder outlet procedure  - schedule Aquablation (tentatively in June after golf season)        Bijan Childress MD   [facility]  [facility] Phone: [phone]

## 2024-04-23 NOTE — PATIENT INSTRUCTIONS
[email or web address]    Go to this website to learn more about Aquablation (this would be done at [facility])

## 2024-04-23 NOTE — LETTER
"4/23/2024       RE: Harpreet Vera  [address] [facility] [address]     Dear Colleague,    Thank you for referring your patient, Harpreet Vera, to the [facility] [place] at [facility]. Please see a copy of my visit note below.    CHIEF COMPLAINT   Harpreet Vera who is a 74 year old male returns today for follow-up of BPH with weak stream, + FH prostate cancer, rising PSA with multiple negative biopsies in the past .      HPI   Harpreet Vera is a 74 year old male who presents with a history of BPH with weak stream, + FH prostate cancer, rising PSA with multiple negative biopsies in the past     Last seen a year ago. He was continued on finasteride and alfuzosin. Urinary symptoms are stable. He feels that it empties well but dribbles at the end and takes extra time to get empty    AUA symptom score 2-3-5-2-5-3-1 = 21 qol mixed    PHYSICAL EXAM  Patient is a 74 year old  male   Vitals: Blood pressure 130/70, height 1.676 m (5' 6\"), weight 89.8 kg (198 lb).  Body mass index is 31.96 kg/m .  General Appearance Adult:   Alert, no acute distress, oriented  HENT: throat/mouth:normal, good dentition  Lungs: no respiratory distress, or pursed lip breathing  Heart: No obvious jugular venous distension present  Abdomen: nondistended  Musculoskeltal: extremities normal, no peripheral edema  Skin: no suspicious lesions or rashes  Neuro: Alert, oriented, speech and mentation normal  Psych: affect and mood normal  Gait: Normal  : deferred    Component      Latest Ref Rng 4/23/2024  9:06 AM   Color Urine      Colorless, Straw, Light Yellow, Yellow  Yellow    Appearance Urine      Clear  Clear    Glucose Urine      Negative mg/dL Negative    Bilirubin Urine      Negative  Negative    Ketones Urine      Negative mg/dL Negative    Specific Gravity Urine      1.003 - 1.035  1.025    Blood Urine      Negative  Negative    pH Urine      5.0 - 7.0  5.5  "   Protein Albumin Urine      Negative mg/dL Negative    Urobilinogen Urine      0.2, 1.0 E.U./dL 0.2    Nitrite Urine      Negative  Negative    Leukocyte Esterase Urine      Negative  Negative         PSA PSA Diag Urologic Phys PSA Tumor Marker   Latest Ref Rng 0.00 - 4.00 ug/L 0.00 - 4.00 ng/mL 0.00 - 6.50 ng/mL   7/3/2007  2:30 PM 3.94      1/9/2008  9:17 AM 4.57 (H)      1/7/2009  8:33 AM 7.96 (H)      1/7/2010  9:23 AM 4.19 (H)      9/15/2010  11:56 AM 3.55      8/17/2011  9:57 AM 4.50 (H)      7/30/2012  10:26 AM 3.27      9/9/2014  9:11 AM 4.27 (H)      4/3/2017  8:42 AM 3.00      7/21/2017  11:30 AM  1.90     7/24/2018  4:28 PM 1.92      8/21/2019  8:58 AM 2.12      8/7/2020  11:06 AM 2.72      9/29/2021  2:13 PM 4.73 (H)      4/5/2022  8:26 AM 6.71 (H)      10/3/2022  8:25 AM 7.57 (H)      4/3/2023  8:27 AM   2.47    3/22/2024  3:48 PM   1.71       Legend:  (H) High    ASSESSMENT and PLAN  74 year old male who presents with a history of BPH with weak stream, + FH prostate cancer, rising PSA with multiple negative biopsies in the past     Re: h/o elevated PSA and family history of prostate cancer, his PSA today is 1.71 ng/ml which is low even after accounting for long term finasteride use. Defer AGGIE today.    Re: BPH with weak stream, he is emptying well with low PVR. Discussed with the patient bladder outlet procedures including REzum (his prostate is on the large size for REzum), Greenlight laser PVP, Aquablation. He is a  season ends in ~June.    Risks of Aquablation including bleeding, infection, dysuria, low chance of retrograde ejaculation. Plan to keep patient overnight in the hospital     - refill finasteride and tamsulosin for now, but presumably will be able to stop after bladder outlet procedure  - schedule Aquablation (tentatively in June after golf season)        Bijan Childress MD   [facility]  [facility] Phone: [phone]

## 2024-04-23 NOTE — NURSING NOTE
Chief Complaint   Patient presents with    Benign Prostatic Hypertrophy     PVR: 15 mL by bladder scan    Nakia Jay, EMT

## 2024-05-02 ENCOUNTER — ANCILLARY PROCEDURE (OUTPATIENT)
Dept: CARDIOLOGY | Facility: CLINIC | Age: 74
End: 2024-05-02
Attending: INTERNAL MEDICINE
Payer: COMMERCIAL

## 2024-05-02 DIAGNOSIS — I49.5 SSS (SICK SINUS SYNDROME) (H): ICD-10-CM

## 2024-05-02 DIAGNOSIS — Z95.0 CARDIAC PACEMAKER IN SITU: ICD-10-CM

## 2024-05-02 PROCEDURE — 93294 REM INTERROG EVL PM/LDLS PM: CPT | Performed by: INTERNAL MEDICINE

## 2024-05-02 PROCEDURE — 93296 REM INTERROG EVL PM/IDS: CPT | Performed by: INTERNAL MEDICINE

## 2024-05-04 LAB
MDC_IDC_EPISODE_DTM: NORMAL
MDC_IDC_EPISODE_ID: NORMAL
MDC_IDC_EPISODE_TYPE: NORMAL
MDC_IDC_LEAD_CONNECTION_STATUS: NORMAL
MDC_IDC_LEAD_CONNECTION_STATUS: NORMAL
MDC_IDC_LEAD_IMPLANT_DT: NORMAL
MDC_IDC_LEAD_IMPLANT_DT: NORMAL
MDC_IDC_LEAD_LOCATION: NORMAL
MDC_IDC_LEAD_LOCATION: NORMAL
MDC_IDC_LEAD_LOCATION_DETAIL_1: NORMAL
MDC_IDC_LEAD_LOCATION_DETAIL_1: NORMAL
MDC_IDC_LEAD_MFG: NORMAL
MDC_IDC_LEAD_MFG: NORMAL
MDC_IDC_LEAD_MODEL: NORMAL
MDC_IDC_LEAD_MODEL: NORMAL
MDC_IDC_LEAD_POLARITY_TYPE: NORMAL
MDC_IDC_LEAD_POLARITY_TYPE: NORMAL
MDC_IDC_LEAD_SERIAL: NORMAL
MDC_IDC_LEAD_SERIAL: NORMAL
MDC_IDC_MSMT_BATTERY_DTM: NORMAL
MDC_IDC_MSMT_BATTERY_REMAINING_LONGEVITY: 138 MO
MDC_IDC_MSMT_BATTERY_REMAINING_PERCENTAGE: 100 %
MDC_IDC_MSMT_BATTERY_STATUS: NORMAL
MDC_IDC_MSMT_LEADCHNL_RA_IMPEDANCE_VALUE: 872 OHM
MDC_IDC_MSMT_LEADCHNL_RA_PACING_THRESHOLD_AMPLITUDE: 0.3 V
MDC_IDC_MSMT_LEADCHNL_RA_PACING_THRESHOLD_PULSEWIDTH: 0.4 MS
MDC_IDC_MSMT_LEADCHNL_RV_IMPEDANCE_VALUE: 617 OHM
MDC_IDC_MSMT_LEADCHNL_RV_PACING_THRESHOLD_AMPLITUDE: 0.7 V
MDC_IDC_MSMT_LEADCHNL_RV_PACING_THRESHOLD_PULSEWIDTH: 0.4 MS
MDC_IDC_PG_IMPLANT_DTM: NORMAL
MDC_IDC_PG_MFG: NORMAL
MDC_IDC_PG_MODEL: NORMAL
MDC_IDC_PG_SERIAL: NORMAL
MDC_IDC_PG_TYPE: NORMAL
MDC_IDC_SESS_CLINIC_NAME: NORMAL
MDC_IDC_SESS_DTM: NORMAL
MDC_IDC_SESS_TYPE: NORMAL
MDC_IDC_SET_BRADY_AT_MODE_SWITCH_MODE: NORMAL
MDC_IDC_SET_BRADY_AT_MODE_SWITCH_RATE: 170 {BEATS}/MIN
MDC_IDC_SET_BRADY_LOWRATE: 60 {BEATS}/MIN
MDC_IDC_SET_BRADY_MAX_SENSOR_RATE: 130 {BEATS}/MIN
MDC_IDC_SET_BRADY_MAX_TRACKING_RATE: 130 {BEATS}/MIN
MDC_IDC_SET_BRADY_MODE: NORMAL
MDC_IDC_SET_BRADY_PAV_DELAY_HIGH: 150 MS
MDC_IDC_SET_BRADY_PAV_DELAY_LOW: 200 MS
MDC_IDC_SET_BRADY_SAV_DELAY_HIGH: 150 MS
MDC_IDC_SET_BRADY_SAV_DELAY_LOW: 200 MS
MDC_IDC_SET_LEADCHNL_RA_PACING_AMPLITUDE: 2 V
MDC_IDC_SET_LEADCHNL_RA_PACING_CAPTURE_MODE: NORMAL
MDC_IDC_SET_LEADCHNL_RA_PACING_POLARITY: NORMAL
MDC_IDC_SET_LEADCHNL_RA_PACING_PULSEWIDTH: 0.4 MS
MDC_IDC_SET_LEADCHNL_RA_SENSING_ADAPTATION_MODE: NORMAL
MDC_IDC_SET_LEADCHNL_RA_SENSING_POLARITY: NORMAL
MDC_IDC_SET_LEADCHNL_RA_SENSING_SENSITIVITY: 0.25 MV
MDC_IDC_SET_LEADCHNL_RV_PACING_AMPLITUDE: 1.2 V
MDC_IDC_SET_LEADCHNL_RV_PACING_CAPTURE_MODE: NORMAL
MDC_IDC_SET_LEADCHNL_RV_PACING_POLARITY: NORMAL
MDC_IDC_SET_LEADCHNL_RV_PACING_PULSEWIDTH: 0.4 MS
MDC_IDC_SET_LEADCHNL_RV_SENSING_ADAPTATION_MODE: NORMAL
MDC_IDC_SET_LEADCHNL_RV_SENSING_POLARITY: NORMAL
MDC_IDC_SET_LEADCHNL_RV_SENSING_SENSITIVITY: 1.5 MV
MDC_IDC_SET_ZONE_DETECTION_INTERVAL: 375 MS
MDC_IDC_SET_ZONE_STATUS: NORMAL
MDC_IDC_SET_ZONE_TYPE: NORMAL
MDC_IDC_SET_ZONE_VENDOR_TYPE: NORMAL
MDC_IDC_STAT_AT_BURDEN_PERCENT: 0 %
MDC_IDC_STAT_AT_DTM_END: NORMAL
MDC_IDC_STAT_AT_DTM_START: NORMAL
MDC_IDC_STAT_BRADY_DTM_END: NORMAL
MDC_IDC_STAT_BRADY_DTM_START: NORMAL
MDC_IDC_STAT_BRADY_RA_PERCENT_PACED: 94 %
MDC_IDC_STAT_BRADY_RV_PERCENT_PACED: 99 %
MDC_IDC_STAT_EPISODE_RECENT_COUNT: 0
MDC_IDC_STAT_EPISODE_RECENT_COUNT_DTM_END: NORMAL
MDC_IDC_STAT_EPISODE_RECENT_COUNT_DTM_START: NORMAL
MDC_IDC_STAT_EPISODE_TYPE: NORMAL
MDC_IDC_STAT_EPISODE_VENDOR_TYPE: NORMAL
MDC_IDC_STAT_EPISODE_VENDOR_TYPE: NORMAL

## 2024-05-08 DIAGNOSIS — E78.00 HYPERCHOLESTEREMIA: ICD-10-CM

## 2024-05-15 ENCOUNTER — TELEPHONE (OUTPATIENT)
Dept: CARDIOLOGY | Facility: CLINIC | Age: 74
End: 2024-05-15
Payer: COMMERCIAL

## 2024-05-15 DIAGNOSIS — E78.00 HYPERCHOLESTEREMIA: Primary | ICD-10-CM

## 2024-05-15 NOTE — TELEPHONE ENCOUNTER
Message from BV team:  Deloris Emery CMA  P Enriqueta [facility] Heart Team 2  Pt has lab on 5/16. Lab was accidentally released, please re-order lipid    Thanks!    Order for lipids updated.

## 2024-05-16 ENCOUNTER — LAB (OUTPATIENT)
Dept: LAB | Facility: CLINIC | Age: 74
End: 2024-05-16
Attending: INTERNAL MEDICINE
Payer: COMMERCIAL

## 2024-05-16 DIAGNOSIS — E78.00 HYPERCHOLESTEREMIA: ICD-10-CM

## 2024-05-16 LAB
CHOLEST SERPL-MCNC: 113 MG/DL
FASTING STATUS PATIENT QL REPORTED: YES
HDLC SERPL-MCNC: 35 MG/DL
LDLC SERPL CALC-MCNC: 43 MG/DL
NONHDLC SERPL-MCNC: 78 MG/DL
TRIGL SERPL-MCNC: 174 MG/DL

## 2024-05-16 PROCEDURE — 80061 LIPID PANEL: CPT | Performed by: INTERNAL MEDICINE

## 2024-05-16 PROCEDURE — 36415 COLL VENOUS BLD VENIPUNCTURE: CPT | Performed by: INTERNAL MEDICINE

## 2024-05-20 NOTE — PROGRESS NOTES
HISTORY OF PRESENT ILLNESS:     This is a 72 year old male who follows with Dr Mccartney at [facility].  His past medical history includes: Sick sinus syndrome s/p PPM, hypertension, hyperlipidemia, sleep apnea, stroke, stage III CKD, BPH     Mr Vera suffered a stroke due to a left vertebral artery occlusion and subsequently underwent stenting to his right vertebral artery stenosis (2019)       He suffered syncope (8/2020) and was found to have significant bradycardia with pauses up to 11 seconds  He subsequently underwent a dual-chamber PPM  ECHO showed LVEF 55%, borderline-mild RV dysfunction, ascending aorta size 4 cm.     ECHO (10/2022) showed LVEF 55%, mild-moderate concentric LVH, normal RV function, 1+ mitral regurgitation, RVSP 16 mmHg, ascending aorta 4.2 cm    Device interrogation (5/2/24)  94% A-paced  99% V-paced  No arrhythmias     He returns today for annual assessment and labs     Mr Vera admits to not being too active  However, he walks the golf [place] without any limitations  He denies any chest pain, significant shortness of breath, palpitations or orthopnea  He occasionally gets some ankle swelling that resolves by morning  I encouraged low salt foods  He tends to like cookies and we talked about reducing his sugar intake to help with his triglycerides  He is motivated to lose weight  He uses his BiPAP nightly but does not always feel rested  He checks his blood pressure at home and states that his BP usually stays < 140/90 mmHg                VITAL SIGNS:  BP:  152/86, 130/80 mmHg at home  Pulse:  63  Weight:  202 lbs  (BMI: 30)    Labs (5/16/24)  total cholesterol: 113  Triglycerides: 174  HDL: 35  LDL: 43        IMPRESSION AND PLAN:     Sick Sinus Syndrome s/p PPM (8/2020)  -> 90%  AV-paced  -continue device cares     Remote Stroke (2019)  -s/p right vertebral artery stenting and known occluded left vertebral artery occlusion  -on  mg/day     Hypertension:  -on Olmesartan 40  mg, Metoprolol XR 25 mg BID, Amlodipine 2.5 mg  -BP elevated here, but fairly well controlled at home  -advised him to move Olmesartan to am dosing and keep Amlodipine in evening  -recommended keeping BP on higher side due to extensive history of cerebrovascular disease   -he will call with persistent BP > 140/90 mmHg     Hyperlipidemia:  -on Crestor 20 mg  -LDL  43     Moderate Ascending Aorta Dilatation:  -4.2 cm (was 4.0 cm in 2020)  -will repeat ECHO and review results over the phone     Sleep Apnea  -uses BiPAP  -ongoing intermittent fatigue     Obesity:  -encouraged weight reduction and Mediterranean diet    The total time for the visit today was 31 minutes which includes patient visit, reviewing of records, discussion, and placing of orders of the outpatient coordination of cardiovascular care as described.  The level of medical decision making during this visit was of moderate complexity.  Thank you for allowing me to participate in their care.    The longitudinal plan of care for the diagnosis(es)/condition(s) as documented were addressed during this visit. Due to the added complexity in care, I will continue to support Harpreet in the subsequent management and with ongoing continuity of care.  Follow up in 1 year    Orders Placed This Encounter   Procedures    Echocardiogram Complete       Orders Placed This Encounter   Medications    amLODIPine (NORVASC) 2.5 MG tablet     Sig: Take 1 tablet (2.5 mg) by mouth every evening     Dispense:  90 tablet     Refill:  3    olmesartan (BENICAR) 40 MG tablet     Sig: Take 1 tablet (40 mg) by mouth every morning     Dispense:  90 tablet     Refill:  3    rosuvastatin (CRESTOR) 20 MG tablet     Sig: Take 1 tablet (20 mg) by mouth at bedtime     Dispense:  90 tablet     Refill:  3    metoprolol succinate ER (TOPROL XL) 25 MG 24 hr tablet     Sig: Take 1 tablet (25 mg) by mouth 2 times daily     Dispense:  180 tablet     Refill:  3       Medications Discontinued During  This Encounter   Medication Reason    metoprolol succinate ER (TOPROL XL) 25 MG 24 hr tablet Reorder (No AVS)    amLODIPine (NORVASC) 2.5 MG tablet     rosuvastatin (CRESTOR) 20 MG tablet Reorder (No AVS)    olmesartan (BENICAR) 40 MG tablet          Encounter Diagnoses   Name Primary?    Hypercholesteremia     SSS (sick sinus syndrome) (H) Yes    Essential hypertension, benign     Dilatation of thoracic aorta (H24)     Cerebrovascular disease        CURRENT MEDICATIONS:  Current Outpatient Medications   Medication Sig Dispense Refill    acetaminophen (TYLENOL) 650 MG CR tablet Take 650 mg by mouth every 8 hours as needed for mild pain or fever      alfuzosin ER (UROXATRAL) 10 MG 24 hr tablet Take 1 tablet (10 mg) by mouth daily 90 tablet 3    allopurinol (ZYLOPRIM) 100 MG tablet Take 200 mg by mouth every evening (2 x 100mg)      amLODIPine (NORVASC) 2.5 MG tablet Take 1 tablet (2.5 mg) by mouth every evening 90 tablet 3    aspirin - buffered (ASCRIPTIN) 325 MG TABS tablet Take 325 mg by mouth every evening      finasteride (PROSCAR) 5 MG tablet Take 1 tablet (5 mg) by mouth daily 90 tablet 3    metoprolol succinate ER (TOPROL XL) 25 MG 24 hr tablet Take 1 tablet (25 mg) by mouth 2 times daily 180 tablet 3    olmesartan (BENICAR) 40 MG tablet Take 1 tablet (40 mg) by mouth every morning 90 tablet 3    omeprazole (PRILOSEC) 20 MG DR capsule Take 1 capsule (20 mg) by mouth 2 times daily (Take 1 capsule 1-2x daily if needed) 180 capsule 0    rosuvastatin (CRESTOR) 20 MG tablet Take 1 tablet (20 mg) by mouth at bedtime 90 tablet 3    vitamin D3 (CHOLECALCIFEROL) 50 mcg (2000 units) tablet Take 1 tablet by mouth every evening Taking 1000 units daily      multivitamin w/minerals (THERA-VIT-M) tablet Take 1 tablet by mouth every evening (Patient not taking: Reported on 3/22/2024)         ALLERGIES   No Known Allergies    PAST MEDICAL HISTORY:  Past Medical History:   Diagnosis Date    Benign neoplasm of colon     Brachial  "plexopathy     Chronic infection 2007    HX of C Diff    Hypertension     Mumps     MAN (obstructive sleep apnea)     Presence of permanent cardiac pacemaker     Sinus node dysfunction (H)     with syncope    Sleep apnea     Bi-PAP    Syncope        PAST SURGICAL HISTORY:  Past Surgical History:   Procedure Laterality Date    ARTHROSCOPY SHOULDER Right 12/11/2020    Procedure: RIGHT SHOULDER ARTHROSCOPY, ARTHROSCOPIC SUBACROMIAL DECOMPRESSION, DISTAL CLAVICLE AND BICEPS TENOTOMY;  Surgeon: Joseph Moon MD;  Location:  OR    CHOLECYSTECTOMY      COLONOSCOPY      COLONOSCOPY N/A 4/5/2017    Procedure: COMBINED COLONOSCOPY, SINGLE OR MULTIPLE BIOPSY/POLYPECTOMY BY BIOPSY;  Surgeon: Tylor Rice MD;  Location:  GI    COLONOSCOPY N/A 4/1/2022    Procedure: COLONOSCOPY (FV);  Surgeon: Tylor Rice MD;  Location:  GI    DECOMPRESSION, FUSION CERVICAL ANTERIOR ONE LEVEL, COMBINED N/A 9/9/2016    Procedure: COMBINED DECOMPRESSION, FUSION CERVICAL ANTERIOR ONE LEVEL;  Surgeon: Erick Valles MD;  Location:  OR    ENT SURGERY      tonsilectomy  as a child    EP PACEMAKER N/A 8/21/2020    Procedure: EP Pacemaker;  Surgeon: Alfonso Sigala MD;  Location:  HEART CARDIAC CATH LAB    IR CAROTID CEREBRAL ANGIOGRAM BILATERAL  1/6/2020    IR CAROTID CEREBRAL ANGIOGRAM BILATERAL  7/1/2020    IR DISCONTINUE SHEATH  1/6/2020    ORTHOPEDIC SURGERY      ambar knees scopedrt shoulder,fx rt arm fx    TESTICLE SURGERY      VASECTOMY      [facility] NONSPECIFIC PROCEDURE      Right clavicle fracture, Multiple right sided rib fracture, hairline fracture \"pelvis\", secondary to fall from tree        Z NONSPECIFIC PROCEDURE      Right ulnar/radial fracture        FAMILY HISTORY:  Family History   Problem Relation Age of Onset    Arthritis Mother     Eye Disorder Mother         cateracts    Lipids Mother     Colorectal Cancer Mother     Cancer Father         colon/prostate    Eye Disorder Father         cateracts    " "Lipids Father     Diabetes Father     Prostate Cancer Brother     Colon Cancer Brother 74        liver mets    Kidney Cancer Brother     Prostate Cancer Brother     Cancer Maternal Aunt         colon    Cancer Maternal Uncle         colon       SOCIAL HISTORY:  Social History     Socioeconomic History    Marital status:      Spouse name: None    Number of children: None    Years of education: None    Highest education level: None   Tobacco Use    Smoking status: Never    Smokeless tobacco: Never   Vaping Use    Vaping status: Never Used   Substance and Sexual Activity    Alcohol use: Yes     Alcohol/week: 1.0 standard drink of alcohol     Types: 1 Glasses of wine per week     Comment: 1-2 weekly    Drug use: No    Sexual activity: Yes     Partners: Female     Social Determinants of Health     Interpersonal Safety: Low Risk  (3/22/2024)    Interpersonal Safety     Do you feel physically and emotionally safe where you currently live?: Yes     Within the past 12 months, have you been hit, slapped, kicked or otherwise physically hurt by someone?: No     Within the past 12 months, have you been humiliated or emotionally abused in other ways by your partner or ex-partner?: No       Review of Systems:  Skin:  not assessed       Eyes:  Positive for glasses    ENT:  Positive for hearing loss wears hearing aids  Respiratory:  Positive for sleep apnea wears BIPAP at night   Cardiovascular:  Negative      Gastroenterology: not assessed      Genitourinary:  not assessed      Musculoskeletal:  not assessed      Neurologic:  not assessed      Psychiatric:  not assessed      Heme/Lymph/Imm:  not assessed      Endocrine:  not assessed        Physical Exam:  Vitals: /80   Pulse 63   Ht 1.727 m (5' 8\")   Wt 92 kg (202 lb 14.4 oz)   SpO2 96%   BMI 30.85 kg/m      Constitutional:  cooperative obese      Skin:  warm and dry to the touch   pacemaker incision in the left infraclavicular area was well-healed      Head:  " normocephalic        Eyes:  pupils equal and round        Lymph:      ENT:  no pallor or cyanosis hearing aide(s) present      Neck:  JVP normal        Respiratory:  clear to auscultation;normal respiratory excursion         Cardiac: regular rhythm     no presence of murmur          pulses full and equal                                        GI:    obese      Extremities and Muscular Skeletal:  no edema              Neurological:  no gross motor deficits        Psych:  Alert and Oriented x 3          CC  Link Mccartney MD  [address]  [place] [address]

## 2024-05-21 ENCOUNTER — OFFICE VISIT (OUTPATIENT)
Dept: CARDIOLOGY | Facility: CLINIC | Age: 74
End: 2024-05-21
Payer: COMMERCIAL

## 2024-05-21 VITALS
WEIGHT: 202.9 LBS | BODY MASS INDEX: 30.75 KG/M2 | HEART RATE: 63 BPM | HEIGHT: 68 IN | SYSTOLIC BLOOD PRESSURE: 130 MMHG | OXYGEN SATURATION: 96 % | DIASTOLIC BLOOD PRESSURE: 80 MMHG

## 2024-05-21 DIAGNOSIS — E78.00 HYPERCHOLESTEREMIA: ICD-10-CM

## 2024-05-21 DIAGNOSIS — I67.9 CEREBROVASCULAR DISEASE: ICD-10-CM

## 2024-05-21 DIAGNOSIS — I49.5 SSS (SICK SINUS SYNDROME) (H): Primary | ICD-10-CM

## 2024-05-21 DIAGNOSIS — I10 ESSENTIAL HYPERTENSION, BENIGN: ICD-10-CM

## 2024-05-21 DIAGNOSIS — I77.810 DILATATION OF THORACIC AORTA (H): ICD-10-CM

## 2024-05-21 PROCEDURE — G2211 COMPLEX E/M VISIT ADD ON: HCPCS | Performed by: NURSE PRACTITIONER

## 2024-05-21 PROCEDURE — 99214 OFFICE O/P EST MOD 30 MIN: CPT | Performed by: NURSE PRACTITIONER

## 2024-05-21 RX ORDER — AMLODIPINE BESYLATE 2.5 MG/1
2.5 TABLET ORAL EVERY EVENING
Qty: 90 TABLET | Refills: 3 | Status: SHIPPED | OUTPATIENT
Start: 2024-05-21

## 2024-05-21 RX ORDER — OLMESARTAN MEDOXOMIL 40 MG/1
40 TABLET ORAL EVERY MORNING
Qty: 90 TABLET | Refills: 3 | Status: SHIPPED | OUTPATIENT
Start: 2024-05-21

## 2024-05-21 RX ORDER — METOPROLOL SUCCINATE 25 MG/1
25 TABLET, EXTENDED RELEASE ORAL 2 TIMES DAILY
Qty: 180 TABLET | Refills: 3 | Status: SHIPPED | OUTPATIENT
Start: 2024-05-21

## 2024-05-21 RX ORDER — ROSUVASTATIN CALCIUM 20 MG/1
20 TABLET, COATED ORAL AT BEDTIME
Qty: 90 TABLET | Refills: 3 | Status: SHIPPED | OUTPATIENT
Start: 2024-05-21

## 2024-05-21 NOTE — LETTER
5/21/2024    Lucía Sandhu MD  [address] [facility] [address]  [facility] [address]    RE: Harpreet Vera       Dear Colleague,     I had the pleasure of seeing Harpreet Vera in the [facility].  HISTORY OF PRESENT ILLNESS:     This is a 72 year old male who follows with Dr Mccartney at [facility].  His past medical history includes: Sick sinus syndrome s/p PPM, hypertension, hyperlipidemia, sleep apnea, stroke, stage III CKD, BPH     Mr Vera suffered a stroke due to a left vertebral artery occlusion and subsequently underwent stenting to his right vertebral artery stenosis (2019)       He suffered syncope (8/2020) and was found to have significant bradycardia with pauses up to 11 seconds  He subsequently underwent a dual-chamber PPM  ECHO showed LVEF 55%, borderline-mild RV dysfunction, ascending aorta size 4 cm.     ECHO (10/2022) showed LVEF 55%, mild-moderate concentric LVH, normal RV function, 1+ mitral regurgitation, RVSP 16 mmHg, ascending aorta 4.2 cm    Device interrogation (5/2/24)  94% A-paced  99% V-paced  No arrhythmias     He returns today for annual assessment and labs     Mr Vera admits to not being too active  However, he walks [place] golf [place] without any limitations  He denies any chest pain, significant shortness of breath, palpitations or orthopnea  He occasionally gets some ankle swelling that resolves by morning  I encouraged low salt foods  He tends to like cookies and we talked about reducing his sugar intake to help with his triglycerides  He is motivated to lose weight  He uses his BiPAP nightly but does not always feel rested  He checks his blood pressure at home and states that his BP usually stays < 140/90 mmHg                VITAL SIGNS:  BP:  152/86, 130/80 mmHg at home  Pulse:  63  Weight:  202 lbs  (BMI: 30)    Labs (5/16/24)  total cholesterol: 113  Triglycerides: 174  HDL: 35  LDL: 43        IMPRESSION AND PLAN:     Sick Sinus Syndrome s/p PPM  (8/2020)  -> 90%  AV-paced  -continue device cares     Remote Stroke (2019)  -s/p right vertebral artery stenting and known occluded left vertebral artery occlusion  -on  mg/day     Hypertension:  -on Olmesartan 40 mg, Metoprolol XR 25 mg BID, Amlodipine 2.5 mg  -BP elevated here, but fairly well controlled at home  -advised him to move Olmesartan to am dosing and keep Amlodipine in evening  -recommended keeping BP on higher side due to extensive history of cerebrovascular disease   -he will call with persistent BP > 140/90 mmHg     Hyperlipidemia:  -on Crestor 20 mg  -LDL  43     Moderate Ascending Aorta Dilatation:  -4.2 cm (was 4.0 cm in 2020)  -will repeat ECHO and review results over the phone     Sleep Apnea  -uses BiPAP  -ongoing intermittent fatigue     Obesity:  -encouraged weight reduction and Mediterranean diet    The total time for the visit today was 31 minutes which includes patient visit, reviewing of records, discussion, and placing of orders of the outpatient coordination of cardiovascular care as described.  The level of medical decision making during this visit was of moderate complexity.  Thank you for allowing me to participate in their care.    The longitudinal plan of care for the diagnosis(es)/condition(s) as documented were addressed during this visit. Due to the added complexity in care, I will continue to support Harpreet in the subsequent management and with ongoing continuity of care.  Follow up as planned in 6 months    Orders Placed This Encounter   Procedures    Echocardiogram Complete       Orders Placed This Encounter   Medications    amLODIPine (NORVASC) 2.5 MG tablet     Sig: Take 1 tablet (2.5 mg) by mouth every evening     Dispense:  90 tablet     Refill:  3    olmesartan (BENICAR) 40 MG tablet     Sig: Take 1 tablet (40 mg) by mouth every morning     Dispense:  90 tablet     Refill:  3    rosuvastatin (CRESTOR) 20 MG tablet     Sig: Take 1 tablet (20 mg) by mouth at bedtime      Dispense:  90 tablet     Refill:  3    metoprolol succinate ER (TOPROL XL) 25 MG 24 hr tablet     Sig: Take 1 tablet (25 mg) by mouth 2 times daily     Dispense:  180 tablet     Refill:  3       Medications Discontinued During This Encounter   Medication Reason    metoprolol succinate ER (TOPROL XL) 25 MG 24 hr tablet Reorder (No AVS)    amLODIPine (NORVASC) 2.5 MG tablet     rosuvastatin (CRESTOR) 20 MG tablet Reorder (No AVS)    olmesartan (BENICAR) 40 MG tablet          Encounter Diagnoses   Name Primary?    Hypercholesteremia     SSS (sick sinus syndrome) (H) Yes    Essential hypertension, benign     Dilatation of thoracic aorta (H24)     Cerebrovascular disease        CURRENT MEDICATIONS:  Current Outpatient Medications   Medication Sig Dispense Refill    acetaminophen (TYLENOL) 650 MG CR tablet Take 650 mg by mouth every 8 hours as needed for mild pain or fever      alfuzosin ER (UROXATRAL) 10 MG 24 hr tablet Take 1 tablet (10 mg) by mouth daily 90 tablet 3    allopurinol (ZYLOPRIM) 100 MG tablet Take 200 mg by mouth every evening (2 x 100mg)      amLODIPine (NORVASC) 2.5 MG tablet Take 1 tablet (2.5 mg) by mouth every evening 90 tablet 3    aspirin - buffered (ASCRIPTIN) 325 MG TABS tablet Take 325 mg by mouth every evening      finasteride (PROSCAR) 5 MG tablet Take 1 tablet (5 mg) by mouth daily 90 tablet 3    metoprolol succinate ER (TOPROL XL) 25 MG 24 hr tablet Take 1 tablet (25 mg) by mouth 2 times daily 180 tablet 3    olmesartan (BENICAR) 40 MG tablet Take 1 tablet (40 mg) by mouth every morning 90 tablet 3    omeprazole (PRILOSEC) 20 MG DR capsule Take 1 capsule (20 mg) by mouth 2 times daily (Take 1 capsule 1-2x daily if needed) 180 capsule 0    rosuvastatin (CRESTOR) 20 MG tablet Take 1 tablet (20 mg) by mouth at bedtime 90 tablet 3    vitamin D3 (CHOLECALCIFEROL) 50 mcg (2000 units) tablet Take 1 tablet by mouth every evening Taking 1000 units daily      multivitamin w/minerals (THERA-VIT-M)  "tablet Take 1 tablet by mouth every evening (Patient not taking: Reported on 3/22/2024)         ALLERGIES   No Known Allergies    PAST MEDICAL HISTORY:  Past Medical History:   Diagnosis Date    Benign neoplasm of colon     Brachial plexopathy     Chronic infection 2007    HX of C Diff    Hypertension     Mumps     MAN (obstructive sleep apnea)     Presence of permanent cardiac pacemaker     Sinus node dysfunction (H)     with syncope    Sleep apnea     Bi-PAP    Syncope        PAST SURGICAL HISTORY:  Past Surgical History:   Procedure Laterality Date    ARTHROSCOPY SHOULDER Right 12/11/2020    Procedure: RIGHT SHOULDER ARTHROSCOPY, ARTHROSCOPIC SUBACROMIAL DECOMPRESSION, DISTAL CLAVICLE AND BICEPS TENOTOMY;  Surgeon: Joseph Moon MD;  Location:  OR    CHOLECYSTECTOMY      COLONOSCOPY      COLONOSCOPY N/A 4/5/2017    Procedure: COMBINED COLONOSCOPY, SINGLE OR MULTIPLE BIOPSY/POLYPECTOMY BY BIOPSY;  Surgeon: Tylor Rice MD;  Location:  GI    COLONOSCOPY N/A 4/1/2022    Procedure: COLONOSCOPY (FV);  Surgeon: Tylor Rice MD;  Location:  GI    DECOMPRESSION, FUSION CERVICAL ANTERIOR ONE LEVEL, COMBINED N/A 9/9/2016    Procedure: COMBINED DECOMPRESSION, FUSION CERVICAL ANTERIOR ONE LEVEL;  Surgeon: Erick Valles MD;  Location:  OR    ENT SURGERY      tonsilectomy  as a child    EP PACEMAKER N/A 8/21/2020    Procedure: EP Pacemaker;  Surgeon: Alfonso Sigala MD;  Location:  HEART CARDIAC CATH LAB    IR CAROTID CEREBRAL ANGIOGRAM BILATERAL  1/6/2020    IR CAROTID CEREBRAL ANGIOGRAM BILATERAL  7/1/2020    IR DISCONTINUE SHEATH  1/6/2020    ORTHOPEDIC SURGERY      ambar knees scopedrt shoulder,fx rt arm fx    TESTICLE SURGERY      VASECTOMY      [facility] NONSPECIFIC PROCEDURE      Right clavicle fracture, Multiple right sided rib fracture, hairline fracture \"pelvis\", secondary to fall from tree        ZZC NONSPECIFIC PROCEDURE      Right ulnar/radial fracture        FAMILY HISTORY:  Family " History   Problem Relation Age of Onset    Arthritis Mother     Eye Disorder Mother         cateracts    Lipids Mother     Colorectal Cancer Mother     Cancer Father         colon/prostate    Eye Disorder Father         cateracts    Lipids Father     Diabetes Father     Prostate Cancer Brother     Colon Cancer Brother 74        liver mets    Kidney Cancer Brother     Prostate Cancer Brother     Cancer Maternal Aunt         colon    Cancer Maternal Uncle         colon       SOCIAL HISTORY:  Social History     Socioeconomic History    Marital status:      Spouse name: None    Number of children: None    Years of education: None    Highest education level: None   Tobacco Use    Smoking status: Never    Smokeless tobacco: Never   Vaping Use    Vaping status: Never Used   Substance and Sexual Activity    Alcohol use: Yes     Alcohol/week: 1.0 standard drink of alcohol     Types: 1 Glasses of wine per week     Comment: 1-2 weekly    Drug use: No    Sexual activity: Yes     Partners: Female     Social Determinants of Health     Interpersonal Safety: Low Risk  (3/22/2024)    Interpersonal Safety     Do you feel physically and emotionally safe where you currently live?: Yes     Within the past 12 months, have you been hit, slapped, kicked or otherwise physically hurt by someone?: No     Within the past 12 months, have you been humiliated or emotionally abused in other ways by your partner or ex-partner?: No       Review of Systems:  Skin:  not assessed       Eyes:  Positive for glasses    ENT:  Positive for hearing loss wears hearing aids  Respiratory:  Positive for sleep apnea wears BIPAP at night   Cardiovascular:  Negative      Gastroenterology: not assessed      Genitourinary:  not assessed      Musculoskeletal:  not assessed      Neurologic:  not assessed      Psychiatric:  not assessed      Heme/Lymph/Imm:  not assessed      Endocrine:  not assessed        Physical Exam:  Vitals: /80   Pulse 63   Ht  "1.727 m (5' 8\")   Wt 92 kg (202 lb 14.4 oz)   SpO2 96%   BMI 30.85 kg/m      Constitutional:  cooperative obese      Skin:  warm and dry to the touch   pacemaker incision in the left infraclavicular area was well-healed      Head:  normocephalic        Eyes:  pupils equal and round        Lymph:      ENT:  no pallor or cyanosis hearing aide(s) present      Neck:  JVP normal        Respiratory:  clear to auscultation;normal respiratory excursion         Cardiac: regular rhythm     no presence of murmur          pulses full and equal                                        GI:    obese      Extremities and Muscular Skeletal:  no edema              Neurological:  no gross motor deficits        Psych:  Alert and Oriented x 3          CC  Link Mccartney MD  [address]  [place] [address]      Thank you for allowing me to participate in the care of your patient.      Sincerely,     SALVATORE Echevarria CNP     [facility]  "

## 2024-05-21 NOTE — PATIENT INSTRUCTIONS
Arrange ECHO and we will call you with the results  Move your Olmesartan to morning dosing and keep Amlodipine in evening dosing  Call with persistent BP > 140/90 mmhg    It was a pleasure seeing you today     Please do not hesitate to call my nurse team with any questions or concerns:  [phone]    Scheduling number:  [phone]    SALVATORE Davis, CNP

## 2024-06-03 ENCOUNTER — TELEPHONE (OUTPATIENT)
Dept: CARDIOLOGY | Facility: CLINIC | Age: 74
End: 2024-06-03
Payer: COMMERCIAL

## 2024-06-03 NOTE — TELEPHONE ENCOUNTER
VM received from patient, stating that he has been having some higher BP readings over the last few days. Contacted patient to review further. Patient states that he's been having some more elevated readings over the last week or so. Inquired if patient is taking his BP at least 1-2 hours after medications. Patient states that he hasn't been. Advised patient to wait at least 1-2 hours after medication has been taken to take his BP readings, and then report to us by the end of the week on how his readings are. Patient verbalized understanding and agreed with plan of care.

## 2024-06-13 ENCOUNTER — DOCUMENTATION ONLY (OUTPATIENT)
Dept: CARDIOLOGY | Facility: CLINIC | Age: 74
End: 2024-06-13

## 2024-06-13 DIAGNOSIS — Z79.899 ENCOUNTER FOR LONG-TERM (CURRENT) USE OF HIGH-RISK MEDICATION: Primary | ICD-10-CM

## 2024-06-13 DIAGNOSIS — M10.9 GOUT, UNSPECIFIED CAUSE, UNSPECIFIED CHRONICITY, UNSPECIFIED SITE: ICD-10-CM

## 2024-06-13 NOTE — PROGRESS NOTES
Patient has lab only appt TODAY 6/13/24 @ 2:30. I am a bit confused as to what labs we should be doing. Lipids were just done on 5/16/24 and BMP has an expected date of 5/21/25.  Please clarify!      Thanks,   [facility] lab

## 2024-06-17 ENCOUNTER — LAB (OUTPATIENT)
Dept: LAB | Facility: CLINIC | Age: 74
End: 2024-06-17
Attending: NURSE PRACTITIONER
Payer: COMMERCIAL

## 2024-06-17 DIAGNOSIS — Z79.899 ENCOUNTER FOR LONG-TERM (CURRENT) USE OF HIGH-RISK MEDICATION: ICD-10-CM

## 2024-06-17 DIAGNOSIS — M10.9 GOUT, UNSPECIFIED CAUSE, UNSPECIFIED CHRONICITY, UNSPECIFIED SITE: ICD-10-CM

## 2024-06-17 LAB
ALBUMIN SERPL BCG-MCNC: 4.5 G/DL (ref 3.5–5.2)
ALT SERPL W P-5'-P-CCNC: 23 U/L (ref 0–70)
AST SERPL W P-5'-P-CCNC: 24 U/L (ref 0–45)
CREAT SERPL-MCNC: 1.18 MG/DL (ref 0.67–1.17)
EGFRCR SERPLBLD CKD-EPI 2021: 64 ML/MIN/1.73M2
ERYTHROCYTE [DISTWIDTH] IN BLOOD BY AUTOMATED COUNT: 12.3 % (ref 10–15)
HCT VFR BLD AUTO: 37.8 % (ref 40–53)
HGB BLD-MCNC: 13.6 G/DL (ref 13.3–17.7)
MCH RBC QN AUTO: 31.9 PG (ref 26.5–33)
MCHC RBC AUTO-ENTMCNC: 36 G/DL (ref 31.5–36.5)
MCV RBC AUTO: 89 FL (ref 78–100)
PLATELET # BLD AUTO: 147 10E3/UL (ref 150–450)
RBC # BLD AUTO: 4.27 10E6/UL (ref 4.4–5.9)
URATE SERPL-MCNC: 5.3 MG/DL (ref 3.4–7)
WBC # BLD AUTO: 5.7 10E3/UL (ref 4–11)

## 2024-06-17 PROCEDURE — 84550 ASSAY OF BLOOD/URIC ACID: CPT

## 2024-06-17 PROCEDURE — 82565 ASSAY OF CREATININE: CPT

## 2024-06-17 PROCEDURE — 85027 COMPLETE CBC AUTOMATED: CPT

## 2024-06-17 PROCEDURE — 82040 ASSAY OF SERUM ALBUMIN: CPT

## 2024-06-17 PROCEDURE — 84450 TRANSFERASE (AST) (SGOT): CPT

## 2024-06-17 PROCEDURE — 84460 ALANINE AMINO (ALT) (SGPT): CPT

## 2024-06-17 PROCEDURE — 36415 COLL VENOUS BLD VENIPUNCTURE: CPT

## 2024-07-24 ENCOUNTER — TRANSFERRED RECORDS (OUTPATIENT)
Dept: HEALTH INFORMATION MANAGEMENT | Facility: CLINIC | Age: 74
End: 2024-07-24
Payer: COMMERCIAL

## 2024-08-04 ENCOUNTER — HEALTH MAINTENANCE LETTER (OUTPATIENT)
Age: 74
End: 2024-08-04

## 2024-08-14 ENCOUNTER — OFFICE VISIT (OUTPATIENT)
Dept: INTERNAL MEDICINE | Facility: CLINIC | Age: 74
End: 2024-08-14
Payer: COMMERCIAL

## 2024-08-14 VITALS
BODY MASS INDEX: 30.16 KG/M2 | WEIGHT: 199 LBS | HEART RATE: 60 BPM | TEMPERATURE: 97.1 F | OXYGEN SATURATION: 94 % | DIASTOLIC BLOOD PRESSURE: 82 MMHG | RESPIRATION RATE: 20 BRPM | HEIGHT: 68 IN | SYSTOLIC BLOOD PRESSURE: 150 MMHG

## 2024-08-14 DIAGNOSIS — N18.31 STAGE 3A CHRONIC KIDNEY DISEASE (H): ICD-10-CM

## 2024-08-14 DIAGNOSIS — R19.7 DIARRHEA, UNSPECIFIED TYPE: Primary | ICD-10-CM

## 2024-08-14 LAB
CREAT UR-MCNC: 183 MG/DL
MICROALBUMIN UR-MCNC: 25.7 MG/L
MICROALBUMIN/CREAT UR: 14.04 MG/G CR (ref 0–17)

## 2024-08-14 PROCEDURE — 82570 ASSAY OF URINE CREATININE: CPT | Performed by: INTERNAL MEDICINE

## 2024-08-14 PROCEDURE — 99214 OFFICE O/P EST MOD 30 MIN: CPT | Performed by: INTERNAL MEDICINE

## 2024-08-14 PROCEDURE — 82043 UR ALBUMIN QUANTITATIVE: CPT | Performed by: INTERNAL MEDICINE

## 2024-08-14 NOTE — PROGRESS NOTES
Assessment & Plan     Diarrhea, unspecified type  intermittent weekly and chronic will check labs below, get colonoscopy with biopsy to rule out microscopic colitis  consider MTM consult to evaluate if medications could be a problem   - Albumin Random Urine Quantitative with Creat Ratio; Future  - Adult GI  Referral - Procedure Only; Future  - Enteric Bacteria and Virus Panel by MARYBEL Stool; Future  - Helicobacter pylori Antigen Stool; Future  - C. difficile Toxin B PCR with reflex to C. difficile Antigen and Toxins A/B EIA; Future  - Albumin Random Urine Quantitative with Creat Ratio  - Enteric Bacteria and Virus Panel by MARYBEL Stool  - Helicobacter pylori Antigen Stool  - C. difficile Toxin B PCR with reflex to C. difficile Antigen and Toxins A/B EIA    Chronic kidney disease, stage 3 (H)  Labs up to date.         Zhanna Mullins is a 74 year old, presenting for the following health issues:  RECHECK and GI Problem      8/14/2024     8:13 AM   Additional Questions   Roomed by LEE Nguyen LPN   Accompanied by self         8/14/2024     8:13 AM   Patient Reported Additional Medications   Patient reports taking the following new medications none     GI Problem    History of Present Illness       Reason for visit:  Stomach and throat    He eats 2-3 servings of fruits and vegetables daily.He consumes 1 sweetened beverage(s) daily.He exercises with enough effort to increase his heart rate 9 or less minutes per day.  He exercises with enough effort to increase his heart rate 4 days per week.   He is taking medications regularly.     He has been getting loose stools to diarrhea 2-3 times a week. Minimal to no cramping. Having lots of gas and belching. No blood in stool. Appetite is good and weight is stable. Energy is good. No infectious contacts. Years ago had a very long and severe case of C diff that was hard to cure. No recent med changes or additions. Rarely drinks ETOH       Review of  "Systems  Constitutional, HEENT, cardiovascular, pulmonary, GI, , musculoskeletal, neuro, skin, endocrine and psych systems are negative, except as otherwise noted.      Objective    BP (!) 158/80   Pulse 60   Temp 97.1  F (36.2  C) (Oral)   Resp 20   Ht 1.727 m (5' 8\")   Wt 90.3 kg (199 lb)   SpO2 94%   BMI 30.26 kg/m    Body mass index is 30.26 kg/m .  Physical Exam   GENERAL: alert and no distress  NECK: no adenopathy, no asymmetry, masses, or scars  RESP: lungs clear to auscultation - no rales, rhonchi or wheezes  CV: regular rate and rhythm, normal S1 S2, no S3 or S4, no murmur, click or rub, no peripheral edema  ABDOMEN: soft, nontender, no hepatosplenomegaly, no masses and bowel sounds normal  MS: no gross musculoskeletal defects noted, no edema  NEURO: Normal strength and tone, mentation intact and speech normal  PSYCH: mentation appears normal, affect normal/bright          Signed Electronically by: Lucía Sanduh MD    "

## 2024-08-16 LAB

## 2024-08-16 PROCEDURE — 87507 IADNA-DNA/RNA PROBE TQ 12-25: CPT | Mod: GZ | Performed by: INTERNAL MEDICINE

## 2024-08-16 PROCEDURE — 87338 HPYLORI STOOL AG IA: CPT | Performed by: INTERNAL MEDICINE

## 2024-08-19 LAB — H PYLORI AG STL QL IA: NEGATIVE

## 2024-08-22 ENCOUNTER — TELEPHONE (OUTPATIENT)
Dept: GASTROENTEROLOGY | Facility: CLINIC | Age: 74
End: 2024-08-22
Payer: COMMERCIAL

## 2024-08-22 DIAGNOSIS — Z12.11 SPECIAL SCREENING FOR MALIGNANT NEOPLASMS, COLON: ICD-10-CM

## 2024-08-22 DIAGNOSIS — Z86.0100 HISTORY OF COLONIC POLYPS: Primary | ICD-10-CM

## 2024-08-22 NOTE — TELEPHONE ENCOUNTER
"Endoscopy Scheduling Screen    Have you had a positive Covid test in the last 14 days?  No    What is your communication preference for Instructions and/or Bowel Prep?   MyChart    What insurance is in the chart?  Other:  [employer]    Ordering/Referring Provider: Lucía Sandhu MD    (If ordering provider performs procedure, schedule with ordering provider unless otherwise instructed. )    BMI: Estimated body mass index is 30.26 kg/m  as calculated from the following:    Height as of 8/14/24: 1.727 m (5' 8\").    Weight as of 8/14/24: 90.3 kg (199 lb).     Sedation Ordered  moderate sedation.   If patient BMI > 50 do not schedule in ASC.    If patient BMI > 45 do not schedule at [facility].    Are you taking methadone or Suboxone?  No    Have you had difficulties, pain, or discomfort during past endoscopy procedures?  No    Are you taking any prescription medications for pain 3 or more times per week?   NO, No RN review required.    Do you have a history of malignant hyperthermia?  No    (Females) Are you currently pregnant?   No     Have you been diagnosed or told you have pulmonary hypertension?   No    Do you have an LVAD?  No    Have you been told you have moderate to severe sleep apnea?  Yes (RN Review required for scheduling unless scheduling in Hospital.)    Have you been told you have COPD, asthma, or any other lung disease?  No    Do you have any heart conditions?  Yes     In the past year, have you had any hospitalizations for heart related issues including cardiomyopathy, heart attack, or stent placement?  No    Do you have any implantable devices in your body (pacemaker, ICD)?  Pacemaker (schedule colonoscopy in Hospital Only)    Do you take nitroglycerine?  No    Have you ever had or are you waiting for an organ transplant?  No. Continue scheduling, no site restrictions.    Have you had a stroke or transient ischemic attack (TIA aka \"mini stroke\" in the last 6 months?   No    Have you been diagnosed with or " "been told you have cirrhosis of the liver?   No    Are you currently on dialysis?   No    Do you need assistance transferring?   No    BMI: Estimated body mass index is 30.26 kg/m  as calculated from the following:    Height as of 8/14/24: 1.727 m (5' 8\").    Weight as of 8/14/24: 90.3 kg (199 lb).     Is patients BMI > 40 and scheduling location UP?  No    Do you take an injectable medication for weight loss or diabetes (excluding insulin)?  No    Do you take the medication Naltrexone?  No    Do you take blood thinners?  No       Prep   Are you currently on dialysis or do you have chronic kidney disease?  No    Do you have a diagnosis of diabetes?  No    Do you have a diagnosis of cystic fibrosis (CF)?  No    On a regular basis do you go 3 -5 days between bowel movements?  No    BMI > 40?  No    Preferred Pharmacy:    [facility][id number] - [address] [address]  Phone: [phone] Fax: [phone]      Final Scheduling Details     Procedure scheduled  Colonoscopy    Surgeon:  Krystal     Date of procedure:  10.10.24     Pre-OP / PAC:   No - Not required for this site.    Location  [facility] - Per exclusion criteria.    Sedation   Moderate Sedation - Per order.      Patient Reminders:   You will receive a call from a Nurse to review instructions and health history.  This assessment must be completed prior to your procedure.  Failure to complete the Nurse assessment may result in the procedure being cancelled.      On the day of your procedure, please designate an adult(s) who can drive you home stay with you for the next 24 hours. The medicines used in the exam will make you sleepy. You will not be able to drive.      You cannot take public transportation, ride share services, or non-medical taxi service without a responsible caregiver.  Medical transport services are allowed with the requirement that a responsible caregiver will receive you at your destination.  We " require that drivers and caregivers are confirmed prior to your procedure.

## 2024-08-23 ENCOUNTER — ANCILLARY PROCEDURE (OUTPATIENT)
Dept: CARDIOLOGY | Facility: CLINIC | Age: 74
End: 2024-08-23
Attending: INTERNAL MEDICINE
Payer: COMMERCIAL

## 2024-08-23 DIAGNOSIS — Z95.0 CARDIAC PACEMAKER IN SITU: ICD-10-CM

## 2024-08-23 DIAGNOSIS — I49.5 SSS (SICK SINUS SYNDROME) (H): ICD-10-CM

## 2024-08-23 LAB
MDC_IDC_EPISODE_DTM: NORMAL
MDC_IDC_EPISODE_ID: NORMAL
MDC_IDC_EPISODE_TYPE: NORMAL
MDC_IDC_LEAD_CONNECTION_STATUS: NORMAL
MDC_IDC_LEAD_CONNECTION_STATUS: NORMAL
MDC_IDC_LEAD_IMPLANT_DT: NORMAL
MDC_IDC_LEAD_IMPLANT_DT: NORMAL
MDC_IDC_LEAD_LOCATION: NORMAL
MDC_IDC_LEAD_LOCATION: NORMAL
MDC_IDC_LEAD_LOCATION_DETAIL_1: NORMAL
MDC_IDC_LEAD_LOCATION_DETAIL_1: NORMAL
MDC_IDC_LEAD_MFG: NORMAL
MDC_IDC_LEAD_MFG: NORMAL
MDC_IDC_LEAD_MODEL: NORMAL
MDC_IDC_LEAD_MODEL: NORMAL
MDC_IDC_LEAD_POLARITY_TYPE: NORMAL
MDC_IDC_LEAD_POLARITY_TYPE: NORMAL
MDC_IDC_LEAD_SERIAL: NORMAL
MDC_IDC_LEAD_SERIAL: NORMAL
MDC_IDC_MSMT_BATTERY_DTM: NORMAL
MDC_IDC_MSMT_BATTERY_REMAINING_LONGEVITY: 138 MO
MDC_IDC_MSMT_BATTERY_REMAINING_PERCENTAGE: 100 %
MDC_IDC_MSMT_BATTERY_STATUS: NORMAL
MDC_IDC_MSMT_LEADCHNL_RA_IMPEDANCE_VALUE: 800 OHM
MDC_IDC_MSMT_LEADCHNL_RA_PACING_THRESHOLD_AMPLITUDE: 0.3 V
MDC_IDC_MSMT_LEADCHNL_RA_PACING_THRESHOLD_PULSEWIDTH: 0.4 MS
MDC_IDC_MSMT_LEADCHNL_RV_IMPEDANCE_VALUE: 571 OHM
MDC_IDC_MSMT_LEADCHNL_RV_PACING_THRESHOLD_AMPLITUDE: 0.7 V
MDC_IDC_MSMT_LEADCHNL_RV_PACING_THRESHOLD_PULSEWIDTH: 0.4 MS
MDC_IDC_PG_IMPLANT_DTM: NORMAL
MDC_IDC_PG_MFG: NORMAL
MDC_IDC_PG_MODEL: NORMAL
MDC_IDC_PG_SERIAL: NORMAL
MDC_IDC_PG_TYPE: NORMAL
MDC_IDC_SESS_CLINIC_NAME: NORMAL
MDC_IDC_SESS_DTM: NORMAL
MDC_IDC_SESS_TYPE: NORMAL
MDC_IDC_SET_BRADY_AT_MODE_SWITCH_MODE: NORMAL
MDC_IDC_SET_BRADY_AT_MODE_SWITCH_RATE: 170 {BEATS}/MIN
MDC_IDC_SET_BRADY_LOWRATE: 60 {BEATS}/MIN
MDC_IDC_SET_BRADY_MAX_SENSOR_RATE: 130 {BEATS}/MIN
MDC_IDC_SET_BRADY_MAX_TRACKING_RATE: 130 {BEATS}/MIN
MDC_IDC_SET_BRADY_MODE: NORMAL
MDC_IDC_SET_BRADY_PAV_DELAY_HIGH: 150 MS
MDC_IDC_SET_BRADY_PAV_DELAY_LOW: 200 MS
MDC_IDC_SET_BRADY_SAV_DELAY_HIGH: 150 MS
MDC_IDC_SET_BRADY_SAV_DELAY_LOW: 200 MS
MDC_IDC_SET_LEADCHNL_RA_PACING_AMPLITUDE: 2 V
MDC_IDC_SET_LEADCHNL_RA_PACING_CAPTURE_MODE: NORMAL
MDC_IDC_SET_LEADCHNL_RA_PACING_POLARITY: NORMAL
MDC_IDC_SET_LEADCHNL_RA_PACING_PULSEWIDTH: 0.4 MS
MDC_IDC_SET_LEADCHNL_RA_SENSING_ADAPTATION_MODE: NORMAL
MDC_IDC_SET_LEADCHNL_RA_SENSING_POLARITY: NORMAL
MDC_IDC_SET_LEADCHNL_RA_SENSING_SENSITIVITY: 0.25 MV
MDC_IDC_SET_LEADCHNL_RV_PACING_AMPLITUDE: 1.2 V
MDC_IDC_SET_LEADCHNL_RV_PACING_CAPTURE_MODE: NORMAL
MDC_IDC_SET_LEADCHNL_RV_PACING_POLARITY: NORMAL
MDC_IDC_SET_LEADCHNL_RV_PACING_PULSEWIDTH: 0.4 MS
MDC_IDC_SET_LEADCHNL_RV_SENSING_ADAPTATION_MODE: NORMAL
MDC_IDC_SET_LEADCHNL_RV_SENSING_POLARITY: NORMAL
MDC_IDC_SET_LEADCHNL_RV_SENSING_SENSITIVITY: 1.5 MV
MDC_IDC_SET_ZONE_DETECTION_INTERVAL: 375 MS
MDC_IDC_SET_ZONE_STATUS: NORMAL
MDC_IDC_SET_ZONE_TYPE: NORMAL
MDC_IDC_SET_ZONE_VENDOR_TYPE: NORMAL
MDC_IDC_STAT_AT_BURDEN_PERCENT: 0 %
MDC_IDC_STAT_AT_DTM_END: NORMAL
MDC_IDC_STAT_AT_DTM_START: NORMAL
MDC_IDC_STAT_BRADY_DTM_END: NORMAL
MDC_IDC_STAT_BRADY_DTM_START: NORMAL
MDC_IDC_STAT_BRADY_RA_PERCENT_PACED: 93 %
MDC_IDC_STAT_BRADY_RV_PERCENT_PACED: 99 %
MDC_IDC_STAT_EPISODE_RECENT_COUNT: 0
MDC_IDC_STAT_EPISODE_RECENT_COUNT_DTM_END: NORMAL
MDC_IDC_STAT_EPISODE_RECENT_COUNT_DTM_START: NORMAL
MDC_IDC_STAT_EPISODE_TYPE: NORMAL
MDC_IDC_STAT_EPISODE_VENDOR_TYPE: NORMAL
MDC_IDC_STAT_EPISODE_VENDOR_TYPE: NORMAL

## 2024-08-23 PROCEDURE — 93296 REM INTERROG EVL PM/IDS: CPT | Performed by: INTERNAL MEDICINE

## 2024-08-23 PROCEDURE — 93294 REM INTERROG EVL PM/LDLS PM: CPT | Performed by: INTERNAL MEDICINE

## 2024-08-26 ENCOUNTER — PATIENT OUTREACH (OUTPATIENT)
Dept: INTERNAL MEDICINE | Facility: CLINIC | Age: 74
End: 2024-08-26
Payer: COMMERCIAL

## 2024-08-26 ENCOUNTER — MYC MEDICAL ADVICE (OUTPATIENT)
Dept: INTERNAL MEDICINE | Facility: CLINIC | Age: 74
End: 2024-08-26
Payer: COMMERCIAL

## 2024-08-26 NOTE — TELEPHONE ENCOUNTER
Patient Quality Outreach    Patient is due for the following:   Hypertension -  Hypertension follow-up visit  Physical Annual Wellness Visit      Topic Date Due    Pneumococcal Vaccine (3 of 3 - PPSV23 or PCV20) 10/13/2020       Next Steps:   Schedule a Annual Wellness Visit    Type of outreach:    Sent [employer] message.      Questions for provider review:    None           Ofelia Nguyen LPN  Chart routed to none.

## 2024-09-19 RX ORDER — BISACODYL 5 MG/1
TABLET, DELAYED RELEASE ORAL
Qty: 4 TABLET | Refills: 0 | Status: SHIPPED | OUTPATIENT
Start: 2024-09-19

## 2024-09-19 NOTE — TELEPHONE ENCOUNTER
Standard Golytely Bowel Prep recommended due to CKD noted.  Instructions were sent via [employer]. Bowel prep was sent 9/19/2024 to [facility] #[id number] - [place], [place] - [address].

## 2024-09-25 DIAGNOSIS — R14.0 FLATULENCE/GAS PAIN/BELCHING: ICD-10-CM

## 2024-09-27 ENCOUNTER — HOSPITAL ENCOUNTER (OUTPATIENT)
Dept: CARDIOLOGY | Facility: CLINIC | Age: 74
Discharge: HOME | End: 2024-09-27
Attending: NURSE PRACTITIONER | Admitting: NURSE PRACTITIONER
Payer: COMMERCIAL

## 2024-09-27 DIAGNOSIS — I77.810 DILATATION OF THORACIC AORTA (H): ICD-10-CM

## 2024-09-27 LAB — LVEF ECHO: NORMAL

## 2024-09-27 PROCEDURE — 93306 TTE W/DOPPLER COMPLETE: CPT | Mod: 26 | Performed by: INTERNAL MEDICINE

## 2024-09-27 PROCEDURE — 93306 TTE W/DOPPLER COMPLETE: CPT

## 2024-10-01 ENCOUNTER — TELEPHONE (OUTPATIENT)
Dept: GASTROENTEROLOGY | Facility: CLINIC | Age: 74
End: 2024-10-01
Payer: COMMERCIAL

## 2024-10-01 ENCOUNTER — TELEPHONE (OUTPATIENT)
Dept: CARDIOLOGY | Facility: CLINIC | Age: 74
End: 2024-10-01
Payer: COMMERCIAL

## 2024-10-01 DIAGNOSIS — I42.8 OTHER CARDIOMYOPATHY (H): Primary | ICD-10-CM

## 2024-10-01 NOTE — RESULT ENCOUNTER NOTE
Results and recommendations routed Via My Chart with call back information if needed.  And also called patient with results and recommendations and he requested we put information on My Chart he was not able to write down the information at time of call.  Mild decline in LVEF 45-50% since 2022  Did not have anginal symptoms when seen in May  Recommend Cira NUC to rule out ischemia and arrange visit

## 2024-10-01 NOTE — TELEPHONE ENCOUNTER
Pre assessment completed for upcoming procedure.   (Please see previous telephone encounter notes for complete details)    Patient  returned call.       Procedure details:    Arrival time and facility location reviewed.    Pre op exam needed? No.    Designated  policy reviewed. Instructed to have someone stay 6  hours post procedure.       Medication review:    Medications reviewed. Please see supporting documentation below. Holding recommendations discussed (if applicable).   N/A      Prep for procedure:     Procedure prep instructions reviewed.        Any additional information needed:  N/A      Patient  verbalized understanding and had no questions or concerns at this time.      Cora Piper RN  Endoscopy Procedure Pre Assessment   [phone] option 2

## 2024-10-01 NOTE — TELEPHONE ENCOUNTER
Attempted to contact patient in order to complete pre assessment questions.     No answer. Left message to return call to [phone] option 2    Callback required communication sent via [employer].      Adia Campos RN  Endoscopy Procedure Pre Assessment

## 2024-10-01 NOTE — TELEPHONE ENCOUNTER
Pre visit planning completed.      Procedure details:    Patient scheduled for Colonoscopy on 10.10.2024.     Arrival time: 1000. Procedure time 1045    Facility location: [facility]; [address] [place] [address]. Check in location: Main entrance, door #1 on the North side of the building under roundabout awning. DO NOT GO TO SURGERY/ED ENTRANCE.     Sedation type: Conscious sedation     Pre op exam needed? No.    Indication for procedure: Diarrhea, unspecified type       Chart review:     Electronic implanted devices? Yes Pacemaker    Recent diagnosis of diverticulitis within the last 6 weeks? No      Medication review:    Diabetic? No    Anticoagulants? No    Weight loss medication/injectable? No GLP-1 medication per patient's medication list.  RN will verify with pre-assessment call.    Other medication HOLDING recommendations:  N/A      Prep for procedure:     Bowel prep recommendation: Standard Golytely. Bowel prep prescription sent to [facility][id number] - [facility], [place] - [address] [place] [address]  Due to: CKD noted.     Prep instructions sent via [employer]         Adia Queen RN  Endoscopy Procedure Pre Assessment RN  [phone] option 2

## 2024-10-07 ENCOUNTER — TELEPHONE (OUTPATIENT)
Dept: INTERNAL MEDICINE | Facility: CLINIC | Age: 74
End: 2024-10-07
Payer: COMMERCIAL

## 2024-10-07 ENCOUNTER — TELEPHONE (OUTPATIENT)
Dept: GASTROENTEROLOGY | Facility: CLINIC | Age: 74
End: 2024-10-07
Payer: COMMERCIAL

## 2024-10-07 NOTE — TELEPHONE ENCOUNTER
Patient calling as spouse has covid.      Patient has an upcoming colonoscopy on Thursday.  Patient has been exposed and is wondering what to do.  Advised patient to call the GI clinic and discuss this with the GI team.  Patient has the number and will call today.

## 2024-10-07 NOTE — TELEPHONE ENCOUNTER
Caller:     Reason for Reschedule/Cancellation   (please be detailed, any staff messages or encounters to note?): WIFE HAS COVID      Prior to reschedule please review:  Ordering Provider:     JESS GARCIA     Sedation Determined: CS  Does patient have any ASC Exclusions, please identify?:       Notes on Cancelled Procedure:  Procedure: Lower Endoscopy [Colonoscopy]   Date: 10/10  Location: [facility]; [facility] [address] [place] [address]  Surgeon: LELA      Rescheduled: Yes,   Procedure: Lower Endoscopy [Colonoscopy]    Date: 11/6   Location: [facility]; [facility] [address] [place] [address]   Surgeon: LELA   Sedation Level Scheduled  CS ,  Reason for Sedation Level ORDER   Instructions updated and sent: Y     Does patient need PAC or Pre -Op Rescheduled? : N       Did you cancel or rescheduled an EUS procedure? No.

## 2024-10-08 ENCOUNTER — HOSPITAL ENCOUNTER (OUTPATIENT)
Dept: NUCLEAR MEDICINE | Facility: CLINIC | Age: 74
Setting detail: NUCLEAR MEDICINE
Discharge: HOME | End: 2024-10-08
Attending: NURSE PRACTITIONER
Payer: COMMERCIAL

## 2024-10-08 ENCOUNTER — HOSPITAL ENCOUNTER (OUTPATIENT)
Dept: CARDIOLOGY | Facility: CLINIC | Age: 74
Setting detail: NUCLEAR MEDICINE
Discharge: HOME | End: 2024-10-08
Attending: NURSE PRACTITIONER
Payer: COMMERCIAL

## 2024-10-08 DIAGNOSIS — I42.8 OTHER CARDIOMYOPATHY (H): ICD-10-CM

## 2024-10-08 LAB
CV BLOOD PRESSURE: 56 MMHG
CV STRESS MAX HR HE: 60
NUC STRESS EJECTION FRACTION: 49 %
RATE PRESSURE PRODUCT: 9480
STRESS ECHO BASELINE DIASTOLIC HE: 83
STRESS ECHO BASELINE HR: 60 BPM
STRESS ECHO BASELINE SYSTOLIC BP: 160
STRESS ECHO CALCULATED PERCENT HR: 42 %
STRESS ECHO LAST STRESS DIASTOLIC BP: 83
STRESS ECHO LAST STRESS SYSTOLIC BP: 158
STRESS ECHO TARGET HR: 144
STRESS/REST PERFUSION RATIO: 1.25

## 2024-10-08 PROCEDURE — 78452 HT MUSCLE IMAGE SPECT MULT: CPT | Mod: 26 | Performed by: INTERNAL MEDICINE

## 2024-10-08 PROCEDURE — 93018 CV STRESS TEST I&R ONLY: CPT | Performed by: INTERNAL MEDICINE

## 2024-10-08 PROCEDURE — 343N000001 HC RX 343 MED OP 636: Performed by: NURSE PRACTITIONER

## 2024-10-08 PROCEDURE — 78452 HT MUSCLE IMAGE SPECT MULT: CPT

## 2024-10-08 PROCEDURE — 250N000011 HC RX IP 250 OP 636: Performed by: NURSE PRACTITIONER

## 2024-10-08 PROCEDURE — 93016 CV STRESS TEST SUPVJ ONLY: CPT | Performed by: INTERNAL MEDICINE

## 2024-10-08 PROCEDURE — A9500 TC99M SESTAMIBI: HCPCS | Performed by: NURSE PRACTITIONER

## 2024-10-08 PROCEDURE — 93017 CV STRESS TEST TRACING ONLY: CPT

## 2024-10-08 RX ORDER — AMINOPHYLLINE 25 MG/ML
50-100 INJECTION, SOLUTION INTRAVENOUS
Status: DISCONTINUED | OUTPATIENT
Start: 2024-10-08 | End: 2024-10-09 | Stop reason: HOSPADM

## 2024-10-08 RX ORDER — LIDOCAINE 40 MG/G
CREAM TOPICAL
Status: DISCONTINUED | OUTPATIENT
Start: 2024-10-08 | End: 2024-10-09 | Stop reason: HOSPADM

## 2024-10-08 RX ORDER — CAFFEINE 200 MG
200 TABLET ORAL
Status: DISCONTINUED | OUTPATIENT
Start: 2024-10-08 | End: 2024-10-08 | Stop reason: HOSPADM

## 2024-10-08 RX ORDER — NITROGLYCERIN 0.4 MG/1
0.4 TABLET SUBLINGUAL EVERY 5 MIN PRN
Status: DISCONTINUED | OUTPATIENT
Start: 2024-10-08 | End: 2024-10-08 | Stop reason: HOSPADM

## 2024-10-08 RX ORDER — LORAZEPAM 0.5 MG/1
0.5 TABLET ORAL EVERY 30 MIN PRN
Status: DISCONTINUED | OUTPATIENT
Start: 2024-10-08 | End: 2024-10-09 | Stop reason: HOSPADM

## 2024-10-08 RX ORDER — DIPHENHYDRAMINE HYDROCHLORIDE 50 MG/ML
25-50 INJECTION INTRAMUSCULAR; INTRAVENOUS
Status: DISCONTINUED | OUTPATIENT
Start: 2024-10-08 | End: 2024-10-09 | Stop reason: HOSPADM

## 2024-10-08 RX ORDER — METHYLPREDNISOLONE SODIUM SUCCINATE 125 MG/2ML
125 INJECTION INTRAMUSCULAR; INTRAVENOUS
Status: DISCONTINUED | OUTPATIENT
Start: 2024-10-08 | End: 2024-10-09 | Stop reason: HOSPADM

## 2024-10-08 RX ORDER — DIPHENHYDRAMINE HCL 25 MG
25 CAPSULE ORAL
Status: DISCONTINUED | OUTPATIENT
Start: 2024-10-08 | End: 2024-10-09 | Stop reason: HOSPADM

## 2024-10-08 RX ORDER — REGADENOSON 0.08 MG/ML
INJECTION, SOLUTION INTRAVENOUS
Status: COMPLETED
Start: 2024-10-08 | End: 2024-10-08

## 2024-10-08 RX ORDER — REGADENOSON 0.08 MG/ML
0.4 INJECTION, SOLUTION INTRAVENOUS ONCE
Status: COMPLETED | OUTPATIENT
Start: 2024-10-08 | End: 2024-10-08

## 2024-10-08 RX ORDER — DIAZEPAM 5 MG/1
5 TABLET ORAL EVERY 30 MIN PRN
Status: DISCONTINUED | OUTPATIENT
Start: 2024-10-08 | End: 2024-10-09 | Stop reason: HOSPADM

## 2024-10-08 RX ORDER — ALBUTEROL SULFATE 90 UG/1
2 INHALANT RESPIRATORY (INHALATION) EVERY 5 MIN PRN
Status: DISCONTINUED | OUTPATIENT
Start: 2024-10-08 | End: 2024-10-09 | Stop reason: HOSPADM

## 2024-10-08 RX ORDER — CAFFEINE CITRATE 20 MG/ML
60 SOLUTION INTRAVENOUS
Status: DISCONTINUED | OUTPATIENT
Start: 2024-10-08 | End: 2024-10-08 | Stop reason: HOSPADM

## 2024-10-08 RX ADMIN — Medication 32.9 MILLICURIE: at 13:26

## 2024-10-08 RX ADMIN — REGADENOSON 0.4 MG: 0.08 INJECTION, SOLUTION INTRAVENOUS at 13:20

## 2024-10-08 RX ADMIN — Medication 11 MILLICURIE: at 11:46

## 2024-10-08 NOTE — RESULT ENCOUNTER NOTE
Recommend follow up with [facility] to discuss cardiac cath given new perfusion defect and TID 1.25 thanks

## 2024-10-09 ENCOUNTER — OFFICE VISIT (OUTPATIENT)
Dept: CARDIOLOGY | Facility: CLINIC | Age: 74
End: 2024-10-09
Payer: COMMERCIAL

## 2024-10-09 VITALS
SYSTOLIC BLOOD PRESSURE: 164 MMHG | HEART RATE: 69 BPM | WEIGHT: 195.6 LBS | DIASTOLIC BLOOD PRESSURE: 88 MMHG | HEIGHT: 68 IN | BODY MASS INDEX: 29.64 KG/M2 | OXYGEN SATURATION: 97 %

## 2024-10-09 DIAGNOSIS — R94.39 ABNORMAL CARDIOVASCULAR STRESS TEST: Primary | ICD-10-CM

## 2024-10-09 DIAGNOSIS — I10 ESSENTIAL HYPERTENSION, BENIGN: ICD-10-CM

## 2024-10-09 DIAGNOSIS — I42.8 OTHER CARDIOMYOPATHY (H): ICD-10-CM

## 2024-10-09 PROCEDURE — 99215 OFFICE O/P EST HI 40 MIN: CPT | Performed by: NURSE PRACTITIONER

## 2024-10-09 RX ORDER — AMLODIPINE BESYLATE 5 MG/1
5 TABLET ORAL EVERY EVENING
Qty: 90 TABLET | Refills: 3 | Status: SHIPPED | OUTPATIENT
Start: 2024-10-09 | End: 2024-11-07

## 2024-10-09 RX ORDER — NITROGLYCERIN 0.4 MG/1
TABLET SUBLINGUAL
Qty: 25 TABLET | Refills: 0 | Status: SHIPPED | OUTPATIENT
Start: 2024-10-09

## 2024-10-09 NOTE — LETTER
10/9/2024    Lucía Sandhu MD  [address] [facility] [address]  [facility] [address]    RE: Harpreet Vera       Dear Colleague,     I had the pleasure of seeing Harpreet Vera in the [facility].    Cardiology Clinic Progress Note    Service Date: October 9, 2024  Primary Cardiology Team: Dr. Mccartney    HISTORY OF PRESENT ILLNESS:  I had the pleasure of meeting Mr. Harpreet Vera in the clinic today. He is a delightful 74 year old gentleman with a past medical history notable for cerebrovascular disease (chronic left vertebral artery occlusion, right vertebral artery stenosis status post stenting), hypertension, hyperlipidemia, mildly dilated ascending aorta, sleep apnea, sick sinus syndrome with syncope status post pacemaker.    Mr Vera suffered a stroke due to a left vertebral artery occlusion and subsequently underwent stenting to his right vertebral artery stenosis in 2019.  He states he has been taking aspirin 325 mg once daily since then.     In August 2020, he had a syncopal episode and was found to have significant bradycardia with pauses up to 11 seconds. He subsequently underwent a dual-chamber permanent pacemaker placement.      Previous echocardiogram from October 2022 showed LVEF 55%, mild-moderate concentric LVH, normal RV function, 1+ mitral regurgitation, RVSP 16 mmHg, ascending aorta at 4.2 cm.     The patient was last seen by my colleague, Emilia Laureano NP, in the clinic in May of this year and was doing well from a cardiac standpoint at that time. He did admit to being less active than he would like to be, but he was continuing to walk the golf course regularly and was tolerating this without exertional symptoms.    Most recent device check on 8/23/24 showed 93% atrial pacing, 99% ventricular pacing, underlying rhythm of sinus bradycardia in the 50s, and 11.5-year battery life remaining. No atrial or ventricular arrhythmias were noted.    In the interim, the patient came in for an  echocardiogram on 9/27/24 which demonstrated mild drop in his ejection fraction to 45-50% with septal motion consistent with conduction abnormality and mild global hypokinesis noted.  Ascending aorta dilatation was stable measuring at 4.2 cm. A liver cyst was also incidentally noted per the report.  A Lexiscan nuclear stress test was recommended for ischemic evaluation.  This was completed yesterday showing here nontransmural infarction in the inferior and inferoseptal segments of the left ventricle. Stress to rest cavity ratio was reported at 1.25 with TID present.  Results were reviewed with Dr. Mccartney and the patient was added onto my schedule today to review the findings and consider proceeding with coronary angiography and possible PCI.    Today, Mr. Vera states that he has been feeling reasonably well from a cardiac standpoint recently. However, he has noticed increasing fatigue/activity intolerance gradually over the past 3 months or so. He was able to golf over the summer, but felt like he was getting more wiped out than usual after completing a round. He has not noticed exertional chest pain or shortness of breath recently. He has not had palpitations, dizziness, lightheadedness, orthopnea, or lower extremity edema.  He notes that his blood pressure has been running higher recently and his checks at home frequently in the 140s-160s systolic range.    We reviewed the findings of his echocardiogram and stress test in detail today. Recommendation for proceeding with coronary angiography and possible PCI for more definitive evaluation and management was discussed. Risks and benefits of coronary angiogram discussed today including, bleeding, bruising, infection, allergic reaction, kidney damage (including need for dialysis), stroke, heart attack, vascular damage, emergency open heart surgery, up to and including death.  Patient indicates understanding and is agreeable to proceed.  He has no known history of an  allergy to contrast dye. He denies any recent bleeding issues or obvious contraindication to dual antiplatelet therapy if this would be indicated.  However, he does note that he was planning to have a colonoscopy completed in early November as he has been having some issues with dyspepsia and frequent diarrhea. He states that this was recommended several months ago, but he has been putting it off for some time.    ASSESSMENT:  New mild cardiomyopathy with EF 45-50% and abnormal cardiovascular stress test.  He has not been having clear anginal symptoms, but in hindsight does report gradual decline in activity tolerance with increased fatigue over the past few months.  Essential hypertension.  Blood pressures suboptimally controlled recently with more frequent readings in the 140s-160s systolic range.  Hyperlipidemia.  Managed with rosuvastatin 20 mg once daily with most recent LDL cholesterol 43 in 05/2024.  Dilated ascending aorta.  Measurements stable at 4.2 cm on recent TTE 9/27/24.  Sick sinus syndrome with syncope s/p PPM placement.   History of CVA due to left vertebral artery occlusion s/p stenting in 2019.  On aspirin 325 mg once daily    PLAN:  - Increase amlodipine from 2.5 mg to 5 mg once daily for better blood pressure control. Reviewed to watch for lower extremity edema as a potential side effect with this and to call if this occurs.  Encouraged him to monitor his blood pressure regularly at home in the coming weeks and to call if readings continue run consistently over 130/85 so that further adjustments can be made if needed.  - Continue aspirin, statin, and beta-blocker.  - Recommend proceeding with coronary angiography and possible PCI in the coming weeks. We reviewed that if any obstructive CAD is found requiring PCI/stenting, that we would need to cancel his upcoming colonoscopy and plan to postpone this for at least 6 months to a year so that he can remain on DAPT uninterrupted. He is agreeable to  proceeding with this.  - Reviewed to take it easy in terms of exercise and exertion until the coronary angiography is completed. Provided him with a prescription for PRN sublingual nitroglycerin. Reviewed to not wait and to seek evaluation in the emergency department should he have any symptoms more concerning for unstable angina between now and the procedure.  - Recommend close follow-up with a cardiology CHET within a few weeks following the coronary angiogram.  - If no significant CAD is noted, would work on further optimization of GDMT consider cardiac MRI in the coming months for further evaluation the mild cardiomyopathy and reassessment of his EF.     Thank you for the opportunity to participate in this pleasant patient's care. We would be happy to see him sooner if needed for any concerns in the meantime.    50 total minutes was spent today including chart review, precharting, history and exam, post visit documentation, and reviewing studies as outlined above.     Please kindly note that this document was completed in part using Dragon voice recognition software. Although reviewed after completion, some word substitutions and typographical errors may occur. Please contact me if clarification is needed.     SALVATORE Hilario, CNP   Nurse Practitioner  [facility]    Orders this Visit:  Orders Placed This Encounter   Procedures     Follow-Up with Cardiology CHET     Case Request Cath Lab: Coronary Angiogram, Left Heart Catheterization, Percutaneous Coronary Intervention     Orders Placed This Encounter   Medications     nitroGLYcerin (NITROSTAT) 0.4 MG sublingual tablet     Sig: For chest pain place 1 tablet under the tongue every 5 minutes for 3 doses. If symptoms persist 5 minutes after 1st dose call 911.     Dispense:  25 tablet     Refill:  0     amLODIPine (NORVASC) 5 MG tablet     Sig: Take 1 tablet (5 mg) by mouth every evening.     Dispense:  90 tablet     Refill:  3     Medications  Discontinued During This Encounter   Medication Reason     amLODIPine (NORVASC) 2.5 MG tablet      Encounter Diagnoses   Name Primary?     Other cardiomyopathy (H)      Abnormal cardiovascular stress test Yes     Essential hypertension, benign        CURRENT MEDICATIONS:  Current Outpatient Medications   Medication Sig Dispense Refill     acetaminophen (TYLENOL) 650 MG CR tablet Take 650 mg by mouth every 8 hours as needed for mild pain or fever       alfuzosin ER (UROXATRAL) 10 MG 24 hr tablet Take 1 tablet (10 mg) by mouth daily 90 tablet 3     allopurinol (ZYLOPRIM) 100 MG tablet Take 200 mg by mouth every evening (2 x 100mg)       amLODIPine (NORVASC) 5 MG tablet Take 1 tablet (5 mg) by mouth every evening. 90 tablet 3     aspirin - buffered (ASCRIPTIN) 325 MG TABS tablet Take 325 mg by mouth every evening       finasteride (PROSCAR) 5 MG tablet Take 1 tablet (5 mg) by mouth daily 90 tablet 3     metoprolol succinate ER (TOPROL XL) 25 MG 24 hr tablet Take 1 tablet (25 mg) by mouth 2 times daily 180 tablet 3     multivitamin w/minerals (THERA-VIT-M) tablet Take 1 tablet by mouth every evening       nitroGLYcerin (NITROSTAT) 0.4 MG sublingual tablet For chest pain place 1 tablet under the tongue every 5 minutes for 3 doses. If symptoms persist 5 minutes after 1st dose call 911. 25 tablet 0     olmesartan (BENICAR) 40 MG tablet Take 1 tablet (40 mg) by mouth every morning 90 tablet 3     omeprazole (PRILOSEC) 20 MG DR capsule TAKE ONE CAPSULE BY MOUTH ONCE OR TWICE DAILY AS NEEDED 180 capsule 0     polyethylene glycol (GOLYTELY) 236 g suspension The night before the exam at 6 pm drink an 8-ounce glass every 15 minutes until the jug is half empty. If you arrive before 11 AM: Drink the other half of the Golytely jug at 11 PM night before procedure. If you arrive after 11 AM: Drink the other half of the Golytely jug at 6 AM day of procedure. For additional instructions refer to your colonoscopy prep instructions. 4000  mL 0     rosuvastatin (CRESTOR) 20 MG tablet Take 1 tablet (20 mg) by mouth at bedtime 90 tablet 3     vitamin D3 (CHOLECALCIFEROL) 50 mcg (2000 units) tablet Take 1 tablet by mouth every evening Taking 1000 units daily       bisacodyl (DULCOLAX) 5 MG EC tablet Take 2 tablets at 3 pm the day before your procedure. If your procedure is before 11 am, take 2 additional tablets at 11 pm. If your procedure is after 11 am, take 2 additional tablets at 6 am. For additional instructions refer to your colonoscopy prep instructions. (Patient not taking: Reported on 10/9/2024) 4 tablet 0       ALLERGIES  No Known Allergies    PAST MEDICAL, SURGICAL, FAMILY HISTORY:  History was reviewed and updated as needed, see medical record.    SOCIAL HISTORY:  Social History     Socioeconomic History     Marital status:      Spouse name: Not on file     Number of children: Not on file     Years of education: Not on file     Highest education level: Not on file   Occupational History     Not on file   Tobacco Use     Smoking status: Never     Smokeless tobacco: Never   Vaping Use     Vaping status: Never Used   Substance and Sexual Activity     Alcohol use: Yes     Alcohol/week: 1.0 standard drink of alcohol     Types: 1 Glasses of wine per week     Comment: 1-2 weekly     Drug use: No     Sexual activity: Yes     Partners: Female   Other Topics Concern     Parent/sibling w/ CABG, MI or angioplasty before 65F 55M? Not Asked   Social History Narrative     Not on file     Social Determinants of Health     Financial Resource Strain: Not on file   Food Insecurity: Not on file   Transportation Needs: Not on file   Physical Activity: Not on file   Stress: Not on file   Social Connections: Not on file   Interpersonal Safety: Low Risk  (3/22/2024)    Interpersonal Safety      Do you feel physically and emotionally safe where you currently live?: Yes      Within the past 12 months, have you been hit, slapped, kicked or otherwise physically  "hurt by someone?: No      Within the past 12 months, have you been humiliated or emotionally abused in other ways by your partner or ex-partner?: No   Housing Stability: Not on file     Review of Systems:  Focused cardiovascular and respiratory review of systems is negative other than the symptoms noted above in the HPI.     Physical Exam:  Vitals: BP (!) 164/88 (BP Location: Right arm, Patient Position: Sitting)   Pulse 69   Ht 1.727 m (5' 8\")   Wt 88.7 kg (195 lb 9.6 oz)   SpO2 97%   BMI 29.74 kg/m     Wt Readings from Last 4 Encounters:   10/09/24 88.7 kg (195 lb 9.6 oz)   08/14/24 90.3 kg (199 lb)   05/21/24 92 kg (202 lb 14.4 oz)   04/23/24 89.8 kg (198 lb)     Constitutional: Alert and in no acute distress.  Neck: No JVD.  Cardiovascular:  Regular rate and rhythm. No murmur, rub or gallop.   Respiratory: Breathing non-labored. Lungs are clear to auscultation with no wheezes or crackles bilaterally.  Gastrointestinal: Abdomen non-distended.  Extremities:  No pitting lower extremity edema bilaterally.   Neuropsychiatric: No gross focal deficits. Affect appropriate. Mentation normal.     Recent Lab Results:  LIPID RESULTS:  Lab Results   Component Value Date    CHOL 113 05/16/2024    CHOL 212 (H) 03/01/2021    HDL 35 (L) 05/16/2024    HDL 39 (L) 03/01/2021    LDL 43 05/16/2024     (H) 03/01/2021    TRIG 174 (H) 05/16/2024    TRIG 202 (H) 03/01/2021    CHOLHDLRATIO 6.4 (H) 10/06/2015     LIVER ENZYME RESULTS:  Lab Results   Component Value Date    AST 24 06/17/2024    AST 20 12/07/2020    ALT 23 06/17/2024    ALT 28 12/07/2020     CBC RESULTS:  Lab Results   Component Value Date    WBC 5.7 06/17/2024    WBC 6.4 12/07/2020    RBC 4.27 (L) 06/17/2024    RBC 4.72 12/07/2020    HGB 13.6 06/17/2024    HGB 15.1 12/07/2020    HCT 37.8 (L) 06/17/2024    HCT 43.0 12/07/2020    MCV 89 06/17/2024    MCV 91 12/07/2020    MCH 31.9 06/17/2024    MCH 32.0 12/07/2020    MCHC 36.0 06/17/2024    MCHC 35.1 12/07/2020    " RDW 12.3 06/17/2024    RDW 12.8 12/07/2020     (L) 06/17/2024     (L) 12/07/2020     BMP RESULTS:  Lab Results   Component Value Date     03/22/2024     12/07/2020    POTASSIUM 4.6 03/22/2024    POTASSIUM 4.4 08/26/2022    POTASSIUM 4.0 12/11/2020    CHLORIDE 107 03/22/2024    CHLORIDE 110 (H) 08/26/2022    CHLORIDE 109 12/07/2020    CO2 25 03/22/2024    CO2 25 08/26/2022    CO2 28 12/07/2020    ANIONGAP 10 03/22/2024    ANIONGAP 5 08/26/2022    ANIONGAP 2 (L) 12/07/2020    GLC 96 03/22/2024     (H) 08/26/2022    GLC 94 12/07/2020    BUN 17.4 03/22/2024    BUN 19 08/26/2022    BUN 17 12/07/2020    CR 1.18 (H) 06/17/2024    CR 1.23 12/11/2020    GFRESTIMATED 64 06/17/2024    GFRESTIMATED 58 (L) 12/11/2020    GFRESTBLACK 67 12/11/2020    LATHA 9.6 03/22/2024    LATHA 9.3 12/07/2020      A1C RESULTS:  Lab Results   Component Value Date    A1C 5.3 01/06/2020     CC  SALVATORE Briceno CNP  [address]  [place] [id number]      Thank you for allowing me to participate in the care of your patient.      Sincerely,     Zurdo Morrell NP     [facility]  cc:   SALVATORE Briceno CNP  [address]  [place] [id number]

## 2024-10-09 NOTE — PATIENT INSTRUCTIONS
Thank you for your visit with the [facility] today.    Medication changes and/or recommendations from today:   - Increase amlodipine from 2.5 mg to 5 mg once daily.  - A prescription for sublingual nitroglycerin was sent out for use as needed for any symptoms of chest pain.   - Take it easy in terms of exertion until the coronary angiogram can be completed.    Follow up plan:   - Complete the coronary angiogram sometime in the coming weeks.   - Follow up with me or another Cardiology CHET within a few weeks following the procedure.    If you have questions or concerns in the meantime please call the nurse team at [phone] or send a [facility] message.     Scheduling phone number: [phone]    It was a pleasure seeing you today!     SALVATORE Hilario, CNP  Nurse Practitioner  [facility]

## 2024-10-09 NOTE — PROGRESS NOTES
Cardiology Clinic Progress Note    Service Date: October 9, 2024  Primary Cardiology Team: Dr. Mccartney    HISTORY OF PRESENT ILLNESS:  I had the pleasure of meeting Mr. Harpreet Vera in the clinic today. He is a delightful 74 year old gentleman with a past medical history notable for cerebrovascular disease (chronic left vertebral artery occlusion, right vertebral artery stenosis status post stenting), hypertension, hyperlipidemia, mildly dilated ascending aorta, sleep apnea, sick sinus syndrome with syncope status post pacemaker.    Mr Vera suffered a stroke due to a left vertebral artery occlusion and subsequently underwent stenting to his right vertebral artery stenosis in 2019.  He states he has been taking aspirin 325 mg once daily since then.     In August 2020, he had a syncopal episode and was found to have significant bradycardia with pauses up to 11 seconds. He subsequently underwent a dual-chamber permanent pacemaker placement.      Previous echocardiogram from October 2022 showed LVEF 55%, mild-moderate concentric LVH, normal RV function, 1+ mitral regurgitation, RVSP 16 mmHg, ascending aorta at 4.2 cm.     The patient was last seen by my colleague, Emilia Laureano NP, in the clinic in May of this year and was doing well from a cardiac standpoint at that time. He did admit to being less active than he would like to be, but he was continuing to walk the golf course regularly and was tolerating this without exertional symptoms.    Most recent device check on 8/23/24 showed 93% atrial pacing, 99% ventricular pacing, underlying rhythm of sinus bradycardia in the 50s, and 11.5-year battery life remaining. No atrial or ventricular arrhythmias were noted.    In the interim, the patient came in for an echocardiogram on 9/27/24 which demonstrated mild drop in his ejection fraction to 45-50% with septal motion consistent with conduction abnormality and mild global hypokinesis noted.  Ascending aorta dilatation was  stable measuring at 4.2 cm. A liver cyst was also incidentally noted per the report.  A Lexiscan nuclear stress test was recommended for ischemic evaluation.  This was completed yesterday showing here nontransmural infarction in the inferior and inferoseptal segments of the left ventricle. Stress to rest cavity ratio was reported at 1.25 with TID present.  Results were reviewed with Dr. Mccartney and the patient was added onto my schedule today to review the findings and consider proceeding with coronary angiography and possible PCI.    Today, Mr. Vera states that he has been feeling reasonably well from a cardiac standpoint recently. However, he has noticed increasing fatigue/activity intolerance gradually over the past 3 months or so. He was able to golf over the summer, but felt like he was getting more wiped out than usual after completing a round. He has not noticed exertional chest pain or shortness of breath recently. He has not had palpitations, dizziness, lightheadedness, orthopnea, or lower extremity edema.  He notes that his blood pressure has been running higher recently and his checks at home frequently in the 140s-160s systolic range.    We reviewed the findings of his echocardiogram and stress test in detail today. Recommendation for proceeding with coronary angiography and possible PCI for more definitive evaluation and management was discussed. Risks and benefits of coronary angiogram discussed today including, bleeding, bruising, infection, allergic reaction, kidney damage (including need for dialysis), stroke, heart attack, vascular damage, emergency open heart surgery, up to and including death.  Patient indicates understanding and is agreeable to proceed.  He has no known history of an allergy to contrast dye. He denies any recent bleeding issues or obvious contraindication to dual antiplatelet therapy if this would be indicated.  However, he does note that he was planning to have a colonoscopy  completed in early November as he has been having some issues with dyspepsia and frequent diarrhea. He states that this was recommended several months ago, but he has been putting it off for some time.    ASSESSMENT:  New mild cardiomyopathy with EF 45-50% and abnormal cardiovascular stress test.  He has not been having clear anginal symptoms, but in hindsight does report gradual decline in activity tolerance with increased fatigue over the past few months.  Essential hypertension.  Blood pressures suboptimally controlled recently with more frequent readings in the 140s-160s systolic range.  Hyperlipidemia.  Managed with rosuvastatin 20 mg once daily with most recent LDL cholesterol 43 in 05/2024.  Dilated ascending aorta.  Measurements stable at 4.2 cm on recent TTE 9/27/24.  Sick sinus syndrome with syncope s/p PPM placement.   History of CVA due to left vertebral artery occlusion s/p stenting in 2019.  On aspirin 325 mg once daily    PLAN:  - Increase amlodipine from 2.5 mg to 5 mg once daily for better blood pressure control. Reviewed to watch for lower extremity edema as a potential side effect with this and to call if this occurs.  Encouraged him to monitor his blood pressure regularly at home in the coming weeks and to call if readings continue run consistently over 130/85 so that further adjustments can be made if needed.  - Continue aspirin, statin, and beta-blocker.  - Recommend proceeding with coronary angiography and possible PCI in the coming weeks. We reviewed that if any obstructive CAD is found requiring PCI/stenting, that we would need to cancel his upcoming colonoscopy and plan to postpone this for at least 6 months to a year so that he can remain on DAPT uninterrupted. He is agreeable to proceeding with this.  - Reviewed to take it easy in terms of exercise and exertion until the coronary angiography is completed. Provided him with a prescription for PRN sublingual nitroglycerin. Reviewed to not  wait and to seek evaluation in the emergency department should he have any symptoms more concerning for unstable angina between now and the procedure.  - Recommend close follow-up with a cardiology CHET within a few weeks following the coronary angiogram.  - If no significant CAD is noted, would work on further optimization of GDMT consider cardiac MRI in the coming months for further evaluation the mild cardiomyopathy and reassessment of his EF.     Thank you for the opportunity to participate in this pleasant patient's care. We would be happy to see him sooner if needed for any concerns in the meantime.    50 total minutes was spent today including chart review, precharting, history and exam, post visit documentation, and reviewing studies as outlined above.     Please kindly note that this document was completed in part using Dragon voice recognition software. Although reviewed after completion, some word substitutions and typographical errors may occur. Please contact me if clarification is needed.     SALVATORE Hilario, CNP   Nurse Practitioner  [facility]    Orders this Visit:  Orders Placed This Encounter   Procedures    Follow-Up with Cardiology CHET    Case Request Cath Lab: Coronary Angiogram, Left Heart Catheterization, Percutaneous Coronary Intervention     Orders Placed This Encounter   Medications    nitroGLYcerin (NITROSTAT) 0.4 MG sublingual tablet     Sig: For chest pain place 1 tablet under the tongue every 5 minutes for 3 doses. If symptoms persist 5 minutes after 1st dose call 911.     Dispense:  25 tablet     Refill:  0    amLODIPine (NORVASC) 5 MG tablet     Sig: Take 1 tablet (5 mg) by mouth every evening.     Dispense:  90 tablet     Refill:  3     Medications Discontinued During This Encounter   Medication Reason    amLODIPine (NORVASC) 2.5 MG tablet      Encounter Diagnoses   Name Primary?    Other cardiomyopathy (H)     Abnormal cardiovascular stress test Yes    Essential  hypertension, benign        CURRENT MEDICATIONS:  Current Outpatient Medications   Medication Sig Dispense Refill    acetaminophen (TYLENOL) 650 MG CR tablet Take 650 mg by mouth every 8 hours as needed for mild pain or fever      alfuzosin ER (UROXATRAL) 10 MG 24 hr tablet Take 1 tablet (10 mg) by mouth daily 90 tablet 3    allopurinol (ZYLOPRIM) 100 MG tablet Take 200 mg by mouth every evening (2 x 100mg)      amLODIPine (NORVASC) 5 MG tablet Take 1 tablet (5 mg) by mouth every evening. 90 tablet 3    aspirin - buffered (ASCRIPTIN) 325 MG TABS tablet Take 325 mg by mouth every evening      finasteride (PROSCAR) 5 MG tablet Take 1 tablet (5 mg) by mouth daily 90 tablet 3    metoprolol succinate ER (TOPROL XL) 25 MG 24 hr tablet Take 1 tablet (25 mg) by mouth 2 times daily 180 tablet 3    multivitamin w/minerals (THERA-VIT-M) tablet Take 1 tablet by mouth every evening      nitroGLYcerin (NITROSTAT) 0.4 MG sublingual tablet For chest pain place 1 tablet under the tongue every 5 minutes for 3 doses. If symptoms persist 5 minutes after 1st dose call 911. 25 tablet 0    olmesartan (BENICAR) 40 MG tablet Take 1 tablet (40 mg) by mouth every morning 90 tablet 3    omeprazole (PRILOSEC) 20 MG DR capsule TAKE ONE CAPSULE BY MOUTH ONCE OR TWICE DAILY AS NEEDED 180 capsule 0    polyethylene glycol (GOLYTELY) 236 g suspension The night before the exam at 6 pm drink an 8-ounce glass every 15 minutes until the jug is half empty. If you arrive before 11 AM: Drink the other half of the RideApartly jug at 11 PM night before procedure. If you arrive after 11 AM: Drink the other half of the Golytely jug at 6 AM day of procedure. For additional instructions refer to your colonoscopy prep instructions. 4000 mL 0    rosuvastatin (CRESTOR) 20 MG tablet Take 1 tablet (20 mg) by mouth at bedtime 90 tablet 3    vitamin D3 (CHOLECALCIFEROL) 50 mcg (2000 units) tablet Take 1 tablet by mouth every evening Taking 1000 units daily      bisacodyl  (DULCOLAX) 5 MG EC tablet Take 2 tablets at 3 pm the day before your procedure. If your procedure is before 11 am, take 2 additional tablets at 11 pm. If your procedure is after 11 am, take 2 additional tablets at 6 am. For additional instructions refer to your colonoscopy prep instructions. (Patient not taking: Reported on 10/9/2024) 4 tablet 0       ALLERGIES  No Known Allergies    PAST MEDICAL, SURGICAL, FAMILY HISTORY:  History was reviewed and updated as needed, see medical record.    SOCIAL HISTORY:  Social History     Socioeconomic History    Marital status:      Spouse name: Not on file    Number of children: Not on file    Years of education: Not on file    Highest education level: Not on file   Occupational History    Not on file   Tobacco Use    Smoking status: Never    Smokeless tobacco: Never   Vaping Use    Vaping status: Never Used   Substance and Sexual Activity    Alcohol use: Yes     Alcohol/week: 1.0 standard drink of alcohol     Types: 1 Glasses of wine per week     Comment: 1-2 weekly    Drug use: No    Sexual activity: Yes     Partners: Female   Other Topics Concern    Parent/sibling w/ CABG, MI or angioplasty before 65F 55M? Not Asked   Social History Narrative    Not on file     Social Determinants of Health     Financial Resource Strain: Not on file   Food Insecurity: Not on file   Transportation Needs: Not on file   Physical Activity: Not on file   Stress: Not on file   Social Connections: Not on file   Interpersonal Safety: Low Risk  (3/22/2024)    Interpersonal Safety     Do you feel physically and emotionally safe where you currently live?: Yes     Within the past 12 months, have you been hit, slapped, kicked or otherwise physically hurt by someone?: No     Within the past 12 months, have you been humiliated or emotionally abused in other ways by your partner or ex-partner?: No   Housing Stability: Not on file     Review of Systems:  Focused cardiovascular and respiratory review  "of systems is negative other than the symptoms noted above in the HPI.     Physical Exam:  Vitals: BP (!) 164/88 (BP Location: Right arm, Patient Position: Sitting)   Pulse 69   Ht 1.727 m (5' 8\")   Wt 88.7 kg (195 lb 9.6 oz)   SpO2 97%   BMI 29.74 kg/m     Wt Readings from Last 4 Encounters:   10/09/24 88.7 kg (195 lb 9.6 oz)   08/14/24 90.3 kg (199 lb)   05/21/24 92 kg (202 lb 14.4 oz)   04/23/24 89.8 kg (198 lb)     Constitutional: Alert and in no acute distress.  Neck: No JVD.  Cardiovascular:  Regular rate and rhythm. No murmur, rub or gallop.   Respiratory: Breathing non-labored. Lungs are clear to auscultation with no wheezes or crackles bilaterally.  Gastrointestinal: Abdomen non-distended.  Extremities:  No pitting lower extremity edema bilaterally.   Neuropsychiatric: No gross focal deficits. Affect appropriate. Mentation normal.     Recent Lab Results:  LIPID RESULTS:  Lab Results   Component Value Date    CHOL 113 05/16/2024    CHOL 212 (H) 03/01/2021    HDL 35 (L) 05/16/2024    HDL 39 (L) 03/01/2021    LDL 43 05/16/2024     (H) 03/01/2021    TRIG 174 (H) 05/16/2024    TRIG 202 (H) 03/01/2021    CHOLHDLRATIO 6.4 (H) 10/06/2015     LIVER ENZYME RESULTS:  Lab Results   Component Value Date    AST 24 06/17/2024    AST 20 12/07/2020    ALT 23 06/17/2024    ALT 28 12/07/2020     CBC RESULTS:  Lab Results   Component Value Date    WBC 5.7 06/17/2024    WBC 6.4 12/07/2020    RBC 4.27 (L) 06/17/2024    RBC 4.72 12/07/2020    HGB 13.6 06/17/2024    HGB 15.1 12/07/2020    HCT 37.8 (L) 06/17/2024    HCT 43.0 12/07/2020    MCV 89 06/17/2024    MCV 91 12/07/2020    MCH 31.9 06/17/2024    MCH 32.0 12/07/2020    MCHC 36.0 06/17/2024    MCHC 35.1 12/07/2020    RDW 12.3 06/17/2024    RDW 12.8 12/07/2020     (L) 06/17/2024     (L) 12/07/2020     BMP RESULTS:  Lab Results   Component Value Date     03/22/2024     12/07/2020    POTASSIUM 4.6 03/22/2024    POTASSIUM 4.4 08/26/2022    " POTASSIUM 4.0 12/11/2020    CHLORIDE 107 03/22/2024    CHLORIDE 110 (H) 08/26/2022    CHLORIDE 109 12/07/2020    CO2 25 03/22/2024    CO2 25 08/26/2022    CO2 28 12/07/2020    ANIONGAP 10 03/22/2024    ANIONGAP 5 08/26/2022    ANIONGAP 2 (L) 12/07/2020    GLC 96 03/22/2024     (H) 08/26/2022    GLC 94 12/07/2020    BUN 17.4 03/22/2024    BUN 19 08/26/2022    BUN 17 12/07/2020    CR 1.18 (H) 06/17/2024    CR 1.23 12/11/2020    GFRESTIMATED 64 06/17/2024    GFRESTIMATED 58 (L) 12/11/2020    GFRESTBLACK 67 12/11/2020    LATHA 9.6 03/22/2024    LATHA 9.3 12/07/2020      A1C RESULTS:  Lab Results   Component Value Date    A1C 5.3 01/06/2020     SALVATORE Salas CNP  [address]  [place] [id number]

## 2024-10-10 ENCOUNTER — MYC MEDICAL ADVICE (OUTPATIENT)
Dept: INTERNAL MEDICINE | Facility: CLINIC | Age: 74
End: 2024-10-10
Payer: COMMERCIAL

## 2024-10-10 NOTE — TELEPHONE ENCOUNTER
Please see patient's mychart message.     Please advise, thanks.    Rd Daniel, Triage RN Sheridan Fanwood  3:45 PM 10/10/2024

## 2024-10-11 ENCOUNTER — TELEPHONE (OUTPATIENT)
Facility: CLINIC | Age: 74
End: 2024-10-11
Payer: COMMERCIAL

## 2024-10-11 NOTE — TELEPHONE ENCOUNTER
Patient called due to testing positive for Covid.  Requesting call back to discuss medications.     Kimberli Conde RN on 10/11/2024 at 11:54 AM

## 2024-10-11 NOTE — TELEPHONE ENCOUNTER
Spoke with patient  He is feeling okay- just tired  Sx started yesterday. + Covid test 10/10  Patient advised to stay hydrated.   If concerns of worsening illness or requesting possible paxlovid- patient to speak with PCP or go to UC/ED  Patient feels he is managing well at this time, wanted to ensure no cardiac meds needing to be held.     Will call back if any cardiac concerns noted    Kimberli Conde RN on 10/11/2024 at 2:13 PM

## 2024-10-17 ENCOUNTER — TELEPHONE (OUTPATIENT)
Dept: CARDIOLOGY | Facility: CLINIC | Age: 74
End: 2024-10-17
Payer: COMMERCIAL

## 2024-10-17 DIAGNOSIS — I67.9 CEREBROVASCULAR DISEASE: Primary | ICD-10-CM

## 2024-10-17 DIAGNOSIS — I42.8 OTHER CARDIOMYOPATHY (H): Primary | ICD-10-CM

## 2024-10-17 DIAGNOSIS — I65.09 VERTEBRAL ARTERY STENOSIS, UNSPECIFIED LATERALITY: ICD-10-CM

## 2024-10-17 DIAGNOSIS — R94.39 ABNORMAL CARDIOVASCULAR STRESS TEST: ICD-10-CM

## 2024-10-17 DIAGNOSIS — I10 ESSENTIAL HYPERTENSION, BENIGN: ICD-10-CM

## 2024-10-17 RX ORDER — SODIUM CHLORIDE 9 MG/ML
INJECTION, SOLUTION INTRAVENOUS CONTINUOUS
Status: CANCELLED | OUTPATIENT
Start: 2024-10-17

## 2024-10-17 RX ORDER — ASPIRIN 81 MG/1
243 TABLET, CHEWABLE ORAL ONCE
Status: CANCELLED | OUTPATIENT
Start: 2024-10-17

## 2024-10-17 RX ORDER — ASPIRIN 325 MG
325 TABLET ORAL ONCE
Status: CANCELLED | OUTPATIENT
Start: 2024-10-17 | End: 2024-10-17

## 2024-10-17 RX ORDER — POTASSIUM CHLORIDE 1500 MG/1
20 TABLET, EXTENDED RELEASE ORAL
Status: CANCELLED | OUTPATIENT
Start: 2024-10-17

## 2024-10-17 RX ORDER — LIDOCAINE 40 MG/G
CREAM TOPICAL
Status: CANCELLED | OUTPATIENT
Start: 2024-10-17

## 2024-10-17 NOTE — TELEPHONE ENCOUNTER
Called patient to review prep for procedure, left a message to call back.   Orders to be placed pending patient callback. Insurance is pending.     Addendum 1430: Spoke to patient, reviewed prep as below. He expressed understanding. Orders entered.     Coronary angiogram/PCI/Right Heart Cath prep instructions.     Patient is scheduled for a Coronary Angio w/possible PCI at [facility] - [address], [place] [address] - Main Entrance of the Hospital on Friday 10/25/24.  Check in time is at 0730 and procedure to follow.    Patient instructed to remain NPO for solid foods 8 hours prior to arrival and may have clear liquids up to 2 hours prior to arrival.    Patient does not require extra fluids prior to procedure.    Patient is not diabetic.    Patient is not on anticoagulation.    Patient is not on diuretics.     Patient is taking ASA 325mg and should continue daily dosing. Patient normally takes it at night, will take a dose prior to arrival day of procedure.     Pt is not on a SGLT2 inhibitor.    Pt is not on a GLP-1 Agonist    Patient advised to take their other daily medications the morning of the procedure with small sips of water.     Patient advised to shower the night before and morning of their procedure with regular soap.    Verified patient does not have a contrast allergy.    Verified patient has someone available to drive them home from the hospital and can stay with them for 24 hours after the procedure.     Patient advised they will have bedrest post procedure.  Length of time is 2-6 hours and dependent on access site used for procedure.  This bedrest is to allow proper clotting of the access site to prevent bleeding.    Patient advised to notify care team with any new COVID like symptoms prior to procedure. Day of procedure phone number: Pool at [phone]    Patient is aware of visitor policy.    Patient expresses understanding of above instructions and denies  further questions at this time.      Lauren Garber RN  [facility]

## 2024-10-23 RX ORDER — ASPIRIN 325 MG/1
325 TABLET, FILM COATED ORAL EVERY MORNING
COMMUNITY
Start: 2024-10-23

## 2024-10-23 NOTE — TELEPHONE ENCOUNTER
"Message sent to Enrrique Morrell NP regarding patients pre-procedure aspirin, normally takes it at night. Per Enrrique 10/23/24-     \"Let's have him switch to take it in the morning starting tomorrow. So could call him and just tell him to take his usual evening dose now or this afternoon and then take it in the morning tomorrow and Friday before the cath.\"    Called patient on home and mobile numbers, left a message to call back.        Addendum 1350: Spoke to patient and reviewed message from Enrrique as above. He expressed understanding of the instructions. He will take his aspirin now for the day and then starting tomorrow, will take it daily in the mornings.   "

## 2024-10-23 NOTE — TELEPHONE ENCOUNTER
"ADDENDUM 10.26.2024- Sent to Zurdo Morrell NP for clearance to complete procedure-results are on [employer].       Recent cardiology visit-Patient is scheduled on 10.25.2024 for testing-Writer will monitor for results and send for approval as needed:    \"Recommend proceeding with coronary angiography and possible PCI in the coming weeks. We reviewed that if any obstructive CAD is found requiring PCI/stenting, that we would need to cancel his upcoming colonoscopy and plan to postpone this for at least 6 months to a year so that he can remain on DAPT uninterrupted. He is agreeable to proceeding with this\".       Rescheduled Colonoscopy    Pre visit planning completed.      Procedure details:    Patient scheduled for Colonoscopy on 11.06.2024.     Arrival time: 1130. Procedure time 1215    Facility location: [facility]; [address] [place] [address]. Check in location: Main entrance, door #1 on the North side of the building under roundabout awning. DO NOT GO TO SURGERY/ED ENTRANCE.     Sedation type: Conscious sedation     Pre op exam needed? No.    Indication for procedure: diarrhea     Chart review:     Electronic implanted devices? Yes: Pacemaker    Recent diagnosis of diverticulitis within the last 6 weeks? No      Medication review:    Diabetic? No    Anticoagulants? No    Weight loss medication/injectable? No GLP-1 medication per patient's medication list.  RN will verify with pre-assessment call.    Other medication HOLDING recommendations:  N/A      Prep for procedure:     Bowel prep recommendation: Standard Golytely. Bowel prep prescription sent to [id number] - [place], [place] - [address]  Due to: CKD noted.     Prep instructions sent via Moment[employer] by CRC team         Adia Queen RN  Endoscopy Procedure Pre Assessment RN  [phone] option 2    "

## 2024-10-25 ENCOUNTER — HOSPITAL ENCOUNTER (OUTPATIENT)
Facility: CLINIC | Age: 74
Discharge: HOME | End: 2024-10-25
Attending: INTERNAL MEDICINE | Admitting: INTERNAL MEDICINE
Payer: COMMERCIAL

## 2024-10-25 VITALS
DIASTOLIC BLOOD PRESSURE: 77 MMHG | BODY MASS INDEX: 31.08 KG/M2 | WEIGHT: 198 LBS | OXYGEN SATURATION: 95 % | SYSTOLIC BLOOD PRESSURE: 145 MMHG | HEIGHT: 67 IN | HEART RATE: 60 BPM | TEMPERATURE: 97.9 F | RESPIRATION RATE: 16 BRPM

## 2024-10-25 DIAGNOSIS — I10 ESSENTIAL HYPERTENSION, BENIGN: ICD-10-CM

## 2024-10-25 DIAGNOSIS — I42.8 OTHER CARDIOMYOPATHY (H): ICD-10-CM

## 2024-10-25 DIAGNOSIS — R94.39 ABNORMAL CARDIOVASCULAR STRESS TEST: ICD-10-CM

## 2024-10-25 PROBLEM — Z98.890 STATUS POST CORONARY ANGIOGRAM: Status: ACTIVE | Noted: 2024-10-25

## 2024-10-25 LAB
ANION GAP SERPL CALCULATED.3IONS-SCNC: 7 MMOL/L (ref 7–15)
APTT PPP: 27 SECONDS (ref 22–38)
ATRIAL RATE - MUSE: 60 BPM
BUN SERPL-MCNC: 14.7 MG/DL (ref 8–23)
CALCIUM SERPL-MCNC: 9.3 MG/DL (ref 8.8–10.4)
CHLORIDE SERPL-SCNC: 108 MMOL/L (ref 98–107)
CREAT SERPL-MCNC: 1.16 MG/DL (ref 0.67–1.17)
DIASTOLIC BLOOD PRESSURE - MUSE: NORMAL MMHG
EGFRCR SERPLBLD CKD-EPI 2021: 65 ML/MIN/1.73M2
ERYTHROCYTE [DISTWIDTH] IN BLOOD BY AUTOMATED COUNT: 12.1 % (ref 10–15)
GLUCOSE SERPL-MCNC: 110 MG/DL (ref 70–99)
HCO3 SERPL-SCNC: 27 MMOL/L (ref 22–29)
HCT VFR BLD AUTO: 37 % (ref 40–53)
HGB BLD-MCNC: 13.2 G/DL (ref 13.3–17.7)
INR PPP: 1.08 (ref 0.85–1.15)
INTERPRETATION ECG - MUSE: NORMAL
MCH RBC QN AUTO: 31.6 PG (ref 26.5–33)
MCHC RBC AUTO-ENTMCNC: 35.7 G/DL (ref 31.5–36.5)
MCV RBC AUTO: 89 FL (ref 78–100)
P AXIS - MUSE: NORMAL DEGREES
PLATELET # BLD AUTO: 149 10E3/UL (ref 150–450)
POTASSIUM SERPL-SCNC: 4.3 MMOL/L (ref 3.4–5.3)
PR INTERVAL - MUSE: NORMAL MS
QRS DURATION - MUSE: 174 MS
QT - MUSE: 486 MS
QTC - MUSE: 486 MS
R AXIS - MUSE: -10 DEGREES
RBC # BLD AUTO: 4.18 10E6/UL (ref 4.4–5.9)
SODIUM SERPL-SCNC: 142 MMOL/L (ref 135–145)
SYSTOLIC BLOOD PRESSURE - MUSE: NORMAL MMHG
T AXIS - MUSE: 79 DEGREES
VENTRICULAR RATE- MUSE: 60 BPM
WBC # BLD AUTO: 5.7 10E3/UL (ref 4–11)

## 2024-10-25 PROCEDURE — 93005 ELECTROCARDIOGRAM TRACING: CPT

## 2024-10-25 PROCEDURE — 999N000054 HC STATISTIC EKG NON-CHARGEABLE

## 2024-10-25 PROCEDURE — 85014 HEMATOCRIT: CPT | Performed by: INTERNAL MEDICINE

## 2024-10-25 PROCEDURE — 250N000009 HC RX 250: Performed by: INTERNAL MEDICINE

## 2024-10-25 PROCEDURE — 250N000011 HC RX IP 250 OP 636: Performed by: INTERNAL MEDICINE

## 2024-10-25 PROCEDURE — 85610 PROTHROMBIN TIME: CPT | Performed by: INTERNAL MEDICINE

## 2024-10-25 PROCEDURE — 99152 MOD SED SAME PHYS/QHP 5/>YRS: CPT | Performed by: INTERNAL MEDICINE

## 2024-10-25 PROCEDURE — 93454 CORONARY ARTERY ANGIO S&I: CPT | Mod: 26 | Performed by: INTERNAL MEDICINE

## 2024-10-25 PROCEDURE — 93454 CORONARY ARTERY ANGIO S&I: CPT | Performed by: INTERNAL MEDICINE

## 2024-10-25 PROCEDURE — 36415 COLL VENOUS BLD VENIPUNCTURE: CPT | Performed by: INTERNAL MEDICINE

## 2024-10-25 PROCEDURE — C1894 INTRO/SHEATH, NON-LASER: HCPCS | Performed by: INTERNAL MEDICINE

## 2024-10-25 PROCEDURE — 999N000184 HC STATISTIC TELEMETRY

## 2024-10-25 PROCEDURE — 272N000001 HC OR GENERAL SUPPLY STERILE: Performed by: INTERNAL MEDICINE

## 2024-10-25 PROCEDURE — 999N000071 HC STATISTIC HEART CATH LAB OR EP LAB

## 2024-10-25 PROCEDURE — 80048 BASIC METABOLIC PNL TOTAL CA: CPT | Performed by: INTERNAL MEDICINE

## 2024-10-25 PROCEDURE — 85730 THROMBOPLASTIN TIME PARTIAL: CPT | Performed by: INTERNAL MEDICINE

## 2024-10-25 RX ORDER — NALOXONE HYDROCHLORIDE 0.4 MG/ML
0.4 INJECTION, SOLUTION INTRAMUSCULAR; INTRAVENOUS; SUBCUTANEOUS
Status: DISCONTINUED | OUTPATIENT
Start: 2024-10-25 | End: 2024-10-25 | Stop reason: HOSPADM

## 2024-10-25 RX ORDER — ACETAMINOPHEN 325 MG/1
650 TABLET ORAL EVERY 4 HOURS PRN
Status: DISCONTINUED | OUTPATIENT
Start: 2024-10-25 | End: 2024-10-25 | Stop reason: HOSPADM

## 2024-10-25 RX ORDER — FENTANYL CITRATE 50 UG/ML
INJECTION, SOLUTION INTRAMUSCULAR; INTRAVENOUS
Status: DISCONTINUED | OUTPATIENT
Start: 2024-10-25 | End: 2024-10-25 | Stop reason: HOSPADM

## 2024-10-25 RX ORDER — LIDOCAINE 40 MG/G
CREAM TOPICAL
Status: DISCONTINUED | OUTPATIENT
Start: 2024-10-25 | End: 2024-10-25 | Stop reason: HOSPADM

## 2024-10-25 RX ORDER — ASPIRIN 325 MG
325 TABLET ORAL ONCE
Status: DISCONTINUED | OUTPATIENT
Start: 2024-10-25 | End: 2024-10-25 | Stop reason: HOSPADM

## 2024-10-25 RX ORDER — SODIUM CHLORIDE 9 MG/ML
INJECTION, SOLUTION INTRAVENOUS CONTINUOUS
Status: DISCONTINUED | OUTPATIENT
Start: 2024-10-25 | End: 2024-10-25 | Stop reason: HOSPADM

## 2024-10-25 RX ORDER — ASPIRIN 81 MG/1
243 TABLET, CHEWABLE ORAL ONCE
Status: DISCONTINUED | OUTPATIENT
Start: 2024-10-25 | End: 2024-10-25 | Stop reason: HOSPADM

## 2024-10-25 RX ORDER — POTASSIUM CHLORIDE 1500 MG/1
20 TABLET, EXTENDED RELEASE ORAL
Status: DISCONTINUED | OUTPATIENT
Start: 2024-10-25 | End: 2024-10-25 | Stop reason: HOSPADM

## 2024-10-25 RX ORDER — NALOXONE HYDROCHLORIDE 0.4 MG/ML
0.2 INJECTION, SOLUTION INTRAMUSCULAR; INTRAVENOUS; SUBCUTANEOUS
Status: DISCONTINUED | OUTPATIENT
Start: 2024-10-25 | End: 2024-10-25 | Stop reason: HOSPADM

## 2024-10-25 RX ORDER — OXYCODONE HYDROCHLORIDE 5 MG/1
10 TABLET ORAL EVERY 4 HOURS PRN
Status: DISCONTINUED | OUTPATIENT
Start: 2024-10-25 | End: 2024-10-25 | Stop reason: HOSPADM

## 2024-10-25 RX ORDER — FENTANYL CITRATE 50 UG/ML
25 INJECTION, SOLUTION INTRAMUSCULAR; INTRAVENOUS
Status: DISCONTINUED | OUTPATIENT
Start: 2024-10-25 | End: 2024-10-25 | Stop reason: HOSPADM

## 2024-10-25 RX ORDER — ATROPINE SULFATE 0.1 MG/ML
0.5 INJECTION INTRAVENOUS
Status: DISCONTINUED | OUTPATIENT
Start: 2024-10-25 | End: 2024-10-25 | Stop reason: HOSPADM

## 2024-10-25 RX ORDER — FLUMAZENIL 0.1 MG/ML
0.2 INJECTION, SOLUTION INTRAVENOUS
Status: DISCONTINUED | OUTPATIENT
Start: 2024-10-25 | End: 2024-10-25 | Stop reason: HOSPADM

## 2024-10-25 RX ORDER — OXYCODONE HYDROCHLORIDE 5 MG/1
5 TABLET ORAL EVERY 4 HOURS PRN
Status: DISCONTINUED | OUTPATIENT
Start: 2024-10-25 | End: 2024-10-25 | Stop reason: HOSPADM

## 2024-10-25 RX ORDER — HEPARIN SODIUM 10000 [USP'U]/100ML
100-1000 INJECTION, SOLUTION INTRAVENOUS CONTINUOUS PRN
Status: DISCONTINUED | OUTPATIENT
Start: 2024-10-25 | End: 2024-10-25 | Stop reason: HOSPADM

## 2024-10-25 SDOH — HEALTH STABILITY: PHYSICAL HEALTH: ON AVERAGE, HOW MANY DAYS PER WEEK DO YOU ENGAGE IN MODERATE TO STRENUOUS EXERCISE (LIKE A BRISK WALK)?: 3 DAYS

## 2024-10-25 SDOH — HEALTH STABILITY: PHYSICAL HEALTH: ON AVERAGE, HOW MANY MINUTES DO YOU ENGAGE IN EXERCISE AT THIS LEVEL?: 10 MIN

## 2024-10-25 ASSESSMENT — ACTIVITIES OF DAILY LIVING (ADL)
ADLS_ACUITY_SCORE: 0

## 2024-10-25 ASSESSMENT — SOCIAL DETERMINANTS OF HEALTH (SDOH): HOW OFTEN DO YOU GET TOGETHER WITH FRIENDS OR RELATIVES?: ONCE A WEEK

## 2024-10-25 NOTE — DISCHARGE INSTRUCTIONS
Cardiac Angiogram Discharge Instructions - Femoral    After you go home:    Have an adult stay with you until tomorrow.  Drink extra fluids for 2 days.  You may resume your normal diet.  No smoking       For 24 hours - due to the sedation you received:  Relax and take it easy.  Do NOT make any important or legal decisions.  Do NOT drive or operate machines at home or at work.  Do NOT drink alcohol.    Care of Groin Puncture Site:    For the first 24 hrs - check the puncture site every 1-2 hours while awake.  For 2 days, when you cough, sneeze, laugh or move your bowels, hold your hand over the puncture site and press firmly.  Remove the bandaid after 24 hours. If there is minor oozing, apply another bandaid and remove it after 12 hours.  It is normal to have a small bruise or pea size lump at the site.  You may shower tomorrow. Do NOT take a bath, or use a hot tub or pool for at least 3 days. Do NOT scrub the site. Do not use lotion or powder near the puncture site.    Activity:            For 2 days:  No stooping or squatting  Do NOT do any heavy activity such as exercise, lifting, or straining.   No housework, yard work or any activity that make you sweat  Do NOT lift more than 10 pounds    Bleeding:    If you start bleeding from the site in your groin, lie down flat and press firmly on/above the site for 10 minutes.   Once bleeding stops, lay flat for 2 hours.   Call [facility] as soon as you can.       Call 911 right away if you have heavy bleeding or bleeding that does not stop.      Medicines:    Take your medications, including blood thinners, unless your provider tells you not to.    If you have stopped any medicines, check with your provider about when to restart them.    Follow Up Appointments:    Follow up with [facility] Nurse Practitioner at [facility] of patient preference in 7-10 days.    Call the clinic if:    You have increased pain or a large or growing hard lump around the site.  The site is  red, swollen, hot or tender.  Blood or fluid is draining from the site.  You have chills or a fever greater than 101 F (38 C).  Your leg feels numb, cool or changes color.  You have hives, a rash or unusual itching.  New pain in the back or belly that you cannot control with Tylenol.  Any questions or concerns.          [facility] [place]:    [phone] [facility] (7 days a week)    Or you may contact your provider via My Chart

## 2024-10-25 NOTE — PROGRESS NOTES
Care Suites Admission Nursing Note    Patient Information  Name: Harpreet Vera  Age: 74 year old  Reason for admission: coronary angiogram  Care Suites arrival time: 0725    Visitor Information  Name: kavon Martinez     Patient Admission/Assessment   Pre-procedure assessment complete: Yes  If abnormal assessment/labs, provider notified: N/A  NPO: Yes  Medications held per instructions/orders: n/a  Consent: obtained  Patient oriented to room: Yes  Education/questions answered: Yes  Plan/other: proceed as planned    Discharge Planning  Discharge name/phone number: kavon Martinez [phone]  Overnight post sedation caregiver: same  Discharge location: home    Bella Garcia RN

## 2024-10-25 NOTE — Clinical Note
Hemodynamic equipment used: 5 lead ECG, NetacK With 3 Leads, Machine BP Cuff and pulse oximeter probe.

## 2024-10-25 NOTE — PROGRESS NOTES
Care Suites Post Procedure Note    Patient Information  Name: Harpreet Vera  Age: 74 year old    Post Procedure  Time patient returned to Care Suites: 1000  Concerns/abnormal assessment: none  If abnormal assessment, provider notified: N/A  Plan/Other: bedrest x4 hours then discharge    Tiffanie Whitmore RN

## 2024-10-25 NOTE — PRE-PROCEDURE
GENERAL PRE-PROCEDURE:   Procedure:  Coronary angiogram  Date/Time:  10/25/2024 9:25 AM    Verbal consent obtained?: Yes    Written consent obtained?: Yes    Risks and benefits: Risks, benefits and alternatives were discussed    Consent given by:  Patient  Patient states understanding of procedure being performed: Yes    Patient's understanding of procedure matches consent: Yes    Procedure consent matches procedure scheduled: Yes    Expected level of sedation:  Moderate  Appropriately NPO:  Yes  ASA Class:  3  Mallampati  :  Grade 3- soft palate visible, posterior pharyngeal wall not visible  Lungs:  Lungs clear with good breath sounds bilaterally  Heart:  Normal heart sounds and rate  History & Physical reviewed:  History and physical reviewed and no updates needed  Statement of review:  I have reviewed the lab findings, diagnostic data, medications, and the plan for sedation

## 2024-10-25 NOTE — PROGRESS NOTES
Care Suites Discharge Nursing Note    Patient Information  Name: Harpreet Vera  Age: 74 year old    Discharge Education:  Discharge instructions reviewed: Yes  Additional education/resources provided: Yes  Patient/patient representative verbalizes understanding: Yes  Patient discharging on new medications: No  Medication education completed: N/A    Discharge Plans:   Discharge location: home  Discharge ride contacted: Yes  Approximate discharge time: 1445    Discharge Criteria:  Discharge criteria met and vital signs stable: Yes    Patient Belongs:  Patient belongings returned to patient: Yes    Bella Zee RN

## 2024-10-28 RX ORDER — IOPAMIDOL 755 MG/ML
INJECTION, SOLUTION INTRAVASCULAR
OUTPATIENT
Start: 2024-10-25

## 2024-10-28 NOTE — TELEPHONE ENCOUNTER
Attempted to contact patient in order to complete pre assessment questions.     No answer. Left message to return call to [phone] option 2    Callback required communication sent via Vast[employer].      Adia Queen RN  Endoscopy Procedure Pre Assessment

## 2024-10-28 NOTE — TELEPHONE ENCOUNTER
"Per Zurdo Morrell, NP:    \"Yes, okay to proceed with the colonoscopy as planned since no obstructive CAD or stenting needed on the angiogram\".    Katty Queen RN    "

## 2024-10-29 ENCOUNTER — TELEPHONE (OUTPATIENT)
Dept: CARDIOLOGY | Facility: CLINIC | Age: 74
End: 2024-10-29

## 2024-10-29 ENCOUNTER — OFFICE VISIT (OUTPATIENT)
Dept: INTERNAL MEDICINE | Facility: CLINIC | Age: 74
End: 2024-10-29
Payer: COMMERCIAL

## 2024-10-29 VITALS
WEIGHT: 197 LBS | RESPIRATION RATE: 20 BRPM | HEART RATE: 60 BPM | BODY MASS INDEX: 30.92 KG/M2 | TEMPERATURE: 96.9 F | HEIGHT: 67 IN | OXYGEN SATURATION: 97 % | DIASTOLIC BLOOD PRESSURE: 85 MMHG | SYSTOLIC BLOOD PRESSURE: 138 MMHG

## 2024-10-29 DIAGNOSIS — Z00.00 PREVENTATIVE HEALTH CARE: Primary | ICD-10-CM

## 2024-10-29 PROCEDURE — G0439 PPPS, SUBSEQ VISIT: HCPCS | Performed by: INTERNAL MEDICINE

## 2024-10-29 NOTE — TELEPHONE ENCOUNTER
Patient was admitted to [facility]: HTN, Abnormal cardiovascular stress test, cardiomyopathy    9/27/24 Echo showed EF of 45-50%    10/25/24: Coronary angiogram via right femoral artry    Pt was started on no new medications at discharge    Called patient to discuss any post hospital d/c questions he may have, review medication changes, and confirm f/u appts. Message left with patient to return call to clinic with any signs of bleeding, swelling, redness or infection of right groin site.    RN advised patient that he has an appointment scheduled on 11/7/24 with JAH Enrrique Morrell at our [facility].     Patient advised to call clinic with any cardiac related questions or concerns prior to this jah't.   Katty Kong RN on 10/29/2024 at 1:29 PM

## 2024-11-01 ENCOUNTER — TELEPHONE (OUTPATIENT)
Dept: CARDIOLOGY | Facility: CLINIC | Age: 74
End: 2024-11-01
Payer: COMMERCIAL

## 2024-11-01 NOTE — TELEPHONE ENCOUNTER
Team received Notifixioust message from Harpreet.  He would like to know about holding ASA for one day for colonoscopy next week.   He also has questions about angiogram results, Lexiscan results, and plan going forward.    Writer spoke with Harpreet, who is asking appropriate questions.   Informed him that is OK to hold the ASA the day of the colonoscopy, and that GI will tell him when to resume based on the procedure.    Writer reviewed the angiogram results with Harpreet, confirmed that he is correct that no vessels needing stenting.   He wonders how come the Lexiscan said that he needed to have the angiogram.   Writer confirmed that Harpreet knows he has upcoming Enrrique apmnt on 11\7; explained that Enrrique will go through the answer to that question with him at the apmnt.   However, based on  Echo and Lexiscan report and patient history, it does show that there may be something going on, and that's why they needed to get a clear view by doing the angiogram.    Instructed Harpreet to contact us by phone or Notifixioust any time with further questions\concerns.    Harpreet verbalized understanding and agreement with the plan.      Amy Ambriz RN, BSN

## 2024-11-06 ENCOUNTER — HOSPITAL ENCOUNTER (OUTPATIENT)
Facility: CLINIC | Age: 74
Discharge: HOME | End: 2024-11-06
Attending: INTERNAL MEDICINE | Admitting: INTERNAL MEDICINE
Payer: COMMERCIAL

## 2024-11-06 VITALS
SYSTOLIC BLOOD PRESSURE: 142 MMHG | OXYGEN SATURATION: 94 % | HEART RATE: 60 BPM | RESPIRATION RATE: 16 BRPM | DIASTOLIC BLOOD PRESSURE: 82 MMHG

## 2024-11-06 LAB — COLONOSCOPY: NORMAL

## 2024-11-06 PROCEDURE — 45380 COLONOSCOPY AND BIOPSY: CPT | Performed by: INTERNAL MEDICINE

## 2024-11-06 PROCEDURE — 45384 COLONOSCOPY W/LESION REMOVAL: CPT | Performed by: INTERNAL MEDICINE

## 2024-11-06 PROCEDURE — 45385 COLONOSCOPY W/LESION REMOVAL: CPT | Performed by: INTERNAL MEDICINE

## 2024-11-06 PROCEDURE — 250N000011 HC RX IP 250 OP 636: Performed by: INTERNAL MEDICINE

## 2024-11-06 PROCEDURE — G0500 MOD SEDAT ENDO SERVICE >5YRS: HCPCS | Performed by: INTERNAL MEDICINE

## 2024-11-06 PROCEDURE — 88305 TISSUE EXAM BY PATHOLOGIST: CPT | Mod: 26 | Performed by: PATHOLOGY

## 2024-11-06 PROCEDURE — 88305 TISSUE EXAM BY PATHOLOGIST: CPT | Mod: TC | Performed by: INTERNAL MEDICINE

## 2024-11-06 PROCEDURE — 99153 MOD SED SAME PHYS/QHP EA: CPT | Performed by: INTERNAL MEDICINE

## 2024-11-06 RX ORDER — ONDANSETRON 2 MG/ML
4 INJECTION INTRAMUSCULAR; INTRAVENOUS
Status: DISCONTINUED | OUTPATIENT
Start: 2024-11-06 | End: 2024-11-06 | Stop reason: HOSPADM

## 2024-11-06 RX ORDER — FENTANYL CITRATE 50 UG/ML
50-100 INJECTION, SOLUTION INTRAMUSCULAR; INTRAVENOUS EVERY 5 MIN PRN
Status: DISCONTINUED | OUTPATIENT
Start: 2024-11-06 | End: 2024-11-06 | Stop reason: HOSPADM

## 2024-11-06 RX ORDER — NALOXONE HYDROCHLORIDE 0.4 MG/ML
0.2 INJECTION, SOLUTION INTRAMUSCULAR; INTRAVENOUS; SUBCUTANEOUS
Status: DISCONTINUED | OUTPATIENT
Start: 2024-11-06 | End: 2024-11-06 | Stop reason: HOSPADM

## 2024-11-06 RX ORDER — FLUMAZENIL 0.1 MG/ML
0.2 INJECTION, SOLUTION INTRAVENOUS
Status: DISCONTINUED | OUTPATIENT
Start: 2024-11-06 | End: 2024-11-06 | Stop reason: HOSPADM

## 2024-11-06 RX ORDER — NALOXONE HYDROCHLORIDE 0.4 MG/ML
0.4 INJECTION, SOLUTION INTRAMUSCULAR; INTRAVENOUS; SUBCUTANEOUS
Status: DISCONTINUED | OUTPATIENT
Start: 2024-11-06 | End: 2024-11-06 | Stop reason: HOSPADM

## 2024-11-06 RX ORDER — ONDANSETRON 4 MG/1
4 TABLET, ORALLY DISINTEGRATING ORAL EVERY 6 HOURS PRN
Status: DISCONTINUED | OUTPATIENT
Start: 2024-11-06 | End: 2024-11-06 | Stop reason: HOSPADM

## 2024-11-06 RX ORDER — PROCHLORPERAZINE MALEATE 5 MG/1
5 TABLET ORAL EVERY 6 HOURS PRN
Status: DISCONTINUED | OUTPATIENT
Start: 2024-11-06 | End: 2024-11-06 | Stop reason: HOSPADM

## 2024-11-06 RX ORDER — LIDOCAINE 40 MG/G
CREAM TOPICAL
Status: DISCONTINUED | OUTPATIENT
Start: 2024-11-06 | End: 2024-11-06 | Stop reason: HOSPADM

## 2024-11-06 RX ORDER — SIMETHICONE 40MG/0.6ML
133 SUSPENSION, DROPS(FINAL DOSAGE FORM)(ML) ORAL
Status: DISCONTINUED | OUTPATIENT
Start: 2024-11-06 | End: 2024-11-06 | Stop reason: HOSPADM

## 2024-11-06 RX ORDER — ATROPINE SULFATE 0.1 MG/ML
1 INJECTION INTRAVENOUS
Status: DISCONTINUED | OUTPATIENT
Start: 2024-11-06 | End: 2024-11-06 | Stop reason: HOSPADM

## 2024-11-06 RX ORDER — ONDANSETRON 2 MG/ML
4 INJECTION INTRAMUSCULAR; INTRAVENOUS EVERY 6 HOURS PRN
Status: DISCONTINUED | OUTPATIENT
Start: 2024-11-06 | End: 2024-11-06 | Stop reason: HOSPADM

## 2024-11-06 RX ORDER — DIPHENHYDRAMINE HYDROCHLORIDE 50 MG/ML
25-50 INJECTION INTRAMUSCULAR; INTRAVENOUS
Status: DISCONTINUED | OUTPATIENT
Start: 2024-11-06 | End: 2024-11-06 | Stop reason: HOSPADM

## 2024-11-06 RX ORDER — EPINEPHRINE 1 MG/ML
0.1 INJECTION, SOLUTION, CONCENTRATE INTRAVENOUS
Status: DISCONTINUED | OUTPATIENT
Start: 2024-11-06 | End: 2024-11-06 | Stop reason: HOSPADM

## 2024-11-06 RX ADMIN — FENTANYL CITRATE 50 MCG: 50 INJECTION, SOLUTION INTRAMUSCULAR; INTRAVENOUS at 12:04

## 2024-11-06 RX ADMIN — FENTANYL CITRATE 50 MCG: 50 INJECTION, SOLUTION INTRAMUSCULAR; INTRAVENOUS at 12:23

## 2024-11-06 RX ADMIN — MIDAZOLAM 1 MG: 1 INJECTION INTRAMUSCULAR; INTRAVENOUS at 12:04

## 2024-11-06 RX ADMIN — MIDAZOLAM 1 MG: 1 INJECTION INTRAMUSCULAR; INTRAVENOUS at 12:23

## 2024-11-06 ASSESSMENT — ACTIVITIES OF DAILY LIVING (ADL)
ADLS_ACUITY_SCORE: 0
ADLS_ACUITY_SCORE: 0

## 2024-11-06 NOTE — DISCHARGE INSTRUCTIONS
Clip Placement    This Patient has received a temporary endoscopic clip which can be safely used in a MRI with a static magnetic field of 1/5-Elizabeth and 3-Elizabeth ONLY.  Retention times for this clip vary so if you have an MRI within the next year make sure to mention you had a clip placed on this day to the MRI staff. If you were given the informational card, keep that in in your wallet or purse for 1 year.    Non-clinical testing demonstrated that the clip is MR Conditional according to ASTM -77. A patient with this device can be scanned safely in an MR system under the following conditions.    Static magnetic field of 1.5-Elizabeth and 3-Telsa, only.  Maximum spatial gradient magnetic field of 2,000-gauss/cm (20-T/m)  Maximum MR system reported, whole body averaged specific absorption rate (MARGIE) of 2-W/kg for 15 minutes of scanning (i.e., per pulse sequence) in the Normal Operating Mode.    Under the scan conditions defined, the clip is expected to produce a maximum temperature rise of 1.9 degrees C after 15 minutes of continuous scanning (i.e., per pulse sequence). In Non-clinical testing, the image artifact caused by the clip extends approximately 30-mm from this implant when imaged using a gradient echo pulse sequence and a 3-Elizabeth RM system.

## 2024-11-06 NOTE — H&P
Pre-Endoscopy History and Physical     Harpreet Vera MRN# [id number]   YOB: 1950 Age: 74 year old     Date of Procedure: 11/6/2024  Primary care provider: Lucía Sandhu  Type of Endoscopy: Colonoscopy with possible biopsy, possible polypectomy  Reason for Procedure: diarrhea  Type of Anesthesia Anticipated: Conscious Sedation    HPI:    Harpreet is a 74 year old male who will be undergoing the above procedure.      A history and physical has been performed. The patient's medications and allergies have been reviewed. The risks and benefits of the procedure and the sedation options and risks were discussed with the patient.  All questions were answered and informed consent was obtained.      He denies a personal or family history of anesthesia complications or bleeding disorders.     Patient Active Problem List   Diagnosis    Essential hypertension, benign    Allergic rhinitis    Intestinal infection due to Clostridium difficile    CARDIOVASCULAR SCREENING; LDL GOAL LESS THAN 130    Hypercholesteremia    MAN (obstructive sleep apnea)    Vitamin D deficiency    Benign prostatic hyperplasia    S/P cervical spinal fusion    Cervicalgia    Vertebral artery stenosis    Syncope    Bradycardia, severe sinus    Chronic kidney disease, stage 3 (H)    SSS (sick sinus syndrome) (H)    Abnormal cardiovascular stress test    Status post coronary angiogram    Other cardiomyopathy (H)        Past Medical History:   Diagnosis Date    Benign neoplasm of colon     Brachial plexopathy     Chronic infection 2007    HX of C Diff    Hypertension     Mumps     MAN (obstructive sleep apnea)     Presence of permanent cardiac pacemaker     Sinus node dysfunction (H)     with syncope    Sleep apnea     Bi-PAP    Syncope         Past Surgical History:   Procedure Laterality Date    ARTHROSCOPY SHOULDER Right 12/11/2020    Procedure: RIGHT SHOULDER ARTHROSCOPY, ARTHROSCOPIC SUBACROMIAL DECOMPRESSION, DISTAL CLAVICLE AND BICEPS  "TENOTOMY;  Surgeon: Joseph Moon MD;  Location:  OR    CHOLECYSTECTOMY      COLONOSCOPY      COLONOSCOPY N/A 4/5/2017    Procedure: COMBINED COLONOSCOPY, SINGLE OR MULTIPLE BIOPSY/POLYPECTOMY BY BIOPSY;  Surgeon: Tylor Rice MD;  Location:  GI    COLONOSCOPY N/A 4/1/2022    Procedure: COLONOSCOPY (FV);  Surgeon: Tylor Rice MD;  Location:  GI    CV CORONARY ANGIOGRAM N/A 10/25/2024    Procedure: Coronary Angiogram;  Surgeon: Alexis Ruiz MD;  Location:  [facility]    DECOMPRESSION, FUSION CERVICAL ANTERIOR ONE LEVEL, COMBINED N/A 9/9/2016    Procedure: COMBINED DECOMPRESSION, FUSION CERVICAL ANTERIOR ONE LEVEL;  Surgeon: Erick Valles MD;  Location:  OR    ENT SURGERY      tonsilectomy  as a child    EP PACEMAKER N/A 8/21/2020    Procedure: EP Pacemaker;  Surgeon: Alfonso Sigala MD;  Location:  [facility]    IR CAROTID CEREBRAL ANGIOGRAM BILATERAL  1/6/2020    IR CAROTID CEREBRAL ANGIOGRAM BILATERAL  7/1/2020    IR DISCONTINUE SHEATH  1/6/2020    ORTHOPEDIC SURGERY      ambar knees scopedrt shoulder,fx rt arm fx    TESTICLE SURGERY      VASECTOMY      [facility] NONSPECIFIC PROCEDURE      Right clavicle fracture, Multiple right sided rib fracture, hairline fracture \"pelvis\", secondary to fall from tree        Z NONSPECIFIC PROCEDURE      Right ulnar/radial fracture        Social History     Tobacco Use    Smoking status: Never    Smokeless tobacco: Never   Substance Use Topics    Alcohol use: Yes     Alcohol/week: 1.0 standard drink of alcohol     Types: 1 Glasses of wine per week     Comment: 1-2 weekly       Family History   Problem Relation Age of Onset    Arthritis Mother     Eye Disorder Mother         cateracts    Lipids Mother     Colorectal Cancer Mother     Cancer Father         colon/prostate    Eye Disorder Father         cateracts    Lipids Father     Diabetes Father     Prostate Cancer Brother     Colon Cancer Brother 74        liver " mets    Kidney Cancer Brother     Prostate Cancer Brother     Colon Cancer Maternal Aunt     Cancer Maternal Aunt         colon    Colon Cancer Maternal Uncle     Cancer Maternal Uncle         colon       Prior to Admission medications    Medication Sig Start Date End Date Taking? Authorizing Provider   acetaminophen (TYLENOL) 650 MG CR tablet Take 650 mg by mouth every 8 hours as needed for mild pain or fever   Yes Reported, Patient   alfuzosin ER (UROXATRAL) 10 MG 24 hr tablet Take 1 tablet (10 mg) by mouth daily 4/23/24  Yes Bijan Childress MD   allopurinol (ZYLOPRIM) 100 MG tablet Take 200 mg by mouth every evening (2 x 100mg)   Yes Unknown, Entered By History   amLODIPine (NORVASC) 5 MG tablet Take 1 tablet (5 mg) by mouth every evening. 10/9/24  Yes Zurdo Morrell NP   aspirin - buffered (ASCRIPTIN) 325 MG TABS tablet Take 1 tablet (325 mg) by mouth every morning. 10/23/24  Yes Zurdo Morrell NP   bisacodyl (DULCOLAX) 5 MG EC tablet Take 2 tablets at 3 pm the day before your procedure. If your procedure is before 11 am, take 2 additional tablets at 11 pm. If your procedure is after 11 am, take 2 additional tablets at 6 am. For additional instructions refer to your colonoscopy prep instructions. 9/19/24  Yes Tylor Rice MD   finasteride (PROSCAR) 5 MG tablet Take 1 tablet (5 mg) by mouth daily 4/23/24  Yes Bijan Childress MD   metoprolol succinate ER (TOPROL XL) 25 MG 24 hr tablet Take 1 tablet (25 mg) by mouth 2 times daily 5/21/24  Yes Emilia Laureano APRN CNP   nitroGLYcerin (NITROSTAT) 0.4 MG sublingual tablet For chest pain place 1 tablet under the tongue every 5 minutes for 3 doses. If symptoms persist 5 minutes after 1st dose call 911. 10/9/24  Yes Zurdo Morrell NP   olmesartan (BENICAR) 40 MG tablet Take 1 tablet (40 mg) by mouth every morning 5/21/24  Yes Emilia Laureano APRN CNP   omeprazole (PRILOSEC) 20 MG DR capsule TAKE ONE CAPSULE BY MOUTH ONCE OR TWICE DAILY AS NEEDED  "9/25/24  Yes Lucía Sandhu MD   polyethylene glycol (GOLYTELY) 236 g suspension The night before the exam at 6 pm drink an 8-ounce glass every 15 minutes until the jug is half empty. If you arrive before 11 AM: Drink the other half of the Golytely jug at 11 PM night before procedure. If you arrive after 11 AM: Drink the other half of the Golytely jug at 6 AM day of procedure. For additional instructions refer to your colonoscopy prep instructions. 9/19/24  Yes Tylor Rice MD   rosuvastatin (CRESTOR) 20 MG tablet Take 1 tablet (20 mg) by mouth at bedtime 5/21/24  Yes Emilia Laureano APRN CNP   Simethicone (GAS-X PO) Take by mouth.   Yes Reported, Patient   vitamin D3 (CHOLECALCIFEROL) 50 mcg (2000 units) tablet Take 1 tablet by mouth every evening Taking 1000 units daily   Yes Unknown, Entered By History       No Known Allergies     REVIEW OF SYSTEMS:   5 point ROS negative except as noted above in HPI, including Gen., Resp., CV, GI &  system review.    PHYSICAL EXAM:   BP (!) 168/87   Pulse 62   Resp (!) 7   SpO2 99%  Estimated body mass index is 30.85 kg/m  as calculated from the following:    Height as of 10/29/24: 1.702 m (5' 7\").    Weight as of 10/29/24: 89.4 kg (197 lb).   GENERAL APPEARANCE: alert, and oriented  MENTAL STATUS: alert  AIRWAY EXAM: Mallampatti Class I (visualization of the soft palate, fauces, uvula, anterior and posterior pillars)  RESP: lungs clear to auscultation - no rales, rhonchi or wheezes  CV: regular rates and rhythm  DIAGNOSTICS:    Not indicated    IMPRESSION   ASA Class 2 - Mild systemic disease    PLAN:   Plan for Colonoscopy with possible biopsy, possible polypectomy. We discussed the risks, benefits and alternatives and the patient wished to proceed.    The above has been forwarded to the consulting provider.      Signed Electronically by: Tylor Rice MD  November 6, 2024          "

## 2024-11-07 ENCOUNTER — LAB (OUTPATIENT)
Dept: LAB | Facility: CLINIC | Age: 74
End: 2024-11-07
Payer: COMMERCIAL

## 2024-11-07 ENCOUNTER — OFFICE VISIT (OUTPATIENT)
Dept: CARDIOLOGY | Facility: CLINIC | Age: 74
End: 2024-11-07
Attending: NURSE PRACTITIONER
Payer: COMMERCIAL

## 2024-11-07 VITALS
WEIGHT: 193.4 LBS | DIASTOLIC BLOOD PRESSURE: 78 MMHG | SYSTOLIC BLOOD PRESSURE: 136 MMHG | BODY MASS INDEX: 29.31 KG/M2 | HEIGHT: 68 IN | HEART RATE: 60 BPM

## 2024-11-07 DIAGNOSIS — I10 ESSENTIAL HYPERTENSION, BENIGN: ICD-10-CM

## 2024-11-07 DIAGNOSIS — I77.810 DILATATION OF THORACIC AORTA (H): ICD-10-CM

## 2024-11-07 DIAGNOSIS — I42.8 NICM (NONISCHEMIC CARDIOMYOPATHY) (H): ICD-10-CM

## 2024-11-07 DIAGNOSIS — R53.83 OTHER FATIGUE: Primary | ICD-10-CM

## 2024-11-07 DIAGNOSIS — I49.5 SSS (SICK SINUS SYNDROME) (H): ICD-10-CM

## 2024-11-07 DIAGNOSIS — I42.8 OTHER CARDIOMYOPATHY (H): ICD-10-CM

## 2024-11-07 DIAGNOSIS — Z95.0 CARDIAC PACEMAKER IN SITU: ICD-10-CM

## 2024-11-07 LAB
ANION GAP SERPL CALCULATED.3IONS-SCNC: 12 MMOL/L (ref 7–15)
BUN SERPL-MCNC: 14.2 MG/DL (ref 8–23)
CALCIUM SERPL-MCNC: 9.4 MG/DL (ref 8.8–10.4)
CHLORIDE SERPL-SCNC: 103 MMOL/L (ref 98–107)
CREAT SERPL-MCNC: 1.23 MG/DL (ref 0.67–1.17)
EGFRCR SERPLBLD CKD-EPI 2021: 61 ML/MIN/1.73M2
GLUCOSE SERPL-MCNC: 107 MG/DL (ref 70–99)
HCO3 SERPL-SCNC: 23 MMOL/L (ref 22–29)
PATH REPORT.COMMENTS IMP SPEC: NORMAL
PATH REPORT.COMMENTS IMP SPEC: NORMAL
PATH REPORT.FINAL DX SPEC: NORMAL
PATH REPORT.GROSS SPEC: NORMAL
PATH REPORT.MICROSCOPIC SPEC OTHER STN: NORMAL
PATH REPORT.RELEVANT HX SPEC: NORMAL
PHOTO IMAGE: NORMAL
POTASSIUM SERPL-SCNC: 4 MMOL/L (ref 3.4–5.3)
SODIUM SERPL-SCNC: 138 MMOL/L (ref 135–145)

## 2024-11-07 PROCEDURE — 80048 BASIC METABOLIC PNL TOTAL CA: CPT

## 2024-11-07 PROCEDURE — 36415 COLL VENOUS BLD VENIPUNCTURE: CPT

## 2024-11-07 PROCEDURE — 99215 OFFICE O/P EST HI 40 MIN: CPT | Performed by: NURSE PRACTITIONER

## 2024-11-07 RX ORDER — AMLODIPINE BESYLATE 5 MG/1
5 TABLET ORAL 2 TIMES DAILY
COMMUNITY
Start: 2024-11-07

## 2024-11-07 NOTE — LETTER
11/7/2024    Lucía Sandhu MD  [address] [facility] [address]  [facility] [address]    RE: Harpreet Vera       Dear Colleague,     I had the pleasure of seeing Harpreet Vera in the [facility].    Cardiology Clinic Progress Note    Service Date: November 7, 2024  Primary Cardiology Team: Dr. Mccartney    HISTORY OF PRESENT ILLNESS:  I had the pleasure of meeting Mr. Harpreet Vera in the clinic today. He is a delightful 74 year old gentleman with a past medical history notable for cerebrovascular disease (chronic left vertebral artery occlusion, right vertebral artery stenosis status post stenting), hypertension, hyperlipidemia, mildly dilated ascending aorta, sleep apnea, sick sinus syndrome with syncope status post pacemaker.    Mr Vera suffered a stroke due to a left vertebral artery occlusion and subsequently underwent stenting to his right vertebral artery stenosis in 2019.  He states he has been taking aspirin 325 mg once daily since then.     In August 2020, he had a syncopal episode and was found to have significant bradycardia with pauses up to 11 seconds. He subsequently underwent a dual-chamber permanent pacemaker placement.      Previous echocardiogram from October 2022 showed LVEF 55%, mild-moderate concentric LVH, normal RV function, 1+ mitral regurgitation, RVSP 16 mmHg, ascending aorta at 4.2 cm.     The patient was last seen by my colleague, Emilia Laureano NP, in the clinic in May of this year and was doing well from a cardiac standpoint at that time. He did admit to being less active than he would like to be, but he was continuing to walk the golf course regularly and was tolerating this without exertional symptoms.    Most recent device check on 8/23/24 showed 93% atrial pacing, 99% ventricular pacing, underlying rhythm of sinus bradycardia in the 50s, and 11.5-year battery life remaining. No atrial or ventricular arrhythmias were noted.    In the interim, the patient came in for an  echocardiogram on 9/27/24 which demonstrated mild drop in his ejection fraction to 45-50% with septal motion consistent with conduction abnormality and mild global hypokinesis noted.  Ascending aorta dilatation was stable measuring at 4.2 cm. A liver cyst was also incidentally noted per the report.  A Lexiscan nuclear stress test was recommended for ischemic evaluation.  This was completed yesterday showing here nontransmural infarction in the inferior and inferoseptal segments of the left ventricle. Stress to rest cavity ratio was reported at 1.25 with TID present.  Results were reviewed with Dr. Mccartney and coronary angiography was recommended for definitive evaluation.  This was completed on 10/25/24 showing minimal to mild non-obstructive CAD with a 25% distal circumflex stenosis, 10% mid LAD stenosis, and 10% proximal RCA stenosis. Medical management was recommended.    I met Harpreet initially prior to getting him set up for the coronary angiogram. He was hypertensive at that time, increased his amlodipine dose from 2.5 mg to 5 mg once daily for better blood pressure control.     Today, Harpreet presents to the clinic accompanied by his wife Michelle in follow-up of the recent coronary angiogram. He tells me that he has been feeling okay since the procedure. He notes continued generalized fatigue and tiredness, feeling like he could fall asleep or take a nap at several points throughout the day.  He again notes that he has been using his CPAP nightly for management of his sleep apnea. He feels that his mood has been fine and denies feeling more down or depressed recently. He otherwise denies any issues with shortness of breath, chest pain, palpitations, dizziness, or lightheadedness recently. He has been tolerating the higher dose of amlodipine well without concerns for side effects. The access site from the angiogram has been healing nicely without concerns.  He has been checking his blood pressure periodically at home and  notes that today's reading in the clinic at 136/78 is the best reading that he has gotten in some time with the majority of his readings around the 140s range at home. We reviewed his reassuring angiogram findings and the plan of care as outlined below. He stated understanding and agreement.  Labs were checked prior to the visit today with basic metabolic panel showing stable electrolytes and renal function with potassium level 4.0 and creatinine at 1.23, which appears to be around his typical baseline recently.    ASSESSMENT:  Mild non-ischemic cardiomyopathy with EF 45-50% by TTE 9/27/24. Etiology unclear. Possibly hypertensive in the setting of sub-optimal blood pressure control recently. On beta-blocker and ARB. Appears euvolemic on exam. NYHA class II.   Minimal to mild nonobstructive coronary artery disease noted on recent coronary angiogram 10/25/24 as outlined above.  Essential hypertension. Blood pressures remain suboptimally controlled with frequent readings in the 130s-140s systolic range or higher.  Hyperlipidemia. Managed with rosuvastatin 20 mg once daily with most recent LDL cholesterol 43 in 05/2024.  Dilated ascending aorta. Measurements stable at 4.2 cm on recent TTE 9/27/24.  Sick sinus syndrome with syncope s/p dual chamber PPM placement.   History of CVA due to left vertebral artery occlusion s/p stenting in 2019.  On aspirin 325 mg once daily  MAN on CPAP  Persistent fatigue. Etiology unclear. Suspect likely multifactorial with MAN and possible some physical deconditioning contributing.    PLAN:  - Increase amlodipine from 5 mg once daily up to 5 mg twice daily for better blood pressure control. Reviewed to continue to monitor his blood pressure regularly at home shooting for readings more consistently under 130/85.  - Continue the remainder of his current cardiac regimen without changes. Could consider adding MRA and SGLT2 inhibitor in follow up and will message the pharmacy liaison team to  look into cost/coverage for Jardiance or Farxiga for GDMT for the mild cardiomyopathy.   - Counseled on trying to stay physically active, gradually increasing his activity level, and trying to stick to a heart healthy lower sodium Mediterranean diet.  - Will check a TSH level with reflex for free T4 to rule out the possibility of hypothyroidism contributing with his recent issues with fatigue has been a couple of years since his last check. Also recommended he check in with the sleep medicine team to see if any adjustment might be warranted with his CPAP.   - Discussed the option of a cardiac MRI to further evaluate his cardiomyopathy and rule out possible infiltrative disease. However, given his pacemaker will defer this and instead plan for a repeat echocardiogram and follow up with Dr. Mccartney in about 6 months.    Thank you for the opportunity to participate in this pleasant patient's care. We would be happy to see him sooner if needed for any concerns in the meantime.    45 total minutes was spent today including chart review, precharting, history and exam, post visit documentation, and reviewing studies as outlined above.     Please kindly note that this document was completed in part using Dragon voice recognition software. Although reviewed after completion, some word substitutions and typographical errors may occur. Please contact me if clarification is needed.     SALVATORE Hilario, CNP   Nurse Practitioner  [facility]    Orders this Visit:  Orders Placed This Encounter   Procedures     TSH with free T4 reflex     Follow-Up with Cardiology     Echocardiogram Complete     Orders Placed This Encounter   Medications     amLODIPine (NORVASC) 5 MG tablet     Sig: Take 1 tablet (5 mg) by mouth 2 times daily.     Medications Discontinued During This Encounter   Medication Reason     amLODIPine (NORVASC) 5 MG tablet Reorder (No AVS)       Encounter Diagnoses   Name Primary?     Essential hypertension,  benign      Other fatigue Yes     NICM (nonischemic cardiomyopathy) (H)      Dilatation of thoracic aorta (H)      SSS (sick sinus syndrome) (H)      Cardiac pacemaker in situ        CURRENT MEDICATIONS:  Current Outpatient Medications   Medication Sig Dispense Refill     acetaminophen (TYLENOL) 650 MG CR tablet Take 650 mg by mouth every 8 hours as needed for mild pain or fever       alfuzosin ER (UROXATRAL) 10 MG 24 hr tablet Take 1 tablet (10 mg) by mouth daily 90 tablet 3     allopurinol (ZYLOPRIM) 100 MG tablet Take 200 mg by mouth every evening (2 x 100mg)       amLODIPine (NORVASC) 5 MG tablet Take 1 tablet (5 mg) by mouth 2 times daily.       aspirin - buffered (ASCRIPTIN) 325 MG TABS tablet Take 1 tablet (325 mg) by mouth every morning.       bisacodyl (DULCOLAX) 5 MG EC tablet Take 2 tablets at 3 pm the day before your procedure. If your procedure is before 11 am, take 2 additional tablets at 11 pm. If your procedure is after 11 am, take 2 additional tablets at 6 am. For additional instructions refer to your colonoscopy prep instructions. 4 tablet 0     finasteride (PROSCAR) 5 MG tablet Take 1 tablet (5 mg) by mouth daily 90 tablet 3     metoprolol succinate ER (TOPROL XL) 25 MG 24 hr tablet Take 1 tablet (25 mg) by mouth 2 times daily 180 tablet 3     nitroGLYcerin (NITROSTAT) 0.4 MG sublingual tablet For chest pain place 1 tablet under the tongue every 5 minutes for 3 doses. If symptoms persist 5 minutes after 1st dose call 911. 25 tablet 0     olmesartan (BENICAR) 40 MG tablet Take 1 tablet (40 mg) by mouth every morning 90 tablet 3     omeprazole (PRILOSEC) 20 MG DR capsule TAKE ONE CAPSULE BY MOUTH ONCE OR TWICE DAILY AS NEEDED 180 capsule 0     polyethylene glycol (GOLYTELY) 236 g suspension The night before the exam at 6 pm drink an 8-ounce glass every 15 minutes until the jug is half empty. If you arrive before 11 AM: Drink the other half of the [employer] jug at 11 PM night before procedure. If you  arrive after 11 AM: Drink the other half of the [email or web address] jug at 6 AM day of procedure. For additional instructions refer to your colonoscopy prep instructions. 4000 mL 0     rosuvastatin (CRESTOR) 20 MG tablet Take 1 tablet (20 mg) by mouth at bedtime 90 tablet 3     Simethicone (GAS-X PO) Take by mouth.       vitamin D3 (CHOLECALCIFEROL) 50 mcg (2000 units) tablet Take 1 tablet by mouth every evening Taking 1000 units daily       ALLERGIES  No Known Allergies    PAST MEDICAL, SURGICAL, FAMILY HISTORY:  History was reviewed and updated as needed, see medical record.    SOCIAL HISTORY:  Social History     Socioeconomic History     Marital status:      Spouse name: Not on file     Number of children: Not on file     Years of education: Not on file     Highest education level: Not on file   Occupational History     Not on file   Tobacco Use     Smoking status: Never     Smokeless tobacco: Never   Vaping Use     Vaping status: Never Used   Substance and Sexual Activity     Alcohol use: Yes     Alcohol/week: 1.0 standard drink of alcohol     Types: 1 Glasses of wine per week     Comment: 1-2 weekly     Drug use: No     Sexual activity: Yes     Partners: Female   Other Topics Concern     Parent/sibling w/ CABG, MI or angioplasty before 65F 55M? Not Asked   Social History Narrative     Not on file     Social Drivers of Health     Financial Resource Strain: Low Risk  (10/25/2024)    Financial Resource Strain      Within the past 12 months, have you or your family members you live with been unable to get utilities (heat, electricity) when it was really needed?: No   Food Insecurity: Low Risk  (10/25/2024)    Food Insecurity      Within the past 12 months, did you worry that your food would run out before you got money to buy more?: No      Within the past 12 months, did the food you bought just not last and you didn t have money to get more?: No   Transportation Needs: Low Risk  (10/25/2024)    Transportation Needs     "  Within the past 12 months, has lack of transportation kept you from medical appointments, getting your medicines, non-medical meetings or appointments, work, or from getting things that you need?: No   Physical Activity: Insufficiently Active (10/25/2024)    Exercise Vital Sign      Days of Exercise per Week: 3 days      Minutes of Exercise per Session: 10 min   Stress: No Stress Concern Present (10/25/2024)    [employer] [employer] - Occupational Stress Questionnaire      Feeling of Stress : Only a little   Social Connections: Unknown (10/25/2024)    Social Connection and Isolation Panel [NHANES]      Frequency of Communication with Friends and Family: Not on file      Frequency of Social Gatherings with Friends and Family: Once a week      Attends Restorationist Services: Not on file      Active Member of Clubs or Organizations: Not on file      Attends Club or Organization Meetings: Not on file      Marital Status: Not on file   Interpersonal Safety: Low Risk  (11/6/2024)    Interpersonal Safety      Do you feel physically and emotionally safe where you currently live?: Yes      Within the past 12 months, have you been hit, slapped, kicked or otherwise physically hurt by someone?: No      Within the past 12 months, have you been humiliated or emotionally abused in other ways by your partner or ex-partner?: No   Housing Stability: Low Risk  (10/25/2024)    Housing Stability      Do you have housing? : Yes      Are you worried about losing your housing?: No     Review of Systems:  Focused cardiovascular and respiratory review of systems is negative other than the symptoms noted above in the HPI.     Physical Exam:  Vitals: /78 (BP Location: Right arm, Patient Position: Sitting, Cuff Size: Adult Regular)   Pulse 60   Ht 1.727 m (5' 8\")   Wt 87.7 kg (193 lb 6.4 oz)   BMI 29.41 kg/m     Wt Readings from Last 4 Encounters:   11/07/24 87.7 kg (193 lb 6.4 oz)   10/29/24 89.4 kg (197 lb) "   10/25/24 89.8 kg (198 lb)   10/09/24 88.7 kg (195 lb 9.6 oz)     Constitutional: Alert and in no acute distress.  Neck: No JVD.  Cardiovascular:  Regular rate and rhythm. No murmur, rub or gallop.   Respiratory: Breathing non-labored. Lungs are clear to auscultation with no wheezes or crackles bilaterally.  Gastrointestinal: Abdomen non-distended.  Extremities:  Warm. No pitting lower extremity edema bilaterally.   Neuropsychiatric: No gross focal deficits. Affect appropriate. Mentation normal.     Recent Lab Results:  LIPID RESULTS:  Lab Results   Component Value Date    CHOL 113 05/16/2024    CHOL 212 (H) 03/01/2021    HDL 35 (L) 05/16/2024    HDL 39 (L) 03/01/2021    LDL 43 05/16/2024     (H) 03/01/2021    TRIG 174 (H) 05/16/2024    TRIG 202 (H) 03/01/2021    CHOLHDLRATIO 6.4 (H) 10/06/2015     LIVER ENZYME RESULTS:  Lab Results   Component Value Date    AST 24 06/17/2024    AST 20 12/07/2020    ALT 23 06/17/2024    ALT 28 12/07/2020     CBC RESULTS:  Lab Results   Component Value Date    WBC 5.7 10/25/2024    WBC 6.4 12/07/2020    RBC 4.18 (L) 10/25/2024    RBC 4.72 12/07/2020    HGB 13.2 (L) 10/25/2024    HGB 15.1 12/07/2020    HCT 37.0 (L) 10/25/2024    HCT 43.0 12/07/2020    MCV 89 10/25/2024    MCV 91 12/07/2020    MCH 31.6 10/25/2024    MCH 32.0 12/07/2020    MCHC 35.7 10/25/2024    MCHC 35.1 12/07/2020    RDW 12.1 10/25/2024    RDW 12.8 12/07/2020     (L) 10/25/2024     (L) 12/07/2020     BMP RESULTS:  Lab Results   Component Value Date     11/07/2024     12/07/2020    POTASSIUM 4.0 11/07/2024    POTASSIUM 4.4 08/26/2022    POTASSIUM 4.0 12/11/2020    CHLORIDE 103 11/07/2024    CHLORIDE 110 (H) 08/26/2022    CHLORIDE 109 12/07/2020    CO2 23 11/07/2024    CO2 25 08/26/2022    CO2 28 12/07/2020    ANIONGAP 12 11/07/2024    ANIONGAP 5 08/26/2022    ANIONGAP 2 (L) 12/07/2020     (H) 11/07/2024     (H) 08/26/2022    GLC 94 12/07/2020    BUN 14.2 11/07/2024     BUN 19 08/26/2022    BUN 17 12/07/2020    CR 1.23 (H) 11/07/2024    CR 1.23 12/11/2020    GFRESTIMATED 61 11/07/2024    GFRESTIMATED 58 (L) 12/11/2020    GFRESTBLACK 67 12/11/2020    LATHA 9.4 11/07/2024    LATHA 9.3 12/07/2020      A1C RESULTS:  Lab Results   Component Value Date    A1C 5.3 01/06/2020     CC  SALVATORE Briceno CNP  [address]  [place] [id number]      Thank you for allowing me to participate in the care of your patient.      Sincerely,     Zurdo Morrell NP     [facility]  cc:   Zurdo Morrell NP  [address] [id number]

## 2024-11-07 NOTE — PROGRESS NOTES
Cardiology Clinic Progress Note    Service Date: November 7, 2024  Primary Cardiology Team: Dr. Mccartney    HISTORY OF PRESENT ILLNESS:  I had the pleasure of meeting Mr. Harpreet Vera in the clinic today. He is a delightful 74 year old gentleman with a past medical history notable for cerebrovascular disease (chronic left vertebral artery occlusion, right vertebral artery stenosis status post stenting), hypertension, hyperlipidemia, mildly dilated ascending aorta, sleep apnea, sick sinus syndrome with syncope status post pacemaker.    Mr Vera suffered a stroke due to a left vertebral artery occlusion and subsequently underwent stenting to his right vertebral artery stenosis in 2019.  He states he has been taking aspirin 325 mg once daily since then.     In August 2020, he had a syncopal episode and was found to have significant bradycardia with pauses up to 11 seconds. He subsequently underwent a dual-chamber permanent pacemaker placement.      Previous echocardiogram from October 2022 showed LVEF 55%, mild-moderate concentric LVH, normal RV function, 1+ mitral regurgitation, RVSP 16 mmHg, ascending aorta at 4.2 cm.     The patient was last seen by my colleague, Emilia Laureano NP, in the clinic in May of this year and was doing well from a cardiac standpoint at that time. He did admit to being less active than he would like to be, but he was continuing to walk the golf course regularly and was tolerating this without exertional symptoms.    Most recent device check on 8/23/24 showed 93% atrial pacing, 99% ventricular pacing, underlying rhythm of sinus bradycardia in the 50s, and 11.5-year battery life remaining. No atrial or ventricular arrhythmias were noted.    In the interim, the patient came in for an echocardiogram on 9/27/24 which demonstrated mild drop in his ejection fraction to 45-50% with septal motion consistent with conduction abnormality and mild global hypokinesis noted.  Ascending aorta dilatation was  stable measuring at 4.2 cm. A liver cyst was also incidentally noted per the report.  A Lexiscan nuclear stress test was recommended for ischemic evaluation.  This was completed yesterday showing here nontransmural infarction in the inferior and inferoseptal segments of the left ventricle. Stress to rest cavity ratio was reported at 1.25 with TID present.  Results were reviewed with Dr. Mccartney and coronary angiography was recommended for definitive evaluation.  This was completed on 10/25/24 showing minimal to mild non-obstructive CAD with a 25% distal circumflex stenosis, 10% mid LAD stenosis, and 10% proximal RCA stenosis. Medical management was recommended.    I met Harpreet initially prior to getting him set up for the coronary angiogram. He was hypertensive at that time, increased his amlodipine dose from 2.5 mg to 5 mg once daily for better blood pressure control.     Today, Harpreet presents to the clinic accompanied by his wife Michelle in follow-up of the recent coronary angiogram. He tells me that he has been feeling okay since the procedure. He notes continued generalized fatigue and tiredness, feeling like he could fall asleep or take a nap at several points throughout the day.  He again notes that he has been using his CPAP nightly for management of his sleep apnea. He feels that his mood has been fine and denies feeling more down or depressed recently. He otherwise denies any issues with shortness of breath, chest pain, palpitations, dizziness, or lightheadedness recently. He has been tolerating the higher dose of amlodipine well without concerns for side effects. The access site from the angiogram has been healing nicely without concerns.  He has been checking his blood pressure periodically at home and notes that today's reading in the clinic at 136/78 is the best reading that he has gotten in some time with the majority of his readings around the 140s range at home. We reviewed his reassuring angiogram findings  and the plan of care as outlined below. He stated understanding and agreement.  Labs were checked prior to the visit today with basic metabolic panel showing stable electrolytes and renal function with potassium level 4.0 and creatinine at 1.23, which appears to be around his typical baseline recently.    ASSESSMENT:  Mild non-ischemic cardiomyopathy with EF 45-50% by TTE 9/27/24. Etiology unclear. Possibly hypertensive in the setting of sub-optimal blood pressure control recently. On beta-blocker and ARB. Appears euvolemic on exam. NYHA class II.   Minimal to mild nonobstructive coronary artery disease noted on recent coronary angiogram 10/25/24 as outlined above.  Essential hypertension. Blood pressures remain suboptimally controlled with frequent readings in the 130s-140s systolic range or higher.  Hyperlipidemia. Managed with rosuvastatin 20 mg once daily with most recent LDL cholesterol 43 in 05/2024.  Dilated ascending aorta. Measurements stable at 4.2 cm on recent TTE 9/27/24.  Sick sinus syndrome with syncope s/p dual chamber PPM placement.   History of CVA due to left vertebral artery occlusion s/p stenting in 2019.  On aspirin 325 mg once daily  MAN on CPAP  Persistent fatigue. Etiology unclear. Suspect likely multifactorial with MAN and possible some physical deconditioning contributing.    PLAN:  - Increase amlodipine from 5 mg once daily up to 5 mg twice daily for better blood pressure control. Reviewed to continue to monitor his blood pressure regularly at home shooting for readings more consistently under 130/85.  - Continue the remainder of his current cardiac regimen without changes. Could consider adding MRA and SGLT2 inhibitor in follow up and will message the pharmacy liaison team to look into cost/coverage for Jardiance or Farxiga for GDMT for the mild cardiomyopathy.   - Counseled on trying to stay physically active, gradually increasing his activity level, and trying to stick to a heart  healthy lower sodium Mediterranean diet.  - Will check a TSH level with reflex for free T4 to rule out the possibility of hypothyroidism contributing with his recent issues with fatigue has been a couple of years since his last check. Also recommended he check in with the sleep medicine team to see if any adjustment might be warranted with his CPAP.   - Discussed the option of a cardiac MRI to further evaluate his cardiomyopathy and rule out possible infiltrative disease. However, given his pacemaker will defer this and instead plan for a repeat echocardiogram and follow up with Dr. Mccartney in about 6 months.    Thank you for the opportunity to participate in this pleasant patient's care. We would be happy to see him sooner if needed for any concerns in the meantime.    45 total minutes was spent today including chart review, precharting, history and exam, post visit documentation, and reviewing studies as outlined above.     Please kindly note that this document was completed in part using Dragon voice recognition software. Although reviewed after completion, some word substitutions and typographical errors may occur. Please contact me if clarification is needed.     SALVATORE Hilario, CNP   Nurse Practitioner  [facility]    Orders this Visit:  Orders Placed This Encounter   Procedures    TSH with free T4 reflex    Follow-Up with Cardiology    Echocardiogram Complete     Orders Placed This Encounter   Medications    amLODIPine (NORVASC) 5 MG tablet     Sig: Take 1 tablet (5 mg) by mouth 2 times daily.     Medications Discontinued During This Encounter   Medication Reason    amLODIPine (NORVASC) 5 MG tablet Reorder (No AVS)       Encounter Diagnoses   Name Primary?    Essential hypertension, benign     Other fatigue Yes    NICM (nonischemic cardiomyopathy) (H)     Dilatation of thoracic aorta (H)     SSS (sick sinus syndrome) (H)     Cardiac pacemaker in situ        CURRENT MEDICATIONS:  Current  Outpatient Medications   Medication Sig Dispense Refill    acetaminophen (TYLENOL) 650 MG CR tablet Take 650 mg by mouth every 8 hours as needed for mild pain or fever      alfuzosin ER (UROXATRAL) 10 MG 24 hr tablet Take 1 tablet (10 mg) by mouth daily 90 tablet 3    allopurinol (ZYLOPRIM) 100 MG tablet Take 200 mg by mouth every evening (2 x 100mg)      amLODIPine (NORVASC) 5 MG tablet Take 1 tablet (5 mg) by mouth 2 times daily.      aspirin - buffered (ASCRIPTIN) 325 MG TABS tablet Take 1 tablet (325 mg) by mouth every morning.      bisacodyl (DULCOLAX) 5 MG EC tablet Take 2 tablets at 3 pm the day before your procedure. If your procedure is before 11 am, take 2 additional tablets at 11 pm. If your procedure is after 11 am, take 2 additional tablets at 6 am. For additional instructions refer to your colonoscopy prep instructions. 4 tablet 0    finasteride (PROSCAR) 5 MG tablet Take 1 tablet (5 mg) by mouth daily 90 tablet 3    metoprolol succinate ER (TOPROL XL) 25 MG 24 hr tablet Take 1 tablet (25 mg) by mouth 2 times daily 180 tablet 3    nitroGLYcerin (NITROSTAT) 0.4 MG sublingual tablet For chest pain place 1 tablet under the tongue every 5 minutes for 3 doses. If symptoms persist 5 minutes after 1st dose call 911. 25 tablet 0    olmesartan (BENICAR) 40 MG tablet Take 1 tablet (40 mg) by mouth every morning 90 tablet 3    omeprazole (PRILOSEC) 20 MG DR capsule TAKE ONE CAPSULE BY MOUTH ONCE OR TWICE DAILY AS NEEDED 180 capsule 0    polyethylene glycol (GOLYTELY) 236 g suspension The night before the exam at 6 pm drink an 8-ounce glass every 15 minutes until the jug is half empty. If you arrive before 11 AM: Drink the other half of the [employer] jug at 11 PM night before procedure. If you arrive after 11 AM: Drink the other half of the [employer] jug at 6 AM day of procedure. For additional instructions refer to your colonoscopy prep instructions. 4000 mL 0    rosuvastatin (CRESTOR) 20 MG tablet Take 1 tablet  (20 mg) by mouth at bedtime 90 tablet 3    Simethicone (GAS-X PO) Take by mouth.      vitamin D3 (CHOLECALCIFEROL) 50 mcg (2000 units) tablet Take 1 tablet by mouth every evening Taking 1000 units daily       ALLERGIES  No Known Allergies    PAST MEDICAL, SURGICAL, FAMILY HISTORY:  History was reviewed and updated as needed, see medical record.    SOCIAL HISTORY:  Social History     Socioeconomic History    Marital status:      Spouse name: Not on file    Number of children: Not on file    Years of education: Not on file    Highest education level: Not on file   Occupational History    Not on file   Tobacco Use    Smoking status: Never    Smokeless tobacco: Never   Vaping Use    Vaping status: Never Used   Substance and Sexual Activity    Alcohol use: Yes     Alcohol/week: 1.0 standard drink of alcohol     Types: 1 Glasses of wine per week     Comment: 1-2 weekly    Drug use: No    Sexual activity: Yes     Partners: Female   Other Topics Concern    Parent/sibling w/ CABG, MI or angioplasty before 65F 55M? Not Asked   Social History Narrative    Not on file     Social Drivers of Health     Financial Resource Strain: Low Risk  (10/25/2024)    Financial Resource Strain     Within the past 12 months, have you or your family members you live with been unable to get utilities (heat, electricity) when it was really needed?: No   Food Insecurity: Low Risk  (10/25/2024)    Food Insecurity     Within the past 12 months, did you worry that your food would run out before you got money to buy more?: No     Within the past 12 months, did the food you bought just not last and you didn t have money to get more?: No   Transportation Needs: Low Risk  (10/25/2024)    Transportation Needs     Within the past 12 months, has lack of transportation kept you from medical appointments, getting your medicines, non-medical meetings or appointments, work, or from getting things that you need?: No   Physical Activity: Insufficiently  "Active (10/25/2024)    Exercise Vital Sign     Days of Exercise per Week: 3 days     Minutes of Exercise per Session: 10 min   Stress: No Stress Concern Present (10/25/2024)    [employer] - Occupational Stress Questionnaire     Feeling of Stress : Only a little   Social Connections: Unknown (10/25/2024)    Social Connection and Isolation Panel [NHANES]     Frequency of Communication with Friends and Family: Not on file     Frequency of Social Gatherings with Friends and Family: Once a week     Attends Rastafari Services: Not on file     Active Member of Clubs or Organizations: Not on file     Attends Club or Organization Meetings: Not on file     Marital Status: Not on file   Interpersonal Safety: Low Risk  (11/6/2024)    Interpersonal Safety     Do you feel physically and emotionally safe where you currently live?: Yes     Within the past 12 months, have you been hit, slapped, kicked or otherwise physically hurt by someone?: No     Within the past 12 months, have you been humiliated or emotionally abused in other ways by your partner or ex-partner?: No   Housing Stability: Low Risk  (10/25/2024)    Housing Stability     Do you have housing? : Yes     Are you worried about losing your housing?: No     Review of Systems:  Focused cardiovascular and respiratory review of systems is negative other than the symptoms noted above in the HPI.     Physical Exam:  Vitals: /78 (BP Location: Right arm, Patient Position: Sitting, Cuff Size: Adult Regular)   Pulse 60   Ht 1.727 m (5' 8\")   Wt 87.7 kg (193 lb 6.4 oz)   BMI 29.41 kg/m     Wt Readings from Last 4 Encounters:   11/07/24 87.7 kg (193 lb 6.4 oz)   10/29/24 89.4 kg (197 lb)   10/25/24 89.8 kg (198 lb)   10/09/24 88.7 kg (195 lb 9.6 oz)     Constitutional: Alert and in no acute distress.  Neck: No JVD.  Cardiovascular:  Regular rate and rhythm. No murmur, rub or gallop.   Respiratory: Breathing non-labored. Lungs are clear to " auscultation with no wheezes or crackles bilaterally.  Gastrointestinal: Abdomen non-distended.  Extremities:  Warm. No pitting lower extremity edema bilaterally.   Neuropsychiatric: No gross focal deficits. Affect appropriate. Mentation normal.     Recent Lab Results:  LIPID RESULTS:  Lab Results   Component Value Date    CHOL 113 05/16/2024    CHOL 212 (H) 03/01/2021    HDL 35 (L) 05/16/2024    HDL 39 (L) 03/01/2021    LDL 43 05/16/2024     (H) 03/01/2021    TRIG 174 (H) 05/16/2024    TRIG 202 (H) 03/01/2021    CHOLHDLRATIO 6.4 (H) 10/06/2015     LIVER ENZYME RESULTS:  Lab Results   Component Value Date    AST 24 06/17/2024    AST 20 12/07/2020    ALT 23 06/17/2024    ALT 28 12/07/2020     CBC RESULTS:  Lab Results   Component Value Date    WBC 5.7 10/25/2024    WBC 6.4 12/07/2020    RBC 4.18 (L) 10/25/2024    RBC 4.72 12/07/2020    HGB 13.2 (L) 10/25/2024    HGB 15.1 12/07/2020    HCT 37.0 (L) 10/25/2024    HCT 43.0 12/07/2020    MCV 89 10/25/2024    MCV 91 12/07/2020    MCH 31.6 10/25/2024    MCH 32.0 12/07/2020    MCHC 35.7 10/25/2024    MCHC 35.1 12/07/2020    RDW 12.1 10/25/2024    RDW 12.8 12/07/2020     (L) 10/25/2024     (L) 12/07/2020     BMP RESULTS:  Lab Results   Component Value Date     11/07/2024     12/07/2020    POTASSIUM 4.0 11/07/2024    POTASSIUM 4.4 08/26/2022    POTASSIUM 4.0 12/11/2020    CHLORIDE 103 11/07/2024    CHLORIDE 110 (H) 08/26/2022    CHLORIDE 109 12/07/2020    CO2 23 11/07/2024    CO2 25 08/26/2022    CO2 28 12/07/2020    ANIONGAP 12 11/07/2024    ANIONGAP 5 08/26/2022    ANIONGAP 2 (L) 12/07/2020     (H) 11/07/2024     (H) 08/26/2022    GLC 94 12/07/2020    BUN 14.2 11/07/2024    BUN 19 08/26/2022    BUN 17 12/07/2020    CR 1.23 (H) 11/07/2024    CR 1.23 12/11/2020    GFRESTIMATED 61 11/07/2024    GFRESTIMATED 58 (L) 12/11/2020    GFRESTBLACK 67 12/11/2020    LATHA 9.4 11/07/2024    LATHA 9.3 12/07/2020      A1C RESULTS:  Lab Results    Component Value Date    A1C 5.3 01/06/2020     CC  SALVATORE Briceno CNP  [address]  [place] [id number]

## 2024-11-07 NOTE — PATIENT INSTRUCTIONS
Thank you for your visit with the [facility] today.    Today's plan:   - Increase the amlodipine to 5 mg twice daily for better blood pressure control. Call if you start to have issues with leg swelling with the higher dose and we can come up with a plan B option.  - Continue to monitor your blood pressure at home shooting for more consistently under 130 systolic and under 85 diastolic.  - Try to stick to a heart healthy Mediterranean diet and exercise regularly.  - Check labs to check your thyroid levels to see if a thyroid issue could be contributing to the fatigue.  - Follow up with Dr. Mccartney in about 6 months with a repeat echocardiogram before the visit.     If you have questions or concerns please call the nurse team at [phone] or send a [employer] message.     Scheduling phone number: [phone]    It was a pleasure seeing you today!     SALVATORE Hilario, CNP  Nurse Practitioner  [facility] [place]

## 2024-11-26 ENCOUNTER — ANCILLARY PROCEDURE (OUTPATIENT)
Dept: CARDIOLOGY | Facility: CLINIC | Age: 74
End: 2024-11-26
Attending: INTERNAL MEDICINE
Payer: COMMERCIAL

## 2024-11-26 DIAGNOSIS — I49.5 SICK SINUS SYNDROME (H): Primary | ICD-10-CM

## 2024-11-26 DIAGNOSIS — I49.5 SSS (SICK SINUS SYNDROME) (H): ICD-10-CM

## 2024-11-26 DIAGNOSIS — Z95.0 CARDIAC PACEMAKER IN SITU: ICD-10-CM

## 2024-11-26 LAB
MDC_IDC_EPISODE_DTM: NORMAL
MDC_IDC_EPISODE_ID: NORMAL
MDC_IDC_EPISODE_TYPE: NORMAL
MDC_IDC_LEAD_CONNECTION_STATUS: NORMAL
MDC_IDC_LEAD_CONNECTION_STATUS: NORMAL
MDC_IDC_LEAD_IMPLANT_DT: NORMAL
MDC_IDC_LEAD_IMPLANT_DT: NORMAL
MDC_IDC_LEAD_LOCATION: NORMAL
MDC_IDC_LEAD_LOCATION: NORMAL
MDC_IDC_LEAD_LOCATION_DETAIL_1: NORMAL
MDC_IDC_LEAD_LOCATION_DETAIL_1: NORMAL
MDC_IDC_LEAD_MFG: NORMAL
MDC_IDC_LEAD_MFG: NORMAL
MDC_IDC_LEAD_MODEL: NORMAL
MDC_IDC_LEAD_MODEL: NORMAL
MDC_IDC_LEAD_POLARITY_TYPE: NORMAL
MDC_IDC_LEAD_POLARITY_TYPE: NORMAL
MDC_IDC_LEAD_SERIAL: NORMAL
MDC_IDC_LEAD_SERIAL: NORMAL
MDC_IDC_MSMT_BATTERY_DTM: NORMAL
MDC_IDC_MSMT_BATTERY_REMAINING_LONGEVITY: 132 MO
MDC_IDC_MSMT_BATTERY_REMAINING_PERCENTAGE: 100 %
MDC_IDC_MSMT_BATTERY_STATUS: NORMAL
MDC_IDC_MSMT_LEADCHNL_RA_IMPEDANCE_VALUE: 817 OHM
MDC_IDC_MSMT_LEADCHNL_RA_PACING_THRESHOLD_AMPLITUDE: 0.4 V
MDC_IDC_MSMT_LEADCHNL_RA_PACING_THRESHOLD_PULSEWIDTH: 0.4 MS
MDC_IDC_MSMT_LEADCHNL_RV_IMPEDANCE_VALUE: 593 OHM
MDC_IDC_MSMT_LEADCHNL_RV_PACING_THRESHOLD_AMPLITUDE: 0.7 V
MDC_IDC_MSMT_LEADCHNL_RV_PACING_THRESHOLD_PULSEWIDTH: 0.4 MS
MDC_IDC_PG_IMPLANT_DTM: NORMAL
MDC_IDC_PG_MFG: NORMAL
MDC_IDC_PG_MODEL: NORMAL
MDC_IDC_PG_SERIAL: NORMAL
MDC_IDC_PG_TYPE: NORMAL
MDC_IDC_SESS_CLINIC_NAME: NORMAL
MDC_IDC_SESS_DTM: NORMAL
MDC_IDC_SESS_TYPE: NORMAL
MDC_IDC_SET_BRADY_AT_MODE_SWITCH_MODE: NORMAL
MDC_IDC_SET_BRADY_AT_MODE_SWITCH_RATE: 170 {BEATS}/MIN
MDC_IDC_SET_BRADY_LOWRATE: 60 {BEATS}/MIN
MDC_IDC_SET_BRADY_MAX_SENSOR_RATE: 130 {BEATS}/MIN
MDC_IDC_SET_BRADY_MAX_TRACKING_RATE: 130 {BEATS}/MIN
MDC_IDC_SET_BRADY_MODE: NORMAL
MDC_IDC_SET_BRADY_PAV_DELAY_HIGH: 150 MS
MDC_IDC_SET_BRADY_PAV_DELAY_LOW: 200 MS
MDC_IDC_SET_BRADY_SAV_DELAY_HIGH: 150 MS
MDC_IDC_SET_BRADY_SAV_DELAY_LOW: 200 MS
MDC_IDC_SET_LEADCHNL_RA_PACING_AMPLITUDE: 2 V
MDC_IDC_SET_LEADCHNL_RA_PACING_CAPTURE_MODE: NORMAL
MDC_IDC_SET_LEADCHNL_RA_PACING_POLARITY: NORMAL
MDC_IDC_SET_LEADCHNL_RA_PACING_PULSEWIDTH: 0.4 MS
MDC_IDC_SET_LEADCHNL_RA_SENSING_ADAPTATION_MODE: NORMAL
MDC_IDC_SET_LEADCHNL_RA_SENSING_POLARITY: NORMAL
MDC_IDC_SET_LEADCHNL_RA_SENSING_SENSITIVITY: 0.25 MV
MDC_IDC_SET_LEADCHNL_RV_PACING_AMPLITUDE: 1.2 V
MDC_IDC_SET_LEADCHNL_RV_PACING_CAPTURE_MODE: NORMAL
MDC_IDC_SET_LEADCHNL_RV_PACING_POLARITY: NORMAL
MDC_IDC_SET_LEADCHNL_RV_PACING_PULSEWIDTH: 0.4 MS
MDC_IDC_SET_LEADCHNL_RV_SENSING_ADAPTATION_MODE: NORMAL
MDC_IDC_SET_LEADCHNL_RV_SENSING_POLARITY: NORMAL
MDC_IDC_SET_LEADCHNL_RV_SENSING_SENSITIVITY: 1.5 MV
MDC_IDC_SET_ZONE_DETECTION_INTERVAL: 375 MS
MDC_IDC_SET_ZONE_STATUS: NORMAL
MDC_IDC_SET_ZONE_TYPE: NORMAL
MDC_IDC_SET_ZONE_VENDOR_TYPE: NORMAL
MDC_IDC_STAT_AT_BURDEN_PERCENT: 0 %
MDC_IDC_STAT_AT_DTM_END: NORMAL
MDC_IDC_STAT_AT_DTM_START: NORMAL
MDC_IDC_STAT_BRADY_DTM_END: NORMAL
MDC_IDC_STAT_BRADY_DTM_START: NORMAL
MDC_IDC_STAT_BRADY_RA_PERCENT_PACED: 93 %
MDC_IDC_STAT_BRADY_RV_PERCENT_PACED: 100 %
MDC_IDC_STAT_EPISODE_RECENT_COUNT: 0
MDC_IDC_STAT_EPISODE_RECENT_COUNT_DTM_END: NORMAL
MDC_IDC_STAT_EPISODE_RECENT_COUNT_DTM_START: NORMAL
MDC_IDC_STAT_EPISODE_TYPE: NORMAL
MDC_IDC_STAT_EPISODE_VENDOR_TYPE: NORMAL
MDC_IDC_STAT_EPISODE_VENDOR_TYPE: NORMAL

## 2024-11-26 PROCEDURE — 93296 REM INTERROG EVL PM/IDS: CPT | Performed by: INTERNAL MEDICINE

## 2024-11-26 PROCEDURE — 93294 REM INTERROG EVL PM/LDLS PM: CPT | Performed by: INTERNAL MEDICINE

## 2024-12-03 DIAGNOSIS — I10 ESSENTIAL HYPERTENSION, BENIGN: ICD-10-CM

## 2024-12-03 RX ORDER — AMLODIPINE BESYLATE 5 MG/1
5 TABLET ORAL 2 TIMES DAILY
Qty: 180 TABLET | Refills: 3 | Status: SHIPPED | OUTPATIENT
Start: 2024-12-03

## 2025-01-06 ENCOUNTER — APPOINTMENT (OUTPATIENT)
Age: 75
Setting detail: DERMATOLOGY
End: 2025-01-06

## 2025-01-06 DIAGNOSIS — Z85.828 PERSONAL HISTORY OF OTHER MALIGNANT NEOPLASM OF SKIN: ICD-10-CM

## 2025-01-06 DIAGNOSIS — D22 MELANOCYTIC NEVI: ICD-10-CM

## 2025-01-06 DIAGNOSIS — D18.0 HEMANGIOMA: ICD-10-CM

## 2025-01-06 DIAGNOSIS — Z87.2 PERSONAL HISTORY OF DISEASES OF THE SKIN AND SUBCUTANEOUS TISSUE: ICD-10-CM | Status: WELL CONTROLLED

## 2025-01-06 DIAGNOSIS — L82.1 OTHER SEBORRHEIC KERATOSIS: ICD-10-CM

## 2025-01-06 DIAGNOSIS — Z71.89 OTHER SPECIFIED COUNSELING: ICD-10-CM

## 2025-01-06 PROBLEM — D18.01 HEMANGIOMA OF SKIN AND SUBCUTANEOUS TISSUE: Status: ACTIVE | Noted: 2025-01-06

## 2025-01-06 PROBLEM — D22.5 MELANOCYTIC NEVI OF TRUNK: Status: ACTIVE | Noted: 2025-01-06

## 2025-01-06 PROCEDURE — ? COUNSELING

## 2025-01-06 PROCEDURE — ? OBSERVATION

## 2025-01-06 PROCEDURE — ? SUNSCREEN RECOMMENDATIONS

## 2025-01-06 PROCEDURE — 99213 OFFICE O/P EST LOW 20 MIN: CPT

## 2025-01-06 ASSESSMENT — LOCATION DETAILED DESCRIPTION DERM
LOCATION DETAILED: INFERIOR THORACIC SPINE
LOCATION DETAILED: SUPERIOR THORACIC SPINE
LOCATION DETAILED: MIDDLE STERNUM
LOCATION DETAILED: LEFT INFERIOR MEDIAL FOREHEAD
LOCATION DETAILED: RIGHT MEDIAL SUPERIOR CHEST
LOCATION DETAILED: RIGHT SUPERIOR MEDIAL MIDBACK
LOCATION DETAILED: EPIGASTRIC SKIN

## 2025-01-06 ASSESSMENT — LOCATION SIMPLE DESCRIPTION DERM
LOCATION SIMPLE: UPPER BACK
LOCATION SIMPLE: LEFT FOREHEAD
LOCATION SIMPLE: CHEST
LOCATION SIMPLE: RIGHT LOWER BACK
LOCATION SIMPLE: ABDOMEN

## 2025-01-06 ASSESSMENT — LOCATION ZONE DERM
LOCATION ZONE: FACE
LOCATION ZONE: TRUNK

## 2025-01-06 NOTE — HPI: NON-MELANOMA SKIN CANCER F/U (HISTORY OF NMSC)
How Many Skin Cancers Have You Had?: more than one
What Is The Reason For Today's Visit?: History of Non-Melanoma Skin Cancer
Additional History: He has a history of skin cancer on the right chest, the right lateral arm, the mid inferior chest, and the left posterior shoulder.

## 2025-01-06 NOTE — HPI: FULL BODY SKIN EXAMINATION
What Type Of Note Output Would You Prefer (Optional)?: Bullet Format
What Is The Reason For Today's Visit?: Annual Full Body Skin Examination with No Concerns
What Is The Reason For Today's Visit? (Being Monitored For X): concerning skin lesions on an annual basis
Additional History: He has a history of actinic keratoses, none treated at his last visit.

## 2025-01-06 NOTE — PROCEDURE: OBSERVATION
Body Location Override (Optional - Billing Will Still Be Based On Selected Body Map Location If Applicable): right chest, right lateral arm, mid inferior chest, and left posterior shoulder
Detail Level: Detailed
Size Of Lesion In Cm (Optional): 0

## 2025-01-16 ENCOUNTER — OFFICE VISIT (OUTPATIENT)
Dept: UROLOGY | Facility: CLINIC | Age: 75
End: 2025-01-16
Payer: COMMERCIAL

## 2025-01-16 VITALS
HEIGHT: 68 IN | DIASTOLIC BLOOD PRESSURE: 76 MMHG | BODY MASS INDEX: 28.79 KG/M2 | WEIGHT: 190 LBS | SYSTOLIC BLOOD PRESSURE: 134 MMHG

## 2025-01-16 DIAGNOSIS — R39.12 BENIGN PROSTATIC HYPERPLASIA WITH WEAK URINARY STREAM: Primary | ICD-10-CM

## 2025-01-16 DIAGNOSIS — R39.198 SLOWING OF URINARY STREAM: ICD-10-CM

## 2025-01-16 DIAGNOSIS — N40.1 BENIGN PROSTATIC HYPERPLASIA WITH WEAK URINARY STREAM: Primary | ICD-10-CM

## 2025-01-16 LAB
ALBUMIN UR-MCNC: ABNORMAL MG/DL
APPEARANCE UR: CLEAR
BILIRUB UR QL STRIP: NEGATIVE
COLOR UR AUTO: YELLOW
GLUCOSE UR STRIP-MCNC: NEGATIVE MG/DL
HGB UR QL STRIP: NEGATIVE
KETONES UR STRIP-MCNC: NEGATIVE MG/DL
LEUKOCYTE ESTERASE UR QL STRIP: NEGATIVE
NITRATE UR QL: NEGATIVE
PH UR STRIP: 5.5 [PH] (ref 5–7)
RESIDUAL VOLUME (RV) (EXTERNAL): 13
SP GR UR STRIP: 1.02 (ref 1–1.03)
UROBILINOGEN UR STRIP-ACNC: 0.2 E.U./DL

## 2025-01-16 RX ORDER — FINASTERIDE 5 MG/1
5 TABLET, FILM COATED ORAL DAILY
Qty: 90 TABLET | Refills: 3 | Status: SHIPPED | OUTPATIENT
Start: 2025-01-16

## 2025-01-16 RX ORDER — ALFUZOSIN HYDROCHLORIDE 10 MG/1
10 TABLET, EXTENDED RELEASE ORAL DAILY
Qty: 90 TABLET | Refills: 3 | Status: SHIPPED | OUTPATIENT
Start: 2025-01-16

## 2025-01-16 ASSESSMENT — PAIN SCALES - GENERAL: PAINLEVEL_OUTOF10: NO PAIN (0)

## 2025-01-16 NOTE — PROGRESS NOTES
"CHIEF COMPLAINT   Harpreet Vera who is a 74 year old male who presents with a history of BPH with weak stream, + FH prostate cancer, rising PSA with multiple negative biopsies in the past     HPI   Harpreet Vera is a 74 year old male who presents with a history of BPH with weak stream w/ unknown awake office procedure in the past (presumably TUNA), + FH prostate cancer, rising PSA with multiple negative biopsies in the past     Last seen 4/23/2024, at which point we had decided to continue finasteride and alfuzosin. He had tentatively planned on Aquablation but that technology had not been available. This has now been recently approved at [facility] last week     He is complaining of post void dribbling and slow stream, takes a long time to empty    AUA symptom score 6-0-8-4-5-3-1 =24 qol mostly dissatisfied    PHYSICAL EXAM  Patient is a 74 year old  male   Vitals: Blood pressure 134/76, height 1.727 m (5' 8\"), weight 86.2 kg (190 lb).  Body mass index is 28.89 kg/m .  General Appearance Adult:   Alert, no acute distress, oriented  HENT: throat/mouth:normal, good dentition  Lungs: no respiratory distress, or pursed lip breathing  Heart: No obvious jugular venous distension present  Abdomen: nondistended  Musculoskeltal: extremities normal, no peripheral edema  Skin: no suspicious lesions or rashes  Neuro: Alert, oriented, speech and mentation normal  Psych: affect and mood normal  Gait: Normal  : deferred      PVR 13 ml    ASSESSMENT and PLAN  74 year old male who presents with a history of BPH with weak stream w/ unknown awake office procedure in the past (presumably TUNA), + FH prostate cancer, rising PSA with multiple negative biopsies in the past     Persistent bothersome LUTS especially slow stream despite medical management with alpha blocker and finasteride. 81 g prostate on last MRI in 2022. We again discussed Aquablation procedure. Will plan for overnight stay, likely remove Reis before going " home. Risks include bleeding, decreased semen volume, pain and discomfort, infection, recurrent obstruction. For now, will continue both the alfuzosin and finasteride    Note that golf season starts up in mid-March and really starts up in April, hopefully will be able to get this scheduled before that    - refill finasteride and alfuzosin (rx drug management)  - schedule Aquablation    Bijan Childress MD   [facility]  [facility] Phone: [phone]

## 2025-01-16 NOTE — LETTER
"1/16/2025       RE: Harpreet Vera  [address] [place] [id number]     Dear Colleague,    Thank you for referring your patient, Harpreet Vera, to the [facility] [place] [facility]. Please see a copy of my visit note below.    CHIEF COMPLAINT   Harpreet Vera who is a 74 year old male who presents with a history of BPH with weak stream, + FH prostate cancer, rising PSA with multiple negative biopsies in the past     HPI   Harpreet Vera is a 74 year old male who presents with a history of BPH with weak stream w/ unknown awake office procedure in the past (presumably TUNA), + FH prostate cancer, rising PSA with multiple negative biopsies in the past     Last seen 4/23/2024, at which point we had decided to continue finasteride and alfuzosin. He had tentatively planned on Aquablation but that technology had not been available. This has now been recently approved at [facility] last week     He is complaining of post void dribbling and slow stream, takes a long time to empty    AUA symptom score 2-9-6-4-5-3-1 =24 qol mostly dissatisfied    PHYSICAL EXAM  Patient is a 74 year old  male   Vitals: Blood pressure 134/76, height 1.727 m (5' 8\"), weight 86.2 kg (190 lb).  Body mass index is 28.89 kg/m .  General Appearance Adult:   Alert, no acute distress, oriented  HENT: throat/mouth:normal, good dentition  Lungs: no respiratory distress, or pursed lip breathing  Heart: No obvious jugular venous distension present  Abdomen: nondistended  Musculoskeltal: extremities normal, no peripheral edema  Skin: no suspicious lesions or rashes  Neuro: Alert, oriented, speech and mentation normal  Psych: affect and mood normal  Gait: Normal  : deferred      PVR 13 ml    ASSESSMENT and PLAN  74 year old male who presents with a history of BPH with weak stream w/ unknown awake office procedure in the past (presumably TUNA), + FH prostate cancer, " rising PSA with multiple negative biopsies in the past     Persistent bothersome LUTS especially slow stream despite medical management with alpha blocker and finasteride. 81 g prostate on last MRI in 2022. We again discussed Aquablation procedure. Will plan for overnight stay, likely remove Reis before going home. Risks include bleeding, decreased semen volume, pain and discomfort, infection, recurrent obstruction. For now, will continue both the alfuzosin and finasteride    Note that golf season starts up in mid-March and really starts up in April, hopefully will be able to get this scheduled before that    - refill finasteride and alfuzosin (rx drug management)  - schedule Aquablation    Bijan Childress MD   [facility]  [facility] Phone: [phone]      Again, thank you for allowing me to participate in the care of your patient.      Sincerely,    Bijan Childress MD

## 2025-01-16 NOTE — NURSING NOTE
Chief Complaint   Patient presents with    Benign Prostatic Hypertrophy     Pt denies gross hematuria or dysuria.    PVR: 13 mL by bladder scan      Nakia Jay, Clinic Assistant

## 2025-02-04 ENCOUNTER — TRANSFERRED RECORDS (OUTPATIENT)
Dept: HEALTH INFORMATION MANAGEMENT | Facility: CLINIC | Age: 75
End: 2025-02-04
Payer: COMMERCIAL

## 2025-02-06 ENCOUNTER — OFFICE VISIT (OUTPATIENT)
Dept: INTERNAL MEDICINE | Facility: CLINIC | Age: 75
End: 2025-02-06
Payer: COMMERCIAL

## 2025-02-06 VITALS
OXYGEN SATURATION: 97 % | BODY MASS INDEX: 29.99 KG/M2 | HEIGHT: 68 IN | HEART RATE: 70 BPM | RESPIRATION RATE: 14 BRPM | SYSTOLIC BLOOD PRESSURE: 130 MMHG | TEMPERATURE: 97.1 F | DIASTOLIC BLOOD PRESSURE: 64 MMHG | WEIGHT: 197.9 LBS

## 2025-02-06 DIAGNOSIS — N18.31 STAGE 3A CHRONIC KIDNEY DISEASE (H): ICD-10-CM

## 2025-02-06 DIAGNOSIS — K52.9 CHRONIC DIARRHEA: ICD-10-CM

## 2025-02-06 DIAGNOSIS — Z76.89 ENCOUNTER TO ESTABLISH CARE: Primary | ICD-10-CM

## 2025-02-06 RX ORDER — PREDNISONE 20 MG/1
TABLET ORAL
COMMUNITY
Start: 2025-02-04

## 2025-02-06 ASSESSMENT — PAIN SCALES - GENERAL: PAINLEVEL_OUTOF10: MODERATE PAIN (6)

## 2025-02-06 NOTE — PROGRESS NOTES
Assessment & Plan     Encounter to establish care  At this time, we did spend time reviewing his past medical history and medication list.  Patient will be due for his annual wellness exam in October 2025.    Chronic diarrhea  At this time, patient has had stool studies as well as a colonoscopy performed that did not reveal any obvious cause of his ongoing issues with chronic diarrhea.  I did discuss with him that it is possible that components of his diet could be playing a role in his symptoms.  After much discussion, patient did wish to have further evaluation performed.  We did refer him to the gastroenterology service for further evaluation of his chronic diarrhea.  We will follow-up on the recommendations once the evaluation has been completed.  - Adult GI  Referral - Consult Only; Future    Stage 3a chronic kidney disease (H)  Chronic condition.  Will continue to monitor with routine lab work.            See Patient Instructions    Zhanna Mullins is a 74 year old, presenting for the following health issues:  Establish Care and Gastric Problem        2/6/2025     1:35 PM   Additional Questions   Roomed by Toña TIDWELL   Accompanied by wife     Patient is a 74-year-old  male who presents to the clinic to establish care.  His main concern today is in regards to ongoing issues with diarrhea.  Patient states that for the past several years he has had intermittent episodes of diarrhea that seem to be occurring more frequently.  He reports that when it first began he would have episodes of loose stool approximately once per week.  Patient states that he is now having issues every few days.  He feels that his symptoms began after being diagnosed with C. difficile approximately 18 years ago.  His previous physician did perform stool testing in August 2024.  His repeat C. difficile assay was negative.  His stool sample that was evaluated for enteric bacteria and viruses was also unremarkable.   "Patient feels that his diet may be contributing to his recurrent episodes of diarrhea.  He has found it is very difficult for him to tolerate milk products, chocolate, coffee, baking powder, and baking soda.  He did have a colonoscopy in May 2024 that did reveal 2 polyps but no other abnormalities.    History of Present Illness       Hyperlipidemia:  He presents for follow up of hyperlipidemia.   He is taking medication to lower cholesterol. He is having myalgia or other side effects to statin medications.    Hypertension: He presents for follow up of hypertension.  He does not check blood pressure  regularly outside of the clinic. Outside blood pressures have been over 140/90. He does not follow a low salt diet.     He eats 0-1 servings of fruits and vegetables daily.He consumes 1 sweetened beverage(s) daily.He exercises with enough effort to increase his heart rate 10 to 19 minutes per day.  He exercises with enough effort to increase his heart rate 3 or less days per week.   He is taking medications regularly.         Review of Systems  CONSTITUTIONAL: NEGATIVE for fever, chills, change in weight  INTEGUMENTARY/SKIN: NEGATIVE for worrisome rashes, moles or lesions  ENT/MOUTH: NEGATIVE for ear, mouth and throat problems  RESP: NEGATIVE for significant cough or SOB  CV: NEGATIVE for chest pain, palpitations or peripheral edema  GI:Positive for diarrhea.  : NEGATIVE for frequency, dysuria, or hematuria  MUSCULOSKELETAL: NEGATIVE for significant arthralgias or myalgia  NEURO: NEGATIVE for weakness, dizziness or paresthesias      Objective    /64 (BP Location: Right arm, Cuff Size: Adult Large)   Pulse 70   Temp 97.1  F (36.2  C) (Oral)   Resp 14   Ht 1.727 m (5' 8\")   Wt 89.8 kg (197 lb 14.4 oz)   SpO2 97%   BMI 30.09 kg/m    Body mass index is 30.09 kg/m .  Physical Exam  Vitals reviewed.   HENT:      Head: Normocephalic and atraumatic.      Mouth/Throat:      Mouth: Mucous membranes are moist.      " Pharynx: Oropharynx is clear.   Eyes:      Extraocular Movements: Extraocular movements intact.      Conjunctiva/sclera: Conjunctivae normal.      Pupils: Pupils are equal, round, and reactive to light.   Cardiovascular:      Rate and Rhythm: Normal rate and regular rhythm.      Pulses: Normal pulses.      Heart sounds: Normal heart sounds.   Pulmonary:      Effort: Pulmonary effort is normal.      Breath sounds: Normal breath sounds.   Abdominal:      General: Abdomen is flat.      Palpations: Abdomen is soft.   Skin:     General: Skin is warm and dry.      Capillary Refill: Capillary refill takes less than 2 seconds.   Neurological:      Mental Status: He is alert.          Signed Electronically by: Tom Allison MD

## 2025-02-17 ENCOUNTER — TRANSFERRED RECORDS (OUTPATIENT)
Dept: HEALTH INFORMATION MANAGEMENT | Facility: CLINIC | Age: 75
End: 2025-02-17
Payer: COMMERCIAL

## 2025-03-04 ENCOUNTER — ANCILLARY PROCEDURE (OUTPATIENT)
Dept: CARDIOLOGY | Facility: CLINIC | Age: 75
End: 2025-03-04
Attending: INTERNAL MEDICINE
Payer: COMMERCIAL

## 2025-03-04 DIAGNOSIS — I49.5 SSS (SICK SINUS SYNDROME) (H): ICD-10-CM

## 2025-03-04 DIAGNOSIS — Z95.0 CARDIAC PACEMAKER IN SITU: ICD-10-CM

## 2025-03-04 PROCEDURE — 93296 REM INTERROG EVL PM/IDS: CPT | Performed by: INTERNAL MEDICINE

## 2025-03-04 PROCEDURE — 93294 REM INTERROG EVL PM/LDLS PM: CPT | Performed by: INTERNAL MEDICINE

## 2025-03-05 LAB
MDC_IDC_EPISODE_DTM: NORMAL
MDC_IDC_EPISODE_ID: NORMAL
MDC_IDC_EPISODE_TYPE: NORMAL
MDC_IDC_LEAD_CONNECTION_STATUS: NORMAL
MDC_IDC_LEAD_CONNECTION_STATUS: NORMAL
MDC_IDC_LEAD_IMPLANT_DT: NORMAL
MDC_IDC_LEAD_IMPLANT_DT: NORMAL
MDC_IDC_LEAD_LOCATION: NORMAL
MDC_IDC_LEAD_LOCATION: NORMAL
MDC_IDC_LEAD_LOCATION_DETAIL_1: NORMAL
MDC_IDC_LEAD_LOCATION_DETAIL_1: NORMAL
MDC_IDC_LEAD_MFG: NORMAL
MDC_IDC_LEAD_MFG: NORMAL
MDC_IDC_LEAD_MODEL: NORMAL
MDC_IDC_LEAD_MODEL: NORMAL
MDC_IDC_LEAD_POLARITY_TYPE: NORMAL
MDC_IDC_LEAD_POLARITY_TYPE: NORMAL
MDC_IDC_LEAD_SERIAL: NORMAL
MDC_IDC_LEAD_SERIAL: NORMAL
MDC_IDC_MSMT_BATTERY_DTM: NORMAL
MDC_IDC_MSMT_BATTERY_REMAINING_LONGEVITY: 132 MO
MDC_IDC_MSMT_BATTERY_REMAINING_PERCENTAGE: 100 %
MDC_IDC_MSMT_BATTERY_STATUS: NORMAL
MDC_IDC_MSMT_LEADCHNL_RA_IMPEDANCE_VALUE: 816 OHM
MDC_IDC_MSMT_LEADCHNL_RA_PACING_THRESHOLD_AMPLITUDE: 0.4 V
MDC_IDC_MSMT_LEADCHNL_RA_PACING_THRESHOLD_PULSEWIDTH: 0.4 MS
MDC_IDC_MSMT_LEADCHNL_RV_IMPEDANCE_VALUE: 587 OHM
MDC_IDC_MSMT_LEADCHNL_RV_PACING_THRESHOLD_AMPLITUDE: 0.7 V
MDC_IDC_MSMT_LEADCHNL_RV_PACING_THRESHOLD_PULSEWIDTH: 0.4 MS
MDC_IDC_PG_IMPLANT_DTM: NORMAL
MDC_IDC_PG_MFG: NORMAL
MDC_IDC_PG_MODEL: NORMAL
MDC_IDC_PG_SERIAL: NORMAL
MDC_IDC_PG_TYPE: NORMAL
MDC_IDC_SESS_CLINIC_NAME: NORMAL
MDC_IDC_SESS_DTM: NORMAL
MDC_IDC_SESS_TYPE: NORMAL
MDC_IDC_SET_BRADY_AT_MODE_SWITCH_MODE: NORMAL
MDC_IDC_SET_BRADY_AT_MODE_SWITCH_RATE: 170 {BEATS}/MIN
MDC_IDC_SET_BRADY_LOWRATE: 60 {BEATS}/MIN
MDC_IDC_SET_BRADY_MAX_SENSOR_RATE: 130 {BEATS}/MIN
MDC_IDC_SET_BRADY_MAX_TRACKING_RATE: 130 {BEATS}/MIN
MDC_IDC_SET_BRADY_MODE: NORMAL
MDC_IDC_SET_BRADY_PAV_DELAY_HIGH: 150 MS
MDC_IDC_SET_BRADY_PAV_DELAY_LOW: 200 MS
MDC_IDC_SET_BRADY_SAV_DELAY_HIGH: 150 MS
MDC_IDC_SET_BRADY_SAV_DELAY_LOW: 200 MS
MDC_IDC_SET_LEADCHNL_RA_PACING_AMPLITUDE: 2 V
MDC_IDC_SET_LEADCHNL_RA_PACING_CAPTURE_MODE: NORMAL
MDC_IDC_SET_LEADCHNL_RA_PACING_POLARITY: NORMAL
MDC_IDC_SET_LEADCHNL_RA_PACING_PULSEWIDTH: 0.4 MS
MDC_IDC_SET_LEADCHNL_RA_SENSING_ADAPTATION_MODE: NORMAL
MDC_IDC_SET_LEADCHNL_RA_SENSING_POLARITY: NORMAL
MDC_IDC_SET_LEADCHNL_RA_SENSING_SENSITIVITY: 0.25 MV
MDC_IDC_SET_LEADCHNL_RV_PACING_AMPLITUDE: 1.2 V
MDC_IDC_SET_LEADCHNL_RV_PACING_CAPTURE_MODE: NORMAL
MDC_IDC_SET_LEADCHNL_RV_PACING_POLARITY: NORMAL
MDC_IDC_SET_LEADCHNL_RV_PACING_PULSEWIDTH: 0.4 MS
MDC_IDC_SET_LEADCHNL_RV_SENSING_ADAPTATION_MODE: NORMAL
MDC_IDC_SET_LEADCHNL_RV_SENSING_POLARITY: NORMAL
MDC_IDC_SET_LEADCHNL_RV_SENSING_SENSITIVITY: 1.5 MV
MDC_IDC_SET_ZONE_DETECTION_INTERVAL: 375 MS
MDC_IDC_SET_ZONE_STATUS: NORMAL
MDC_IDC_SET_ZONE_TYPE: NORMAL
MDC_IDC_SET_ZONE_VENDOR_TYPE: NORMAL
MDC_IDC_STAT_AT_BURDEN_PERCENT: 0 %
MDC_IDC_STAT_AT_DTM_END: NORMAL
MDC_IDC_STAT_AT_DTM_START: NORMAL
MDC_IDC_STAT_BRADY_DTM_END: NORMAL
MDC_IDC_STAT_BRADY_DTM_START: NORMAL
MDC_IDC_STAT_BRADY_RA_PERCENT_PACED: 93 %
MDC_IDC_STAT_BRADY_RV_PERCENT_PACED: 100 %
MDC_IDC_STAT_EPISODE_RECENT_COUNT: 0
MDC_IDC_STAT_EPISODE_RECENT_COUNT_DTM_END: NORMAL
MDC_IDC_STAT_EPISODE_RECENT_COUNT_DTM_START: NORMAL
MDC_IDC_STAT_EPISODE_TYPE: NORMAL
MDC_IDC_STAT_EPISODE_VENDOR_TYPE: NORMAL
MDC_IDC_STAT_EPISODE_VENDOR_TYPE: NORMAL

## 2025-03-19 ENCOUNTER — OFFICE VISIT (OUTPATIENT)
Dept: GASTROENTEROLOGY | Facility: CLINIC | Age: 75
End: 2025-03-19
Attending: INTERNAL MEDICINE
Payer: COMMERCIAL

## 2025-03-19 VITALS
HEIGHT: 68 IN | BODY MASS INDEX: 30.62 KG/M2 | SYSTOLIC BLOOD PRESSURE: 138 MMHG | OXYGEN SATURATION: 98 % | HEART RATE: 63 BPM | DIASTOLIC BLOOD PRESSURE: 82 MMHG | WEIGHT: 202 LBS

## 2025-03-19 DIAGNOSIS — K52.9 CHRONIC DIARRHEA: Primary | ICD-10-CM

## 2025-03-19 DIAGNOSIS — R14.0 ABDOMINAL BLOATING: ICD-10-CM

## 2025-03-19 DIAGNOSIS — K59.00 CONSTIPATION, UNSPECIFIED CONSTIPATION TYPE: ICD-10-CM

## 2025-03-19 LAB
ALBUMIN SERPL BCG-MCNC: 4.6 G/DL (ref 3.5–5.2)
ALP SERPL-CCNC: 59 U/L (ref 40–150)
ALT SERPL W P-5'-P-CCNC: 31 U/L (ref 0–70)
ANION GAP SERPL CALCULATED.3IONS-SCNC: 10 MMOL/L (ref 7–15)
AST SERPL W P-5'-P-CCNC: 27 U/L (ref 0–45)
BILIRUB SERPL-MCNC: 0.9 MG/DL
BUN SERPL-MCNC: 15.7 MG/DL (ref 8–23)
CALCIUM SERPL-MCNC: 9.5 MG/DL (ref 8.8–10.4)
CHLORIDE SERPL-SCNC: 108 MMOL/L (ref 98–107)
CREAT SERPL-MCNC: 1.18 MG/DL (ref 0.67–1.17)
CRP SERPL-MCNC: <3 MG/L
EGFRCR SERPLBLD CKD-EPI 2021: 64 ML/MIN/1.73M2
ERYTHROCYTE [DISTWIDTH] IN BLOOD BY AUTOMATED COUNT: 12.3 % (ref 10–15)
GLUCOSE SERPL-MCNC: 97 MG/DL (ref 70–99)
HCO3 SERPL-SCNC: 24 MMOL/L (ref 22–29)
HCT VFR BLD AUTO: 37.1 % (ref 40–53)
HGB BLD-MCNC: 13.4 G/DL (ref 13.3–17.7)
MCH RBC QN AUTO: 31.9 PG (ref 26.5–33)
MCHC RBC AUTO-ENTMCNC: 36.1 G/DL (ref 31.5–36.5)
MCV RBC AUTO: 88 FL (ref 78–100)
PLATELET # BLD AUTO: 146 10E3/UL (ref 150–450)
POTASSIUM SERPL-SCNC: 4.3 MMOL/L (ref 3.4–5.3)
PROT SERPL-MCNC: 6.7 G/DL (ref 6.4–8.3)
RBC # BLD AUTO: 4.2 10E6/UL (ref 4.4–5.9)
SODIUM SERPL-SCNC: 142 MMOL/L (ref 135–145)
TSH SERPL DL<=0.005 MIU/L-ACNC: 2.37 UIU/ML (ref 0.3–4.2)
WBC # BLD AUTO: 6.6 10E3/UL (ref 4–11)

## 2025-03-19 PROCEDURE — 99204 OFFICE O/P NEW MOD 45 MIN: CPT | Performed by: PHYSICIAN ASSISTANT

## 2025-03-19 PROCEDURE — 3075F SYST BP GE 130 - 139MM HG: CPT | Performed by: PHYSICIAN ASSISTANT

## 2025-03-19 PROCEDURE — 3079F DIAST BP 80-89 MM HG: CPT | Performed by: PHYSICIAN ASSISTANT

## 2025-03-19 PROCEDURE — 1126F AMNT PAIN NOTED NONE PRSNT: CPT | Performed by: PHYSICIAN ASSISTANT

## 2025-03-19 ASSESSMENT — PAIN SCALES - GENERAL: PAINLEVEL_OUTOF10: NO PAIN (0)

## 2025-03-19 NOTE — PATIENT INSTRUCTIONS
It was a pleasure meeting with you today and discussing your healthcare plan. Below is a summary of what we covered:    Labs today for blood work and to get supplies for take home stool collection kit. Return the stool sample to any convenient [facility] location.    Depending on results of your lab work, we will determine the most appropriate treatment plan.    You can look into a low FODMAP diet in the meantime to see if any of the high FODMAP foods seem to trigger GI issues for you:        Follow up in 3 months to recheck, sooner as needed.      Please see below for any additional questions and scheduling guidelines.    Sign up for [employer]: [employer] patient portal serves as a secure platform for accessing your medical records from the [facility]. Additionally, [employer] facilitates easy, timely, and secure messaging with your care team. If you have not signed up, you may do so by using the provided code or calling [phone].    Coordinating your care after your visit:  There are multiple options for scheduling your follow-up care based on your provider's recommendation.    How do I schedule a follow-up clinic appointment:   After your appointment, you may receive scheduling assistance with the Clinic Coordinators by having a seat in the waiting room and a Clinic Coordinator will call you up to schedule.  Virtual visits or after you leave the clinic:  Your provider has placed a follow-up order in the [employer] portal for scheduling your return appointment. A member of the scheduling team will contact you to schedule.  [employer] Scheduling: Timely scheduling through [employer] is advised to ensure appointment availability.   Call to schedule: You may schedule your follow-up appointment(s) by calling [phone], option 1.    How do I schedule my endoscopy or colonoscopy procedure:  If a procedure, such as a colonoscopy or upper endoscopy was ordered by your provider, the scheduling team will contact  you to schedule this procedure. Or you may choose to call to schedule at   [phone], option 2.  Please allow 20-30 minutes when scheduling a procedure.    How do I get my blood work done? To get your blood work done, you need to schedule a lab appointment at an [facility]. There are multiple ways to schedule:   At the clinic: The Clinic Coordinator you meet after your visit can help you schedule a lab appointment.   [facility] scheduling: [facility] offers online lab scheduling at all [facility] laboratory locations.   Call to schedule: You can call [phone] to schedule your lab appointment.    How do I schedule my imaging study: To schedule imaging studies, such as CT scans, ultrasounds, MRIs, or X-rays, contact Imaging Services at [phone].    How do I schedule a referral to another doctor: If your provider recommended a referral to another specialist(s), the referral order was placed by your provider. You will receive a phone call to schedule this referral, or you may choose to call the number attached to the referral to self-schedule.    For Post-Visit Question(s):  For any inquiries following today's visit:  Please utilize [facility] messaging and allow 48 hours for reply or contact the Call Center during normal business hours at [phone], option 3.  For Emergent After-hours questions, contact the On-Call GI Fellow through the [facility]  at [phone].  In addition, you may contact your Nurse directly using the provided contact information.    Test Results:  Test results will be accessible via [facility] in compliance with the 21st Century Cures Act. This means that your results will be available to you at the same time as your provider. Often you may see your results before your provider does. Results are reviewed by staff within two weeks with communication follow-up. Results may be released in the patient portal prior to your care team  review.    Prescription Refill(s):  Medication prescribed by your provider will be addressed during your visit. For future refills, please coordinate with your pharmacy. If you have not had a recent clinic visit or routine labs, for your safety, your provider may not be able to refill your prescription.

## 2025-03-19 NOTE — NURSING NOTE
"Chief Complaint   Patient presents with    New Patient     Chronic diarrhea     He requests these members of his care team be copied on today's visit information:  PCP: Tom Allison    Referring Provider:  Tom Allison MD  [address] [place] [address]    Vitals:    03/19/25 1313   BP: 138/82   Pulse: 63   SpO2: 98%   Weight: 91.6 kg (202 lb)   Height: 1.727 m (5' 8\")     Body mass index is 30.71 kg/m .    Do you have a history of colon cancer in your immediate family? YES    If yes who: Maternal grandfather     And what age were they diagnosed: 70's    Medications were reconciled.    Rere Larry, Clinical Assistant      "

## 2025-03-19 NOTE — LETTER
3/19/2025      Harpreet Vera  [address] [facility] [address]      Dear Colleague,    Thank you for referring your patient, Harpreet Vera, to the [facility]. Please see a copy of my visit note below.    NEW PATIENT GI CLINIC VISIT    CC/REFERRING MD:  Tom Allison  REASON FOR CONSULTATION:   Harpreet Vera is a 74 year old male who I was asked to see in consultation at the request of Dr. Tom Allison for   Chief Complaint   Patient presents with     New Patient     Chronic diarrhea       ASSESSMENT/PLAN:  74 year old male with longstanding diarrhea and associated symptoms of abdominal cramping, bloating, fecal urgency, and occasional fecal incontinence. H/o remote C diff colitis, but C diff and other infection testing in 8/2024 was negative. We discussed possible etiologies for diarrhea including malabsorption, inflammatory process, IBS-D, medication side effect, vs other. Colonoscopy in 11/2024 did not reveal any abnormal inflammation and random colon biopsies did not show evidence for microscopic colitis. We will check some additional labs including CBC, CMP, TSH, Celiac serologies, CRP, fecal calprotectin, and fecal elastase. If labs are normal, would favor IBS-D and/or bile acid diarrhea in the setting of post-CCY as the most likely culprits. Discussed that there are various treatment options for these conditions including supplemental fiber, low FODMAP diet, bile acid sequestrants, anti-diarrheals, Rifaximin, among others. Provided basic information on low FODMAP diet; he would be open to a nutrition referral in the future as well. If loose stools/diarrhea fail to respond to treatment, would consider abdominal x-ray to ensure he doesn't have underlying constipation with overflow diarrhea, though this is felt less likely based on history. Another future consideration could include hydrogen breath test to evaluate for SIBO.    -Labs today as outlined  above.  -Next steps for treatment pending lab results.  -Follow up in 3 months, sooner as needed.    Thank you for this consultation. It was a pleasure to participate in the care of this patient; please contact us with any further questions.  A total of 25 face-to-face minutes was spent with this patient, >50% of which was counseling regarding the above delineated issues. An additional 20 minutes was spent on the date of the encounter doing chart review, documentation, and further activities as noted above.    This note was created with voice recognition software, and while reviewed for accuracy, typos may remain.     Hanna Pino PA-C  Division of Gastroenterology, Hepatology and Nutrition  [facility]    ---------------------------------------------------------------------------------------------------  HPI:  Harpreet Vera is a 74 year old male with past medical history significant for HTN, sinus node dysfunction s/p pacemaker, sleep apnea, and CKD stage 3 who presents for evaluation of diarrhea. He is accompanied by his wife at today's visit. Harpreet notes longstanding GI issues since childhood. He has always tended to have looser stools. This possibly got worse after cholecystectomy in the early 2000's, but has certainly worsened in recent years. He typically has 2-3 bowel movements per day. The first two are often clustered in the morning and the third may occur later in the day (often before he has to leave the house). He will have a formed (Dixon type 4) stool a few times per week. The rest of the time they are loose to liquid (Dixon type 5-7). Usually has flare ups of liquid diarrhea associated with significant urgency 2-3 times per week. Will have 2 stools clustered together when this happens and then it improves. This is occasionally associated with fecal incontinence, roughly once per month. He can have abdominal cramping and bloating with diarrhea that  improves after passing stool. He also notes abdominal bloating separate to bowel issues, particularly when he eats foods that contain baking soda. Aliyah Resaca with simethicone helps.    Over the last month or so, Harpreet also reports some harder, dry stools which is unusual for him. Despite this, he continues to have diarrhea as well. He denies any blood in the stool or black, tarry stools. Has not lost any weight; in fact, he has gained some weight. Over the years, he has tried to identify foods that trigger his symptoms and has possibly found the following things to be irritating: oils, coffee, sweets, chocolate, baking soda, dairy. Sometimes feels that too much fiber can also lead to worsening diarrhea. He drinks 3 cups of caffeinated tea in the morning.     On a separate note, reports that he started omeprazole close to a year ago which has helped with throat phlegm. He denies typical GERD symptoms. No nausea or vomiting.     Colonoscopy in Nov 2024 did not show evidence for microscopic colitis. Three subcentimeter TAs were removed fro the descending colon. The exam was otherwise normal.    PREVIOUS ENDOSCOPY:  COLONOSCOPY 11/06/2024   Findings:       The perianal and digital rectal examinations were normal.        A 7 mm polyp was found in the mid descending colon. The polyp was        pedunculated. The polyp was removed with a hot snare. Resection and        retrieval were complete. Verification of patient identification for the        specimen was done. Estimated blood loss was minimal.        Two semi-pedunculated polyps were found in the mid descending colon. The        polyps were 4 to 5 mm in size. These polyps were removed with a cold        snare. Resection and retrieval were complete. Verification of patient        identification for the specimen was done. Estimated blood loss was        minimal. To prevent bleeding after the polypectomy, two hemostatic clips        were successfully placed. There was  no bleeding at the end of the        procedure.        The terminal ileum appeared normal.        The exam was otherwise without abnormality on direct and retroflexion        views.        The exam was otherwise normal throughout the examined colon.        Biopsies for histology were taken with a cold forceps from the entire        colon for evaluation of microscopic colitis.                                                                                    Impression:               - One 7 mm polyp in the mid descending colon,                             removed with a hot snare. Resected and retrieved.                             - Two 4 to 5 mm polyps in the mid descending colon,                             removed with a cold snare. Resected and retrieved.                             Clips were placed.                             - The examined portion of the ileum was normal.                             - The examination was otherwise normal on direct                             and retroflexion views.     Final Diagnosis   A. Colon, descending, polypectomies:  --Tubular adenomas.     B. Colon, random, biopsies:  --No significant histopathologic change.     COLONOSCOPY 04/01/2022   Findings:       The perianal and digital rectal examinations were normal.        The entire examined colon appeared normal on direct and retroflexion        views.                                                                                    Impression:               - The entire examined colon is normal on direct and                             retroflexion views.                             - No specimens collected.       COLONOSCOPY 04/05/2017   Findings:       The perianal and digital rectal examinations were normal.        A hyperplastic polyp was found in the sigmoid colon. The polyp was 4 mm        in size. Biopsies were taken with a cold forceps for histology.        Verification of patient identification for the  specimen was done.        Estimated blood loss was minimal.        The exam was otherwise without abnormality on direct and retroflexion        views.                                                                                    Impression:               - One 4 mm polyp in the sigmoid colon. Biopsied.                             - The examination was otherwise normal on direct                             and retroflexion views.     FINAL DIAGNOSIS:   Sigmoid colon, polyp, biopsy/polypectomy-   - Tubular adenoma; negative for high-grade dysplasia and malignancy.         PROBLEM LIST  Patient Active Problem List    Diagnosis Date Noted     Abnormal cardiovascular stress test 10/25/2024     Priority: Medium     Status post coronary angiogram 10/25/2024     Priority: Medium     Other cardiomyopathy (H) 10/25/2024     Priority: Medium     SSS (sick sinus syndrome) (H) 08/23/2022     Priority: Medium     Chronic kidney disease, stage 3 (H) 03/01/2021     Priority: Medium     Bradycardia, severe sinus 08/21/2020     Priority: Medium     Syncope 08/19/2020     Priority: Medium     Vertebral artery stenosis 01/06/2020     Priority: Medium     Cervicalgia 02/23/2017     Priority: Medium     S/P cervical spinal fusion 09/09/2016     Priority: Medium     Vitamin D deficiency 10/06/2015     Priority: Medium     Benign prostatic hyperplasia 10/06/2015     Priority: Medium     MAN (obstructive sleep apnea) 08/01/2013     Priority: Medium     Hypercholesteremia 08/03/2012     Priority: Medium     CARDIOVASCULAR SCREENING; LDL GOAL LESS THAN 130 10/31/2010     Priority: Medium     Intestinal infection due to Clostridium difficile 09/06/2007     Priority: Medium     Allergic rhinitis 06/14/2007     Priority: Medium     Problem list name updated by automated process. Provider to review       Essential hypertension, benign 01/14/2003     Priority: Medium       PERTINENT PAST MEDICAL HISTORY:  Past Medical History:   Diagnosis  Date     Benign neoplasm of colon      Brachial plexopathy      Chronic infection 2007    HX of C Diff     Hypertension      Mumps      MAN (obstructive sleep apnea)      Presence of permanent cardiac pacemaker      Sinus node dysfunction (H)     with syncope     Sleep apnea     Bi-PAP     Syncope        PREVIOUS SURGERIES:  Past Surgical History:   Procedure Laterality Date     ARTHROSCOPY SHOULDER Right 12/11/2020    Procedure: RIGHT SHOULDER ARTHROSCOPY, ARTHROSCOPIC SUBACROMIAL DECOMPRESSION, DISTAL CLAVICLE AND BICEPS TENOTOMY;  Surgeon: Joseph Moon MD;  Location:  OR     CHOLECYSTECTOMY       COLONOSCOPY       COLONOSCOPY N/A 4/5/2017    Procedure: COMBINED COLONOSCOPY, SINGLE OR MULTIPLE BIOPSY/POLYPECTOMY BY BIOPSY;  Surgeon: Tylor Rice MD;  Location:  GI     COLONOSCOPY N/A 4/1/2022    Procedure: COLONOSCOPY (FV);  Surgeon: Tylor Rice MD;  Location:  GI     COLONOSCOPY N/A 11/6/2024    Procedure: Colonoscopy with polypectomies using exacto and hot snare and biopsies using regular forceps;  Surgeon: Tylor Rice MD;  Location:  GI     COLONOSCOPY N/A 11/6/2024    Procedure: COLONOSCOPY, WITH POLYPECTOMY AND BIOPSY;  Surgeon: Tylor Rice MD;  Location:  GI     CV CORONARY ANGIOGRAM N/A 10/25/2024    Procedure: Coronary Angiogram;  Surgeon: Alexis Ruiz MD;  Location:  HEART CARDIAC CATH LAB     DECOMPRESSION, FUSION CERVICAL ANTERIOR ONE LEVEL, COMBINED N/A 9/9/2016    Procedure: COMBINED DECOMPRESSION, FUSION CERVICAL ANTERIOR ONE LEVEL;  Surgeon: Erick Valles MD;  Location:  OR     ENT SURGERY      tonsilectomy  as a child     EP PACEMAKER N/A 8/21/2020    Procedure: EP Pacemaker;  Surgeon: Alfonso Sigala MD;  Location:  HEART CARDIAC CATH LAB     IR CAROTID CEREBRAL ANGIOGRAM BILATERAL  1/6/2020     IR CAROTID CEREBRAL ANGIOGRAM BILATERAL  7/1/2020     IR DISCONTINUE SHEATH  1/6/2020     ORTHOPEDIC SURGERY      ambar knees scopedrt  "shoulder,fx rt arm fx     TESTICLE SURGERY       VASECTOMY       [facility] NONSPECIFIC PROCEDURE      Right clavicle fracture, Multiple right sided rib fracture, hairline fracture \"pelvis\", secondary to fall from tree         [facility] NONSPECIFIC PROCEDURE      Right ulnar/radial fracture        ALLERGIES:   No Known Allergies    PERTINENT MEDICATIONS:  Current Outpatient Medications   Medication Sig Dispense Refill     acetaminophen (TYLENOL) 650 MG CR tablet Take 650 mg by mouth every 8 hours as needed for mild pain or fever       alfuzosin ER (UROXATRAL) 10 MG 24 hr tablet Take 1 tablet (10 mg) by mouth daily. 90 tablet 3     allopurinol (ZYLOPRIM) 100 MG tablet Take 200 mg by mouth every evening (2 x 100mg)       amLODIPine (NORVASC) 5 MG tablet Take 1 tablet (5 mg) by mouth 2 times daily. 180 tablet 3     aspirin - buffered (ASCRIPTIN) 325 MG TABS tablet Take 1 tablet (325 mg) by mouth every morning.       finasteride (PROSCAR) 5 MG tablet Take 1 tablet (5 mg) by mouth daily. 90 tablet 3     metoprolol succinate ER (TOPROL XL) 25 MG 24 hr tablet Take 1 tablet (25 mg) by mouth 2 times daily 180 tablet 3     nitroGLYcerin (NITROSTAT) 0.4 MG sublingual tablet For chest pain place 1 tablet under the tongue every 5 minutes for 3 doses. If symptoms persist 5 minutes after 1st dose call 911. 25 tablet 0     olmesartan (BENICAR) 40 MG tablet Take 1 tablet (40 mg) by mouth every morning 90 tablet 3     omeprazole (PRILOSEC) 20 MG DR capsule TAKE ONE CAPSULE BY MOUTH ONCE OR TWICE DAILY AS NEEDED 180 capsule 0     predniSONE (DELTASONE) 20 MG tablet TAKE 1 TABLET BY MOUTH EVERY MORNING WITH FOOD*       rosuvastatin (CRESTOR) 20 MG tablet Take 1 tablet (20 mg) by mouth at bedtime 90 tablet 3     vitamin D3 (CHOLECALCIFEROL) 50 mcg (2000 units) tablet Take 1 tablet by mouth every evening Taking 1000 units daily       No current facility-administered medications for this visit.     Facility-Administered Medications Ordered in Other " Visits   Medication Dose Route Frequency Provider Last Rate Last Admin     iopamidol (ISOVUE-370) solution    Once BERNARDN Alexis Ruiz MD   40 mL at 10/25/24 0950       SOCIAL HISTORY:  Social History     Socioeconomic History     Marital status:      Spouse name: Not on file     Number of children: Not on file     Years of education: Not on file     Highest education level: Not on file   Occupational History     Not on file   Tobacco Use     Smoking status: Never     Smokeless tobacco: Never   Vaping Use     Vaping status: Never Used   Substance and Sexual Activity     Alcohol use: Yes     Alcohol/week: 1.0 standard drink of alcohol     Types: 1 Glasses of wine per week     Comment: 1-2 weekly     Drug use: No     Sexual activity: Yes     Partners: Female   Other Topics Concern     Parent/sibling w/ CABG, MI or angioplasty before 65F 55M? Not Asked   Social History Narrative     Not on file     Social Drivers of Health     Financial Resource Strain: Low Risk  (10/25/2024)    Financial Resource Strain      Within the past 12 months, have you or your family members you live with been unable to get utilities (heat, electricity) when it was really needed?: No   Food Insecurity: Low Risk  (10/25/2024)    Food Insecurity      Within the past 12 months, did you worry that your food would run out before you got money to buy more?: No      Within the past 12 months, did the food you bought just not last and you didn t have money to get more?: No   Transportation Needs: Low Risk  (10/25/2024)    Transportation Needs      Within the past 12 months, has lack of transportation kept you from medical appointments, getting your medicines, non-medical meetings or appointments, work, or from getting things that you need?: No   Physical Activity: Insufficiently Active (10/25/2024)    Exercise Vital Sign      Days of Exercise per Week: 3 days      Minutes of Exercise per Session: 10 min   Stress: No Stress Concern  "Present (10/25/2024)    [employer] - Occupational Stress Questionnaire      Feeling of Stress : Only a little   Social Connections: Unknown (10/25/2024)    Social Connection and Isolation Panel [NHANES]      Frequency of Communication with Friends and Family: Not on file      Frequency of Social Gatherings with Friends and Family: Once a week      Attends Sikh Services: Not on file      Active Member of Clubs or Organizations: Not on file      Attends Club or Organization Meetings: Not on file      Marital Status: Not on file   Interpersonal Safety: Low Risk  (11/6/2024)    Interpersonal Safety      Do you feel physically and emotionally safe where you currently live?: Yes      Within the past 12 months, have you been hit, slapped, kicked or otherwise physically hurt by someone?: No      Within the past 12 months, have you been humiliated or emotionally abused in other ways by your partner or ex-partner?: No   Housing Stability: Low Risk  (10/25/2024)    Housing Stability      Do you have housing? : Yes      Are you worried about losing your housing?: No       FAMILY HISTORY:  Family History   Problem Relation Age of Onset     Arthritis Mother      Eye Disorder Mother         cateracts     Lipids Mother      Colorectal Cancer Mother      Cancer Father         colon/prostate     Eye Disorder Father         cateracts     Lipids Father      Diabetes Father      Prostate Cancer Brother      Colon Cancer Brother 74        liver mets     Kidney Cancer Brother      Prostate Cancer Brother      Colon Cancer Maternal Aunt      Cancer Maternal Aunt         colon     Colon Cancer Maternal Uncle      Cancer Maternal Uncle         colon       Past/family/social history reviewed and no changes    PHYSICAL EXAMINATION:  Vitals /82   Pulse 63   Ht 1.727 m (5' 8\")   Wt 91.6 kg (202 lb)   SpO2 98%   BMI 30.71 kg/m     Wt   Wt Readings from Last 2 Encounters:   03/19/25 91.6 kg (202 lb) "   02/06/25 89.8 kg (197 lb 14.4 oz)      Gen: Resting comfortably in an exam chair, alert, no distress  Eyes: sclerae anicteric  ENT:  OP clear, MMM  Resp: No increased respiratory effort  Skin: Visible skin clear. No significant rash, abnormal pigmentation or lesions.  Neuro: Cranial nerves grossly intact.  Mentation and speech appropriate for age.  Psych: Appropriate affect, tone, and pace of words    PERTINENT STUDIES:  Reviewed      Again, thank you for allowing me to participate in the care of your patient.        Sincerely,        Hanna iPno PA-C    Electronically signed

## 2025-03-19 NOTE — PROGRESS NOTES
NEW PATIENT GI CLINIC VISIT    CC/REFERRING MD:  Tom Allison  REASON FOR CONSULTATION:   Harpreet Vera is a 74 year old male who I was asked to see in consultation at the request of Dr. Tom Allison for   Chief Complaint   Patient presents with    New Patient     Chronic diarrhea       ASSESSMENT/PLAN:  74 year old male with longstanding diarrhea and associated symptoms of abdominal cramping, bloating, fecal urgency, and occasional fecal incontinence. H/o remote C diff colitis, but C diff and other infection testing in 8/2024 was negative. We discussed possible etiologies for diarrhea including malabsorption, inflammatory process, IBS-D, medication side effect, vs other. Colonoscopy in 11/2024 did not reveal any abnormal inflammation and random colon biopsies did not show evidence for microscopic colitis. We will check some additional labs including CBC, CMP, TSH, Celiac serologies, CRP, fecal calprotectin, and fecal elastase. If labs are normal, would favor IBS-D and/or bile acid diarrhea in the setting of post-CCY as the most likely culprits. Discussed that there are various treatment options for these conditions including supplemental fiber, low FODMAP diet, bile acid sequestrants, anti-diarrheals, Rifaximin, among others. Provided basic information on low FODMAP diet; he would be open to a nutrition referral in the future as well. If loose stools/diarrhea fail to respond to treatment, would consider abdominal x-ray to ensure he doesn't have underlying constipation with overflow diarrhea, though this is felt less likely based on history. Another future consideration could include hydrogen breath test to evaluate for SIBO.    -Labs today as outlined above.  -Next steps for treatment pending lab results.  -Follow up in 3 months, sooner as needed.    Thank you for this consultation. It was a pleasure to participate in the care of this patient; please contact us with any further questions.  A total  of 25 face-to-face minutes was spent with this patient, >50% of which was counseling regarding the above delineated issues. An additional 20 minutes was spent on the date of the encounter doing chart review, documentation, and further activities as noted above.    This note was created with voice recognition software, and while reviewed for accuracy, typos may remain.     Hanna Pino PA-C  Division of Gastroenterology, Hepatology and Nutrition  [facility]    ---------------------------------------------------------------------------------------------------  HPI:  Harpreet Vera is a 74 year old male with past medical history significant for HTN, sinus node dysfunction s/p pacemaker, sleep apnea, and CKD stage 3 who presents for evaluation of diarrhea. He is accompanied by his wife at today's visit. Harpreet notes longstanding GI issues since childhood. He has always tended to have looser stools. This possibly got worse after cholecystectomy in the early 2000's, but has certainly worsened in recent years. He typically has 2-3 bowel movements per day. The first two are often clustered in the morning and the third may occur later in the day (often before he has to leave the house). He will have a formed (Grainger type 4) stool a few times per week. The rest of the time they are loose to liquid (Grainger type 5-7). Usually has flare ups of liquid diarrhea associated with significant urgency 2-3 times per week. Will have 2 stools clustered together when this happens and then it improves. This is occasionally associated with fecal incontinence, roughly once per month. He can have abdominal cramping and bloating with diarrhea that improves after passing stool. He also notes abdominal bloating separate to bowel issues, particularly when he eats foods that contain baking soda. Aliyah Carey with simethicone helps.    Over the last month or so, Harpreet also reports some harder, dry  stools which is unusual for him. Despite this, he continues to have diarrhea as well. He denies any blood in the stool or black, tarry stools. Has not lost any weight; in fact, he has gained some weight. Over the years, he has tried to identify foods that trigger his symptoms and has possibly found the following things to be irritating: oils, coffee, sweets, chocolate, baking soda, dairy. Sometimes feels that too much fiber can also lead to worsening diarrhea. He drinks 3 cups of caffeinated tea in the morning.     On a separate note, reports that he started omeprazole close to a year ago which has helped with throat phlegm. He denies typical GERD symptoms. No nausea or vomiting.     Colonoscopy in Nov 2024 did not show evidence for microscopic colitis. Three subcentimeter TAs were removed fro the descending colon. The exam was otherwise normal.    PREVIOUS ENDOSCOPY:  COLONOSCOPY 11/06/2024   Findings:       The perianal and digital rectal examinations were normal.        A 7 mm polyp was found in the mid descending colon. The polyp was        pedunculated. The polyp was removed with a hot snare. Resection and        retrieval were complete. Verification of patient identification for the        specimen was done. Estimated blood loss was minimal.        Two semi-pedunculated polyps were found in the mid descending colon. The        polyps were 4 to 5 mm in size. These polyps were removed with a cold        snare. Resection and retrieval were complete. Verification of patient        identification for the specimen was done. Estimated blood loss was        minimal. To prevent bleeding after the polypectomy, two hemostatic clips        were successfully placed. There was no bleeding at the end of the        procedure.        The terminal ileum appeared normal.        The exam was otherwise without abnormality on direct and retroflexion        views.        The exam was otherwise normal throughout the examined colon.         Biopsies for histology were taken with a cold forceps from the entire        colon for evaluation of microscopic colitis.                                                                                    Impression:               - One 7 mm polyp in the mid descending colon,                             removed with a hot snare. Resected and retrieved.                             - Two 4 to 5 mm polyps in the mid descending colon,                             removed with a cold snare. Resected and retrieved.                             Clips were placed.                             - The examined portion of the ileum was normal.                             - The examination was otherwise normal on direct                             and retroflexion views.     Final Diagnosis   A. Colon, descending, polypectomies:  --Tubular adenomas.     B. Colon, random, biopsies:  --No significant histopathologic change.     COLONOSCOPY 04/01/2022   Findings:       The perianal and digital rectal examinations were normal.        The entire examined colon appeared normal on direct and retroflexion        views.                                                                                    Impression:               - The entire examined colon is normal on direct and                             retroflexion views.                             - No specimens collected.       COLONOSCOPY 04/05/2017   Findings:       The perianal and digital rectal examinations were normal.        A hyperplastic polyp was found in the sigmoid colon. The polyp was 4 mm        in size. Biopsies were taken with a cold forceps for histology.        Verification of patient identification for the specimen was done.        Estimated blood loss was minimal.        The exam was otherwise without abnormality on direct and retroflexion        views.                                                                                    Impression:                - One 4 mm polyp in the sigmoid colon. Biopsied.                             - The examination was otherwise normal on direct                             and retroflexion views.     FINAL DIAGNOSIS:   Sigmoid colon, polyp, biopsy/polypectomy-   - Tubular adenoma; negative for high-grade dysplasia and malignancy.         PROBLEM LIST  Patient Active Problem List    Diagnosis Date Noted    Abnormal cardiovascular stress test 10/25/2024     Priority: Medium    Status post coronary angiogram 10/25/2024     Priority: Medium    Other cardiomyopathy (H) 10/25/2024     Priority: Medium    SSS (sick sinus syndrome) (H) 08/23/2022     Priority: Medium    Chronic kidney disease, stage 3 (H) 03/01/2021     Priority: Medium    Bradycardia, severe sinus 08/21/2020     Priority: Medium    Syncope 08/19/2020     Priority: Medium    Vertebral artery stenosis 01/06/2020     Priority: Medium    Cervicalgia 02/23/2017     Priority: Medium    S/P cervical spinal fusion 09/09/2016     Priority: Medium    Vitamin D deficiency 10/06/2015     Priority: Medium    Benign prostatic hyperplasia 10/06/2015     Priority: Medium    MAN (obstructive sleep apnea) 08/01/2013     Priority: Medium    Hypercholesteremia 08/03/2012     Priority: Medium    CARDIOVASCULAR SCREENING; LDL GOAL LESS THAN 130 10/31/2010     Priority: Medium    Intestinal infection due to Clostridium difficile 09/06/2007     Priority: Medium    Allergic rhinitis 06/14/2007     Priority: Medium     Problem list name updated by automated process. Provider to review      Essential hypertension, benign 01/14/2003     Priority: Medium       PERTINENT PAST MEDICAL HISTORY:  Past Medical History:   Diagnosis Date    Benign neoplasm of colon     Brachial plexopathy     Chronic infection 2007    HX of C Diff    Hypertension     Mumps     MAN (obstructive sleep apnea)     Presence of permanent cardiac pacemaker     Sinus node dysfunction (H)     with syncope    Sleep apnea     Bi-PAP  "   Syncope        PREVIOUS SURGERIES:  Past Surgical History:   Procedure Laterality Date    ARTHROSCOPY SHOULDER Right 12/11/2020    Procedure: RIGHT SHOULDER ARTHROSCOPY, ARTHROSCOPIC SUBACROMIAL DECOMPRESSION, DISTAL CLAVICLE AND BICEPS TENOTOMY;  Surgeon: Joseph Moon MD;  Location:  OR    CHOLECYSTECTOMY      COLONOSCOPY      COLONOSCOPY N/A 4/5/2017    Procedure: COMBINED COLONOSCOPY, SINGLE OR MULTIPLE BIOPSY/POLYPECTOMY BY BIOPSY;  Surgeon: Tylor Rice MD;  Location:  GI    COLONOSCOPY N/A 4/1/2022    Procedure: COLONOSCOPY (FV);  Surgeon: Tylor Rice MD;  Location:  GI    COLONOSCOPY N/A 11/6/2024    Procedure: Colonoscopy with polypectomies using exacto and hot snare and biopsies using regular forceps;  Surgeon: Tylor Rice MD;  Location:  GI    COLONOSCOPY N/A 11/6/2024    Procedure: COLONOSCOPY, WITH POLYPECTOMY AND BIOPSY;  Surgeon: Tylor Rice MD;  Location:  GI    CV CORONARY ANGIOGRAM N/A 10/25/2024    Procedure: Coronary Angiogram;  Surgeon: Alexis Ruiz MD;  Location:  HEART CARDIAC CATH LAB    DECOMPRESSION, FUSION CERVICAL ANTERIOR ONE LEVEL, COMBINED N/A 9/9/2016    Procedure: COMBINED DECOMPRESSION, FUSION CERVICAL ANTERIOR ONE LEVEL;  Surgeon: Erick Valles MD;  Location:  OR    ENT SURGERY      tonsilectomy  as a child    EP PACEMAKER N/A 8/21/2020    Procedure: EP Pacemaker;  Surgeon: Alfonso Sigala MD;  Location:  HEART CARDIAC CATH LAB    IR CAROTID CEREBRAL ANGIOGRAM BILATERAL  1/6/2020    IR CAROTID CEREBRAL ANGIOGRAM BILATERAL  7/1/2020    IR DISCONTINUE SHEATH  1/6/2020    ORTHOPEDIC SURGERY      ambar knees scopedrt shoulder,fx rt arm fx    TESTICLE SURGERY      VASECTOMY      Z NONSPECIFIC PROCEDURE      Right clavicle fracture, Multiple right sided rib fracture, hairline fracture \"pelvis\", secondary to fall from tree        [facility] NONSPECIFIC PROCEDURE      Right ulnar/radial fracture        ALLERGIES:   No Known " Allergies    PERTINENT MEDICATIONS:  Current Outpatient Medications   Medication Sig Dispense Refill    acetaminophen (TYLENOL) 650 MG CR tablet Take 650 mg by mouth every 8 hours as needed for mild pain or fever      alfuzosin ER (UROXATRAL) 10 MG 24 hr tablet Take 1 tablet (10 mg) by mouth daily. 90 tablet 3    allopurinol (ZYLOPRIM) 100 MG tablet Take 200 mg by mouth every evening (2 x 100mg)      amLODIPine (NORVASC) 5 MG tablet Take 1 tablet (5 mg) by mouth 2 times daily. 180 tablet 3    aspirin - buffered (ASCRIPTIN) 325 MG TABS tablet Take 1 tablet (325 mg) by mouth every morning.      finasteride (PROSCAR) 5 MG tablet Take 1 tablet (5 mg) by mouth daily. 90 tablet 3    metoprolol succinate ER (TOPROL XL) 25 MG 24 hr tablet Take 1 tablet (25 mg) by mouth 2 times daily 180 tablet 3    nitroGLYcerin (NITROSTAT) 0.4 MG sublingual tablet For chest pain place 1 tablet under the tongue every 5 minutes for 3 doses. If symptoms persist 5 minutes after 1st dose call 911. 25 tablet 0    olmesartan (BENICAR) 40 MG tablet Take 1 tablet (40 mg) by mouth every morning 90 tablet 3    omeprazole (PRILOSEC) 20 MG DR capsule TAKE ONE CAPSULE BY MOUTH ONCE OR TWICE DAILY AS NEEDED 180 capsule 0    predniSONE (DELTASONE) 20 MG tablet TAKE 1 TABLET BY MOUTH EVERY MORNING WITH FOOD*      rosuvastatin (CRESTOR) 20 MG tablet Take 1 tablet (20 mg) by mouth at bedtime 90 tablet 3    vitamin D3 (CHOLECALCIFEROL) 50 mcg (2000 units) tablet Take 1 tablet by mouth every evening Taking 1000 units daily       No current facility-administered medications for this visit.     Facility-Administered Medications Ordered in Other Visits   Medication Dose Route Frequency Provider Last Rate Last Admin    iopamidol (ISOVUE-370) solution    Once PRN Alexis Ruiz MD   40 mL at 10/25/24 0950       SOCIAL HISTORY:  Social History     Socioeconomic History    Marital status:      Spouse name: Not on file    Number of children: Not on  file    Years of education: Not on file    Highest education level: Not on file   Occupational History    Not on file   Tobacco Use    Smoking status: Never    Smokeless tobacco: Never   Vaping Use    Vaping status: Never Used   Substance and Sexual Activity    Alcohol use: Yes     Alcohol/week: 1.0 standard drink of alcohol     Types: 1 Glasses of wine per week     Comment: 1-2 weekly    Drug use: No    Sexual activity: Yes     Partners: Female   Other Topics Concern    Parent/sibling w/ CABG, MI or angioplasty before 65F 55M? Not Asked   Social History Narrative    Not on file     Social Drivers of Health     Financial Resource Strain: Low Risk  (10/25/2024)    Financial Resource Strain     Within the past 12 months, have you or your family members you live with been unable to get utilities (heat, electricity) when it was really needed?: No   Food Insecurity: Low Risk  (10/25/2024)    Food Insecurity     Within the past 12 months, did you worry that your food would run out before you got money to buy more?: No     Within the past 12 months, did the food you bought just not last and you didn t have money to get more?: No   Transportation Needs: Low Risk  (10/25/2024)    Transportation Needs     Within the past 12 months, has lack of transportation kept you from medical appointments, getting your medicines, non-medical meetings or appointments, work, or from getting things that you need?: No   Physical Activity: Insufficiently Active (10/25/2024)    Exercise Vital Sign     Days of Exercise per Week: 3 days     Minutes of Exercise per Session: 10 min   Stress: No Stress Concern Present (10/25/2024)    [employer] [place] [employer] - Occupational Stress Questionnaire     Feeling of Stress : Only a little   Social Connections: Unknown (10/25/2024)    Social Connection and Isolation Panel [NHANES]     Frequency of Communication with Friends and Family: Not on file     Frequency of Social Gatherings with  "Friends and Family: Once a week     Attends Anabaptist Services: Not on file     Active Member of Clubs or Organizations: Not on file     Attends Club or Organization Meetings: Not on file     Marital Status: Not on file   Interpersonal Safety: Low Risk  (11/6/2024)    Interpersonal Safety     Do you feel physically and emotionally safe where you currently live?: Yes     Within the past 12 months, have you been hit, slapped, kicked or otherwise physically hurt by someone?: No     Within the past 12 months, have you been humiliated or emotionally abused in other ways by your partner or ex-partner?: No   Housing Stability: Low Risk  (10/25/2024)    Housing Stability     Do you have housing? : Yes     Are you worried about losing your housing?: No       FAMILY HISTORY:  Family History   Problem Relation Age of Onset    Arthritis Mother     Eye Disorder Mother         cateracts    Lipids Mother     Colorectal Cancer Mother     Cancer Father         colon/prostate    Eye Disorder Father         cateracts    Lipids Father     Diabetes Father     Prostate Cancer Brother     Colon Cancer Brother 74        liver mets    Kidney Cancer Brother     Prostate Cancer Brother     Colon Cancer Maternal Aunt     Cancer Maternal Aunt         colon    Colon Cancer Maternal Uncle     Cancer Maternal Uncle         colon       Past/family/social history reviewed and no changes    PHYSICAL EXAMINATION:  Vitals /82   Pulse 63   Ht 1.727 m (5' 8\")   Wt 91.6 kg (202 lb)   SpO2 98%   BMI 30.71 kg/m     Wt   Wt Readings from Last 2 Encounters:   03/19/25 91.6 kg (202 lb)   02/06/25 89.8 kg (197 lb 14.4 oz)      Gen: Resting comfortably in an exam chair, alert, no distress  Eyes: sclerae anicteric  ENT:  OP clear, MMM  Resp: No increased respiratory effort  Skin: Visible skin clear. No significant rash, abnormal pigmentation or lesions.  Neuro: Cranial nerves grossly intact.  Mentation and speech appropriate for age.  Psych: " Appropriate affect, tone, and pace of words    PERTINENT STUDIES:  Reviewed

## 2025-03-20 LAB
IGA SERPL-MCNC: 112 MG/DL (ref 84–499)
TTG IGA SER-ACNC: <0.2 U/ML
TTG IGG SER-ACNC: <0.6 U/ML

## 2025-04-25 ENCOUNTER — HOSPITAL ENCOUNTER (EMERGENCY)
Facility: CLINIC | Age: 75
Discharge: HOME | End: 2025-04-26
Attending: EMERGENCY MEDICINE | Admitting: EMERGENCY MEDICINE
Payer: COMMERCIAL

## 2025-04-25 DIAGNOSIS — R07.89 MUSCULOSKELETAL CHEST PAIN: ICD-10-CM

## 2025-04-25 PROCEDURE — 36415 COLL VENOUS BLD VENIPUNCTURE: CPT | Performed by: EMERGENCY MEDICINE

## 2025-04-25 PROCEDURE — 85004 AUTOMATED DIFF WBC COUNT: CPT | Performed by: EMERGENCY MEDICINE

## 2025-04-25 PROCEDURE — 84484 ASSAY OF TROPONIN QUANT: CPT | Performed by: EMERGENCY MEDICINE

## 2025-04-25 PROCEDURE — 80048 BASIC METABOLIC PNL TOTAL CA: CPT | Performed by: EMERGENCY MEDICINE

## 2025-04-25 PROCEDURE — 99284 EMERGENCY DEPT VISIT MOD MDM: CPT

## 2025-04-25 PROCEDURE — 93005 ELECTROCARDIOGRAM TRACING: CPT

## 2025-04-25 ASSESSMENT — ACTIVITIES OF DAILY LIVING (ADL): ADLS_ACUITY_SCORE: 48

## 2025-04-25 ASSESSMENT — COLUMBIA-SUICIDE SEVERITY RATING SCALE - C-SSRS
1. IN THE PAST MONTH, HAVE YOU WISHED YOU WERE DEAD OR WISHED YOU COULD GO TO SLEEP AND NOT WAKE UP?: NO
2. HAVE YOU ACTUALLY HAD ANY THOUGHTS OF KILLING YOURSELF IN THE PAST MONTH?: NO
6. HAVE YOU EVER DONE ANYTHING, STARTED TO DO ANYTHING, OR PREPARED TO DO ANYTHING TO END YOUR LIFE?: NO

## 2025-04-26 VITALS
DIASTOLIC BLOOD PRESSURE: 71 MMHG | RESPIRATION RATE: 16 BRPM | TEMPERATURE: 97.7 F | SYSTOLIC BLOOD PRESSURE: 110 MMHG | HEART RATE: 60 BPM | OXYGEN SATURATION: 94 %

## 2025-04-26 LAB
ANION GAP SERPL CALCULATED.3IONS-SCNC: 12 MMOL/L (ref 7–15)
BASOPHILS # BLD AUTO: 0 10E3/UL (ref 0–0.2)
BASOPHILS NFR BLD AUTO: 1 %
BUN SERPL-MCNC: 16.2 MG/DL (ref 8–23)
CALCIUM SERPL-MCNC: 9.2 MG/DL (ref 8.8–10.4)
CHLORIDE SERPL-SCNC: 106 MMOL/L (ref 98–107)
CREAT SERPL-MCNC: 1.16 MG/DL (ref 0.67–1.17)
EGFRCR SERPLBLD CKD-EPI 2021: 65 ML/MIN/1.73M2
EOSINOPHIL # BLD AUTO: 0.2 10E3/UL (ref 0–0.7)
EOSINOPHIL NFR BLD AUTO: 3 %
ERYTHROCYTE [DISTWIDTH] IN BLOOD BY AUTOMATED COUNT: 12.3 % (ref 10–15)
GLUCOSE SERPL-MCNC: 134 MG/DL (ref 70–99)
HCO3 SERPL-SCNC: 20 MMOL/L (ref 22–29)
HCT VFR BLD AUTO: 34.3 % (ref 40–53)
HGB BLD-MCNC: 12.2 G/DL (ref 13.3–17.7)
IMM GRANULOCYTES # BLD: 0 10E3/UL
IMM GRANULOCYTES NFR BLD: 0 %
LYMPHOCYTES # BLD AUTO: 1.1 10E3/UL (ref 0.8–5.3)
LYMPHOCYTES NFR BLD AUTO: 17 %
MCH RBC QN AUTO: 31.6 PG (ref 26.5–33)
MCHC RBC AUTO-ENTMCNC: 35.6 G/DL (ref 31.5–36.5)
MCV RBC AUTO: 89 FL (ref 78–100)
MONOCYTES # BLD AUTO: 0.6 10E3/UL (ref 0–1.3)
MONOCYTES NFR BLD AUTO: 10 %
NEUTROPHILS # BLD AUTO: 4.3 10E3/UL (ref 1.6–8.3)
NEUTROPHILS NFR BLD AUTO: 69 %
NRBC # BLD AUTO: 0 10E3/UL
NRBC BLD AUTO-RTO: 0 /100
PLATELET # BLD AUTO: 138 10E3/UL (ref 150–450)
POTASSIUM SERPL-SCNC: 4.2 MMOL/L (ref 3.4–5.3)
RBC # BLD AUTO: 3.86 10E6/UL (ref 4.4–5.9)
SODIUM SERPL-SCNC: 138 MMOL/L (ref 135–145)
TROPONIN T SERPL HS-MCNC: 10 NG/L
WBC # BLD AUTO: 6.3 10E3/UL (ref 4–11)

## 2025-04-26 ASSESSMENT — ACTIVITIES OF DAILY LIVING (ADL): ADLS_ACUITY_SCORE: 48

## 2025-04-26 NOTE — ED TRIAGE NOTES
Patient arrives via EMS from home with chest pain that began around 6PM. Patient originally thought it was a pulled muscle but pain began increasing. Per EMS report, pain is located right around the site of his pacemaker. EMS gave 324 asprin and 1 nitroglycerin at 2225 which lowered his blood pressure so they only administered 1 dose. On arrival patient is A&Ox4 and ambulatory.      Triage Assessment (Adult)       Row Name 04/25/25 0800          Respiratory WDL    Respiratory WDL WDL        Skin Circulation/Temperature WDL    Skin Circulation/Temperature WDL WDL        Cardiac WDL    Cardiac WDL X        Peripheral/Neurovascular WDL    Peripheral Neurovascular WDL WDL        Cognitive/Neuro/Behavioral WDL    Cognitive/Neuro/Behavioral WDL WDL

## 2025-04-26 NOTE — ED PROVIDER NOTES
Emergency Department Note      History of Present Illness     Chief Complaint   Chest Pain    HPI   Harpreet Vera is a 75 year old male who presents with localized left upper chest pain that began at approximately 1800 this evening. Patient reports he was riding the bus home this afternoon and had to use his hand to steady himself during the bumpy ride. Due to this, he initially thought his pain was a pulled muscle. Patient reports his pain worsens with movement and alleviates after he is sitting down or resting. Reports taking two Tylenol this evening with minimal relief. He also took one nitroglycerin and baby aspirin with moderate relief. Denies shortness of breath. Wife was concerned the pain was due to problems with his pacemaker, which was placed about 2 years ago.     Independent Historian   None    Review of External Notes   I reviewed Care Everywhere and updated [facility].     Past Medical History     Medical History and Problem List   Past Medical History:   Diagnosis Date    Benign neoplasm of colon     Brachial plexopathy     history of C. difficile infection 2007    Hypertension     Mumps, history of     MAN (obstructive sleep apnea)     Sinus node dysfunction     Sleep apnea      Medications   alfuzosin ER (UROXATRAL) 10 MG 24 hr tablet  allopurinol (ZYLOPRIM) 100 MG tablet  amLODIPine (NORVASC) 5 MG tablet  aspirin - buffered (ASCRIPTIN) 325 MG TABS tablet  finasteride (PROSCAR) 5 MG tablet  metoprolol succinate ER (TOPROL XL) 25 MG 24 hr tablet  nitroGLYcerin (NITROSTAT) 0.4 MG sublingual tablet  olmesartan (BENICAR) 40 MG tablet  omeprazole (PRILOSEC) 20 MG DR capsule  rosuvastatin (CRESTOR) 20 MG tablet  vitamin D3 (CHOLECALCIFEROL) 50 mcg (2000 units) tablet      Surgical History   Past Surgical History:   Procedure Laterality Date    ARTHROSCOPY KNEE Bilateral     ARTHROSCOPY SHOULDER Right 12/11/2020    Procedure: RIGHT SHOULDER ARTHROSCOPY, ARTHROSCOPIC SUBACROMIAL DECOMPRESSION, DISTAL CLAVICLE  AND BICEPS TENOTOMY;  Surgeon: Joseph Moon MD;  Location:  OR    CHOLECYSTECTOMY      COLONOSCOPY N/A 04/05/2017    Procedure: COMBINED COLONOSCOPY, SINGLE OR MULTIPLE BIOPSY/POLYPECTOMY BY BIOPSY;  Surgeon: Tylor Rice MD;  Location:  GI    COLONOSCOPY N/A 04/01/2022    Procedure: COLONOSCOPY (FV);  Surgeon: Tylor Rice MD;  Location:  GI    COLONOSCOPY N/A 11/06/2024    Procedure: Colonoscopy with polypectomies using exacto and hot snare and biopsies using regular forceps;  Surgeon: Tylor Rice MD;  Location:  GI    COLONOSCOPY N/A 11/06/2024    Procedure: COLONOSCOPY, WITH POLYPECTOMY AND BIOPSY;  Surgeon: Tylor Rice MD;  Location:  GI    CV CORONARY ANGIOGRAM N/A 10/25/2024    Procedure: Coronary Angiogram;  Surgeon: Alexis Ruiz MD;  Location:  HEART CARDIAC CATH LAB    DECOMPRESSION, FUSION CERVICAL ANTERIOR ONE LEVEL, COMBINED N/A 09/09/2016    Procedure: COMBINED DECOMPRESSION, FUSION CERVICAL ANTERIOR ONE LEVEL;  Surgeon: Erick Valles MD;  Location: [facility] OR    EP PACEMAKER N/A 08/21/2020    Procedure: EP Pacemaker;  Surgeon: Alfonso Sigala MD;  Location:  HEART CARDIAC CATH LAB    IR CAROTID CEREBRAL ANGIOGRAM BILATERAL  01/06/2020    IR CAROTID CEREBRAL ANGIOGRAM BILATERAL  07/01/2020    IR DISCONTINUE SHEATH  01/06/2020    TONSILLECTOMY      VASECTOMY         Physical Exam     Patient Vitals for the past 24 hrs:   BP Temp Temp src Pulse Resp SpO2   04/25/25 2326 -- 97.7  F (36.5  C) Temporal -- -- --   04/25/25 2253 124/77 -- -- 60 16 96 %   04/25/25 2251 -- -- -- -- -- 95 %   04/25/25 2250 -- -- -- -- -- 96 %     Physical Exam  Nursing note and vitals reviewed.  Constitutional: Cooperative.  Sitting up comfortably  HENT:   Mouth/Throat: Mucous membranes are normal.   Cardiovascular: Normal rate, regular rhythm and normal heart sounds.  No murmur.  Pulmonary/Chest: Effort normal and breath sounds normal. No respiratory distress. No  wheezes. No rales. Pacemaker left upper chest.   Abdominal: Soft. Normal appearance.  Nontender  Neurological: Alert.  Oriented x 3  Skin: Skin is warm and dry. No rash noted on left upper chest.   Psychiatric: Normal mood and affect.     Diagnostics     Lab Results   Labs Ordered and Resulted from Time of ED Arrival to Time of ED Departure   BASIC METABOLIC PANEL - Abnormal       Result Value    Sodium 138      Potassium 4.2      Chloride 106      Carbon Dioxide (CO2) 20 (*)     Anion Gap 12      Urea Nitrogen 16.2      Creatinine 1.16      GFR Estimate 65      Calcium 9.2      Glucose 134 (*)    CBC WITH PLATELETS AND DIFFERENTIAL - Abnormal    WBC Count 6.3      RBC Count 3.86 (*)     Hemoglobin 12.2 (*)     Hematocrit 34.3 (*)     MCV 89      MCH 31.6      MCHC 35.6      RDW 12.3      Platelet Count 138 (*)     % Neutrophils 69      % Lymphocytes 17      % Monocytes 10      % Eosinophils 3      % Basophils 1      % Immature Granulocytes 0      NRBCs per 100 WBC 0      Absolute Neutrophils 4.3      Absolute Lymphocytes 1.1      Absolute Monocytes 0.6      Absolute Eosinophils 0.2      Absolute Basophils 0.0      Absolute Immature Granulocytes 0.0      Absolute NRBCs 0.0     TROPONIN T, HIGH SENSITIVITY - Normal    Troponin T, High Sensitivity 10     RBC AND PLATELET MORPHOLOGY     Imaging   No orders to display     EKG   ECG taken at 2242, ECG read at 2325  Atrial-sensed ventricular-paced rhythm with prolonged AV conduction.   Rate 60 bpm. FL interval 376 ms. QRS duration 176 ms. QT/QTc 504/504 ms. P-R-T axes 35 -40 93.    ED Course      Medications Administered   Medications - No data to display    Discussion of Management   None    ED Course   ED Course as of 04/26/25 0109 Fri Apr 25, 2025 2325 I obtained history and examined the patient as noted above.     Sat Apr 26, 2025 0102 I rechecked the patient and explained findings. We discussed plan for discharge and patient is in agreement with plan.       Additional Documentation  None    Medical Decision Making / Diagnosis     [facility]   Harpreet Vera is a 75 year old male who presents with reproducible chest pain after likely straining his left pectoralis by holding onto the bus seat during a very bumpy ride.  Pain is completely resolved with resting.  EKG shows an pacemaker rhythm.  No concern for pacemaker malfunction.  Troponin is negative and with 6 hours of symptoms I am comfortable ruling out cardiac ischemia at this time.  No concern for pulmonary etiology.  At this time I feel they are stable for discharge with almost certainly myofascial upper chest wall pain    Disposition   The patient was discharged.     Diagnosis     ICD-10-CM    1. Musculoskeletal chest pain  R07.89          Scribe Disclosure:  I, Gabrielle Bell, am serving as a scribe at 11:00 PM on 4/25/2025 to document services personally performed by Serafin Armando MD based on my observations and the provider's statements to me.        Serafin Armando MD  04/26/25 0110

## 2025-04-28 LAB
ATRIAL RATE - MUSE: 60 BPM
DIASTOLIC BLOOD PRESSURE - MUSE: NORMAL MMHG
INTERPRETATION ECG - MUSE: NORMAL
P AXIS - MUSE: 35 DEGREES
PR INTERVAL - MUSE: 376 MS
QRS DURATION - MUSE: 176 MS
QT - MUSE: 504 MS
QTC - MUSE: 504 MS
R AXIS - MUSE: -40 DEGREES
SYSTOLIC BLOOD PRESSURE - MUSE: NORMAL MMHG
T AXIS - MUSE: 93 DEGREES
VENTRICULAR RATE- MUSE: 60 BPM

## 2025-05-06 ENCOUNTER — HOSPITAL ENCOUNTER (OUTPATIENT)
Dept: CARDIOLOGY | Facility: CLINIC | Age: 75
Discharge: HOME | End: 2025-05-06
Attending: NURSE PRACTITIONER | Admitting: NURSE PRACTITIONER
Payer: COMMERCIAL

## 2025-05-06 ENCOUNTER — LAB (OUTPATIENT)
Dept: LAB | Facility: CLINIC | Age: 75
End: 2025-05-06
Payer: COMMERCIAL

## 2025-05-06 DIAGNOSIS — I77.810 DILATATION OF THORACIC AORTA: ICD-10-CM

## 2025-05-06 DIAGNOSIS — E78.00 HYPERCHOLESTEREMIA: ICD-10-CM

## 2025-05-06 DIAGNOSIS — I42.8 NICM (NONISCHEMIC CARDIOMYOPATHY) (H): ICD-10-CM

## 2025-05-06 DIAGNOSIS — I10 ESSENTIAL HYPERTENSION, BENIGN: ICD-10-CM

## 2025-05-06 DIAGNOSIS — R53.83 OTHER FATIGUE: ICD-10-CM

## 2025-05-06 LAB
ANION GAP SERPL CALCULATED.3IONS-SCNC: 7 MMOL/L (ref 7–15)
BUN SERPL-MCNC: 15.4 MG/DL (ref 8–23)
CALCIUM SERPL-MCNC: 9.5 MG/DL (ref 8.8–10.4)
CHLORIDE SERPL-SCNC: 105 MMOL/L (ref 98–107)
CHOLEST SERPL-MCNC: 105 MG/DL
CREAT SERPL-MCNC: 1.07 MG/DL (ref 0.67–1.17)
EGFRCR SERPLBLD CKD-EPI 2021: 71 ML/MIN/1.73M2
FASTING STATUS PATIENT QL REPORTED: YES
GLUCOSE SERPL-MCNC: 115 MG/DL (ref 70–99)
HCO3 SERPL-SCNC: 26 MMOL/L (ref 22–29)
HDLC SERPL-MCNC: 36 MG/DL
LDLC SERPL CALC-MCNC: 43 MG/DL
LVEF ECHO: NORMAL
NONHDLC SERPL-MCNC: 69 MG/DL
POTASSIUM SERPL-SCNC: 4.3 MMOL/L (ref 3.4–5.3)
SODIUM SERPL-SCNC: 138 MMOL/L (ref 135–145)
TRIGL SERPL-MCNC: 128 MG/DL

## 2025-05-06 PROCEDURE — 93306 TTE W/DOPPLER COMPLETE: CPT | Mod: 26 | Performed by: INTERNAL MEDICINE

## 2025-05-06 PROCEDURE — 80048 BASIC METABOLIC PNL TOTAL CA: CPT

## 2025-05-06 PROCEDURE — 36415 COLL VENOUS BLD VENIPUNCTURE: CPT

## 2025-05-06 PROCEDURE — 93306 TTE W/DOPPLER COMPLETE: CPT

## 2025-05-06 PROCEDURE — 80061 LIPID PANEL: CPT

## 2025-05-07 ENCOUNTER — TELEPHONE (OUTPATIENT)
Dept: CARDIOLOGY | Facility: CLINIC | Age: 75
End: 2025-05-07
Payer: COMMERCIAL

## 2025-05-07 ENCOUNTER — RESULTS FOLLOW-UP (OUTPATIENT)
Dept: CARDIOLOGY | Facility: CLINIC | Age: 75
End: 2025-05-07

## 2025-05-07 NOTE — RESULT ENCOUNTER NOTE
BMP in normal limits except for elevated glucose LDL at goal   Results and recommendations via phone Patient verbalized understanding.   Karina Huang RN, [phone]

## 2025-05-07 NOTE — TELEPHONE ENCOUNTER
Cost/coverage check performed for GDMT for mild nonischemic cardiomyopathy. See information below.   _____________________________________________________  Ashley Rowland Alexander J, ADELSO  Good afternoon,    This patient has a BCBS medicare plan for pharmacy benefits costs are as follows.    Entresto $172 for 30 days supply  Jardiance $153 for 30 days supply  Farxiga $146 for 30 days supply    This is the cost until the end of the year or if the patient pays $2000 out of pocket then the cost will be $0 the remainder of the year, no Medicare gap this year.    Thank you for allowing me to help with your patient  Ashley Rowland [facility]  Pharmacy Discharge Liaison  [place] locations  Available on teams and [facility]

## 2025-05-08 ENCOUNTER — RESULTS FOLLOW-UP (OUTPATIENT)
Dept: CARDIOLOGY | Facility: CLINIC | Age: 75
End: 2025-05-08

## 2025-05-14 ENCOUNTER — ANCILLARY PROCEDURE (OUTPATIENT)
Dept: CARDIOLOGY | Facility: CLINIC | Age: 75
End: 2025-05-14
Attending: INTERNAL MEDICINE
Payer: COMMERCIAL

## 2025-05-14 ENCOUNTER — PREP FOR PROCEDURE (OUTPATIENT)
Dept: UROLOGY | Facility: CLINIC | Age: 75
End: 2025-05-14

## 2025-05-14 DIAGNOSIS — Z95.0 CARDIAC PACEMAKER IN SITU: ICD-10-CM

## 2025-05-14 DIAGNOSIS — N40.1 BPH WITH OBSTRUCTION/LOWER URINARY TRACT SYMPTOMS: Primary | ICD-10-CM

## 2025-05-14 DIAGNOSIS — N13.8 BPH WITH OBSTRUCTION/LOWER URINARY TRACT SYMPTOMS: Primary | ICD-10-CM

## 2025-05-14 DIAGNOSIS — I49.5 SICK SINUS SYNDROME (H): ICD-10-CM

## 2025-05-14 LAB
MDC_IDC_LEAD_CONNECTION_STATUS: NORMAL
MDC_IDC_LEAD_CONNECTION_STATUS: NORMAL
MDC_IDC_LEAD_IMPLANT_DT: NORMAL
MDC_IDC_LEAD_IMPLANT_DT: NORMAL
MDC_IDC_LEAD_LOCATION: NORMAL
MDC_IDC_LEAD_LOCATION: NORMAL
MDC_IDC_LEAD_LOCATION_DETAIL_1: NORMAL
MDC_IDC_LEAD_LOCATION_DETAIL_1: NORMAL
MDC_IDC_LEAD_MFG: NORMAL
MDC_IDC_LEAD_MFG: NORMAL
MDC_IDC_LEAD_MODEL: NORMAL
MDC_IDC_LEAD_MODEL: NORMAL
MDC_IDC_LEAD_POLARITY_TYPE: NORMAL
MDC_IDC_LEAD_POLARITY_TYPE: NORMAL
MDC_IDC_LEAD_SERIAL: NORMAL
MDC_IDC_LEAD_SERIAL: NORMAL
MDC_IDC_MSMT_BATTERY_DTM: NORMAL
MDC_IDC_MSMT_BATTERY_REMAINING_LONGEVITY: 126 MO
MDC_IDC_MSMT_BATTERY_REMAINING_PERCENTAGE: 100 %
MDC_IDC_MSMT_BATTERY_STATUS: NORMAL
MDC_IDC_MSMT_LEADCHNL_RA_IMPEDANCE_VALUE: 785 OHM
MDC_IDC_MSMT_LEADCHNL_RA_PACING_THRESHOLD_AMPLITUDE: 0.5 V
MDC_IDC_MSMT_LEADCHNL_RA_PACING_THRESHOLD_PULSEWIDTH: 0.4 MS
MDC_IDC_MSMT_LEADCHNL_RV_IMPEDANCE_VALUE: 586 OHM
MDC_IDC_MSMT_LEADCHNL_RV_PACING_THRESHOLD_AMPLITUDE: 0.6 V
MDC_IDC_MSMT_LEADCHNL_RV_PACING_THRESHOLD_PULSEWIDTH: 0.4 MS
MDC_IDC_PG_IMPLANT_DTM: NORMAL
MDC_IDC_PG_MFG: NORMAL
MDC_IDC_PG_MODEL: NORMAL
MDC_IDC_PG_SERIAL: NORMAL
MDC_IDC_PG_TYPE: NORMAL
MDC_IDC_SESS_CLINIC_NAME: NORMAL
MDC_IDC_SESS_DTM: NORMAL
MDC_IDC_SESS_TYPE: NORMAL
MDC_IDC_SET_BRADY_AT_MODE_SWITCH_MODE: NORMAL
MDC_IDC_SET_BRADY_AT_MODE_SWITCH_RATE: 170 {BEATS}/MIN
MDC_IDC_SET_BRADY_LOWRATE: 60 {BEATS}/MIN
MDC_IDC_SET_BRADY_MAX_SENSOR_RATE: 130 {BEATS}/MIN
MDC_IDC_SET_BRADY_MAX_TRACKING_RATE: 130 {BEATS}/MIN
MDC_IDC_SET_BRADY_MODE: NORMAL
MDC_IDC_SET_BRADY_PAV_DELAY_HIGH: 150 MS
MDC_IDC_SET_BRADY_PAV_DELAY_LOW: 200 MS
MDC_IDC_SET_BRADY_SAV_DELAY_HIGH: 150 MS
MDC_IDC_SET_BRADY_SAV_DELAY_LOW: 200 MS
MDC_IDC_SET_LEADCHNL_RA_PACING_AMPLITUDE: 2 V
MDC_IDC_SET_LEADCHNL_RA_PACING_CAPTURE_MODE: NORMAL
MDC_IDC_SET_LEADCHNL_RA_PACING_POLARITY: NORMAL
MDC_IDC_SET_LEADCHNL_RA_PACING_PULSEWIDTH: 0.4 MS
MDC_IDC_SET_LEADCHNL_RA_SENSING_ADAPTATION_MODE: NORMAL
MDC_IDC_SET_LEADCHNL_RA_SENSING_POLARITY: NORMAL
MDC_IDC_SET_LEADCHNL_RA_SENSING_SENSITIVITY: 0.25 MV
MDC_IDC_SET_LEADCHNL_RV_PACING_AMPLITUDE: 1.2 V
MDC_IDC_SET_LEADCHNL_RV_PACING_CAPTURE_MODE: NORMAL
MDC_IDC_SET_LEADCHNL_RV_PACING_POLARITY: NORMAL
MDC_IDC_SET_LEADCHNL_RV_PACING_PULSEWIDTH: 0.4 MS
MDC_IDC_SET_LEADCHNL_RV_SENSING_ADAPTATION_MODE: NORMAL
MDC_IDC_SET_LEADCHNL_RV_SENSING_POLARITY: NORMAL
MDC_IDC_SET_LEADCHNL_RV_SENSING_SENSITIVITY: 1.5 MV
MDC_IDC_SET_ZONE_DETECTION_INTERVAL: 375 MS
MDC_IDC_SET_ZONE_STATUS: NORMAL
MDC_IDC_SET_ZONE_TYPE: NORMAL
MDC_IDC_SET_ZONE_VENDOR_TYPE: NORMAL
MDC_IDC_STAT_AT_BURDEN_PERCENT: 0 %
MDC_IDC_STAT_AT_DTM_END: NORMAL
MDC_IDC_STAT_AT_DTM_START: NORMAL
MDC_IDC_STAT_BRADY_DTM_END: NORMAL
MDC_IDC_STAT_BRADY_DTM_START: NORMAL
MDC_IDC_STAT_BRADY_RA_PERCENT_PACED: 93 %
MDC_IDC_STAT_BRADY_RV_PERCENT_PACED: 100 %
MDC_IDC_STAT_EPISODE_RECENT_COUNT: 0
MDC_IDC_STAT_EPISODE_RECENT_COUNT_DTM_END: NORMAL
MDC_IDC_STAT_EPISODE_RECENT_COUNT_DTM_START: NORMAL
MDC_IDC_STAT_EPISODE_TYPE: NORMAL
MDC_IDC_STAT_EPISODE_VENDOR_TYPE: NORMAL
MDC_IDC_STAT_EPISODE_VENDOR_TYPE: NORMAL

## 2025-05-14 PROCEDURE — 93280 PM DEVICE PROGR EVAL DUAL: CPT | Performed by: INTERNAL MEDICINE

## 2025-05-14 RX ORDER — ACETAMINOPHEN 325 MG/1
975 TABLET ORAL ONCE
OUTPATIENT
Start: 2025-05-14 | End: 2025-05-14

## 2025-05-18 DIAGNOSIS — I10 ESSENTIAL HYPERTENSION, BENIGN: ICD-10-CM

## 2025-05-19 RX ORDER — METOPROLOL SUCCINATE 25 MG/1
25 TABLET, EXTENDED RELEASE ORAL 2 TIMES DAILY
Qty: 180 TABLET | Refills: 2 | Status: SHIPPED | OUTPATIENT
Start: 2025-05-19

## 2025-05-20 ENCOUNTER — TELEPHONE (OUTPATIENT)
Dept: UROLOGY | Facility: CLINIC | Age: 75
End: 2025-05-20
Payer: COMMERCIAL

## 2025-05-20 NOTE — TELEPHONE ENCOUNTER
Scheduled surgery 6/18 with Dr. Childress. Went over instructions on the phone. He is aware he needs a pre-op. Surgery packet mailed.

## 2025-06-05 ENCOUNTER — OFFICE VISIT (OUTPATIENT)
Dept: INTERNAL MEDICINE | Facility: CLINIC | Age: 75
End: 2025-06-05
Payer: COMMERCIAL

## 2025-06-05 VITALS
BODY MASS INDEX: 29.4 KG/M2 | RESPIRATION RATE: 18 BRPM | TEMPERATURE: 97.4 F | HEIGHT: 68 IN | DIASTOLIC BLOOD PRESSURE: 78 MMHG | HEART RATE: 60 BPM | SYSTOLIC BLOOD PRESSURE: 135 MMHG | OXYGEN SATURATION: 97 % | WEIGHT: 194 LBS

## 2025-06-05 DIAGNOSIS — Z01.818 PREOPERATIVE EXAMINATION: Primary | ICD-10-CM

## 2025-06-05 DIAGNOSIS — R39.11 BENIGN PROSTATIC HYPERPLASIA WITH URINARY HESITANCY: ICD-10-CM

## 2025-06-05 DIAGNOSIS — R19.5 ELEVATED FECAL CALPROTECTIN: ICD-10-CM

## 2025-06-05 DIAGNOSIS — N40.1 BENIGN PROSTATIC HYPERPLASIA WITH URINARY HESITANCY: ICD-10-CM

## 2025-06-05 LAB
BASOPHILS # BLD AUTO: 0 10E3/UL (ref 0–0.2)
BASOPHILS NFR BLD AUTO: 1 %
EOSINOPHIL # BLD AUTO: 0.3 10E3/UL (ref 0–0.7)
EOSINOPHIL NFR BLD AUTO: 5 %
ERYTHROCYTE [DISTWIDTH] IN BLOOD BY AUTOMATED COUNT: 12 % (ref 10–15)
HCT VFR BLD AUTO: 35.2 % (ref 40–53)
HGB BLD-MCNC: 13.2 G/DL (ref 13.3–17.7)
IMM GRANULOCYTES # BLD: 0 10E3/UL
IMM GRANULOCYTES NFR BLD: 0 %
LYMPHOCYTES # BLD AUTO: 1.4 10E3/UL (ref 0.8–5.3)
LYMPHOCYTES NFR BLD AUTO: 22 %
MCH RBC QN AUTO: 32.4 PG (ref 26.5–33)
MCHC RBC AUTO-ENTMCNC: 37.5 G/DL (ref 31.5–36.5)
MCV RBC AUTO: 87 FL (ref 78–100)
MONOCYTES # BLD AUTO: 0.6 10E3/UL (ref 0–1.3)
MONOCYTES NFR BLD AUTO: 10 %
NEUTROPHILS # BLD AUTO: 3.8 10E3/UL (ref 1.6–8.3)
NEUTROPHILS NFR BLD AUTO: 63 %
PLATELET # BLD AUTO: 131 10E3/UL (ref 150–450)
RBC # BLD AUTO: 4.07 10E6/UL (ref 4.4–5.9)
WBC # BLD AUTO: 6.1 10E3/UL (ref 4–11)

## 2025-06-05 RX ORDER — SENNOSIDES 8.6 MG
650 CAPSULE ORAL EVERY 8 HOURS PRN
COMMUNITY

## 2025-06-05 RX ORDER — CHOLECALCIFEROL (VITAMIN D3) 50 MCG
1 TABLET ORAL DAILY
COMMUNITY

## 2025-06-05 NOTE — PROGRESS NOTES
Preoperative Evaluation  [facility]  [address]  [address]  [facility] [address]  Phone: [phone]  Primary Provider: Tom Allison MD  Pre-op Performing Provider: Tom Allison MD  Jun 5, 2025           6/3/2025   Surgical Information   What procedure is being done? Aquablation-Cystoscopy   Facility or Hospital where procedure/surgery will be performed: [facility]   Who is doing the procedure / surgery? Dr. Childress   Date of surgery / procedure: 06/18/25   Time of surgery / procedure: 10:30am   Where do you plan to recover after surgery? at home with family     Fax number for surgical facility: Note does not need to be faxed, will be available electronically in Epic.    Assessment & Plan     The proposed surgical procedure is considered LOW risk.    Preoperative examination and Benign prostatic hyperplasia with urinary hesitancy  At this time, patient does have a relatively unremarkable physical examination.  His blood pressure is noted to be elevated acceptable level.  He has previously tolerated anesthesia without issue.  Patient is also known to tolerate greater than 4 METS of physical activity.  He does have a CBC pending.  EKG is unremarkable.  Patient was instructed that he should hold his aspirin 10 days prior to any surgical date.  At this time, I would consider him to be acceptable candidate to proceed with a noncardiac related procedure.  Assuming no unexpected abnormalities on his outstanding lab work, patient should be able to proceed with surgical scheduling.  He should follow any additional instructions as provided to him by his performing surgeon.  - Basic metabolic panel  (Ca, Cl, CO2, Creat, Gluc, K, Na, BUN); Future  - CBC with platelets and differential; Future  - EKG 12-lead complete w/read - Clinics       Implanted Device   - Type of device: pacemaker Patient advised to bring device information on day of surgery.       - No  identified additional risk factors other than previously addressed    Antiplatelet or Anticoagulation Medication Instructions   - aspirin: Discontinue aspirin 7 days prior to procedure to reduce bleeding risk. It should be resumed postoperatively.     Additional Medication Instructions  Take all scheduled medications on the day of surgery    Recommendation  Approval given to proceed with proposed procedure pending review of diagnostic evaluation.    Follow-up    Follow-up Visit   Expected date:  Jul 05, 2025 (Approximate)      Follow Up Appointment Details:     Follow-up with whom?: PCP    Follow-Up for what?: Acute Issue Recheck    How?: In Person    Is this an as-needed follow-up?: Yes                 Subjective   Harpreet is a 75 year old, presenting for the following:  No chief complaint on file.          6/5/2025     8:38 AM   Additional Questions   Roomed by LEE Nguyen LPN   Accompanied by wife         6/5/2025     8:38 AM   Patient Reported Additional Medications   Patient reports taking the following new medications none     HPI: Patient is a 75-year-old  male who presents to the clinic for a preoperative examination.  Patient is currently scheduled undergo a cystoscopy with dilatation treatment of his BPH.  This procedure is scheduled for January 2025.  Patient has tolerated anesthesia previously as he has failed multiple orthopedic procedures, cholecystectomy, and tonsillectomy.  He is not aware of any family history of anesthesia intolerance or bleeding disorders.  Patient is able to walk up a flight of stairs without experiencing chest pain, shortness of breath, or syncopal episodes.  He does have a history of chronic kidney disease stage III, sick sinus syndrome with pacemaker placement, and hypertension.  Patient does take olmesartan 40 mg daily along with metoprolol succinate ER 25 mg milligrams twice daily.  Patient does take a daily aspirin of 325 mg.        6/3/2025   Pre-Op Questionnaire    Have you ever had a heart attack or stroke? (!) YES    Have you ever had surgery on your heart or blood vessels, such as a stent placement, a coronary artery bypass, or surgery on an artery in your head, neck, heart, or legs? (!) YES    Do you have chest pain with activity? No   Do you have a history of heart failure? No   Do you currently have a cold, bronchitis or symptoms of other infection? No   Do you have a cough, shortness of breath, or wheezing? No   Do you or anyone in your family have previous history of blood clots? No   Do you or does anyone in your family have a serious bleeding problem such as prolonged bleeding following surgeries or cuts? No   Have you ever had problems with anemia or been told to take iron pills? No   Have you had any abnormal blood loss such as black, tarry or bloody stools? No   Have you ever had a blood transfusion? No   Are you willing to have a blood transfusion if it is medically needed before, during, or after your surgery? Yes   Have you or any of your relatives ever had problems with anesthesia? No   Do you have sleep apnea, excessive snoring or daytime drowsiness? (!) YES   Do you have a CPAP machine? Yes   Do you have any artifical heart valves or other implanted medical devices like a pacemaker, defibrillator, or continuous glucose monitor? (!) YES   What type of device do you have? Pacemaker   Name of the clinic that manages your device [place]   Do you have artificial joints? No   Are you allergic to latex? No     Advance Care Planning    Discussed advance care planning with patient; however, patient declined at this time.    Preoperative Review of    reviewed - no record of controlled substances prescribed.      Patient Active Problem List    Diagnosis Date Noted    Abnormal cardiovascular stress test 10/25/2024     Priority: Medium    Status post coronary angiogram 10/25/2024     Priority: Medium    Other cardiomyopathy (H) 10/25/2024     Priority: Medium     SSS (sick sinus syndrome) (H) 08/23/2022     Priority: Medium    Chronic kidney disease, stage 3 (H) 03/01/2021     Priority: Medium    Bradycardia, severe sinus 08/21/2020     Priority: Medium    Syncope 08/19/2020     Priority: Medium    Vertebral artery stenosis 01/06/2020     Priority: Medium    Cervicalgia 02/23/2017     Priority: Medium    S/P cervical spinal fusion 09/09/2016     Priority: Medium    Vitamin D deficiency 10/06/2015     Priority: Medium    Benign prostatic hyperplasia 10/06/2015     Priority: Medium    MAN (obstructive sleep apnea) 08/01/2013     Priority: Medium    Hypercholesteremia 08/03/2012     Priority: Medium    CARDIOVASCULAR SCREENING; LDL GOAL LESS THAN 130 10/31/2010     Priority: Medium    Intestinal infection due to Clostridium difficile 09/06/2007     Priority: Medium    Allergic rhinitis 06/14/2007     Priority: Medium     Problem list name updated by automated process. Provider to review      Essential hypertension, benign 01/14/2003     Priority: Medium      Past Medical History:   Diagnosis Date    Benign neoplasm of colon     Brachial plexopathy     history of C. difficile infection 2007    Hypertension     Mumps, history of     MAN (obstructive sleep apnea)     Sinus node dysfunction     with syncope    Sleep apnea     Bi-PAP     Past Surgical History:   Procedure Laterality Date    ARTHROSCOPY KNEE Bilateral     ARTHROSCOPY SHOULDER Right 12/11/2020    Procedure: RIGHT SHOULDER ARTHROSCOPY, ARTHROSCOPIC SUBACROMIAL DECOMPRESSION, DISTAL CLAVICLE AND BICEPS TENOTOMY;  Surgeon: Joseph Moon MD;  Location:  OR    CHOLECYSTECTOMY      COLONOSCOPY N/A 04/05/2017    Procedure: COMBINED COLONOSCOPY, SINGLE OR MULTIPLE BIOPSY/POLYPECTOMY BY BIOPSY;  Surgeon: Tylor Rice MD;  Location:  GI    COLONOSCOPY N/A 04/01/2022    Procedure: COLONOSCOPY (FV);  Surgeon: Tylor Rice MD;  Location:  GI    COLONOSCOPY N/A 11/06/2024    Procedure: Colonoscopy with  polypectomies using exacto and hot snare and biopsies using regular forceps;  Surgeon: Tylor Rice MD;  Location:  GI    COLONOSCOPY N/A 11/06/2024    Procedure: COLONOSCOPY, WITH POLYPECTOMY AND BIOPSY;  Surgeon: Tylor Rice MD;  Location: [facility] GI    CV CORONARY ANGIOGRAM N/A 10/25/2024    Procedure: Coronary Angiogram;  Surgeon: Alexis Ruiz MD;  Location:  HEART CARDIAC CATH LAB    DECOMPRESSION, FUSION CERVICAL ANTERIOR ONE LEVEL, COMBINED N/A 09/09/2016    Procedure: COMBINED DECOMPRESSION, FUSION CERVICAL ANTERIOR ONE LEVEL;  Surgeon: Erick Valles MD;  Location: [facility] OR    EP PACEMAKER N/A 08/21/2020    Procedure: EP Pacemaker;  Surgeon: Alfonso Sigala MD;  Location:  HEART CARDIAC CATH LAB    IR CAROTID CEREBRAL ANGIOGRAM BILATERAL  01/06/2020    IR CAROTID CEREBRAL ANGIOGRAM BILATERAL  07/01/2020    IR DISCONTINUE SHEATH  01/06/2020    TONSILLECTOMY      VASECTOMY       Current Outpatient Medications   Medication Sig Dispense Refill    alfuzosin ER (UROXATRAL) 10 MG 24 hr tablet Take 1 tablet (10 mg) by mouth daily. 90 tablet 3    allopurinol (ZYLOPRIM) 100 MG tablet Take 200 mg by mouth every evening (2 x 100mg)      amLODIPine (NORVASC) 5 MG tablet Take 1 tablet (5 mg) by mouth 2 times daily. 180 tablet 3    aspirin - buffered (ASCRIPTIN) 325 MG TABS tablet Take 1 tablet (325 mg) by mouth every morning.      finasteride (PROSCAR) 5 MG tablet Take 1 tablet (5 mg) by mouth daily. 90 tablet 3    metoprolol succinate ER (TOPROL XL) 25 MG 24 hr tablet TAKE 1 TABLET BY MOUTH TWICE DAILY 180 tablet 2    nitroGLYcerin (NITROSTAT) 0.4 MG sublingual tablet For chest pain place 1 tablet under the tongue every 5 minutes for 3 doses. If symptoms persist 5 minutes after 1st dose call 911. 25 tablet 0    olmesartan (BENICAR) 40 MG tablet Take 1 tablet (40 mg) by mouth every morning 90 tablet 3    omeprazole (PRILOSEC) 20 MG DR capsule TAKE ONE CAPSULE BY MOUTH ONCE OR TWICE DAILY AS  "NEEDED 180 capsule 3    psyllium (METAMUCIL/KONSYL) 58.6 % powder Take by mouth daily.      rosuvastatin (CRESTOR) 20 MG tablet Take 1 tablet (20 mg) by mouth at bedtime 90 tablet 3    vitamin D3 (CHOLECALCIFEROL) 50 mcg (2000 units) tablet Take 1 tablet by mouth every evening Taking 1000 units daily         No Known Allergies     Social History     Tobacco Use    Smoking status: Never    Smokeless tobacco: Never   Substance Use Topics    Alcohol use: Yes     Alcohol/week: 1.0 standard drink of alcohol     Types: 1 Glasses of wine per week     Comment: 1-2 weekly       History   Drug Use No             Review of Systems  CONSTITUTIONAL: NEGATIVE for fever, chills, change in weight  INTEGUMENTARY/SKIN: NEGATIVE for worrisome rashes, moles or lesions  ENT/MOUTH: NEGATIVE for ear, mouth and throat problems  RESP: NEGATIVE for significant cough or SOB  CV: NEGATIVE for chest pain, palpitations or peripheral edema  GI: NEGATIVE for nausea, abdominal pain, heartburn, or change in bowel habits  : NEGATIVE for frequency, dysuria, or hematuria  MUSCULOSKELETAL: NEGATIVE for significant arthralgias or myalgia  NEURO: NEGATIVE for weakness, dizziness or paresthesias    Objective    BP (!) 145/77   Pulse 60   Temp 97.4  F (36.3  C) (Oral)   Resp 18   Ht 1.727 m (5' 8\")   Wt 88 kg (194 lb)   SpO2 97%   BMI 29.50 kg/m     Estimated body mass index is 29.5 kg/m  as calculated from the following:    Height as of this encounter: 1.727 m (5' 8\").    Weight as of this encounter: 88 kg (194 lb).  Physical Exam  Vitals reviewed.   Constitutional:       Appearance: Normal appearance.   HENT:      Head: Normocephalic and atraumatic.      Mouth/Throat:      Mouth: Mucous membranes are moist.      Pharynx: Oropharynx is clear.   Eyes:      Extraocular Movements: Extraocular movements intact.      Conjunctiva/sclera: Conjunctivae normal.      Pupils: Pupils are equal, round, and reactive to light.   Cardiovascular:      Rate and " Rhythm: Normal rate and regular rhythm.      Pulses: Normal pulses.      Heart sounds: Normal heart sounds.   Pulmonary:      Effort: Pulmonary effort is normal.      Breath sounds: Normal breath sounds.   Musculoskeletal:      Cervical back: Normal range of motion and neck supple.   Skin:     General: Skin is warm and dry.      Capillary Refill: Capillary refill takes less than 2 seconds.   Neurological:      Mental Status: He is alert.           Recent Labs   Lab Test 05/06/25  0753 04/25/25  2359 03/19/25  1402 11/07/24  1036 10/25/24  0802   HGB  --  12.2* 13.4  --  13.2*   PLT  --  138* 146*  --  149*   INR  --   --   --   --  1.08    138 142   < > 142   POTASSIUM 4.3 4.2 4.3   < > 4.3   CR 1.07 1.16 1.18*   < > 1.16    < > = values in this interval not displayed.        Diagnostics  Labs pending at this time.  Results will be reviewed when available.   EKG: appears normal, NSR, normal axis, normal intervals, no acute ST/T changes c/w ischemia, no LVH by voltage criteria, Left Bundle Branch Block, unchanged from previous tracings    Revised Cardiac Risk Index (RCRI)  The patient has the following serious cardiovascular risks for perioperative complications:   - No serious cardiac risks = 0 points     RCRI Interpretation: 0 points: Class I (very low risk - 0.4% complication rate)         Signed Electronically by: Tom Allison MD  A copy of this evaluation report is provided to the requesting physician.

## 2025-06-06 ENCOUNTER — RESULTS FOLLOW-UP (OUTPATIENT)
Dept: INTERNAL MEDICINE | Facility: CLINIC | Age: 75
End: 2025-06-06

## 2025-06-13 ENCOUNTER — RESULTS FOLLOW-UP (OUTPATIENT)
Dept: GASTROENTEROLOGY | Facility: CLINIC | Age: 75
End: 2025-06-13

## 2025-06-13 NOTE — PROGRESS NOTES
PTA medications updated by Medication Scribe prior to surgery via phone call with patient (last doses completed by Nurse)     Medication history sources: Patient, Surescripts, and H&P  In the past week, patient estimated taking medication this percent of the time: Greater than 90%      Significant changes made to the medication list:  Patient reports no longer taking the following meds (med scribe removed from PTA med list): Tums      Additional medication history information:   None    Medication reconciliation completed by provider prior to medication history? No    Time spent in this activity: 40 minutes    The information provided in this note is only as accurate as the sources available at the time of update(s)      Prior to Admission medications    Medication Sig Last Dose Taking? Auth Provider Long Term End Date   acetaminophen (TYLENOL 8 HOUR) 650 MG CR tablet Take 650 mg by mouth every 8 hours as needed for mild pain or fever.  Yes Reported, Patient     alfuzosin ER (UROXATRAL) 10 MG 24 hr tablet Take 1 tablet (10 mg) by mouth daily. Evening Yes Bijan Childress MD     allopurinol (ZYLOPRIM) 100 MG tablet Take 200 mg by mouth every evening (2 x 100mg) Evening Yes Unknown, Entered By History     amLODIPine (NORVASC) 5 MG tablet Take 1 tablet (5 mg) by mouth 2 times daily. Evening Yes Zurdo Morrell NP Yes    aspirin - buffered (ASCRIPTIN) 325 MG TABS tablet Take 1 tablet (325 mg) by mouth every morning. 6/9/2025 Morning Yes Zurdo Morrell NP     finasteride (PROSCAR) 5 MG tablet Take 1 tablet (5 mg) by mouth daily. Evening Yes Bijan Childress MD     magnesium hydroxide (MILK OF MAGNESIA) 400 MG/5ML suspension Take 30 mLs by mouth once. Evening Yes Reported, Patient     metoprolol succinate ER (TOPROL XL) 25 MG 24 hr tablet TAKE 1 TABLET BY MOUTH TWICE DAILY Morning Yes Zurdo Morrell NP Yes    Multiple Vitamins-Minerals (SENIOR MULTIVITAMIN PLUS PO) Take by mouth. Morning Yes Reported,  Patient     nitroGLYcerin (NITROSTAT) 0.4 MG sublingual tablet For chest pain place 1 tablet under the tongue every 5 minutes for 3 doses. If symptoms persist 5 minutes after 1st dose call 911.  Yes Zurdo Morrell, NP Yes    olmesartan (BENICAR) 40 MG tablet Take 1 tablet (40 mg) by mouth every morning Morning Yes Emilia Laureano, SALVATORE CNP Yes    omeprazole (PRILOSEC) 20 MG DR capsule TAKE ONE CAPSULE BY MOUTH ONCE OR TWICE DAILY AS NEEDED Morning Yes Tom Allison MD     psyllium (METAMUCIL/KONSYL) 58.6 % powder Take by mouth daily. Evening Yes Reported, Patient     rosuvastatin (CRESTOR) 20 MG tablet Take 1 tablet (20 mg) by mouth at bedtime Bedtime Yes Emilia Laureano APRN CNP Yes    SIMETHICONE PO Take 2 tablets by mouth once. Evening Yes Reported, Patient No    vitamin D3 (CHOLECALCIFEROL) 50 mcg (2000 units) tablet Take 1 tablet by mouth daily. Morning Yes Reported, Patient         Medication history completed by: Nelson Ku

## 2025-06-17 ENCOUNTER — ANESTHESIA EVENT (OUTPATIENT)
Dept: SURGERY | Facility: CLINIC | Age: 75
End: 2025-06-17
Payer: COMMERCIAL

## 2025-06-18 ENCOUNTER — HOSPITAL ENCOUNTER (OUTPATIENT)
Facility: CLINIC | Age: 75
Discharge: HOME | End: 2025-06-19
Attending: STUDENT IN AN ORGANIZED HEALTH CARE EDUCATION/TRAINING PROGRAM | Admitting: STUDENT IN AN ORGANIZED HEALTH CARE EDUCATION/TRAINING PROGRAM
Payer: COMMERCIAL

## 2025-06-18 ENCOUNTER — ANESTHESIA (OUTPATIENT)
Dept: SURGERY | Facility: CLINIC | Age: 75
End: 2025-06-18
Payer: COMMERCIAL

## 2025-06-18 DIAGNOSIS — N40.1 BENIGN PROSTATIC HYPERPLASIA WITH URINARY HESITANCY: Primary | ICD-10-CM

## 2025-06-18 DIAGNOSIS — R39.11 BENIGN PROSTATIC HYPERPLASIA WITH URINARY HESITANCY: Primary | ICD-10-CM

## 2025-06-18 PROBLEM — N13.8 BPH WITH OBSTRUCTION/LOWER URINARY TRACT SYMPTOMS: Status: ACTIVE | Noted: 2025-06-18

## 2025-06-18 LAB — GLUCOSE BLDC GLUCOMTR-MCNC: 99 MG/DL (ref 70–99)

## 2025-06-18 PROCEDURE — 250N000011 HC RX IP 250 OP 636: Performed by: NURSE ANESTHETIST, CERTIFIED REGISTERED

## 2025-06-18 PROCEDURE — 370N000017 HC ANESTHESIA TECHNICAL FEE, PER MIN: Performed by: STUDENT IN AN ORGANIZED HEALTH CARE EDUCATION/TRAINING PROGRAM

## 2025-06-18 PROCEDURE — 710N000009 HC RECOVERY PHASE 1, LEVEL 1, PER MIN: Performed by: STUDENT IN AN ORGANIZED HEALTH CARE EDUCATION/TRAINING PROGRAM

## 2025-06-18 PROCEDURE — 250N000011 HC RX IP 250 OP 636: Performed by: ANESTHESIOLOGY

## 2025-06-18 PROCEDURE — 258N000003 HC RX IP 258 OP 636: Performed by: NURSE ANESTHETIST, CERTIFIED REGISTERED

## 2025-06-18 PROCEDURE — 250N000013 HC RX MED GY IP 250 OP 250 PS 637: Performed by: STUDENT IN AN ORGANIZED HEALTH CARE EDUCATION/TRAINING PROGRAM

## 2025-06-18 PROCEDURE — 272N000001 HC OR GENERAL SUPPLY STERILE: Performed by: STUDENT IN AN ORGANIZED HEALTH CARE EDUCATION/TRAINING PROGRAM

## 2025-06-18 PROCEDURE — C1769 GUIDE WIRE: HCPCS | Performed by: STUDENT IN AN ORGANIZED HEALTH CARE EDUCATION/TRAINING PROGRAM

## 2025-06-18 PROCEDURE — 82962 GLUCOSE BLOOD TEST: CPT

## 2025-06-18 PROCEDURE — 258N000003 HC RX IP 258 OP 636: Performed by: ANESTHESIOLOGY

## 2025-06-18 PROCEDURE — 250N000009 HC RX 250: Performed by: NURSE ANESTHETIST, CERTIFIED REGISTERED

## 2025-06-18 PROCEDURE — 250N000011 HC RX IP 250 OP 636: Performed by: STUDENT IN AN ORGANIZED HEALTH CARE EDUCATION/TRAINING PROGRAM

## 2025-06-18 PROCEDURE — 999N000141 HC STATISTIC PRE-PROCEDURE NURSING ASSESSMENT: Performed by: STUDENT IN AN ORGANIZED HEALTH CARE EDUCATION/TRAINING PROGRAM

## 2025-06-18 PROCEDURE — 360N000079 HC SURGERY LEVEL 6, PER MIN: Performed by: STUDENT IN AN ORGANIZED HEALTH CARE EDUCATION/TRAINING PROGRAM

## 2025-06-18 PROCEDURE — 258N000001 HC RX 258: Performed by: STUDENT IN AN ORGANIZED HEALTH CARE EDUCATION/TRAINING PROGRAM

## 2025-06-18 PROCEDURE — 272N000002 HC OR SUPPLY OTHER OPNP: Performed by: STUDENT IN AN ORGANIZED HEALTH CARE EDUCATION/TRAINING PROGRAM

## 2025-06-18 PROCEDURE — 250N000025 HC SEVOFLURANE, PER MIN: Performed by: STUDENT IN AN ORGANIZED HEALTH CARE EDUCATION/TRAINING PROGRAM

## 2025-06-18 PROCEDURE — 88305 TISSUE EXAM BY PATHOLOGIST: CPT | Mod: TC | Performed by: STUDENT IN AN ORGANIZED HEALTH CARE EDUCATION/TRAINING PROGRAM

## 2025-06-18 PROCEDURE — C2596 PROBE, ROBOTIC, WATER-JET: HCPCS | Performed by: STUDENT IN AN ORGANIZED HEALTH CARE EDUCATION/TRAINING PROGRAM

## 2025-06-18 PROCEDURE — 250N000009 HC RX 250: Performed by: STUDENT IN AN ORGANIZED HEALTH CARE EDUCATION/TRAINING PROGRAM

## 2025-06-18 RX ORDER — HYDROMORPHONE HCL IN WATER/PF 6 MG/30 ML
0.2 PATIENT CONTROLLED ANALGESIA SYRINGE INTRAVENOUS EVERY 5 MIN PRN
Status: DISCONTINUED | OUTPATIENT
Start: 2025-06-18 | End: 2025-06-18 | Stop reason: HOSPADM

## 2025-06-18 RX ORDER — FENTANYL CITRATE 50 UG/ML
25 INJECTION, SOLUTION INTRAMUSCULAR; INTRAVENOUS EVERY 5 MIN PRN
Status: DISCONTINUED | OUTPATIENT
Start: 2025-06-18 | End: 2025-06-18 | Stop reason: HOSPADM

## 2025-06-18 RX ORDER — SODIUM CHLORIDE, SODIUM LACTATE, POTASSIUM CHLORIDE, CALCIUM CHLORIDE 600; 310; 30; 20 MG/100ML; MG/100ML; MG/100ML; MG/100ML
INJECTION, SOLUTION INTRAVENOUS CONTINUOUS
Status: DISCONTINUED | OUTPATIENT
Start: 2025-06-18 | End: 2025-06-18 | Stop reason: HOSPADM

## 2025-06-18 RX ORDER — ALFUZOSIN HYDROCHLORIDE 10 MG/1
10 TABLET, EXTENDED RELEASE ORAL DAILY
Status: DISCONTINUED | OUTPATIENT
Start: 2025-06-19 | End: 2025-06-19 | Stop reason: HOSPADM

## 2025-06-18 RX ORDER — EPHEDRINE SULFATE 50 MG/ML
INJECTION, SOLUTION INTRAMUSCULAR; INTRAVENOUS; SUBCUTANEOUS PRN
Status: DISCONTINUED | OUTPATIENT
Start: 2025-06-18 | End: 2025-06-18

## 2025-06-18 RX ORDER — ATROPA BELLADONNA AND OPIUM 16.2; 3 MG/1; MG/1
SUPPOSITORY RECTAL PRN
Status: DISCONTINUED | OUTPATIENT
Start: 2025-06-18 | End: 2025-06-18

## 2025-06-18 RX ORDER — DEXAMETHASONE SODIUM PHOSPHATE 4 MG/ML
4 INJECTION, SOLUTION INTRA-ARTICULAR; INTRALESIONAL; INTRAMUSCULAR; INTRAVENOUS; SOFT TISSUE
Status: DISCONTINUED | OUTPATIENT
Start: 2025-06-18 | End: 2025-06-18 | Stop reason: HOSPADM

## 2025-06-18 RX ORDER — DOCUSATE SODIUM 100 MG/1
100 CAPSULE, LIQUID FILLED ORAL 2 TIMES DAILY
Qty: 30 CAPSULE | Refills: 0 | Status: SHIPPED | OUTPATIENT
Start: 2025-06-18

## 2025-06-18 RX ORDER — NALOXONE HYDROCHLORIDE 0.4 MG/ML
0.1 INJECTION, SOLUTION INTRAMUSCULAR; INTRAVENOUS; SUBCUTANEOUS
Status: DISCONTINUED | OUTPATIENT
Start: 2025-06-18 | End: 2025-06-18 | Stop reason: HOSPADM

## 2025-06-18 RX ORDER — OXYCODONE HYDROCHLORIDE 5 MG/1
5 TABLET ORAL EVERY 6 HOURS PRN
Qty: 6 TABLET | Refills: 0 | Status: SHIPPED | OUTPATIENT
Start: 2025-06-18 | End: 2025-06-21

## 2025-06-18 RX ORDER — ACETAMINOPHEN 325 MG/1
650 TABLET ORAL EVERY 6 HOURS PRN
Status: DISCONTINUED | OUTPATIENT
Start: 2025-06-18 | End: 2025-06-19 | Stop reason: HOSPADM

## 2025-06-18 RX ORDER — ROSUVASTATIN CALCIUM 20 MG/1
20 TABLET, COATED ORAL AT BEDTIME
Status: DISCONTINUED | OUTPATIENT
Start: 2025-06-18 | End: 2025-06-19 | Stop reason: HOSPADM

## 2025-06-18 RX ORDER — ACETAMINOPHEN 325 MG/1
975 TABLET ORAL ONCE
Status: COMPLETED | OUTPATIENT
Start: 2025-06-18 | End: 2025-06-18

## 2025-06-18 RX ORDER — FENTANYL CITRATE 50 UG/ML
INJECTION, SOLUTION INTRAMUSCULAR; INTRAVENOUS PRN
Status: DISCONTINUED | OUTPATIENT
Start: 2025-06-18 | End: 2025-06-18

## 2025-06-18 RX ORDER — CIPROFLOXACIN 500 MG/1
500 TABLET, FILM COATED ORAL ONCE
Qty: 1 TABLET | Refills: 0 | Status: SHIPPED | OUTPATIENT
Start: 2025-06-18 | End: 2025-06-18

## 2025-06-18 RX ORDER — ACETAMINOPHEN 650 MG/1
650 SUPPOSITORY RECTAL ONCE
Status: COMPLETED | OUTPATIENT
Start: 2025-06-18 | End: 2025-06-18

## 2025-06-18 RX ORDER — CEFAZOLIN SODIUM/WATER 2 G/20 ML
2 SYRINGE (ML) INTRAVENOUS
Status: DISCONTINUED | OUTPATIENT
Start: 2025-06-18 | End: 2025-06-18 | Stop reason: HOSPADM

## 2025-06-18 RX ORDER — PANTOPRAZOLE SODIUM 40 MG/1
40 TABLET, DELAYED RELEASE ORAL
Status: DISCONTINUED | OUTPATIENT
Start: 2025-06-19 | End: 2025-06-19 | Stop reason: HOSPADM

## 2025-06-18 RX ORDER — CHOLECALCIFEROL (VITAMIN D3) 50 MCG
50 TABLET ORAL DAILY
Status: DISCONTINUED | OUTPATIENT
Start: 2025-06-18 | End: 2025-06-19 | Stop reason: HOSPADM

## 2025-06-18 RX ORDER — NALOXONE HYDROCHLORIDE 0.4 MG/ML
0.4 INJECTION, SOLUTION INTRAMUSCULAR; INTRAVENOUS; SUBCUTANEOUS
Status: DISCONTINUED | OUTPATIENT
Start: 2025-06-18 | End: 2025-06-19 | Stop reason: HOSPADM

## 2025-06-18 RX ORDER — LIDOCAINE 40 MG/G
CREAM TOPICAL
Status: DISCONTINUED | OUTPATIENT
Start: 2025-06-18 | End: 2025-06-19 | Stop reason: HOSPADM

## 2025-06-18 RX ORDER — MAGNESIUM HYDROXIDE 1200 MG/15ML
LIQUID ORAL PRN
Status: DISCONTINUED | OUTPATIENT
Start: 2025-06-18 | End: 2025-06-18 | Stop reason: HOSPADM

## 2025-06-18 RX ORDER — LIDOCAINE 40 MG/G
CREAM TOPICAL
Status: DISCONTINUED | OUTPATIENT
Start: 2025-06-18 | End: 2025-06-18 | Stop reason: HOSPADM

## 2025-06-18 RX ORDER — OXYCODONE HYDROCHLORIDE 5 MG/1
10 TABLET ORAL EVERY 4 HOURS PRN
Status: DISCONTINUED | OUTPATIENT
Start: 2025-06-18 | End: 2025-06-19 | Stop reason: HOSPADM

## 2025-06-18 RX ORDER — METOPROLOL SUCCINATE 25 MG/1
25 TABLET, EXTENDED RELEASE ORAL 2 TIMES DAILY
Status: DISCONTINUED | OUTPATIENT
Start: 2025-06-18 | End: 2025-06-19 | Stop reason: HOSPADM

## 2025-06-18 RX ORDER — OXYCODONE HYDROCHLORIDE 5 MG/1
5 TABLET ORAL EVERY 4 HOURS PRN
Status: DISCONTINUED | OUTPATIENT
Start: 2025-06-18 | End: 2025-06-19 | Stop reason: HOSPADM

## 2025-06-18 RX ORDER — ONDANSETRON 2 MG/ML
4 INJECTION INTRAMUSCULAR; INTRAVENOUS EVERY 30 MIN PRN
Status: DISCONTINUED | OUTPATIENT
Start: 2025-06-18 | End: 2025-06-18 | Stop reason: HOSPADM

## 2025-06-18 RX ORDER — LOSARTAN POTASSIUM 100 MG/1
100 TABLET ORAL EVERY MORNING
Status: DISCONTINUED | OUTPATIENT
Start: 2025-06-19 | End: 2025-06-19 | Stop reason: HOSPADM

## 2025-06-18 RX ORDER — ONDANSETRON 4 MG/1
4 TABLET, ORALLY DISINTEGRATING ORAL EVERY 6 HOURS PRN
Status: DISCONTINUED | OUTPATIENT
Start: 2025-06-18 | End: 2025-06-19 | Stop reason: HOSPADM

## 2025-06-18 RX ORDER — OXYBUTYNIN CHLORIDE 5 MG/1
10 TABLET, EXTENDED RELEASE ORAL DAILY
Status: DISCONTINUED | OUTPATIENT
Start: 2025-06-18 | End: 2025-06-19 | Stop reason: HOSPADM

## 2025-06-18 RX ORDER — ATROPA BELLADONNA AND OPIUM 16.2; 3 MG/1; MG/1
30 SUPPOSITORY RECTAL 3 TIMES DAILY PRN
Status: DISCONTINUED | OUTPATIENT
Start: 2025-06-18 | End: 2025-06-19 | Stop reason: HOSPADM

## 2025-06-18 RX ORDER — ONDANSETRON 2 MG/ML
INJECTION INTRAMUSCULAR; INTRAVENOUS PRN
Status: DISCONTINUED | OUTPATIENT
Start: 2025-06-18 | End: 2025-06-18

## 2025-06-18 RX ORDER — ALLOPURINOL 100 MG/1
200 TABLET ORAL EVERY EVENING
Status: DISCONTINUED | OUTPATIENT
Start: 2025-06-18 | End: 2025-06-19 | Stop reason: HOSPADM

## 2025-06-18 RX ORDER — ONDANSETRON 2 MG/ML
4 INJECTION INTRAMUSCULAR; INTRAVENOUS EVERY 6 HOURS PRN
Status: DISCONTINUED | OUTPATIENT
Start: 2025-06-18 | End: 2025-06-19 | Stop reason: HOSPADM

## 2025-06-18 RX ORDER — DEXAMETHASONE SODIUM PHOSPHATE 4 MG/ML
INJECTION, SOLUTION INTRA-ARTICULAR; INTRALESIONAL; INTRAMUSCULAR; INTRAVENOUS; SOFT TISSUE PRN
Status: DISCONTINUED | OUTPATIENT
Start: 2025-06-18 | End: 2025-06-18

## 2025-06-18 RX ORDER — CEFAZOLIN SODIUM/WATER 2 G/20 ML
2 SYRINGE (ML) INTRAVENOUS SEE ADMIN INSTRUCTIONS
Status: DISCONTINUED | OUTPATIENT
Start: 2025-06-18 | End: 2025-06-18 | Stop reason: HOSPADM

## 2025-06-18 RX ORDER — OLMESARTAN MEDOXOMIL 20 MG/1
40 TABLET ORAL EVERY MORNING
Status: DISCONTINUED | OUTPATIENT
Start: 2025-06-19 | End: 2025-06-18

## 2025-06-18 RX ORDER — ONDANSETRON 4 MG/1
4 TABLET, ORALLY DISINTEGRATING ORAL EVERY 30 MIN PRN
Status: DISCONTINUED | OUTPATIENT
Start: 2025-06-18 | End: 2025-06-18 | Stop reason: HOSPADM

## 2025-06-18 RX ORDER — ACETAMINOPHEN 500 MG
1000 TABLET ORAL EVERY 6 HOURS PRN
Qty: 100 TABLET | Refills: 0 | Status: SHIPPED | OUTPATIENT
Start: 2025-06-18

## 2025-06-18 RX ORDER — OMEPRAZOLE 20 MG/1
20 CAPSULE, DELAYED RELEASE ORAL
Status: DISCONTINUED | OUTPATIENT
Start: 2025-06-19 | End: 2025-06-18 | Stop reason: ALTCHOICE

## 2025-06-18 RX ORDER — FENTANYL CITRATE 50 UG/ML
50 INJECTION, SOLUTION INTRAMUSCULAR; INTRAVENOUS EVERY 5 MIN PRN
Status: DISCONTINUED | OUTPATIENT
Start: 2025-06-18 | End: 2025-06-18 | Stop reason: HOSPADM

## 2025-06-18 RX ORDER — AMLODIPINE BESYLATE 5 MG/1
5 TABLET ORAL 2 TIMES DAILY
Status: DISCONTINUED | OUTPATIENT
Start: 2025-06-18 | End: 2025-06-19 | Stop reason: HOSPADM

## 2025-06-18 RX ORDER — PROPOFOL 10 MG/ML
INJECTION, EMULSION INTRAVENOUS PRN
Status: DISCONTINUED | OUTPATIENT
Start: 2025-06-18 | End: 2025-06-18

## 2025-06-18 RX ORDER — OXYBUTYNIN CHLORIDE 5 MG/1
5 TABLET ORAL 3 TIMES DAILY PRN
Qty: 15 TABLET | Refills: 0 | Status: SHIPPED | OUTPATIENT
Start: 2025-06-18 | End: 2025-06-24

## 2025-06-18 RX ORDER — LIDOCAINE HYDROCHLORIDE 20 MG/ML
INJECTION, SOLUTION INFILTRATION; PERINEURAL PRN
Status: DISCONTINUED | OUTPATIENT
Start: 2025-06-18 | End: 2025-06-18

## 2025-06-18 RX ORDER — NALOXONE HYDROCHLORIDE 0.4 MG/ML
0.2 INJECTION, SOLUTION INTRAMUSCULAR; INTRAVENOUS; SUBCUTANEOUS
Status: DISCONTINUED | OUTPATIENT
Start: 2025-06-18 | End: 2025-06-19 | Stop reason: HOSPADM

## 2025-06-18 RX ORDER — HYDROMORPHONE HCL IN WATER/PF 6 MG/30 ML
0.4 PATIENT CONTROLLED ANALGESIA SYRINGE INTRAVENOUS EVERY 5 MIN PRN
Status: DISCONTINUED | OUTPATIENT
Start: 2025-06-18 | End: 2025-06-18 | Stop reason: HOSPADM

## 2025-06-18 RX ADMIN — ALLOPURINOL 200 MG: 100 TABLET ORAL at 20:29

## 2025-06-18 RX ADMIN — Medication 2 G: at 12:13

## 2025-06-18 RX ADMIN — AMLODIPINE BESYLATE 5 MG: 5 TABLET ORAL at 20:29

## 2025-06-18 RX ADMIN — PHENYLEPHRINE HYDROCHLORIDE 100 MCG: 10 INJECTION INTRAVENOUS at 12:20

## 2025-06-18 RX ADMIN — FENTANYL CITRATE 50 MCG: 50 INJECTION INTRAMUSCULAR; INTRAVENOUS at 12:17

## 2025-06-18 RX ADMIN — Medication 5 MG: at 12:31

## 2025-06-18 RX ADMIN — FENTANYL CITRATE 50 MCG: 50 INJECTION INTRAMUSCULAR; INTRAVENOUS at 14:42

## 2025-06-18 RX ADMIN — Medication 5 MG: at 12:28

## 2025-06-18 RX ADMIN — OXYCODONE HYDROCHLORIDE 10 MG: 5 TABLET ORAL at 16:18

## 2025-06-18 RX ADMIN — OXYCODONE HYDROCHLORIDE 10 MG: 5 TABLET ORAL at 20:29

## 2025-06-18 RX ADMIN — ACETAMINOPHEN 975 MG: 325 TABLET, FILM COATED ORAL at 10:52

## 2025-06-18 RX ADMIN — FENTANYL CITRATE 50 MCG: 50 INJECTION INTRAMUSCULAR; INTRAVENOUS at 12:30

## 2025-06-18 RX ADMIN — PROPOFOL 120 MG: 10 INJECTION, EMULSION INTRAVENOUS at 12:17

## 2025-06-18 RX ADMIN — PHENYLEPHRINE HYDROCHLORIDE 0.5 MCG/KG/MIN: 10 INJECTION INTRAVENOUS at 12:21

## 2025-06-18 RX ADMIN — ONDANSETRON 4 MG: 2 INJECTION INTRAMUSCULAR; INTRAVENOUS at 13:46

## 2025-06-18 RX ADMIN — Medication 50 MCG: at 17:05

## 2025-06-18 RX ADMIN — ROCURONIUM BROMIDE 10 MG: 50 INJECTION, SOLUTION INTRAVENOUS at 13:24

## 2025-06-18 RX ADMIN — FENTANYL CITRATE 25 MCG: 50 INJECTION INTRAMUSCULAR; INTRAVENOUS at 14:17

## 2025-06-18 RX ADMIN — ROCURONIUM BROMIDE 10 MG: 50 INJECTION, SOLUTION INTRAVENOUS at 13:02

## 2025-06-18 RX ADMIN — METOPROLOL SUCCINATE 25 MG: 25 TABLET, EXTENDED RELEASE ORAL at 20:29

## 2025-06-18 RX ADMIN — FENTANYL CITRATE 25 MCG: 50 INJECTION INTRAMUSCULAR; INTRAVENOUS at 14:12

## 2025-06-18 RX ADMIN — DEXAMETHASONE SODIUM PHOSPHATE 4 MG: 4 INJECTION, SOLUTION INTRA-ARTICULAR; INTRALESIONAL; INTRAMUSCULAR; INTRAVENOUS; SOFT TISSUE at 12:21

## 2025-06-18 RX ADMIN — OXYBUTYNIN CHLORIDE 10 MG: 5 TABLET, EXTENDED RELEASE ORAL at 16:18

## 2025-06-18 RX ADMIN — Medication 5 MG: at 12:25

## 2025-06-18 RX ADMIN — ROSUVASTATIN 20 MG: 20 TABLET, FILM COATED ORAL at 21:00

## 2025-06-18 RX ADMIN — SODIUM CHLORIDE, SODIUM LACTATE, POTASSIUM CHLORIDE, AND CALCIUM CHLORIDE: .6; .31; .03; .02 INJECTION, SOLUTION INTRAVENOUS at 12:09

## 2025-06-18 RX ADMIN — LIDOCAINE HYDROCHLORIDE 100 MG: 20 INJECTION, SOLUTION INFILTRATION; PERINEURAL at 12:17

## 2025-06-18 RX ADMIN — ROCURONIUM BROMIDE 40 MG: 50 INJECTION, SOLUTION INTRAVENOUS at 12:46

## 2025-06-18 RX ADMIN — SODIUM CHLORIDE, SODIUM LACTATE, POTASSIUM CHLORIDE, AND CALCIUM CHLORIDE: .6; .31; .03; .02 INJECTION, SOLUTION INTRAVENOUS at 15:30

## 2025-06-18 RX ADMIN — Medication 200 MG: at 13:49

## 2025-06-18 ASSESSMENT — ACTIVITIES OF DAILY LIVING (ADL)
ADLS_ACUITY_SCORE: 28
ADLS_ACUITY_SCORE: 29
ADLS_ACUITY_SCORE: 31
ADLS_ACUITY_SCORE: 48
ADLS_ACUITY_SCORE: 31
ADLS_ACUITY_SCORE: 29
ADLS_ACUITY_SCORE: 28
ADLS_ACUITY_SCORE: 29
ADLS_ACUITY_SCORE: 28
ADLS_ACUITY_SCORE: 29
ADLS_ACUITY_SCORE: 29
ADLS_ACUITY_SCORE: 28
ADLS_ACUITY_SCORE: 28
ADLS_ACUITY_SCORE: 48

## 2025-06-18 ASSESSMENT — ENCOUNTER SYMPTOMS: ORTHOPNEA: 0

## 2025-06-18 NOTE — OR NURSING
Report given to Mami MIRZA. Patient transferred in stable condition. Oxygen and capno monitor on. 3 bags of personal belongings includint CPAP machine. CBI at moderate to fast rate. Traction on, instructed Mami to release at 1600. Urine color yellow to light red. Wife Michelle with patient upon transfer.

## 2025-06-18 NOTE — OP NOTE
[facility]  Operative Note    Pre-operative diagnosis: BPH with obstruction/lower urinary tract symptoms [N40.1, N13.8]   Post-operative diagnosis BPH with obstruction/lower urinary tract symptoms [N40.1, N13.8]    Bladder tumor       Procedure: Procedure(s):  CYSTOSCOPY, TRANSURETHRAL WATERJET ABLATION PROSTATE, USING AQUABLATION    TRANSURETHRAL RESECTION OF BLADDER TUMOR SMALL 0.5-2.0 CM    LIMITED TRANSURETHRAL RESECTION OF PROSTATE    TRANSRECTAL ULTRASOUND     Surgeon: Bijan Childress MD   Assistants(s): NONE   Anesthesia: General    Estimated blood loss: Less than 50 ml    Total IV fluids: (See anesthesia record)   Blood transfusion: No transfusion was given during surgery   Total urine output: Not measured   Drains: 22 Fr 3-way Reis catheter, 50 ml in balloon, to light traction and CBI   Specimens: ID Type Source Tests Collected by Time Destination   1 : Bladder Tumor Tissue Urinary Bladder SURGICAL PATHOLOGY EXAM Bijan Childress MD 6/18/2025 12:52 PM    2 : Prostate Resection Tissue Prostate SURGICAL PATHOLOGY EXAM Bijan Childress MD 6/18/2025  1:18 PM         Implants: * No implants in log *       Findings:   Bilobar obstructive hypertrophy. 6 mm papillary bladder tumor (unexpected finding) resected. Aquablation, [id number] passes x2.   Complications: None.   Condition: Stable               Description of procedure:  The patient was brought to OR#19. Preop antibiotics were given prior to the procedure. General anesthesia was induced, he was placed in dorsal lithotomy and a timeout was performed.     The patient's genitalia were then prepped. 50 ml of surgical lubricant were inserted into the rectum with a syringe. The TRUS Stepper was mounted to the Articulating Arm and secured to OR bed. The ultrasound probe was attached to the stepper. The ultrasound probe was aligned, and confirmation made that the prostate was centered and aligned using both transverse and sagittal  views. The bladder neck, verumontanum and the central/transition zones were identified. A clinical assessment of the prostate was performed to note prostate anatomy.     The patient was then draped after transrectal ultrasound placement    A 22 Fr Storz cystoscope was assembled and passed through the urethra into the bladder. There was bilobar obstructive hypertrophy without significant intravesical protrusion. Unexpectedly, a 6 mm bladder tumor was found about 2 cm superolateral to the right ureteral orifice. I opted to exchange the scope for a 26 fr bipolar resectoscope and the bladder tumor was resected down to muscle fibers. The specimen was sent for permanent pathology. There was no concern for bladder perforation. The resectoscope was removed    The 24F AQUABEAM Handpiece was inserted into the prostatic urethra and a complete cystoscopic evaluation was performed by inspecting the prostate, bladder, and identifying the location of the verumontanum/external sphincter.      The AQUABEAM Handpiece was secured to the Handpiece Articulating Arm. Confirmed alignment of AQUABEAM Handpiece and TRUS Probe to be parallel and co-linear. Confirmation that AQUABEAM nozzle was centered and anterior to the bladder neck. The cystoscope was then retracted to visualize the verumontanum and external sphincter and the cystoscope tip was positioned just proximal to the external sphincter. Reconfirmed alignment of the TRUS probe with the AQUABEAM Handpiece and compression applied with TRUS probe. Horizontal alignment of the Handpiece waterjet nozzle was performed.      The Aquablation treatment zones were planned utilizing real-time transrectal ultrasound guidance to visualize the contour of the prostate and the depth and radial angles of resection were defined in the transverse view. In the sagittal view, the AQUABEAM nozzle was identified and its position registered with the software. The treatment contours were then adjusted to  conform to the intended resection margins. The bladder neck and verumontanum were marked and confirmed in the treatment contour.     The Aquablation Treatment was then started following the resection contour and confirmed under ultrasound guidance. A second pass with the AQUABEAM was made after re-calibrating a new treatment plan based on anatomy.     Once Aquablation resection was complete, cystoscopy and tissue/clot evacuation were performed using a 26F bipolar resectoscope until the urine remained light pink. The bladder was drained and Floseal was injected into the prostatic fossa.     A 3-way Reis was placed. The balloon was inflated to 50cc under ultrasound guidance and placed on light traction. Irrigation showed clear effluent. Continuous saline bladder irrigation was initiated.     Patient was cleaned up, awoken from anesthesia and brought to pacu in stable condition      Bijan Childress MD   [facility]  [phone] clinic phone

## 2025-06-18 NOTE — DISCHARGE INSTRUCTIONS
Today you were given 975 mg of Tylenol at 10:53AM. The recommended daily maximum dose is 4000 mg.      Same Day Surgery Discharge Instructions for  Sedation and General Anesthesia     It's not unusual to feel dizzy, light-headed or faint for up to 24 hours after surgery or while taking pain medication.  If you have these symptoms: sit for a few minutes before standing and have someone assist you when you get up to walk or use the bathroom.    You should rest and relax for the next 24 hours. We recommend you make arrangements to have an adult stay with you for at least 24 hours after your discharge.  Avoid hazardous and strenuous activity.    DO NOT DRIVE any vehicle or operate mechanical equipment for 24 hours following the end of your surgery.  Even though you may feel normal, your reactions may be affected by the medication you have received.    Do not drink alcoholic beverages for 24 hours following surgery.     Slowly progress to your regular diet as you feel able. It's not unusual to feel nauseated and/or vomit after receiving anesthesia.  If you develop these symptoms, drink clear liquids (apple juice, ginger ale, broth, 7-up, etc. ) until you feel better.  If your nausea and vomiting persists for 24 hours, please notify your surgeon.      All narcotic pain medications, along with inactivity and anesthesia, can cause constipation. Drinking plenty of liquids and increasing fiber intake will help.    For any questions of a medical nature, call your surgeon.    Do not make important decisions for 24 hours.    If you had general anesthesia, you may have a sore throat for a couple of days related to the breathing tube used during surgery.  You may use Cepacol lozenges to help with this discomfort.  If it worsens or if you develop a fever, contact your surgeon.     If you feel your pain is not well managed with the pain medications prescribed by your surgeon, please contact your surgeon's office to let them know so  they can address your concerns.     POSTOPERATIVE INSTRUCTIONS    Diagnosis-------------------------------   BPH with LUTS  Bladder tumor    Procedure-------------------------------  Procedure(s) (LRB):  CYSTOSCOPY, TRANSURETHERAL WATERJET ABLATION PROSTATE, USING AQUABLATION; TRANSURETHERAL RESECTION OF BLADDER TUMOR (N/A)      Findings--------------------------------  Bilobar obstructive hypertrophy. 6 mm papillary bladder tumor (unexpected finding) resected. Aquablation, [id number] passes x2.     Home-going instructions-----------------    CURTIS CATHETER  - You will go home with the catheter. Be sure the catheter is well secured to the leg at all times. There should never be any tension or tugging on the catheter. Take care not to pull on the catheter when rolling in bed or changing position. The nurse will show you how to attach the curtis catheter to a leg bag during the day and a big bag at night. The drainage bag must be below the bladder to ensure the bladder drains well.         Activity Limitation:     - No driving or operating heavy machinery while on narcotic pain medication. No driving for at least 24 hours after anesthesia and until the patient is off narcotic pain medications and feels able to safely operate a vehicle.  - Avoid any strenuous activity or lifting more than ten pounds for 2 weeks. This includes any heavy lifting, running, riding a bicycle or golf. This also includes activities such as raking leaves, mowing the lawn, shoveling snow or other strenuous chores. After two weeks, you may start to gradually increase your physical activity. Start with some gentle pitch shot and chipping at two weeks. You should be able to swing a club fully by 4 wks.   -  Refrain from sexual activity for 2-4 weeks or until hematuria has completely resolved, as sexual activity may increase the risk of bleeding or discomfort during the early healing phase.      FOLLOW THESE INSTRUCTIONS AS INDICATED BELOW:  -  Observe operative area for signs of excessive bleeding.  - You may shower.  - Increase fluid intake to promote clear urine.  - Resume usual diet as tolerated    What to expect while recovering-----------  For the first few weeks after your procedure, you may notice that your urine is cloudy. Or you may have blood or blood clots in your urine. This is normal while your body rids itself of the treated tissue. These symptoms may begin to get better during the first few weeks. But it may take a few months before they go away. Your provider can tell you when you can have sex again and when you can go back to work.  You also may be told to avoid lifting anything over 10 pounds or bending over to lift things from the ground.  You should drink plenty of fluids to help flush out your bladder.  You may experience some intermittent bleeding that makes your urine pink or cherry colored. This is normal.  However, if you are unable to urinate, passing large amount of clots, have yaya blood in your urine, or have a temperature >101 degrees, call the urology nurse on call, or present to your nearest emergency department.      Avoid constipation  Anesthesia, surgery and narcotic pain medication all increase your risk for constipation. Do not strain to move the bowels as this can impair the healing process and start bleeding. Take plenty of fiber, water and over the counter stool softener to avoid constipation.    When to call your healthcare provider  Contact your healthcare provider right away if:  You have a fever of 100.4 F (38 C) or higher, or as directed by your provider  You have excessive bleeding  You have pain not relieved by medicines  You notice that no urine is draining from the catheter or the catheter falls out  You have a frequent or very strong urge to urinate  You re not able to urinate, or notice a decrease in urine flow    Discharge Medications/instructions:   - Take Tylenol 1000mg every 6 hours for pain  - take  oxycodone for severe pain not relieved by tylenol  - Take an over the counter stool softener to promote soft bowel movements  - take oxybutinin as needed for bladder spasm type pain  - take ciprofloxacin antibiotic with you to your scheduled curtis removal      Questions/concerns------------------------  [facility]: [phone]    Future appointments  You will follow up with Dr. Bijan Childress in 3 months at his office for cystoscopy to evaluate the bladder  I sent a message to my schedulers to get you arranged for cath out on Monday.       Bijan Childress

## 2025-06-18 NOTE — PROGRESS NOTES
Patient VSS are at baseline No    Patient able to ambulate as they were prior to admission or with assist devices provided by therapies during their stay No    Patient able to tolerate oral intake and maintain hydration status Yes    Patient has adequate pain control using oral analgesics Yes    Patient must be voiding adequate amounts Yes    Number of times OUT OF BED this shift 1    Hypercapnia, Hypoventilation or hypoxia resolved for at least 2 hours without supplemental oxygen No    Deficits in sensation, mobility or coordination have resolved if spinal or regional anesthesia used Yes    Patient has returned to baseline mental status Yes

## 2025-06-18 NOTE — ANESTHESIA CARE TRANSFER NOTE
Patient: Harpreet Vera    Procedure: Procedure(s):  CYSTOSCOPY, TRANSURETHERAL WATERJET ABLATION PROSTATE, USING AQUABLATION; TRANSURETHERAL RESECTION OF BLADDER TUMOR       Diagnosis: BPH with obstruction/lower urinary tract symptoms [N40.1, N13.8]  Diagnosis Additional Information: No value filed.    Anesthesia Type:   General     Note:    Oropharynx: oropharynx clear of all foreign objects and spontaneously breathing  Level of Consciousness: awake  Oxygen Supplementation: face mask  Level of Supplemental Oxygen (L/min / FiO2): 6  Independent Airway: airway patency satisfactory and stable  Dentition: dentition unchanged  Vital Signs Stable: post-procedure vital signs reviewed and stable  Report to RN Given: handoff report given  Patient transferred to: PACU  Comments: Neuromuscular blockade reversed with sugammadex, spontaneous respirations, adequate tidal volumes, followed commands to voice, oropharynx suctioned with soft flexible catheter, extubated atraumatically, extubated with suction, airway patent after extubation.  Oxygen via facemask at 6 liters per minute to PACU. Oxygen tubing connected to wall O2 in PACU, SpO2, NiBP, and EKG monitors and alarms on and functioning, Kendra Hugger warmer connected to patient gown, report on patient's clinical status given to PACU RN, RN questions answered.       Handoff Report: Identifed the Patient, Identified the Reponsible Provider, Reviewed the pertinent medical history, Discussed the surgical course, Reviewed Intra-OP anesthesia mangement and issues during anesthesia, Set expectations for post-procedure period and Allowed opportunity for questions and acknowledgement of understanding      Vitals:  Vitals Value Taken Time   /75 06/18/25 13:59   Temp 35.9  C (96.62  F) 06/18/25 14:01   Pulse 66 06/18/25 14:02   Resp 14 06/18/25 14:02   SpO2 99 % 06/18/25 14:02   Vitals shown include unfiled device data.    Electronically Signed By: SALVATORE Lopez  CRNA  June 18, 2025  2:03 PM

## 2025-06-18 NOTE — ANESTHESIA PREPROCEDURE EVALUATION
Anesthesia Pre-Procedure Evaluation    Patient: Harpreet Vera   MRN: [id number] : 1950          Procedure : Procedure(s):  CYSTOSCOPY, TRANSURETHERAL WATERJET ABLATION PROSTATE, USING AQUABLATION         Past Medical History:   Diagnosis Date    Benign neoplasm of colon     Brachial plexopathy     history of C. difficile infection     Hypertension     Mumps, history of     MAN (obstructive sleep apnea)     Sinus node dysfunction     with syncope    Sleep apnea     Bi-PAP      Past Surgical History:   Procedure Laterality Date    ARTHROSCOPY KNEE Bilateral     ARTHROSCOPY SHOULDER Right 2020    Procedure: RIGHT SHOULDER ARTHROSCOPY, ARTHROSCOPIC SUBACROMIAL DECOMPRESSION, DISTAL CLAVICLE AND BICEPS TENOTOMY;  Surgeon: Joseph Moon MD;  Location:  OR    CHOLECYSTECTOMY      COLONOSCOPY N/A 2017    Procedure: COMBINED COLONOSCOPY, SINGLE OR MULTIPLE BIOPSY/POLYPECTOMY BY BIOPSY;  Surgeon: Tylor Rice MD;  Location:  GI    COLONOSCOPY N/A 2022    Procedure: COLONOSCOPY (FV);  Surgeon: Tylor Rice MD;  Location:  GI    COLONOSCOPY N/A 2024    Procedure: Colonoscopy with polypectomies using exacto and hot snare and biopsies using regular forceps;  Surgeon: Tylor Rice MD;  Location:  GI    COLONOSCOPY N/A 2024    Procedure: COLONOSCOPY, WITH POLYPECTOMY AND BIOPSY;  Surgeon: Tylor Rice MD;  Location:  GI    CV CORONARY ANGIOGRAM N/A 10/25/2024    Procedure: Coronary Angiogram;  Surgeon: Alexis Ruiz MD;  Location:  [facility]    DECOMPRESSION, FUSION CERVICAL ANTERIOR ONE LEVEL, COMBINED N/A 2016    Procedure: COMBINED DECOMPRESSION, FUSION CERVICAL ANTERIOR ONE LEVEL;  Surgeon: Erick Valles MD;  Location: [facility]    EP PACEMAKER N/A 2020    Procedure: EP Pacemaker;  Surgeon: Alfonso Sigala MD;  Location:  [facility]    IR CAROTID CEREBRAL ANGIOGRAM BILATERAL  2020     IR CAROTID CEREBRAL ANGIOGRAM BILATERAL  07/01/2020    IR DISCONTINUE SHEATH  01/06/2020    TONSILLECTOMY      VASECTOMY        No Known Allergies   Social History     Tobacco Use    Smoking status: Never    Smokeless tobacco: Never   Substance Use Topics    Alcohol use: Yes     Alcohol/week: 1.0 standard drink of alcohol     Types: 1 Glasses of wine per week     Comment: 1-2 weekly      Wt Readings from Last 1 Encounters:   06/05/25 88 kg (194 lb)      Echo   Interpretation Summary     The left ventricle is normal in size.  There is mild global hypokinesia of the left ventricle.  The visual ejection fraction is 45-50%.  There is mild (1+) tricuspid regurgitation.  Right ventricle systolic pressure estimate normal  Ascending aorta dilatation is present, 4.2 cm  Compared to prior study, there is no significant change.  ______________________________________________________________________________  Left Ventricle  The left ventricle is normal in size. There is normal left ventricular wall  thickness. Diastolic Doppler findings (E/E' ratio and/or other parameters)  suggest left ventricular filling pressures are indeterminate. The visual  ejection fraction is 45-50%. There is mild global hypokinesia of the left  ventricle.     Right Ventricle  The right ventricle is normal size. The right ventricular systolic function is  normal. There is a pacemaker lead in the right ventricle.     Atria  Normal left atrial size. Right atrial size is normal. Intact atrial septum.     Mitral Valve  The mitral valve is normal in structure and function. There is trace mitral  regurgitation.     Tricuspid Valve  There is mild (1+) tricuspid regurgitation. IVC diameter <2.1 cm collapsing  >50% with sniff suggests a normal RA pressure of 3 mmHg. The right ventricular  systolic pressure is approximated at 19.5 mmHg plus the right atrial pressure.  Right ventricle systolic pressure estimate normal.     Aortic Valve  The aortic valve is  trileaflet with aortic valve sclerosis. No aortic  regurgitation is present. No aortic stenosis is present.     Pulmonic Valve  The pulmonic valve is not well seen, but is grossly normal. There is trace  pulmonic valvular regurgitation.     Vessels  Ascending aorta dilatation is present. 4.2 cm. The inferior vena cava was  normal in size with preserved respiratory variability.     Pericardium  There is no pericardial effusion.     Rhythm  Sinus rhythm was noted.    EKG  Sinus Rhythm   -Left bundle branch block and left axis.    ABNORMAL    Cardiac cath   Comments/Patient Narrative    Patient is a 74-year-old gentleman with a cardiomyopathy.  He is symptomatic with shortness of breath.  Recent stress test with inferior and inferoseptal ischemia.  Sent for coronary angiography and PCI if appropriate     Pre Procedure Diagnosis    abnormal stress test       Conclusion         Dist Cx lesion is 25% stenosed.    Mid LAD lesion is 10% stenosed.    Prox RCA lesion is 10% stenosed.        1.  Minimal nonocclusive coronary artery disease         Plan    1.  Secondary prevention and risk factor modification as appropriate  2.  Diagnosis of nonischemic cardiomyopathy     NM study    The nuclear stress test is abnormal.    There is nontransmural infarction in the inferior and inferoseptal segment(s) of the left ventricle.    Left ventricular function is mildly reduced.    The left ventricular ejection fraction at rest is 56%.  The left ventricular ejection fraction at stress is 49%.    Stress to rest cavity ratio is 1.25.  TID is present visually and quantitatively.    There is no prior study for comparison.     Device check  [employer] Accolade (D)  Pacemaker Device Check  Patient seen in clinic for device evaluation and iterative programming.     Mode: DDDR 60/130  Underlying Rhythm: SB 30s with CHB and no JE at VVI 30       Pacing/Histogram Data Since: 1/24/25  AP: 93    %    : 100 %    Heart Rate: primarily blunted  at 60. See rate response changes below       Sensing: RA: WNL, RV: MARI as pt is dependent   Pacing Threshold: WNL    Impedance: WNL    Battery Status: estimated at 10.5 years remaining until TARYN    Device Site: well healed       Arrhythmia Data Since: 3/4/25  Atrial Arrhythmia: 0    Ventricular Arrhythmia: 0       Setting Change: pt reports he has to stop with activities, especially walking up hill. Per histograms,  24 hour graph and increased pacing percentages, programmed accelerometer activity threshold from med low to low and reaction time form 30s to 20s to increase sensitivity of rate response, allowing HR to increase with activities.    Anesthesia Evaluation   Pt has had prior anesthetic.     History of anesthetic complications       ROS/MED HX  ENT/Pulmonary:     (+) sleep apnea (Uses bipap), uses CPAP,         allergic rhinitis,                             Neurologic:     (+)          CVA (CVA x 2),                      Cardiovascular: Comment: cardiomyopathy    (+) Dyslipidemia hypertension- Peripheral Vascular Disease (vertebral artery stenosis s/p stenting; occlusion of verterbral artery on contralateral side)-  CAD -  - -             fainting (syncope). pacemaker, Reason placed: sick sinsu syndrome.                    (-) CHF and orthopnea/PND   METS/Exercise Tolerance:     Hematologic:       Musculoskeletal: Comment: Prior cervical spine surgery      GI/Hepatic:     (+) GERD,                   Renal/Genitourinary:     (+) renal disease, type: CRI,      BPH,      Endo:    (-) Type II DM and thyroid disease   Psychiatric/Substance Use:       Infectious Disease:  - neg infectious disease ROS     Malignancy:  - neg malignancy ROS     Other:              Physical Exam  Airway  Mallampati: II  TM distance: >3 FB  Neck ROM: limited  Mouth opening: < 4 cm    Cardiovascular - normal exam  Rhythm: regular  Rate: normal rate   Comments: cardiomyopathy  Dental   (+) Minor Abnormalities - some fillings, tiny  chips      Pulmonary Breath sounds clear to auscultation        Neurological - normal exam  He appears awake, alert and oriented x3.    Other Findings       OUTSIDE LABS:  CBC:   Lab Results   Component Value Date    WBC 6.1 06/05/2025    WBC 6.3 04/25/2025    HGB 13.2 (L) 06/05/2025    HGB 12.2 (L) 04/25/2025    HCT 35.2 (L) 06/05/2025    HCT 34.3 (L) 04/25/2025     (L) 06/05/2025     (L) 04/25/2025     BMP:   Lab Results   Component Value Date     06/05/2025     05/06/2025    POTASSIUM 4.2 06/05/2025    POTASSIUM 4.3 05/06/2025    CHLORIDE 107 06/05/2025    CHLORIDE 105 05/06/2025    CO2 24 06/05/2025    CO2 26 05/06/2025    BUN 15.7 06/05/2025    BUN 15.4 05/06/2025    CR 1.15 06/05/2025    CR 1.07 05/06/2025     (H) 06/05/2025     (H) 05/06/2025     COAGS:   Lab Results   Component Value Date    PTT 27 10/25/2024    INR 1.08 10/25/2024     POC:   Lab Results   Component Value Date     (H) 08/21/2020     HEPATIC:   Lab Results   Component Value Date    ALBUMIN 4.6 03/19/2025    PROTTOTAL 6.7 03/19/2025    ALT 31 03/19/2025    AST 27 03/19/2025    ALKPHOS 59 03/19/2025    BILITOTAL 0.9 03/19/2025     OTHER:   Lab Results   Component Value Date    A1C 5.3 01/06/2020    LATHA 9.3 06/05/2025    PHOS 3.5 01/07/2020    MAG 2.1 08/21/2020    TSH 2.37 03/19/2025    CRP 36.0 (H) 01/13/2020    SED 14 01/13/2020       Anesthesia Plan    ASA Status:  3      NPO Status: NPO Appropriate   Anesthesia Type: General.  Airway: supraglottic airway.  Induction: intravenous.  Maintenance: Balanced.   Techniques and Equipment:     - Airway:  Planned airway equipment includes supraglottic airway.     - Monitoring Plan: standard ASA monitoring     Consents    Anesthesia Plan(s) and associated risks, benefits, and realistic alternatives discussed. Questions answered and patient/representative(s) expressed understanding.     - Discussed:     - Discussed with:  Patient, family            "    Postoperative Care    Pain management: multimodal analgesia.     Comments:                   Yong Lopez MD    I have reviewed the pertinent notes and labs in the chart from the past 30 days and (re)examined the patient.  Any updates or changes from those notes are reflected in this note.    Clinically Significant Risk Factors Present on Admission                   # Hypertension: Noted on problem list           # Overweight: Estimated body mass index is 29.5 kg/m  as calculated from the following:    Height as of 6/5/25: 1.727 m (5' 8\").    Weight as of 6/5/25: 88 kg (194 lb).        # Pacemaker present             "

## 2025-06-18 NOTE — ANESTHESIA PROCEDURE NOTES
Airway       Patient location during procedure: OR  Staff -        Anesthesiologist:  Yong Lopez MD       CRNA: Mary Zayas APRN CRNA       Performed By: CRNAIndications and Patient Condition       Indications for airway management: faiza-procedural       Induction type:intravenous       Mask difficulty assessment: 0 - not attempted    Final Airway Details       Final airway type: supraglottic airway    Supraglottic Airway Details        Type: LMA       Brand: I-Gel       LMA size: 5    Post intubation assessment        Placement verified by: capnometry, equal breath sounds and chest rise        Number of attempts at approach: 1       Ease of procedure: easy       Dentition: Unchanged

## 2025-06-18 NOTE — ANESTHESIA PROCEDURE NOTES
Airway       Patient location during procedure: OR  Staff -        Anesthesiologist:  Yong Lopez MD       CRNA: Mary Zayas APRN CRNA       Performed By: CRNAIndications and Patient Condition       Indications for airway management: faiza-procedural       Induction type:intravenous       Mask difficulty assessment: 0 - not attempted    Final Airway Details       Final airway type: endotracheal airway       Successful airway: ETT - single  Endotracheal Airway Details        ETT size (mm): 8.0       Cuffed: yes       Successful intubation technique: video laryngoscopy       VL Blade Size: Glidescope 4       Grade View of Cords: 1       Adjucts: stylet       Position: Right       Measured from: lips       Secured at (cm): 24       Bite block used: None    Post intubation assessment        Placement verified by: capnometry, equal breath sounds and chest rise        Number of attempts at approach: 1       Secured with: tape       Ease of procedure: easy       Dentition: Unchanged

## 2025-06-18 NOTE — PROVIDER NOTIFICATION
Provider Notification      Person Name: Dr. Felix    Date/Time: 6/18/25 3327    Interaction: Answering service     Purpose of Notification: Diet order needed    Orders/Response Received:  See new order

## 2025-06-19 VITALS
SYSTOLIC BLOOD PRESSURE: 130 MMHG | HEART RATE: 65 BPM | RESPIRATION RATE: 16 BRPM | WEIGHT: 191.9 LBS | TEMPERATURE: 98.5 F | DIASTOLIC BLOOD PRESSURE: 71 MMHG | OXYGEN SATURATION: 96 % | BODY MASS INDEX: 30.12 KG/M2 | HEIGHT: 67 IN

## 2025-06-19 LAB
ERYTHROCYTE [DISTWIDTH] IN BLOOD BY AUTOMATED COUNT: 12 % (ref 10–15)
HCT VFR BLD AUTO: 34.6 % (ref 40–53)
HGB BLD-MCNC: 12.9 G/DL (ref 13.3–17.7)
MCH RBC QN AUTO: 32.1 PG (ref 26.5–33)
MCHC RBC AUTO-ENTMCNC: 37.3 G/DL (ref 31.5–36.5)
MCV RBC AUTO: 86 FL (ref 78–100)
PLATELET # BLD AUTO: 152 10E3/UL (ref 150–450)
RBC # BLD AUTO: 4.02 10E6/UL (ref 4.4–5.9)
WBC # BLD AUTO: 12.2 10E3/UL (ref 4–11)

## 2025-06-19 PROCEDURE — 258N000001 HC RX 258: Performed by: STUDENT IN AN ORGANIZED HEALTH CARE EDUCATION/TRAINING PROGRAM

## 2025-06-19 PROCEDURE — 85027 COMPLETE CBC AUTOMATED: CPT | Performed by: STUDENT IN AN ORGANIZED HEALTH CARE EDUCATION/TRAINING PROGRAM

## 2025-06-19 PROCEDURE — 250N000013 HC RX MED GY IP 250 OP 250 PS 637: Performed by: STUDENT IN AN ORGANIZED HEALTH CARE EDUCATION/TRAINING PROGRAM

## 2025-06-19 PROCEDURE — 36415 COLL VENOUS BLD VENIPUNCTURE: CPT | Performed by: STUDENT IN AN ORGANIZED HEALTH CARE EDUCATION/TRAINING PROGRAM

## 2025-06-19 RX ADMIN — SODIUM CHLORIDE FOR IRRIGATION: 0.9 SOLUTION IRRIGATION at 00:03

## 2025-06-19 RX ADMIN — SODIUM CHLORIDE FOR IRRIGATION: 0.9 SOLUTION IRRIGATION at 09:36

## 2025-06-19 RX ADMIN — OXYCODONE HYDROCHLORIDE 5 MG: 5 TABLET ORAL at 09:18

## 2025-06-19 RX ADMIN — OXYCODONE HYDROCHLORIDE 5 MG: 5 TABLET ORAL at 02:26

## 2025-06-19 RX ADMIN — AMLODIPINE BESYLATE 5 MG: 5 TABLET ORAL at 09:18

## 2025-06-19 RX ADMIN — PANTOPRAZOLE SODIUM 40 MG: 40 TABLET, DELAYED RELEASE ORAL at 06:05

## 2025-06-19 RX ADMIN — ACETAMINOPHEN 650 MG: 325 TABLET, FILM COATED ORAL at 02:25

## 2025-06-19 RX ADMIN — LOSARTAN POTASSIUM 100 MG: 100 TABLET, FILM COATED ORAL at 09:18

## 2025-06-19 RX ADMIN — METOPROLOL SUCCINATE 25 MG: 25 TABLET, EXTENDED RELEASE ORAL at 09:18

## 2025-06-19 RX ADMIN — ALFUZOSIN HYDROCHLORIDE 10 MG: 10 TABLET, EXTENDED RELEASE ORAL at 09:18

## 2025-06-19 RX ADMIN — Medication 50 MCG: at 09:18

## 2025-06-19 RX ADMIN — OXYBUTYNIN CHLORIDE 10 MG: 5 TABLET, EXTENDED RELEASE ORAL at 09:18

## 2025-06-19 ASSESSMENT — ACTIVITIES OF DAILY LIVING (ADL)
ADLS_ACUITY_SCORE: 34
ADLS_ACUITY_SCORE: 35
ADLS_ACUITY_SCORE: 31
ADLS_ACUITY_SCORE: 34
ADLS_ACUITY_SCORE: 31
ADLS_ACUITY_SCORE: 35
ADLS_ACUITY_SCORE: 35
ADLS_ACUITY_SCORE: 34

## 2025-06-19 NOTE — ANESTHESIA POSTPROCEDURE EVALUATION
Patient: Harpreet Vera    Procedure: Procedure(s):  CYSTOSCOPY, TRANSURETHERAL WATERJET ABLATION PROSTATE, USING AQUABLATION; TRANSURETHERAL RESECTION OF BLADDER TUMOR       Anesthesia Type:  General    Note:     Postop Pain Control: Uneventful            Sign Out: Well controlled pain   PONV: No   Neuro/Psych: Uneventful            Sign Out: Acceptable/Baseline neuro status   Airway/Respiratory: Uneventful            Sign Out: Acceptable/Baseline resp. status   CV/Hemodynamics: Uneventful            Sign Out: Acceptable CV status; No obvious hypovolemia; No obvious fluid overload   Other NRE: NONE   DID A NON-ROUTINE EVENT OCCUR? No           Last vitals:  Vitals Value Taken Time   /78 06/18/25 15:15   Temp 36.1  C (97  F) 06/18/25 15:15   Pulse 60 06/18/25 15:24   Resp 12 06/18/25 15:25   SpO2 97 % 06/18/25 15:27   Vitals shown include unfiled device data.    Electronically Signed By: Yong Lopez MD  June 18, 2025  8:18 PM

## 2025-06-19 NOTE — PROGRESS NOTES
"During VS at approx 1900 pt asked if the monitor can read his heart. Writer stated that it shows the heart rate but asked if he was feeling okay. Pt replied that he feels \"odd\" but this happens a lot when his heart rate gets elevated above 60bpm. HR was 68bpm at the time. He states this feeling is not abnormal for him. Writer put the pulse ox back on the pt and HR that were captured included 62, 60, 76. Pt thinks that maybe his watch that reads his HR is wrong. Pt stated that he is feeling okay. Writer felt radial pulse and it was regular. Writer explained to the patient that he should call if this happens again and pt agreed to do so.   "

## 2025-06-19 NOTE — PLAN OF CARE
Date & Time: 6/18-6/19 [id number]    Surgery/POD#: POD1 CYSTOSCOPY, TRANSURETHERAL WATERJET ABLATION PROSTATE, USING AQUABLATION; TRANSURETHERAL RESECTION OF BLADDER TUMOR    Behavior & Aggression: Green  Fall Risk: Yes  Orientation: A&Ox4  ABNL VS/O2: VSS on RA  ABNL Labs: See chart  Pain Management: PRN oxycodone given x2, PRN tylenol x1  Bowel/Bladder: Reis  Drains: CBI  Diet: Regular  Number of times OUT OF BED this shift: Ambulated in halls x2  Anticipated DC Date: Pending

## 2025-06-19 NOTE — DISCHARGE SUMMARY
Urology Discharge Summary    Patient: Harpreet Vera    MRN: [id number]   : 1950         Date of Admission:  2025   Date of Discharge::  2025  Admitting Physician:  Bijan Childress MD  Discharge Provider:  SALVATORE Lebron CNP          Admission Diagnoses:   BPH with obstruction/lower urinary tract symptoms [N40.1, N13.8]  Past Medical History:   Diagnosis Date    Benign neoplasm of colon     Brachial plexopathy     history of C. difficile infection     Hypertension     Mumps, history of     MAN (obstructive sleep apnea)     BI-PAP    Sinus node dysfunction     with syncope    Sleep apnea     Bi-PAP             Discharge Diagnosis:     BPH with obstruction/lower urinary tract symptoms [N40.1, N13.8]  Past Medical History:   Diagnosis Date    Benign neoplasm of colon     Brachial plexopathy     history of C. difficile infection     Hypertension     Mumps, history of     MAN (obstructive sleep apnea)     BI-PAP    Sinus node dysfunction     with syncope    Sleep apnea     Bi-PAP            Procedures:     Procedure(s): [place] TRANSURETHRAL WATERJET ABLATION PROSTATE COMPL [0421T] (CYSTOSCOPY, TRANSURETHERAL WATERJET ABLATION PROSTATE, USING AQUABLATION)             Medications Prior to Admission:     Medications Prior to Admission   Medication Sig Dispense Refill Last Dose/Taking    alfuzosin ER (UROXATRAL) 10 MG 24 hr tablet Take 1 tablet (10 mg) by mouth daily. 90 tablet 3 2025 Morning    allopurinol (ZYLOPRIM) 100 MG tablet Take 200 mg by mouth every evening (2 x 100mg)   2025 Evening    amLODIPine (NORVASC) 5 MG tablet Take 1 tablet (5 mg) by mouth 2 times daily. 180 tablet 3 2025 Morning    aspirin - buffered (ASCRIPTIN) 325 MG TABS tablet Take 1 tablet (325 mg) by mouth every morning.   2025 Morning    magnesium hydroxide (MILK OF MAGNESIA) 400 MG/5ML suspension Take 30 mLs by mouth once.   2025 Evening    metoprolol succinate ER (TOPROL XL) 25 MG 24 hr tablet  TAKE 1 TABLET BY MOUTH TWICE DAILY 180 tablet 2 6/18/2025 Morning    Multiple Vitamins-Minerals (SENIOR MULTIVITAMIN PLUS PO) Take by mouth.   6/17/2025 Morning    nitroGLYcerin (NITROSTAT) 0.4 MG sublingual tablet For chest pain place 1 tablet under the tongue every 5 minutes for 3 doses. If symptoms persist 5 minutes after 1st dose call 911. 25 tablet 0 More than a month    olmesartan (BENICAR) 40 MG tablet Take 1 tablet (40 mg) by mouth every morning 90 tablet 3 6/18/2025 Morning    omeprazole (PRILOSEC) 20 MG DR capsule TAKE ONE CAPSULE BY MOUTH ONCE OR TWICE DAILY AS NEEDED 180 capsule 3 6/18/2025 Morning    psyllium (METAMUCIL/KONSYL) 58.6 % powder Take by mouth daily.   6/13/2025    rosuvastatin (CRESTOR) 20 MG tablet Take 1 tablet (20 mg) by mouth at bedtime 90 tablet 3 6/17/2025 Bedtime    SIMETHICONE PO Take 2 tablets by mouth once.   6/17/2025 Evening    vitamin D3 (CHOLECALCIFEROL) 50 mcg (2000 units) tablet Take 1 tablet by mouth daily.   6/17/2025 Morning    [DISCONTINUED] acetaminophen (TYLENOL 8 HOUR) 650 MG CR tablet Take 650 mg by mouth every 8 hours as needed for mild pain or fever.   6/16/2025    [DISCONTINUED] finasteride (PROSCAR) 5 MG tablet Take 1 tablet (5 mg) by mouth daily. 90 tablet 3 6/17/2025 Evening             Discharge Medications:     Current Discharge Medication List        START taking these medications    Details   acetaminophen (TYLENOL) 500 MG tablet Take 2 tablets (1,000 mg) by mouth every 6 hours as needed for mild pain.  Qty: 100 tablet, Refills: 0    Associated Diagnoses: Benign prostatic hyperplasia with urinary hesitancy      docusate sodium (COLACE) 100 MG capsule Take 1 capsule (100 mg) by mouth 2 times daily.  Qty: 30 capsule, Refills: 0    Associated Diagnoses: Benign prostatic hyperplasia with urinary hesitancy      oxyBUTYnin (DITROPAN) 5 MG tablet Take 1 tablet (5 mg) by mouth 3 times daily as needed for bladder spasms.  Qty: 15 tablet, Refills: 0    Associated  Diagnoses: Benign prostatic hyperplasia with urinary hesitancy      oxyCODONE (ROXICODONE) 5 MG tablet Take 1 tablet (5 mg) by mouth every 6 hours as needed.  Qty: 6 tablet, Refills: 0    Associated Diagnoses: Benign prostatic hyperplasia with urinary hesitancy           CONTINUE these medications which have NOT CHANGED    Details   alfuzosin ER (UROXATRAL) 10 MG 24 hr tablet Take 1 tablet (10 mg) by mouth daily.  Qty: 90 tablet, Refills: 3    Associated Diagnoses: Benign prostatic hyperplasia with weak urinary stream      allopurinol (ZYLOPRIM) 100 MG tablet Take 200 mg by mouth every evening (2 x 100mg)      amLODIPine (NORVASC) 5 MG tablet Take 1 tablet (5 mg) by mouth 2 times daily.  Qty: 180 tablet, Refills: 3    Associated Diagnoses: Essential hypertension, benign      aspirin - buffered (ASCRIPTIN) 325 MG TABS tablet Take 1 tablet (325 mg) by mouth every morning.    Associated Diagnoses: Cerebrovascular disease; Vertebral artery stenosis, unspecified laterality      magnesium hydroxide (MILK OF MAGNESIA) 400 MG/5ML suspension Take 30 mLs by mouth once.      metoprolol succinate ER (TOPROL XL) 25 MG 24 hr tablet TAKE 1 TABLET BY MOUTH TWICE DAILY  Qty: 180 tablet, Refills: 2    Associated Diagnoses: Essential hypertension, benign      Multiple Vitamins-Minerals (SENIOR MULTIVITAMIN PLUS PO) Take by mouth.      nitroGLYcerin (NITROSTAT) 0.4 MG sublingual tablet For chest pain place 1 tablet under the tongue every 5 minutes for 3 doses. If symptoms persist 5 minutes after 1st dose call 911.  Qty: 25 tablet, Refills: 0    Associated Diagnoses: Abnormal cardiovascular stress test      olmesartan (BENICAR) 40 MG tablet Take 1 tablet (40 mg) by mouth every morning  Qty: 90 tablet, Refills: 3    Associated Diagnoses: Essential hypertension, benign      omeprazole (PRILOSEC) 20 MG DR capsule TAKE ONE CAPSULE BY MOUTH ONCE OR TWICE DAILY AS NEEDED  Qty: 180 capsule, Refills: 3    Associated Diagnoses: Flatulence/gas  pain/belching      psyllium (METAMUCIL/KONSYL) 58.6 % powder Take by mouth daily.      rosuvastatin (CRESTOR) 20 MG tablet Take 1 tablet (20 mg) by mouth at bedtime  Qty: 90 tablet, Refills: 3    Associated Diagnoses: Hypercholesteremia; Cerebrovascular disease      SIMETHICONE PO Take 2 tablets by mouth once.      vitamin D3 (CHOLECALCIFEROL) 50 mcg (2000 units) tablet Take 1 tablet by mouth daily.           STOP taking these medications       ciprofloxacin (CIPRO) 500 MG tablet Comments:   Reason for Stopping:         acetaminophen (TYLENOL 8 HOUR) 650 MG CR tablet Comments:   Reason for Stopping:         finasteride (PROSCAR) 5 MG tablet Comments:   Reason for Stopping:                     Consultations:   Consultation during this admission received:   None          Brief History of Illness:   Reason for admission requiring a surgical or invasive procedure:   BPH with obstruction/lower urinary tract symptoms [N40.1, N13.8]   The patient underwent the following procedure(s):   See above   There were no immediate complications during this procedure.    Please refer to the full operative summary for details.           Hospital Course:   The patient's hospital course was unremarkable.  Harpreet Vera recovered as anticipated and experienced no post-operative complications.      On POD  1 he was ambulating without assitance, tolerating the discharge diet, had pain controlled with PO medications to go home with, and was requiring no IV medications or fluids. He was discharged to home with appropriate contact information, follow-up and instructions as seen below in the discharge paperwork.         Discharge Labs:     Lab Results   Component Value Date    PSA 1.71 03/22/2024    PSA 2.47 04/03/2023    PSA 7.57 10/03/2022    PSA 6.71 04/05/2022    PSA 4.73 09/29/2021    PSA 2.72 08/07/2020    PSA 2.12 08/21/2019    PSA 1.92 07/24/2018    PSA 3.00 04/03/2017    PSA 4.27 09/09/2014     Recent Labs   Lab 06/19/25  0638   WBC  "12.2*   HGB 12.9*          Recent Labs   Lab 06/18/25  1102   GLC 99     No lab results found in last 7 days.    Invalid input(s): \"URINEBLOOD\"  No results found for this or any previous visit.         Discharge Instructions and Follow-Up:      Today you were given 975 mg of Tylenol at 10:53AM. The recommended daily maximum dose is 4000 mg.      Same Day Surgery Discharge Instructions for  Sedation and General Anesthesia     It's not unusual to feel dizzy, light-headed or faint for up to 24 hours after surgery or while taking pain medication.  If you have these symptoms: sit for a few minutes before standing and have someone assist you when you get up to walk or use the bathroom.    You should rest and relax for the next 24 hours. We recommend you make arrangements to have an adult stay with you for at least 24 hours after your discharge.  Avoid hazardous and strenuous activity.    DO NOT DRIVE any vehicle or operate mechanical equipment for 24 hours following the end of your surgery.  Even though you may feel normal, your reactions may be affected by the medication you have received.    Do not drink alcoholic beverages for 24 hours following surgery.     Slowly progress to your regular diet as you feel able. It's not unusual to feel nauseated and/or vomit after receiving anesthesia.  If you develop these symptoms, drink clear liquids (apple juice, ginger ale, broth, 7-up, etc. ) until you feel better.  If your nausea and vomiting persists for 24 hours, please notify your surgeon.      All narcotic pain medications, along with inactivity and anesthesia, can cause constipation. Drinking plenty of liquids and increasing fiber intake will help.    For any questions of a medical nature, call your surgeon.    Do not make important decisions for 24 hours.    If you had general anesthesia, you may have a sore throat for a couple of days related to the breathing tube used during surgery.  You may use Cepacol lozenges " to help with this discomfort.  If it worsens or if you develop a fever, contact your surgeon.     If you feel your pain is not well managed with the pain medications prescribed by your surgeon, please contact your surgeon's office to let them know so they can address your concerns.     POSTOPERATIVE INSTRUCTIONS    Diagnosis-------------------------------   BPH with LUTS  Bladder tumor    Procedure-------------------------------  Procedure(s) (LRB):  CYSTOSCOPY, TRANSURETHERAL WATERJET ABLATION PROSTATE, USING AQUABLATION; TRANSURETHERAL RESECTION OF BLADDER TUMOR (N/A)      Findings--------------------------------  Bilobar obstructive hypertrophy. 6 mm papillary bladder tumor (unexpected finding) resected. Aquablation, [id number] passes x2.     Home-going instructions-----------------    CURTIS CATHETER  - You will go home with the catheter. Be sure the catheter is well secured to the leg at all times. There should never be any tension or tugging on the catheter. Take care not to pull on the catheter when rolling in bed or changing position. The nurse will show you how to attach the curtis catheter to a leg bag during the day and a big bag at night. The drainage bag must be below the bladder to ensure the bladder drains well.         Activity Limitation:     - No driving or operating heavy machinery while on narcotic pain medication. No driving for at least 24 hours after anesthesia and until the patient is off narcotic pain medications and feels able to safely operate a vehicle.  - Avoid any strenuous activity or lifting more than ten pounds for 2 weeks. This includes any heavy lifting, running, riding a bicycle or golf. This also includes activities such as raking leaves, mowing the lawn, shoveling snow or other strenuous chores. After two weeks, you may start to gradually increase your physical activity. Start with some gentle pitch shot and chipping at two weeks. You should be able to swing a club fully by 4 wks.    -  Refrain from sexual activity for 2-4 weeks or until hematuria has completely resolved, as sexual activity may increase the risk of bleeding or discomfort during the early healing phase.      FOLLOW THESE INSTRUCTIONS AS INDICATED BELOW:  - Observe operative area for signs of excessive bleeding.  - You may shower.  - Increase fluid intake to promote clear urine.  - Resume usual diet as tolerated    What to expect while recovering-----------  For the first few weeks after your procedure, you may notice that your urine is cloudy. Or you may have blood or blood clots in your urine. This is normal while your body rids itself of the treated tissue. These symptoms may begin to get better during the first few weeks. But it may take a few months before they go away. Your provider can tell you when you can have sex again and when you can go back to work.  You also may be told to avoid lifting anything over 10 pounds or bending over to lift things from the ground.  You should drink plenty of fluids to help flush out your bladder.  You may experience some intermittent bleeding that makes your urine pink or cherry colored. This is normal.  However, if you are unable to urinate, passing large amount of clots, have yaya blood in your urine, or have a temperature >101 degrees, call the urology nurse on call, or present to your nearest emergency department.      Avoid constipation  Anesthesia, surgery and narcotic pain medication all increase your risk for constipation. Do not strain to move the bowels as this can impair the healing process and start bleeding. Take plenty of fiber, water and over the counter stool softener to avoid constipation.    When to call your healthcare provider  Contact your healthcare provider right away if:  You have a fever of 100.4 F (38 C) or higher, or as directed by your provider  You have excessive bleeding  You have pain not relieved by medicines  You notice that no urine is draining from the  catheter or the catheter falls out  You have a frequent or very strong urge to urinate  You re not able to urinate, or notice a decrease in urine flow    Discharge Medications/instructions:   - Take Tylenol 1000mg every 6 hours for pain  - take oxycodone for severe pain not relieved by tylenol  - Take an over the counter stool softener to promote soft bowel movements  - take oxybutinin as needed for bladder spasm type pain  - take ciprofloxacin antibiotic with you to your scheduled curtis removal      Questions/concerns------------------------  [facility]: [phone]    Future appointments  You will follow up with Dr. Bijan Childress in 3 months at his office for cystoscopy to evaluate the bladder  I sent a message to my schedulers to get you arranged for cath out on Monday.     Phone numbers:  - Nursing phone helpline at the Urology Clinic (8A-4:30P M-F):  [phone]. Call for questions, requests for medication refills, or to schedule or confirm an appointment.  - Nights, weekends, or holidays, call [phone] and ask the  to page the [facility] urologist on call. Typically, the on-call provider should return your call within 1 hr.  Please page the on-call provider again if you haven't been contacted as expected.   - For emergencies, always call [phone]          Discharge Disposition:     Discharged to home      Attestation: I have reviewed today's vital signs, notes, medications, labs and imaging.    SALVATORE Lebron, CNP  [facility] - Department of Urology  Office: [phone]  After business hours: [phone]  Securely message with [employer]  June 19, 2025

## 2025-06-19 NOTE — DISCHARGE SUMMARY
Discharge    Patient discharged to Home via Car with Wife  Care plan note:  See Below     Listed belongings gathered and given to patient (including from security/pharmacy). Yes  Care Plan and Patient education resolved: Yes  Prescriptions if needed, hard copies sent with patient  NA  Medication Bin checked and emptied on discharge Yes  SW/care coordinator/charge RN aware of discharge: Yes    Orientation: AO x4  Pain: PRN PO Oxycodone x1  Vitals/Tele: VSS RA  IV Access/drains: PIV Removed  Diet: Regular   Mobility: Independent   GI/: No BM; Curtis in place (Discharging w/Curtis, Urology removing Monday)  Wound/Skin: Redness around curtis site  Consults: Urology   Discharge Plan: Today      See Flow sheets for assessment

## 2025-06-19 NOTE — PROGRESS NOTES
"  Urology Progress Note    Name: Harpreet Vera    MRN: [id number]   YOB: 1950  Date of Admission: 6/18/2025               Impression and Plan:   Impression / Plan:   Harpreet Vera is a 75 year old male with history of BPH with weak stream w/ unknown awake office procedure in the past (presumably TUNA), + FH prostate cancer, rising PSA with multiple negative biopsies in the past.    Now s/p Aquablation yesterday 6/18/25.  Unexpectedly, a 6 mm bladder tumor was found about 2 cm superolateral to the right ureteral orifice. The bladder tumor was resected down to muscle fibers. The specimen was sent for permanent pathology.  Path results pending.    Passing flatulence.  Urine is clear.  Ambulating.  Pain under control w PO meds, notes some insertion site discomfort.     Exam  /71   Pulse 65   Temp 98.5  F (36.9  C) (Oral)   Resp 16   Ht 1.702 m (5' 7\")   Wt 87 kg (191 lb 14.4 oz)   SpO2 96%   BMI 30.06 kg/m    No acute distress  Unlabored breathing  Abdomen soft, nontender, nondistended.   Curtis with clear drainage    Labs  WBC 12.2  Hgb 12.9        -Ready for discharge this afternoon with curtis in place.  -Plan for cath out this coming Monday.  -Will arrange for OP cystoscopy in 3 mo.     Discussed with Dr. Childress  Thank you for the opportunity to participate in the care of Harpreet Vera.     SALVATORE Lebron, CNP  [facility] - Department of Urology  Office: [phone]  After business hours: [phone]  Securely message with [employer]    "

## 2025-06-23 ENCOUNTER — ALLIED HEALTH/NURSE VISIT (OUTPATIENT)
Dept: UROLOGY | Facility: CLINIC | Age: 75
End: 2025-06-23
Payer: COMMERCIAL

## 2025-06-23 DIAGNOSIS — R39.12 BENIGN PROSTATIC HYPERPLASIA WITH WEAK URINARY STREAM: Primary | ICD-10-CM

## 2025-06-23 DIAGNOSIS — N40.1 BENIGN PROSTATIC HYPERPLASIA WITH WEAK URINARY STREAM: Primary | ICD-10-CM

## 2025-06-23 PROCEDURE — 99207 PR NO CHARGE NURSE ONLY: CPT

## 2025-06-23 NOTE — PROGRESS NOTES
Harpreet Vera comes into clinic today at the request of Dr. Childress Ordering Provider for Catheter Removal.      Catheter removal documentation on 6/23/2025:    Harpreet Vera presents to the clinic for catheter removal.  Reason for removal: post op cath removal  Order has been verified. yes  Catheter successfully removed at 10:00 AM without immediate complication.  Clear/pink urine present in the catheter bag.  Urethral meatus is free of secretions and encrustation.  The patient is afebrile.  The patient tolerated the procedure and was instructed to return or call for pain, fever, leakage or decreased urine flow  Pt instructed to take one prophylactic antibiotic today that was sent with pt at hospital discharge.     This service provided today was under the supervising provider of the day Dr. Nixon, who was available if needed.    Jessa Kim CMA

## 2025-06-24 ENCOUNTER — OFFICE VISIT (OUTPATIENT)
Dept: CARDIOLOGY | Facility: CLINIC | Age: 75
End: 2025-06-24
Payer: COMMERCIAL

## 2025-06-24 VITALS
BODY MASS INDEX: 29.72 KG/M2 | DIASTOLIC BLOOD PRESSURE: 76 MMHG | WEIGHT: 196.1 LBS | SYSTOLIC BLOOD PRESSURE: 126 MMHG | HEIGHT: 68 IN | HEART RATE: 64 BPM | OXYGEN SATURATION: 98 %

## 2025-06-24 DIAGNOSIS — R53.83 OTHER FATIGUE: ICD-10-CM

## 2025-06-24 DIAGNOSIS — I49.5 SSS (SICK SINUS SYNDROME) (H): ICD-10-CM

## 2025-06-24 DIAGNOSIS — I77.810 DILATATION OF THORACIC AORTA: ICD-10-CM

## 2025-06-24 DIAGNOSIS — I42.8 NICM (NONISCHEMIC CARDIOMYOPATHY) (H): ICD-10-CM

## 2025-06-24 DIAGNOSIS — I10 ESSENTIAL HYPERTENSION, BENIGN: ICD-10-CM

## 2025-06-24 DIAGNOSIS — Z95.0 CARDIAC PACEMAKER IN SITU: ICD-10-CM

## 2025-06-24 LAB
PATH REPORT.COMMENTS IMP SPEC: NORMAL
PATH REPORT.COMMENTS IMP SPEC: NORMAL
PATH REPORT.FINAL DX SPEC: NORMAL
PATH REPORT.GROSS SPEC: NORMAL
PATH REPORT.MICROSCOPIC SPEC OTHER STN: NORMAL
PATH REPORT.RELEVANT HX SPEC: NORMAL
PHOTO IMAGE: NORMAL

## 2025-06-24 PROCEDURE — 88305 TISSUE EXAM BY PATHOLOGIST: CPT | Mod: 26 | Performed by: PATHOLOGY

## 2025-06-24 PROCEDURE — 88307 TISSUE EXAM BY PATHOLOGIST: CPT | Mod: 26 | Performed by: PATHOLOGY

## 2025-06-24 NOTE — PROGRESS NOTES
Cardiology Clinic Progress Note:    June 24, 2025   Patient Name: Harpreet Vera  Patient MRN: [id number]     Consult indication: CHF    HPI:    I had the opportunity to see patient Harpreet Vera in cardiology clinic for a follow up visit. Patient is followed by our colleague Tom Allison MD with Primary Care.     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     Patient has a history of sick sinus syndrome status post PPM 8/2020.    TTE 10/2022 LVEF 55 to 60%.    TTE 9/2024 LVEF 45 to 50%.    Lexiscan stress test 10/2024 nontransmural infarction in the inferior and inferoseptal segments, mildly reduced LVEF, TID ratio elevated at 1.25.    Cardiac catheterization 10/2024 minimal nonocclusive CAD.    TTE 5/2025 LVEF 45 to 50%.  Reviewed personally and I concur with LVEF estimation.     Since last clinic visit with my colleague Enrrique Morrell NP patient reports that overall he feels stable from a cardiac perspective.  No chest pain, chest pressure, symptoms of orthopnea/PND, abnormal weight gain.  He does note some swelling in his ankles/feet occasionally.    Device interrogation 5/2025 AP 93%,  100%.    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    Assessment and Plan/Recommendations:    # HFmrEF LVEF 45-50%, NICM, cardiac cath 10/2024 non obstructive CAD. Possible could be related to RV pacing (100%). Euvolemic on exam.  # SSS s/p dual chamber [employer] pacemaker 8/2020   # Ascending aorta 4.2 cm (within normal limits when indexed to age, BSA, sex)  # Cerebrovascular disease,  chronic left vertebral artery stenosis, right vertebral artery stenosis status post stenting, followed by Vascular surgery    - Overall patient is in stable cardiac health without symptoms concerning for decompensated heart failure  - He does note some mild ankle swelling occasionally, suspect related to the amlodipine since he does not have other signs/symptoms suggesting fluid overload.  Discussed switching amlodipine to another agent, he is already maxed out on all losartan, did not tolerate carvedilol, so would trial a thiazide diuretic or MRA.  Patient notes that he is likely chronically dehydrated and so would like to continue with amlodipine, advised to continue monitoring  - Reviewed SGLT2i therapy, risks/benefits, patient would like to hold off for now given urologic pathology which I concur is appropriate   - Cardiac MRI  - Continue remainder of cardiac regimen  - Follow-up with Enrrique Morrell or Emilia Laureano in about 6 months, or sooner as needed    Thank you for allowing our team to participate in the care of Harpreet Vera.  Please do not hesitate to call or page me with any questions or concerns.    Sincerely,     Link Mccartney MD, [facility]  Cardiology  June 24, 2025      Voice recognition software utilized.     The longitudinal plan of care for the diagnosis(es)/condition(s) as documented were addressed during this visit. Due to the added complexity in care, I will continue to support Harpreet in the subsequent management and with ongoing continuity of care.     Past Medical History:   The ASCVD Risk score ([place] DK, et al., 2019) failed to calculate for the following reasons:    The valid total cholesterol range is 130 to 320 mg/dL  Past Medical History:   Diagnosis Date    Benign neoplasm of colon     Brachial plexopathy     history of C. difficile infection 2007    Hypertension     Mumps, history of     MAN (obstructive sleep apnea)     BI-PAP    Sinus node dysfunction     with syncope     Sleep apnea     Bi-PAP        Past Surgical History:   Past Surgical History:   Procedure Laterality Date    ARTHROSCOPY KNEE Bilateral     ARTHROSCOPY SHOULDER Right 12/11/2020    Procedure: RIGHT SHOULDER ARTHROSCOPY, ARTHROSCOPIC SUBACROMIAL DECOMPRESSION, DISTAL CLAVICLE AND BICEPS TENOTOMY;  Surgeon: Joseph Moon MD;  Location:  OR    CHOLECYSTECTOMY      COLONOSCOPY N/A 04/05/2017    Procedure: COMBINED COLONOSCOPY, SINGLE OR MULTIPLE BIOPSY/POLYPECTOMY BY BIOPSY;  Surgeon: Tylor Rice MD;  Location:  GI    COLONOSCOPY N/A 04/01/2022    Procedure: COLONOSCOPY (FV);  Surgeon: Tylor Rice MD;  Location:  GI    COLONOSCOPY N/A 11/06/2024    Procedure: Colonoscopy with polypectomies using exacto and hot snare and biopsies using regular forceps;  Surgeon: Tylor Rice MD;  Location:  GI    COLONOSCOPY N/A 11/06/2024    Procedure: COLONOSCOPY, WITH POLYPECTOMY AND BIOPSY;  Surgeon: Tylor Rice MD;  Location:  GI    CV CORONARY ANGIOGRAM N/A 10/25/2024    Procedure: Coronary Angiogram;  Surgeon: Alexis Ruiz MD;  Location:  HEART CARDIAC CATH LAB    CYSTOSCOPY, TRANSURETHERAL WATERJET ABLATION PROSTATE, USING AQUABLATION N/A 6/18/2025    Procedure: CYSTOSCOPY, TRANSURETHERAL WATERJET ABLATION PROSTATE, USING AQUABLATION; TRANSURETHERAL RESECTION OF BLADDER TUMOR;  Surgeon: Bijan Childress MD;  Location:  OR    DECOMPRESSION, FUSION CERVICAL ANTERIOR ONE LEVEL, COMBINED N/A 09/09/2016    Procedure: COMBINED DECOMPRESSION, FUSION CERVICAL ANTERIOR ONE LEVEL;  Surgeon: Erick Valles MD;  Location:  OR    EP PACEMAKER N/A 08/21/2020    Procedure: EP Pacemaker;  Surgeon: Alfonso Sigala MD;  Location:  HEART CARDIAC CATH LAB    IR CAROTID CEREBRAL ANGIOGRAM BILATERAL  01/06/2020    IR CAROTID CEREBRAL ANGIOGRAM BILATERAL  07/01/2020    IR DISCONTINUE SHEATH  01/06/2020    TONSILLECTOMY      VASECTOMY         Medications (outpatient):  Current  Outpatient Medications   Medication Sig Dispense Refill    acetaminophen (TYLENOL) 500 MG tablet Take 2 tablets (1,000 mg) by mouth every 6 hours as needed for mild pain. 100 tablet 0    alfuzosin ER (UROXATRAL) 10 MG 24 hr tablet Take 1 tablet (10 mg) by mouth daily. 90 tablet 3    allopurinol (ZYLOPRIM) 100 MG tablet Take 200 mg by mouth every evening (2 x 100mg)      amLODIPine (NORVASC) 5 MG tablet Take 1 tablet (5 mg) by mouth 2 times daily. 180 tablet 3    aspirin - buffered (ASCRIPTIN) 325 MG TABS tablet Take 1 tablet (325 mg) by mouth every morning.      docusate sodium (COLACE) 100 MG capsule Take 1 capsule (100 mg) by mouth 2 times daily. 30 capsule 0    metoprolol succinate ER (TOPROL XL) 25 MG 24 hr tablet TAKE 1 TABLET BY MOUTH TWICE DAILY 180 tablet 2    Multiple Vitamins-Minerals (SENIOR MULTIVITAMIN PLUS PO) Take by mouth.      nitroGLYcerin (NITROSTAT) 0.4 MG sublingual tablet For chest pain place 1 tablet under the tongue every 5 minutes for 3 doses. If symptoms persist 5 minutes after 1st dose call 911. 25 tablet 0    olmesartan (BENICAR) 40 MG tablet Take 1 tablet (40 mg) by mouth every morning 90 tablet 3    omeprazole (PRILOSEC) 20 MG DR capsule TAKE ONE CAPSULE BY MOUTH ONCE OR TWICE DAILY AS NEEDED (Patient taking differently: Take 20 mg by mouth daily.) 180 capsule 3    psyllium (METAMUCIL/KONSYL) 58.6 % powder Take by mouth daily.      rosuvastatin (CRESTOR) 20 MG tablet Take 1 tablet (20 mg) by mouth at bedtime 90 tablet 3    SIMETHICONE PO Take 2 tablets by mouth once. (Patient taking differently: Take 2 tablets by mouth daily as needed.)      vitamin D3 (CHOLECALCIFEROL) 50 mcg (2000 units) tablet Take 1 tablet by mouth daily.         Allergies:  No Known Allergies    Social History:   History   Drug Use No      History   Smoking Status    Never   Smokeless Tobacco    Never     Social History    Substance and Sexual Activity      Alcohol use: Yes        Alcohol/week: 1.0 standard drink  "of alcohol        Types: 1 Glasses of wine per week        Comment: 1-2 weekly       Family History:  Family History   Problem Relation Age of Onset    Arthritis Mother     Eye Disorder Mother         cateracts    Lipids Mother     Colorectal Cancer Mother     Cancer Father         colon/prostate    Eye Disorder Father         cateracts    Lipids Father     Diabetes Father     Prostate Cancer Brother     Colon Cancer Brother 74        liver mets    Kidney Cancer Brother     Prostate Cancer Brother     Colon Cancer Maternal Aunt     Cancer Maternal Aunt         colon    Colon Cancer Maternal Uncle     Cancer Maternal Uncle         colon         Objective & Physical Exam:  /76 (BP Location: Right arm, Patient Position: Sitting)   Pulse 64   Ht 1.727 m (5' 8\")   Wt 89 kg (196 lb 1.6 oz)   SpO2 98%   BMI 29.82 kg/m    Wt Readings from Last 2 Encounters:   06/24/25 89 kg (196 lb 1.6 oz)   06/18/25 87 kg (191 lb 14.4 oz)     Body mass index is 29.82 kg/m .   Body surface area is 2.07 meters squared.    Constitutional: appears stated age, in no apparent distress, appears to be well nourished  Pulmonary: clear to auscultation bilaterally, no wheezes, no rales, no increased work of breathing  Cardiovascular: regular rate, regular rhythm, no murmur appreciated, trace BLE edema    Data reviewed:  Lab Results   Component Value Date    WBC 12.2 (H) 06/19/2025    WBC 6.4 12/07/2020    RBC 4.02 (L) 06/19/2025    RBC 4.72 12/07/2020    HGB 12.9 (L) 06/19/2025    HGB 15.1 12/07/2020    HCT 34.6 (L) 06/19/2025    HCT 43.0 12/07/2020    MCV 86 06/19/2025    MCV 91 12/07/2020    MCH 32.1 06/19/2025    MCH 32.0 12/07/2020    MCHC 37.3 (H) 06/19/2025    MCHC 35.1 12/07/2020    RDW 12.0 06/19/2025    RDW 12.8 12/07/2020     06/19/2025     (L) 12/07/2020     Sodium   Date Value Ref Range Status   06/05/2025 142 135 - 145 mmol/L Final   12/07/2020 139 133 - 144 mmol/L Final     Potassium   Date Value Ref Range " Status   06/05/2025 4.2 3.4 - 5.3 mmol/L Final   08/26/2022 4.4 3.4 - 5.3 mmol/L Final   12/11/2020 4.0 3.4 - 5.3 mmol/L Final     Chloride   Date Value Ref Range Status   06/05/2025 107 98 - 107 mmol/L Final   08/26/2022 110 (H) 94 - 109 mmol/L Final   12/07/2020 109 94 - 109 mmol/L Final     Carbon Dioxide   Date Value Ref Range Status   12/07/2020 28 20 - 32 mmol/L Final     Carbon Dioxide (CO2)   Date Value Ref Range Status   06/05/2025 24 22 - 29 mmol/L Final   08/26/2022 25 20 - 32 mmol/L Final     Anion Gap   Date Value Ref Range Status   06/05/2025 11 7 - 15 mmol/L Final   08/26/2022 5 3 - 14 mmol/L Final   12/07/2020 2 (L) 3 - 14 mmol/L Final     Glucose   Date Value Ref Range Status   08/26/2022 103 (H) 70 - 99 mg/dL Final   12/07/2020 94 70 - 99 mg/dL Final     Comment:     Non Fasting     GLUCOSE BY METER POCT   Date Value Ref Range Status   06/18/2025 99 70 - 99 mg/dL Final     Urea Nitrogen   Date Value Ref Range Status   06/05/2025 15.7 8.0 - 23.0 mg/dL Final   08/26/2022 19 7 - 30 mg/dL Final   12/07/2020 17 7 - 30 mg/dL Final     Creatinine   Date Value Ref Range Status   06/05/2025 1.15 0.67 - 1.17 mg/dL Final   12/11/2020 1.23 0.66 - 1.25 mg/dL Final     GFR Estimate   Date Value Ref Range Status   06/05/2025 66 >60 mL/min/1.73m2 Final     Comment:     eGFR calculated using 2021 CKD-EPI equation.   12/11/2020 58 (L) >60 mL/min/[1.73_m2] Final     Comment:     Non  GFR Calc  Starting 12/18/2018, serum creatinine based estimated GFR (eGFR) will be   calculated using the Chronic Kidney Disease Epidemiology Collaboration   (CKD-EPI) equation.       Calcium   Date Value Ref Range Status   06/05/2025 9.3 8.8 - 10.4 mg/dL Final   12/07/2020 9.3 8.5 - 10.1 mg/dL Final     Bilirubin Total   Date Value Ref Range Status   03/19/2025 0.9 <=1.2 mg/dL Final   12/07/2020 1.3 0.2 - 1.3 mg/dL Final     Alkaline Phosphatase   Date Value Ref Range Status   03/19/2025 59 40 - 150 U/L Final    2020 74 40 - 150 U/L Final     ALT   Date Value Ref Range Status   2025 31 0 - 70 U/L Final   2020 28 0 - 70 U/L Final     AST   Date Value Ref Range Status   2025 27 0 - 45 U/L Final   2020 20 0 - 45 U/L Final     Recent Labs   Lab Test 25  0753 24  0820   CHOL 105 113   HDL 36* 35*   LDL 43 43   TRIG 128 174*        Recent Results (from the past 4320 hours)   Echocardiogram Complete   Result Value    LVEF  45-50%    Narrative    [id number]  [id number]  [id number]  [id number]^ANIL^SANTOS^DELBERT     [facility]  Echocardiography Laboratory  [address] [place] [address]     Name: ALVAREZ JESUS  MRN: [id number]  : 1950  Study Date: 2025 07:21 AM  Age: 75 yrs  Gender: Male  Patient Location: [facility]  Reason For Study: Essential hypertension, benign, Other fatigue, NICM  (nonischemic  Ordering Physician: SANTOS MA  Referring Physician: SANTOS MA  Performed By: Andre Styles RDCS     BSA: 2.0 m2  Height: 67 in  Weight: 190 lb  HR: 61  BP: 143/77 mmHg  ______________________________________________________________________________  Procedure  Echocardiogram with two-dimensional, color and spectral Doppler.  ______________________________________________________________________________  Interpretation Summary     The left ventricle is normal in size.  There is mild global hypokinesia of the left ventricle.  The visual ejection fraction is 45-50%.  There is mild (1+) tricuspid regurgitation.  Right ventricle systolic pressure estimate normal  Ascending aorta dilatation is present, 4.2 cm  Compared to prior study, there is no significant change.  ______________________________________________________________________________  Left Ventricle  The left ventricle is normal in size. There is normal left ventricular wall  thickness. Diastolic Doppler findings (E/E' ratio and/or other parameters)  suggest left ventricular filling  pressures are indeterminate. The visual  ejection fraction is 45-50%. There is mild global hypokinesia of the left  ventricle.     Right Ventricle  The right ventricle is normal size. The right ventricular systolic function is  normal. There is a pacemaker lead in the right ventricle.     Atria  Normal left atrial size. Right atrial size is normal. Intact atrial septum.     Mitral Valve  The mitral valve is normal in structure and function. There is trace mitral  regurgitation.     Tricuspid Valve  There is mild (1+) tricuspid regurgitation. IVC diameter <2.1 cm collapsing  >50% with sniff suggests a normal RA pressure of 3 mmHg. The right ventricular  systolic pressure is approximated at 19.5 mmHg plus the right atrial pressure.  Right ventricle systolic pressure estimate normal.     Aortic Valve  The aortic valve is trileaflet with aortic valve sclerosis. No aortic  regurgitation is present. No aortic stenosis is present.     Pulmonic Valve  The pulmonic valve is not well seen, but is grossly normal. There is trace  pulmonic valvular regurgitation.     Vessels  Ascending aorta dilatation is present. 4.2 cm. The inferior vena cava was  normal in size with preserved respiratory variability.     Pericardium  There is no pericardial effusion.     Rhythm  Sinus rhythm was noted.  ______________________________________________________________________________  MMode/2D Measurements & Calculations  IVSd: 1.4 cm     LVIDd: 4.8 cm  LVIDs: 2.7 cm  LVPWd: 1.1 cm  IVC diam: 1.6 cm  FS: 43.3 %  LV mass(C)d: 234.5 grams  LV mass(C)dI: 118.5 grams/m2  Ao root diam: 3.8 cm  asc Aorta Diam: 4.2 cm  LVOT diam: 2.2 cm  LVOT area: 3.8 cm2  Ao root diam index Ht(cm/m): 2.2  Ao root diam index BSA (cm/m2): 1.9  Asc Ao diam index BSA (cm/m2): 2.1  Asc Ao diam index Ht(cm/m): 2.5  EF Biplane: 48.5 %  LA Volume (BP): 42.8 ml     LA Volume Index (BP): 21.6 ml/m2  RV Base: 3.6 cm  RWT: 0.46  TAPSE: 2.4 cm     Time Measurements  Aortic HR:  60.0 BPM     Doppler Measurements & Calculations  MV E max kyler: 43.5 cm/sec  MV A max kyler: 87.0 cm/sec  MV E/A: 0.50  MV max PG: 3.0 mmHg  MV mean P.2 mmHg  MV V2 VTI: 22.9 cm  MVA(VTI): 3.3 cm2  MV dec time: 0.23 sec  LV V1 max PG: 3.2 mmHg  LV V1 max: 89.6 cm/sec  LV V1 VTI: 19.5 cm  CO(LVOT): 4.5 l/min  CI(LVOT): 2.3 l/min/m2  SV(LVOT): 74.8 ml  SI(LVOT): 37.8 ml/m2     PA acc time: 0.11 sec  TR max kyler: 220.8 cm/sec  TR max P.5 mmHg  E/E' av.5  Lateral E/e': 10.5  Medial E/e': 6.4  RV S Kyler: 12.2 cm/sec     ______________________________________________________________________________  Report approved by: Chuck Augustine MD on 2025 09:39 AM

## 2025-06-24 NOTE — LETTER
6/24/2025    Tom Allison MD  [address] [facility] [address]    RE: Harpreet Vera       Dear Colleague,     I had the pleasure of seeing Harpreet Vera in the [facility].      Cardiology Clinic Progress Note:    June 24, 2025   Patient Name: Harpreet Vera  Patient MRN: [id number]     Consult indication: CHF    HPI:    I had the opportunity to see patient Harpreet Vera in cardiology clinic for a follow up visit. Patient is followed by our colleague Tom Allison MD with Primary Care.     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     Patient has a history of sick sinus syndrome status post PPM 8/2020.    TTE 10/2022 LVEF 55 to 60%.    TTE 9/2024 LVEF 45 to 50%.    Lexiscan stress test 10/2024 nontransmural infarction in the inferior and inferoseptal segments, mildly reduced LVEF, TID ratio elevated at 1.25.    Cardiac catheterization 10/2024 minimal nonocclusive CAD.    TTE 5/2025 LVEF 45 to 50%.  Reviewed personally and I concur with LVEF estimation.     Since last clinic visit with my colleague Enrrique Morrell NP patient reports that overall he feels stable from a cardiac perspective.  No chest pain, chest pressure, symptoms of orthopnea/PND, abnormal weight gain.  He does note some swelling in his ankles/feet occasionally.    Device interrogation 5/2025 AP 93%,  100%.    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    Assessment and Plan/Recommendations:    # HFmrEF LVEF 45-50%, NICM, cardiac cath 10/2024 non obstructive CAD. Possible could be related  to RV pacing (100%). Euvolemic on exam.  # SSS s/p dual chamber [employer] pacemaker 8/2020   # Ascending aorta 4.2 cm (within normal limits when indexed to age, BSA, sex)  # Cerebrovascular disease, chronic left vertebral artery stenosis, right vertebral artery stenosis status post stenting, followed by Vascular surgery    - Overall patient is in stable cardiac health without symptoms concerning for decompensated heart failure  - He does note some mild ankle swelling occasionally, suspect related to the amlodipine since he does not have other signs/symptoms suggesting fluid overload.  Discussed switching amlodipine to another agent, he is already maxed out on all losartan, did not tolerate carvedilol, so would trial a thiazide diuretic or MRA.  Patient notes that he is likely chronically dehydrated and so would like to continue with amlodipine, advised to continue monitoring  - Reviewed SGLT2i therapy, risks/benefits, patient would like to hold off for now given urologic pathology which I concur is appropriate   - Cardiac MRI  - Continue remainder of cardiac regimen  - Follow-up with Enrrique Morrell or Emilia Laureano in about 6 months, or sooner as needed    Thank you for allowing our team to participate in the care of Harpreet Vera.  Please do not hesitate to call or page me with any questions or concerns.    Sincerely,     Link Mccartney MD, [facility]  Cardiology  June 24, 2025      Voice recognition software utilized.     The longitudinal plan of care for the diagnosis(es)/condition(s) as documented were addressed during this visit. Due to the added complexity in care, I will continue to support Harpreet in the subsequent management and with ongoing continuity of care.     Past Medical History:   The ASCVD Risk score (Greenwood DK, et al., 2019) failed to calculate for the following reasons:    The valid total cholesterol range is 130 to 320 mg/dL  Past Medical History:   Diagnosis Date     Benign neoplasm  of colon      Brachial plexopathy      history of C. difficile infection 2007     Hypertension      Mumps, history of      MAN (obstructive sleep apnea)     BI-PAP     Sinus node dysfunction     with syncope     Sleep apnea     Bi-PAP        Past Surgical History:   Past Surgical History:   Procedure Laterality Date     ARTHROSCOPY KNEE Bilateral      ARTHROSCOPY SHOULDER Right 12/11/2020    Procedure: RIGHT SHOULDER ARTHROSCOPY, ARTHROSCOPIC SUBACROMIAL DECOMPRESSION, DISTAL CLAVICLE AND BICEPS TENOTOMY;  Surgeon: Joseph Moon MD;  Location:  OR     CHOLECYSTECTOMY       COLONOSCOPY N/A 04/05/2017    Procedure: COMBINED COLONOSCOPY, SINGLE OR MULTIPLE BIOPSY/POLYPECTOMY BY BIOPSY;  Surgeon: Tylor Rice MD;  Location:  GI     COLONOSCOPY N/A 04/01/2022    Procedure: COLONOSCOPY (FV);  Surgeon: Tylor Rice MD;  Location:  GI     COLONOSCOPY N/A 11/06/2024    Procedure: Colonoscopy with polypectomies using exacto and hot snare and biopsies using regular forceps;  Surgeon: Tylor Rice MD;  Location:  GI     COLONOSCOPY N/A 11/06/2024    Procedure: COLONOSCOPY, WITH POLYPECTOMY AND BIOPSY;  Surgeon: Tylor Rice MD;  Location:  GI     CV CORONARY ANGIOGRAM N/A 10/25/2024    Procedure: Coronary Angiogram;  Surgeon: Alexis Ruiz MD;  Location:  HEART CARDIAC CATH LAB     CYSTOSCOPY, TRANSURETHERAL WATERJET ABLATION PROSTATE, USING AQUABLATION N/A 6/18/2025    Procedure: CYSTOSCOPY, TRANSURETHERAL WATERJET ABLATION PROSTATE, USING AQUABLATION; TRANSURETHERAL RESECTION OF BLADDER TUMOR;  Surgeon: Bijan Childress MD;  Location:  OR     DECOMPRESSION, FUSION CERVICAL ANTERIOR ONE LEVEL, COMBINED N/A 09/09/2016    Procedure: COMBINED DECOMPRESSION, FUSION CERVICAL ANTERIOR ONE LEVEL;  Surgeon: Erick Valles MD;  Location:  OR     EP PACEMAKER N/A 08/21/2020    Procedure: EP Pacemaker;  Surgeon: Alfonso Sigala MD;  Location:  HEART CARDIAC CATH LAB      IR CAROTID CEREBRAL ANGIOGRAM BILATERAL  01/06/2020     IR CAROTID CEREBRAL ANGIOGRAM BILATERAL  07/01/2020     IR DISCONTINUE SHEATH  01/06/2020     TONSILLECTOMY       VASECTOMY         Medications (outpatient):  Current Outpatient Medications   Medication Sig Dispense Refill     acetaminophen (TYLENOL) 500 MG tablet Take 2 tablets (1,000 mg) by mouth every 6 hours as needed for mild pain. 100 tablet 0     alfuzosin ER (UROXATRAL) 10 MG 24 hr tablet Take 1 tablet (10 mg) by mouth daily. 90 tablet 3     allopurinol (ZYLOPRIM) 100 MG tablet Take 200 mg by mouth every evening (2 x 100mg)       amLODIPine (NORVASC) 5 MG tablet Take 1 tablet (5 mg) by mouth 2 times daily. 180 tablet 3     aspirin - buffered (ASCRIPTIN) 325 MG TABS tablet Take 1 tablet (325 mg) by mouth every morning.       docusate sodium (COLACE) 100 MG capsule Take 1 capsule (100 mg) by mouth 2 times daily. 30 capsule 0     metoprolol succinate ER (TOPROL XL) 25 MG 24 hr tablet TAKE 1 TABLET BY MOUTH TWICE DAILY 180 tablet 2     Multiple Vitamins-Minerals (SENIOR MULTIVITAMIN PLUS PO) Take by mouth.       nitroGLYcerin (NITROSTAT) 0.4 MG sublingual tablet For chest pain place 1 tablet under the tongue every 5 minutes for 3 doses. If symptoms persist 5 minutes after 1st dose call 911. 25 tablet 0     olmesartan (BENICAR) 40 MG tablet Take 1 tablet (40 mg) by mouth every morning 90 tablet 3     omeprazole (PRILOSEC) 20 MG DR capsule TAKE ONE CAPSULE BY MOUTH ONCE OR TWICE DAILY AS NEEDED (Patient taking differently: Take 20 mg by mouth daily.) 180 capsule 3     psyllium (METAMUCIL/KONSYL) 58.6 % powder Take by mouth daily.       rosuvastatin (CRESTOR) 20 MG tablet Take 1 tablet (20 mg) by mouth at bedtime 90 tablet 3     SIMETHICONE PO Take 2 tablets by mouth once. (Patient taking differently: Take 2 tablets by mouth daily as needed.)       vitamin D3 (CHOLECALCIFEROL) 50 mcg (2000 units) tablet Take 1 tablet by mouth daily.         Allergies:  No  "Known Allergies    Social History:   History   Drug Use No      History   Smoking Status     Never   Smokeless Tobacco     Never     Social History    Substance and Sexual Activity      Alcohol use: Yes        Alcohol/week: 1.0 standard drink of alcohol        Types: 1 Glasses of wine per week        Comment: 1-2 weekly       Family History:  Family History   Problem Relation Age of Onset     Arthritis Mother      Eye Disorder Mother         cateracts     Lipids Mother      Colorectal Cancer Mother      Cancer Father         colon/prostate     Eye Disorder Father         cateracts     Lipids Father      Diabetes Father      Prostate Cancer Brother      Colon Cancer Brother 74        liver mets     Kidney Cancer Brother      Prostate Cancer Brother      Colon Cancer Maternal Aunt      Cancer Maternal Aunt         colon     Colon Cancer Maternal Uncle      Cancer Maternal Uncle         colon         Objective & Physical Exam:  /76 (BP Location: Right arm, Patient Position: Sitting)   Pulse 64   Ht 1.727 m (5' 8\")   Wt 89 kg (196 lb 1.6 oz)   SpO2 98%   BMI 29.82 kg/m    Wt Readings from Last 2 Encounters:   06/24/25 89 kg (196 lb 1.6 oz)   06/18/25 87 kg (191 lb 14.4 oz)     Body mass index is 29.82 kg/m .   Body surface area is 2.07 meters squared.    Constitutional: appears stated age, in no apparent distress, appears to be well nourished  Pulmonary: clear to auscultation bilaterally, no wheezes, no rales, no increased work of breathing  Cardiovascular: regular rate, regular rhythm, no murmur appreciated, trace BLE edema    Data reviewed:  Lab Results   Component Value Date    WBC 12.2 (H) 06/19/2025    WBC 6.4 12/07/2020    RBC 4.02 (L) 06/19/2025    RBC 4.72 12/07/2020    HGB 12.9 (L) 06/19/2025    HGB 15.1 12/07/2020    HCT 34.6 (L) 06/19/2025    HCT 43.0 12/07/2020    MCV 86 06/19/2025    MCV 91 12/07/2020    MCH 32.1 06/19/2025    MCH 32.0 12/07/2020    MCHC 37.3 (H) 06/19/2025    MCHC 35.1 " 12/07/2020    RDW 12.0 06/19/2025    RDW 12.8 12/07/2020     06/19/2025     (L) 12/07/2020     Sodium   Date Value Ref Range Status   06/05/2025 142 135 - 145 mmol/L Final   12/07/2020 139 133 - 144 mmol/L Final     Potassium   Date Value Ref Range Status   06/05/2025 4.2 3.4 - 5.3 mmol/L Final   08/26/2022 4.4 3.4 - 5.3 mmol/L Final   12/11/2020 4.0 3.4 - 5.3 mmol/L Final     Chloride   Date Value Ref Range Status   06/05/2025 107 98 - 107 mmol/L Final   08/26/2022 110 (H) 94 - 109 mmol/L Final   12/07/2020 109 94 - 109 mmol/L Final     Carbon Dioxide   Date Value Ref Range Status   12/07/2020 28 20 - 32 mmol/L Final     Carbon Dioxide (CO2)   Date Value Ref Range Status   06/05/2025 24 22 - 29 mmol/L Final   08/26/2022 25 20 - 32 mmol/L Final     Anion Gap   Date Value Ref Range Status   06/05/2025 11 7 - 15 mmol/L Final   08/26/2022 5 3 - 14 mmol/L Final   12/07/2020 2 (L) 3 - 14 mmol/L Final     Glucose   Date Value Ref Range Status   08/26/2022 103 (H) 70 - 99 mg/dL Final   12/07/2020 94 70 - 99 mg/dL Final     Comment:     Non Fasting     GLUCOSE BY METER POCT   Date Value Ref Range Status   06/18/2025 99 70 - 99 mg/dL Final     Urea Nitrogen   Date Value Ref Range Status   06/05/2025 15.7 8.0 - 23.0 mg/dL Final   08/26/2022 19 7 - 30 mg/dL Final   12/07/2020 17 7 - 30 mg/dL Final     Creatinine   Date Value Ref Range Status   06/05/2025 1.15 0.67 - 1.17 mg/dL Final   12/11/2020 1.23 0.66 - 1.25 mg/dL Final     GFR Estimate   Date Value Ref Range Status   06/05/2025 66 >60 mL/min/1.73m2 Final     Comment:     eGFR calculated using 2021 CKD-EPI equation.   12/11/2020 58 (L) >60 mL/min/[1.73_m2] Final     Comment:     Non  GFR Calc  Starting 12/18/2018, serum creatinine based estimated GFR (eGFR) will be   calculated using the Chronic Kidney Disease Epidemiology Collaboration   (CKD-EPI) equation.       Calcium   Date Value Ref Range Status   06/05/2025 9.3 8.8 - 10.4 mg/dL Final    2020 9.3 8.5 - 10.1 mg/dL Final     Bilirubin Total   Date Value Ref Range Status   2025 0.9 <=1.2 mg/dL Final   2020 1.3 0.2 - 1.3 mg/dL Final     Alkaline Phosphatase   Date Value Ref Range Status   2025 59 40 - 150 U/L Final   2020 74 40 - 150 U/L Final     ALT   Date Value Ref Range Status   2025 31 0 - 70 U/L Final   2020 28 0 - 70 U/L Final     AST   Date Value Ref Range Status   2025 27 0 - 45 U/L Final   2020 20 0 - 45 U/L Final     Recent Labs   Lab Test 25  0753 24  0820   CHOL 105 113   HDL 36* 35*   LDL 43 43   TRIG 128 174*        Recent Results (from the past 4320 hours)   Echocardiogram Complete   Result Value    LVEF  45-50%    Narrative    [id number]  [id number]  [id number]  [id number]^ANIL^SANTOS^DELBERT     [facility]  Echocardiography Laboratory  [address] [place] [address]     Name: ALVAREZ JESUS  MRN: [id number]  : 1950  Study Date: 2025 07:21 AM  Age: 75 yrs  Gender: Male  Patient Location: [facility]  Reason For Study: Essential hypertension, benign, Other fatigue, NICM  (nonischemic  Ordering Physician: SANTOS MA  Referring Physician: SANTOS MA  Performed By: Andre Styles RDCS     BSA: 2.0 m2  Height: 67 in  Weight: 190 lb  HR: 61  BP: 143/77 mmHg  ______________________________________________________________________________  Procedure  Echocardiogram with two-dimensional, color and spectral Doppler.  ______________________________________________________________________________  Interpretation Summary     The left ventricle is normal in size.  There is mild global hypokinesia of the left ventricle.  The visual ejection fraction is 45-50%.  There is mild (1+) tricuspid regurgitation.  Right ventricle systolic pressure estimate normal  Ascending aorta dilatation is present, 4.2 cm  Compared to prior study, there is no significant  change.  ______________________________________________________________________________  Left Ventricle  The left ventricle is normal in size. There is normal left ventricular wall  thickness. Diastolic Doppler findings (E/E' ratio and/or other parameters)  suggest left ventricular filling pressures are indeterminate. The visual  ejection fraction is 45-50%. There is mild global hypokinesia of the left  ventricle.     Right Ventricle  The right ventricle is normal size. The right ventricular systolic function is  normal. There is a pacemaker lead in the right ventricle.     Atria  Normal left atrial size. Right atrial size is normal. Intact atrial septum.     Mitral Valve  The mitral valve is normal in structure and function. There is trace mitral  regurgitation.     Tricuspid Valve  There is mild (1+) tricuspid regurgitation. IVC diameter <2.1 cm collapsing  >50% with sniff suggests a normal RA pressure of 3 mmHg. The right ventricular  systolic pressure is approximated at 19.5 mmHg plus the right atrial pressure.  Right ventricle systolic pressure estimate normal.     Aortic Valve  The aortic valve is trileaflet with aortic valve sclerosis. No aortic  regurgitation is present. No aortic stenosis is present.     Pulmonic Valve  The pulmonic valve is not well seen, but is grossly normal. There is trace  pulmonic valvular regurgitation.     Vessels  Ascending aorta dilatation is present. 4.2 cm. The inferior vena cava was  normal in size with preserved respiratory variability.     Pericardium  There is no pericardial effusion.     Rhythm  Sinus rhythm was noted.  ______________________________________________________________________________  MMode/2D Measurements & Calculations  IVSd: 1.4 cm     LVIDd: 4.8 cm  LVIDs: 2.7 cm  LVPWd: 1.1 cm  IVC diam: 1.6 cm  FS: 43.3 %  LV mass(C)d: 234.5 grams  LV mass(C)dI: 118.5 grams/m2  Ao root diam: 3.8 cm  asc Aorta Diam: 4.2 cm  LVOT diam: 2.2 cm  LVOT area: 3.8 cm2  Ao root  diam index Ht(cm/m): 2.2  Ao root diam index BSA (cm/m2): 1.9  Asc Ao diam index BSA (cm/m2): 2.1  Asc Ao diam index Ht(cm/m): 2.5  EF Biplane: 48.5 %  LA Volume (BP): 42.8 ml     LA Volume Index (BP): 21.6 ml/m2  RV Base: 3.6 cm  RWT: 0.46  TAPSE: 2.4 cm     Time Measurements  Aortic HR: 60.0 BPM     Doppler Measurements & Calculations  MV E max kyler: 43.5 cm/sec  MV A max kyler: 87.0 cm/sec  MV E/A: 0.50  MV max PG: 3.0 mmHg  MV mean P.2 mmHg  MV V2 VTI: 22.9 cm  MVA(VTI): 3.3 cm2  MV dec time: 0.23 sec  LV V1 max PG: 3.2 mmHg  LV V1 max: 89.6 cm/sec  LV V1 VTI: 19.5 cm  CO(LVOT): 4.5 l/min  CI(LVOT): 2.3 l/min/m2  SV(LVOT): 74.8 ml  SI(LVOT): 37.8 ml/m2     PA acc time: 0.11 sec  TR max kyler: 220.8 cm/sec  TR max P.5 mmHg  E/E' av.5  Lateral E/e': 10.5  Medial E/e': 6.4  RV S Kyler: 12.2 cm/sec     ______________________________________________________________________________  Report approved by: Chuck Augustine MD on 2025 09:39 AM                Thank you for allowing me to participate in the care of your patient.      Sincerely,     Link Mccartney MD     [facility]  cc:   Link Mccartney MD  [address]  [place] [address]

## 2025-06-24 NOTE — PATIENT INSTRUCTIONS
June 24, 2025    Thank you for allowing our Cardiology team to participate in your care.     Please note the following changes to your heart treatment plan:     Medication changes:   - none    Tests to be done:  - cardiac MRI    Follow up:  - Follow up in 6 months, or sooner as needed      For scheduling, please call [phone]      Please contact our team through [email or web address] or our Nurse Team [email or web address] service [phone] for questions or concerns.     General Clinic [phone]     If you are having a medical emergency, please call 911.     Sincerely,    Link Mccartney MD, FACC  Cardiology    [facility] - [facility]

## 2025-06-26 ENCOUNTER — RESULTS FOLLOW-UP (OUTPATIENT)
Dept: SURGERY | Facility: CLINIC | Age: 75
End: 2025-06-26

## 2025-07-06 DIAGNOSIS — I10 ESSENTIAL HYPERTENSION, BENIGN: ICD-10-CM

## 2025-07-07 ENCOUNTER — TRANSFERRED RECORDS (OUTPATIENT)
Dept: HEALTH INFORMATION MANAGEMENT | Facility: CLINIC | Age: 75
End: 2025-07-07
Payer: COMMERCIAL

## 2025-07-07 ENCOUNTER — APPOINTMENT (OUTPATIENT)
Age: 75
Setting detail: DERMATOLOGY
End: 2025-07-07

## 2025-07-07 DIAGNOSIS — L82.1 OTHER SEBORRHEIC KERATOSIS: ICD-10-CM

## 2025-07-07 DIAGNOSIS — Z85.828 PERSONAL HISTORY OF OTHER MALIGNANT NEOPLASM OF SKIN: ICD-10-CM

## 2025-07-07 DIAGNOSIS — L57.0 ACTINIC KERATOSIS: ICD-10-CM | Status: STABLE

## 2025-07-07 PROCEDURE — ? COUNSELING

## 2025-07-07 PROCEDURE — ? OBSERVATION

## 2025-07-07 RX ORDER — OLMESARTAN MEDOXOMIL 40 MG/1
40 TABLET ORAL EVERY MORNING
Qty: 90 TABLET | Refills: 3 | Status: SHIPPED | OUTPATIENT
Start: 2025-07-07

## 2025-07-07 ASSESSMENT — LOCATION ZONE DERM
LOCATION ZONE: TRUNK
LOCATION ZONE: FACE

## 2025-07-07 ASSESSMENT — LOCATION DETAILED DESCRIPTION DERM
LOCATION DETAILED: RIGHT MEDIAL SUPERIOR CHEST
LOCATION DETAILED: RIGHT INFERIOR LATERAL MALAR CHEEK

## 2025-07-07 ASSESSMENT — LOCATION SIMPLE DESCRIPTION DERM
LOCATION SIMPLE: CHEST
LOCATION SIMPLE: RIGHT CHEEK

## 2025-07-07 NOTE — HPI: SKIN LESION
What Type Of Note Output Would You Prefer (Optional)?: Bullet Format
How Severe Is Your Skin Lesion?: mild
Has Your Skin Lesion Been Treated?: not been treated
Is This A New Presentation, Or A Follow-Up?: Growth
Additional History: He states that he is able to shave over it without any issues.  He did think it was a wart at first, but has not tried to treat with anything.

## 2025-07-09 ENCOUNTER — OFFICE VISIT (OUTPATIENT)
Dept: INTERNAL MEDICINE | Facility: CLINIC | Age: 75
End: 2025-07-09
Attending: NURSE PRACTITIONER
Payer: COMMERCIAL

## 2025-07-09 VITALS
HEART RATE: 68 BPM | TEMPERATURE: 97.8 F | BODY MASS INDEX: 29.34 KG/M2 | OXYGEN SATURATION: 100 % | WEIGHT: 193.6 LBS | RESPIRATION RATE: 16 BRPM | DIASTOLIC BLOOD PRESSURE: 82 MMHG | SYSTOLIC BLOOD PRESSURE: 138 MMHG | HEIGHT: 68 IN

## 2025-07-09 DIAGNOSIS — N40.1 BENIGN PROSTATIC HYPERPLASIA WITH URINARY HESITANCY: Primary | ICD-10-CM

## 2025-07-09 DIAGNOSIS — C68.9 UROTHELIAL CARCINOMA (H): ICD-10-CM

## 2025-07-09 DIAGNOSIS — R39.11 BENIGN PROSTATIC HYPERPLASIA WITH URINARY HESITANCY: Primary | ICD-10-CM

## 2025-07-09 PROCEDURE — 99213 OFFICE O/P EST LOW 20 MIN: CPT | Performed by: INTERNAL MEDICINE

## 2025-07-09 PROCEDURE — 3075F SYST BP GE 130 - 139MM HG: CPT | Performed by: INTERNAL MEDICINE

## 2025-07-09 PROCEDURE — 3079F DIAST BP 80-89 MM HG: CPT | Performed by: INTERNAL MEDICINE

## 2025-07-09 PROCEDURE — 1111F DSCHRG MED/CURRENT MED MERGE: CPT | Performed by: INTERNAL MEDICINE

## 2025-07-09 NOTE — PROGRESS NOTES
"  Assessment & Plan     Benign prostatic hyperplasia with urinary hesitancy and Urothelial carcinoma (H)  Patient appears to be doing well after his recent cystoscopy with resection of a urothelial carcinoma.  His urinary symptoms have almost fully resolved.  Patient is not reporting any discomfort.  He does have a follow-up cystoscopy scheduled with his urologist in 3 months.  In the meantime, he will continue his alfuzosin ER 10 mg daily.  Side effects of medication were reviewed.  Patient had no further questions or concerns at this time.      MED REC REQUIRED{  Post Medication Reconciliation Status: discharge medications reconciled, continue medications without change  BMI  Estimated body mass index is 29.44 kg/m  as calculated from the following:    Height as of this encounter: 1.727 m (5' 8\").    Weight as of this encounter: 87.8 kg (193 lb 9.6 oz).       Follow-up    Follow-up Visit   Expected date:  Oct 09, 2025 (Approximate)      Follow Up Appointment Details:     Follow-up with whom?: PCP    Follow-Up for what?: Medicare Wellness    Welcome or Annual?: Annual Wellness    How?: In Person                 Zhanna Mullins is a 75 year old, presenting for the following health issues:  Hospital F/U    Patient is a 75-year-old  male who presents to the clinic for a hospital follow-up visit.  Patient was recently admitted to [facility] on June 18, 2025 with urinary difficulties.  Patient was having some difficulty with emptying his bladder as well as some dysuria.  While in the hospital, he did undergo a cystoscopy with transurethral water jet ablation procedure.  Patient was found to have a tumor that was completely removed during this procedure.  Surgical pathology did reveal a noninvasive low-grade papillary urothelial cell carcinoma.  Patient was deemed stable for discharge on June 19, 2025.  He was continued on alfuzosin ER 10 mg daily.  His previous prescription for finasteride was " discontinued.  Since being at home, patient states that his urinary symptoms have improved tremendously.  He is not voiding his bladder as frequently as he was before.  Patient denies any dysuria, and he states that the hematuria is slowly resolving.  Patient was instructed that he should have a follow-up cystoscopy in 3 months to ensure that there has been no recurrence of the tumor.            Hospital Follow-up Visit:    Hospital/Nursing Home/IP Rehab Facility: [facility]  Most Recent Admission Date: 6/18/2025   Most Recent Admission Diagnosis: BPH with obstruction/lower urinary tract symptoms - N40.1, N13.8     Most Recent Discharge Date: 6/19/2025   Most Recent Discharge Diagnosis: Benign prostatic hyperplasia with urinary hesitancy - N40.1, R39.11   Do you have any other stressors you would like to discuss with your provider? Health Concerns    Problems taking medications regularly:  None  Medication changes since discharge: None  Problems adhering to non-medication therapy:  None    Summary of hospitalization:  [facility] discharge summary reviewed  Diagnostic Tests/Treatments reviewed.  Follow up needed: none  Other Healthcare Providers Involved in Patient s Care:         Specialist appointment - Urology  Update since discharge: improved.       Plan of care communicated with patient         Review of Systems  CONSTITUTIONAL: NEGATIVE for fever, chills, change in weight  INTEGUMENTARY/SKIN: NEGATIVE for worrisome rashes, moles or lesions  ENT/MOUTH: NEGATIVE for ear, mouth and throat problems  RESP: NEGATIVE for significant cough or SOB  CV: NEGATIVE for chest pain, palpitations or peripheral edema  GI: NEGATIVE for nausea, abdominal pain, heartburn, or change in bowel habits  : NEGATIVE for frequency, dysuria, or hematuria  MUSCULOSKELETAL: NEGATIVE for significant arthralgias or myalgia  NEURO: NEGATIVE for weakness, dizziness or paresthesias      Objective   "  Blood pressure 138/82, pulse 68, temperature 97.8  F (36.6  C), temperature source Oral, resp. rate 16, height 1.727 m (5' 8\"), weight 87.8 kg (193 lb 9.6 oz), SpO2 100%.    Physical Exam  Vitals reviewed.   Constitutional:       Appearance: Normal appearance.   HENT:      Head: Normocephalic and atraumatic.      Mouth/Throat:      Mouth: Mucous membranes are moist.      Pharynx: Oropharynx is clear.   Eyes:      Extraocular Movements: Extraocular movements intact.      Conjunctiva/sclera: Conjunctivae normal.      Pupils: Pupils are equal, round, and reactive to light.   Cardiovascular:      Rate and Rhythm: Normal rate and regular rhythm.      Pulses: Normal pulses.      Heart sounds: Normal heart sounds.   Pulmonary:      Effort: Pulmonary effort is normal.      Breath sounds: Normal breath sounds.   Abdominal:      General: Bowel sounds are normal.      Palpations: Abdomen is soft.   Skin:     General: Skin is warm and dry.   Neurological:      Mental Status: He is alert.                Signed Electronically by: Tom Allison MD    "

## 2025-07-14 ENCOUNTER — ANCILLARY PROCEDURE (OUTPATIENT)
Dept: CARDIOLOGY | Facility: CLINIC | Age: 75
End: 2025-07-14
Attending: INTERNAL MEDICINE
Payer: COMMERCIAL

## 2025-07-14 ENCOUNTER — HOSPITAL ENCOUNTER (OUTPATIENT)
Dept: MRI IMAGING | Facility: CLINIC | Age: 75
Discharge: HOME | End: 2025-07-14
Attending: INTERNAL MEDICINE
Payer: COMMERCIAL

## 2025-07-14 ENCOUNTER — HOSPITAL ENCOUNTER (OUTPATIENT)
Dept: GENERAL RADIOLOGY | Facility: CLINIC | Age: 75
Discharge: HOME | End: 2025-07-14
Attending: INTERNAL MEDICINE
Payer: COMMERCIAL

## 2025-07-14 VITALS — HEART RATE: 80 BPM | OXYGEN SATURATION: 92 %

## 2025-07-14 DIAGNOSIS — Z45.02 FITTING AND ADJUSTMENT OF AUTOMATIC IMPLANTABLE CARDIOVERTER-DEFIBRILLATOR: ICD-10-CM

## 2025-07-14 DIAGNOSIS — I42.8 NICM (NONISCHEMIC CARDIOMYOPATHY) (H): ICD-10-CM

## 2025-07-14 DIAGNOSIS — Z45.02 FITTING AND ADJUSTMENT OF AUTOMATIC IMPLANTABLE CARDIOVERTER-DEFIBRILLATOR: Primary | ICD-10-CM

## 2025-07-14 LAB
MDC_IDC_LEAD_CONNECTION_STATUS: NORMAL
MDC_IDC_LEAD_IMPLANT_DT: NORMAL
MDC_IDC_LEAD_LOCATION: NORMAL
MDC_IDC_LEAD_LOCATION_DETAIL_1: NORMAL
MDC_IDC_LEAD_MFG: NORMAL
MDC_IDC_LEAD_MODEL: NORMAL
MDC_IDC_LEAD_POLARITY_TYPE: NORMAL
MDC_IDC_LEAD_SERIAL: NORMAL
MDC_IDC_MSMT_BATTERY_DTM: NORMAL
MDC_IDC_MSMT_BATTERY_REMAINING_LONGEVITY: 126 MO
MDC_IDC_MSMT_BATTERY_REMAINING_PERCENTAGE: 100 %
MDC_IDC_MSMT_BATTERY_STATUS: NORMAL
MDC_IDC_MSMT_LEADCHNL_RA_IMPEDANCE_VALUE: 751 OHM
MDC_IDC_MSMT_LEADCHNL_RA_IMPEDANCE_VALUE: 793 OHM
MDC_IDC_MSMT_LEADCHNL_RA_PACING_THRESHOLD_AMPLITUDE: 0.5 V
MDC_IDC_MSMT_LEADCHNL_RA_PACING_THRESHOLD_AMPLITUDE: 0.5 V
MDC_IDC_MSMT_LEADCHNL_RA_PACING_THRESHOLD_PULSEWIDTH: 0.4 MS
MDC_IDC_MSMT_LEADCHNL_RA_PACING_THRESHOLD_PULSEWIDTH: 0.4 MS
MDC_IDC_MSMT_LEADCHNL_RA_SENSING_INTR_AMPL: 4.3 MV
MDC_IDC_MSMT_LEADCHNL_RV_IMPEDANCE_VALUE: 593 OHM
MDC_IDC_MSMT_LEADCHNL_RV_IMPEDANCE_VALUE: 601 OHM
MDC_IDC_MSMT_LEADCHNL_RV_PACING_THRESHOLD_AMPLITUDE: 0.5 V
MDC_IDC_MSMT_LEADCHNL_RV_PACING_THRESHOLD_AMPLITUDE: 0.7 V
MDC_IDC_MSMT_LEADCHNL_RV_PACING_THRESHOLD_PULSEWIDTH: 0.4 MS
MDC_IDC_MSMT_LEADCHNL_RV_PACING_THRESHOLD_PULSEWIDTH: 0.4 MS
MDC_IDC_PG_IMPLANT_DTM: NORMAL
MDC_IDC_PG_IMPLANT_DTM: NORMAL
MDC_IDC_PG_MFG: NORMAL
MDC_IDC_PG_MFG: NORMAL
MDC_IDC_PG_MODEL: NORMAL
MDC_IDC_PG_MODEL: NORMAL
MDC_IDC_PG_SERIAL: NORMAL
MDC_IDC_PG_SERIAL: NORMAL
MDC_IDC_PG_TYPE: NORMAL
MDC_IDC_PG_TYPE: NORMAL
MDC_IDC_SESS_CLINIC_NAME: NORMAL
MDC_IDC_SESS_CLINIC_NAME: NORMAL
MDC_IDC_SESS_DTM: NORMAL
MDC_IDC_SESS_DTM: NORMAL
MDC_IDC_SESS_TYPE: NORMAL
MDC_IDC_SESS_TYPE: NORMAL
MDC_IDC_SET_BRADY_AT_MODE_SWITCH_MODE: NORMAL
MDC_IDC_SET_BRADY_AT_MODE_SWITCH_MODE: NORMAL
MDC_IDC_SET_BRADY_AT_MODE_SWITCH_RATE: 170 {BEATS}/MIN
MDC_IDC_SET_BRADY_AT_MODE_SWITCH_RATE: 170 {BEATS}/MIN
MDC_IDC_SET_BRADY_LOWRATE: 60 {BEATS}/MIN
MDC_IDC_SET_BRADY_LOWRATE: 60 {BEATS}/MIN
MDC_IDC_SET_BRADY_MAX_SENSOR_RATE: 130 {BEATS}/MIN
MDC_IDC_SET_BRADY_MAX_SENSOR_RATE: 130 {BEATS}/MIN
MDC_IDC_SET_BRADY_MAX_TRACKING_RATE: 130 {BEATS}/MIN
MDC_IDC_SET_BRADY_MAX_TRACKING_RATE: 130 {BEATS}/MIN
MDC_IDC_SET_BRADY_MODE: NORMAL
MDC_IDC_SET_BRADY_MODE: NORMAL
MDC_IDC_SET_BRADY_PAV_DELAY_HIGH: 150 MS
MDC_IDC_SET_BRADY_PAV_DELAY_HIGH: 150 MS
MDC_IDC_SET_BRADY_PAV_DELAY_LOW: 200 MS
MDC_IDC_SET_BRADY_PAV_DELAY_LOW: 200 MS
MDC_IDC_SET_BRADY_SAV_DELAY_HIGH: 150 MS
MDC_IDC_SET_BRADY_SAV_DELAY_HIGH: 150 MS
MDC_IDC_SET_BRADY_SAV_DELAY_LOW: 200 MS
MDC_IDC_SET_BRADY_SAV_DELAY_LOW: 200 MS
MDC_IDC_SET_LEADCHNL_RA_PACING_AMPLITUDE: 2 V
MDC_IDC_SET_LEADCHNL_RA_PACING_AMPLITUDE: 2 V
MDC_IDC_SET_LEADCHNL_RA_PACING_CAPTURE_MODE: NORMAL
MDC_IDC_SET_LEADCHNL_RA_PACING_CAPTURE_MODE: NORMAL
MDC_IDC_SET_LEADCHNL_RA_PACING_POLARITY: NORMAL
MDC_IDC_SET_LEADCHNL_RA_PACING_POLARITY: NORMAL
MDC_IDC_SET_LEADCHNL_RA_PACING_PULSEWIDTH: 0.4 MS
MDC_IDC_SET_LEADCHNL_RA_PACING_PULSEWIDTH: 0.4 MS
MDC_IDC_SET_LEADCHNL_RA_SENSING_ADAPTATION_MODE: NORMAL
MDC_IDC_SET_LEADCHNL_RA_SENSING_ADAPTATION_MODE: NORMAL
MDC_IDC_SET_LEADCHNL_RA_SENSING_POLARITY: NORMAL
MDC_IDC_SET_LEADCHNL_RA_SENSING_POLARITY: NORMAL
MDC_IDC_SET_LEADCHNL_RA_SENSING_SENSITIVITY: 0.25 MV
MDC_IDC_SET_LEADCHNL_RA_SENSING_SENSITIVITY: 0.25 MV
MDC_IDC_SET_LEADCHNL_RV_PACING_AMPLITUDE: 1.2 V
MDC_IDC_SET_LEADCHNL_RV_PACING_AMPLITUDE: 3.5 V
MDC_IDC_SET_LEADCHNL_RV_PACING_CAPTURE_MODE: NORMAL
MDC_IDC_SET_LEADCHNL_RV_PACING_CAPTURE_MODE: NORMAL
MDC_IDC_SET_LEADCHNL_RV_PACING_POLARITY: NORMAL
MDC_IDC_SET_LEADCHNL_RV_PACING_POLARITY: NORMAL
MDC_IDC_SET_LEADCHNL_RV_PACING_PULSEWIDTH: 0.4 MS
MDC_IDC_SET_LEADCHNL_RV_PACING_PULSEWIDTH: 0.4 MS
MDC_IDC_SET_LEADCHNL_RV_SENSING_ADAPTATION_MODE: NORMAL
MDC_IDC_SET_LEADCHNL_RV_SENSING_ADAPTATION_MODE: NORMAL
MDC_IDC_SET_LEADCHNL_RV_SENSING_POLARITY: NORMAL
MDC_IDC_SET_LEADCHNL_RV_SENSING_POLARITY: NORMAL
MDC_IDC_SET_LEADCHNL_RV_SENSING_SENSITIVITY: 1.5 MV
MDC_IDC_SET_LEADCHNL_RV_SENSING_SENSITIVITY: 1.5 MV
MDC_IDC_SET_ZONE_DETECTION_INTERVAL: 375 MS
MDC_IDC_SET_ZONE_DETECTION_INTERVAL: 375 MS
MDC_IDC_SET_ZONE_STATUS: NORMAL
MDC_IDC_SET_ZONE_STATUS: NORMAL
MDC_IDC_SET_ZONE_TYPE: NORMAL
MDC_IDC_SET_ZONE_TYPE: NORMAL
MDC_IDC_SET_ZONE_VENDOR_TYPE: NORMAL
MDC_IDC_SET_ZONE_VENDOR_TYPE: NORMAL
MDC_IDC_STAT_AT_BURDEN_PERCENT: 0 %
MDC_IDC_STAT_AT_DTM_END: NORMAL
MDC_IDC_STAT_AT_DTM_START: NORMAL
MDC_IDC_STAT_BRADY_DTM_END: NORMAL
MDC_IDC_STAT_BRADY_DTM_START: NORMAL
MDC_IDC_STAT_BRADY_RA_PERCENT_PACED: 93 %
MDC_IDC_STAT_BRADY_RV_PERCENT_PACED: 100 %
MDC_IDC_STAT_EPISODE_RECENT_COUNT: 0
MDC_IDC_STAT_EPISODE_RECENT_COUNT: 1
MDC_IDC_STAT_EPISODE_RECENT_COUNT: 1
MDC_IDC_STAT_EPISODE_RECENT_COUNT_DTM_END: NORMAL
MDC_IDC_STAT_EPISODE_RECENT_COUNT_DTM_START: NORMAL
MDC_IDC_STAT_EPISODE_TYPE: NORMAL
MDC_IDC_STAT_EPISODE_VENDOR_TYPE: NORMAL
MDC_IDC_STAT_EPISODE_VENDOR_TYPE: NORMAL

## 2025-07-14 PROCEDURE — 71046 X-RAY EXAM CHEST 2 VIEWS: CPT

## 2025-07-14 PROCEDURE — A9585 GADOBUTROL INJECTION: HCPCS | Performed by: INTERNAL MEDICINE

## 2025-07-14 PROCEDURE — 71046 X-RAY EXAM CHEST 2 VIEWS: CPT | Mod: 26 | Performed by: RADIOLOGY

## 2025-07-14 PROCEDURE — 255N000002 HC RX 255 OP 636: Performed by: INTERNAL MEDICINE

## 2025-07-14 PROCEDURE — 93286 PERI-PX EVAL PM/LDLS PM IP: CPT | Mod: 26 | Performed by: INTERNAL MEDICINE

## 2025-07-14 PROCEDURE — 93286 PERI-PX EVAL PM/LDLS PM IP: CPT

## 2025-07-14 PROCEDURE — 75561 CARDIAC MRI FOR MORPH W/DYE: CPT

## 2025-07-14 RX ORDER — GADOBUTROL 604.72 MG/ML
10 INJECTION INTRAVENOUS ONCE
Status: COMPLETED | OUTPATIENT
Start: 2025-07-14 | End: 2025-07-14

## 2025-07-14 RX ADMIN — GADOBUTROL 10 ML: 604.72 INJECTION INTRAVENOUS at 12:56

## 2025-07-16 ENCOUNTER — TELEPHONE (OUTPATIENT)
Dept: CARDIOLOGY | Facility: CLINIC | Age: 75
End: 2025-07-16
Payer: COMMERCIAL

## 2025-07-16 DIAGNOSIS — Z95.0 CARDIAC PACEMAKER IN SITU: Primary | ICD-10-CM

## 2025-07-16 DIAGNOSIS — I42.8 OTHER CARDIOMYOPATHY (H): ICD-10-CM

## 2025-07-16 NOTE — TELEPHONE ENCOUNTER
Regarding results of Cardiac MRI:    Link Mccartney MD to [facility] Heart Team 2     7/16/25 12:50 PM    Results reviewed, please let the patient know that overall findings are reassuring, stable findings from TTE, no new abnormalities seen. Recommend he see cardiology EP for evaluation of whether the RV pacing could be causing the cardiomyopathy thanks            ==============================================    Patient called to inform that Dr. Mccartney has reviewed results of cardiac MRI and above interpretation and recommendation. Questions answered and patient is agreeable to plan. EP scheduling messaged to assist in making appointment.

## 2025-08-04 ENCOUNTER — ANCILLARY PROCEDURE (OUTPATIENT)
Dept: ULTRASOUND IMAGING | Facility: CLINIC | Age: 75
End: 2025-08-04
Payer: COMMERCIAL

## 2025-08-04 ENCOUNTER — TRANSFERRED RECORDS (OUTPATIENT)
Dept: HEALTH INFORMATION MANAGEMENT | Facility: CLINIC | Age: 75
End: 2025-08-04

## 2025-08-04 DIAGNOSIS — R60.9 EDEMA: ICD-10-CM

## 2025-08-04 LAB
ALT SERPL-CCNC: 19 IU/L (ref 9–46)
AST SERPL-CCNC: 20 U/L (ref 10–40)
CREATININE (EXTERNAL): 1.2 MG/DL (ref 0.6–1.35)

## 2025-08-04 PROCEDURE — 93970 EXTREMITY STUDY: CPT

## 2025-08-18 ENCOUNTER — OFFICE VISIT (OUTPATIENT)
Dept: CARDIOLOGY | Facility: CLINIC | Age: 75
End: 2025-08-18
Payer: COMMERCIAL

## 2025-08-18 VITALS
HEART RATE: 64 BPM | DIASTOLIC BLOOD PRESSURE: 80 MMHG | HEIGHT: 68 IN | OXYGEN SATURATION: 97 % | SYSTOLIC BLOOD PRESSURE: 142 MMHG | WEIGHT: 198 LBS | BODY MASS INDEX: 30.01 KG/M2

## 2025-08-18 DIAGNOSIS — I42.8 OTHER CARDIOMYOPATHY (H): ICD-10-CM

## 2025-08-18 DIAGNOSIS — Z95.0 CARDIAC PACEMAKER IN SITU: ICD-10-CM

## 2025-09-02 ENCOUNTER — TELEPHONE (OUTPATIENT)
Dept: CARDIOLOGY | Facility: CLINIC | Age: 75
End: 2025-09-02

## 2025-09-02 DIAGNOSIS — I10 ESSENTIAL HYPERTENSION, BENIGN: ICD-10-CM

## 2025-09-03 RX ORDER — METOPROLOL SUCCINATE 25 MG/1
TABLET, EXTENDED RELEASE ORAL
Qty: 270 TABLET | Refills: 3 | Status: SHIPPED | OUTPATIENT
Start: 2025-09-03

## (undated) DEVICE — ENDO APPLICATOR SURGIFLO PLASMA COBLATION MS1995

## (undated) DEVICE — CATH FOLEY 3WAY 22FR 30ML LATEX 0167SI22

## (undated) DEVICE — GLOVE PROTEXIS W/NEU-THERA 8.5  2D73TE85

## (undated) DEVICE — PAD CHUX UNDERPAD 23X24" 7136

## (undated) DEVICE — GLOVE PROTEXIS MICRO 7.0 LT BLUE 2D73PM70

## (undated) DEVICE — SOL NACL 0.9% IRRIG 1000ML BOTTLE 2F7124

## (undated) DEVICE — CATH ANGIO INFINITI 3DRC 4FRX100CM 538476

## (undated) DEVICE — PACK PCMKR PERM SRG PROC LF SAN32PC573

## (undated) DEVICE — ESU ELEC CUTTING LOOP 24/26FR .35MM BIPOLAR 27040GP1-S

## (undated) DEVICE — KIT TURNOVER FAIRVIEW SOUTHDALE FULL SP3889

## (undated) DEVICE — GEL ULTRASOUND AQUASONIC 20GM 01-01

## (undated) DEVICE — PREP CHLORAPREP 26ML TINTED ORANGE  260815

## (undated) DEVICE — CATH ANGIO JUDKINS JL4 6FRX100CM INFINITI 534620T

## (undated) DEVICE — PAD CRYOCUFF SHOULDER CUFF & JUG

## (undated) DEVICE — ARTHROSCOPIC CANNULA 5.5X70MM GRAY  9718

## (undated) DEVICE — GUIDEWIRE URO STR STIFF .035"X150CM NITINOL 150NSS35

## (undated) DEVICE — SYR 50ML CATH TIP W/O NDL 309620

## (undated) DEVICE — TOTE ANGIO CORP PC15AT SAN32CC83O

## (undated) DEVICE — INTRODUCER CATH VASC 5FRX10CM  MPIS-501-NT-U-SST

## (undated) DEVICE — PACK SHOULDER ARTHROSCOPY SOP15SAFSH

## (undated) DEVICE — Device

## (undated) DEVICE — GLOVE PROTEXIS BLUE W/NEU-THERA 7.0  2D73EB70

## (undated) DEVICE — SOLUTION IV IRRIGATION 0.9% NACL 3L R8206

## (undated) DEVICE — SHEATH PRELUDE SNAP 13CM 6FR

## (undated) DEVICE — AQUABEAM DRAPE PACK

## (undated) DEVICE — SOL WATER IRRIG 1000ML BOTTLE 2F7114

## (undated) DEVICE — TUBING SUCTION 12"X1/4" N612

## (undated) DEVICE — DRAPE STERI TOWEL LG 1010

## (undated) DEVICE — CATH ANGIO INFINITI 3DRC 6FRX100CM 534676T

## (undated) DEVICE — KIT ENDO TURNOVER/PROCEDURE W/CLEAN A SCOPE LINERS 103888

## (undated) DEVICE — MANIFOLD KIT ANGIO AUTOMATED 014613

## (undated) DEVICE — JELLY LUBRICATING SURGILUBE 2OZ TUBE

## (undated) DEVICE — PACK TUR CUSTOM SBA15RUFSE

## (undated) DEVICE — BAG URINARY DRAIN 4000ML LF 153509

## (undated) DEVICE — MEDITRACE MULTIFUNTION ADULT RADIOTRANSPARENT ELECTRODE FOR ZOLL

## (undated) DEVICE — NDL COUNTER 10CT

## (undated) DEVICE — GLOVE PROTEXIS POWDER FREE 6.5 ORTHOPEDIC 2D73ET65

## (undated) DEVICE — DEFIB PRO-PADZ LVP LQD GEL ADULT 8900-2105-01

## (undated) DEVICE — EVACUATOR BLADDER UROVAC LATEX M0067301250

## (undated) DEVICE — SUCTION CANISTER MEDIVAC LINER 3000ML W/LID 65651-530

## (undated) DEVICE — AQUABEAM HANDPIECE

## (undated) DEVICE — ENDO FORCEP BX CAPTURA JUMBO SPIKE 2.8MMX230CM G53042

## (undated) DEVICE — BAG DRAIN URO FOR SIEMENS 8MM ADAPTER NS CC164NS-A

## (undated) DEVICE — ESU CLEANER TIP 31142717

## (undated) DEVICE — GLOVE PROTEXIS MICRO 6.5  2D73PM65

## (undated) DEVICE — ABLATOR ARTHREX APOLLO RF MP90 ASPIRATING 90DEG AR-9811

## (undated) DEVICE — BUR ARTHREX COOLCUT OVAL 5.5MMX13CM AR-8550FOE

## (undated) DEVICE — SU PDS II 2-0 CT-2 27"  Z333H

## (undated) DEVICE — SOL NACL 0.9% IRRIG 3000ML BAG 2B7477

## (undated) DEVICE — CATH HOLDER STRAP 36600

## (undated) DEVICE — MANIFOLD NEPTUNE 4 PORT 700-20

## (undated) DEVICE — TUBING ARTHROSCOPY PUMP ARTHREX AR-6410

## (undated) DEVICE — NDL 19GA 1.5"

## (undated) DEVICE — ESU ELEC BIPOLAR CUTTING LOOP EXT LENGTH 24/26FR 27040GP130-

## (undated) DEVICE — SU ETHILON 3-0 FS-1 18" 669H

## (undated) DEVICE — RX SURGIFLO HEMOSTATIC MATRIX W/THROMBIN 8ML 2994

## (undated) DEVICE — SLING ARM LG 79-99157

## (undated) DEVICE — GLOVE PROTEXIS BLUE W/NEU-THERA 8.5  2D73EB85

## (undated) DEVICE — DRAPE ARTHROSCOPY SHOULDER BEACHCHAIR 29369

## (undated) DEVICE — DRAPE MAYO STAND 23X54 8337

## (undated) DEVICE — LINEN TOWEL PACK X5 5464

## (undated) DEVICE — KIT HAND CONTROL ANGIOTOUCH ACIST 65CM AT-P65

## (undated) DEVICE — INTRO SHEATH 6FRX10CM PINNACLE RSS602

## (undated) RX ORDER — FENTANYL CITRATE 50 UG/ML
INJECTION, SOLUTION INTRAMUSCULAR; INTRAVENOUS
Status: DISPENSED
Start: 2025-06-18

## (undated) RX ORDER — HEPARIN SODIUM 200 [USP'U]/100ML
INJECTION, SOLUTION INTRAVENOUS
Status: DISPENSED
Start: 2024-10-25

## (undated) RX ORDER — FENTANYL CITRATE 50 UG/ML
INJECTION, SOLUTION INTRAMUSCULAR; INTRAVENOUS
Status: DISPENSED
Start: 2024-10-25

## (undated) RX ORDER — LIDOCAINE HYDROCHLORIDE 20 MG/ML
INJECTION, SOLUTION EPIDURAL; INFILTRATION; INTRACAUDAL; PERINEURAL
Status: DISPENSED
Start: 2020-12-11

## (undated) RX ORDER — FENTANYL CITRATE 0.05 MG/ML
INJECTION, SOLUTION INTRAMUSCULAR; INTRAVENOUS
Status: DISPENSED
Start: 2025-06-18

## (undated) RX ORDER — PROPOFOL 10 MG/ML
INJECTION, EMULSION INTRAVENOUS
Status: DISPENSED
Start: 2020-12-11

## (undated) RX ORDER — BUPIVACAINE HYDROCHLORIDE 2.5 MG/ML
INJECTION, SOLUTION EPIDURAL; INFILTRATION; INTRACAUDAL
Status: DISPENSED
Start: 2020-08-21

## (undated) RX ORDER — ATROPA BELLADONNA AND OPIUM 16.2; 3 MG/1; MG/1
SUPPOSITORY RECTAL
Status: DISPENSED
Start: 2025-06-18

## (undated) RX ORDER — DEXAMETHASONE SODIUM PHOSPHATE 4 MG/ML
INJECTION, SOLUTION INTRA-ARTICULAR; INTRALESIONAL; INTRAMUSCULAR; INTRAVENOUS; SOFT TISSUE
Status: DISPENSED
Start: 2025-06-18

## (undated) RX ORDER — LIDOCAINE HYDROCHLORIDE 10 MG/ML
INJECTION, SOLUTION INFILTRATION; PERINEURAL
Status: DISPENSED
Start: 2020-07-01

## (undated) RX ORDER — FENTANYL CITRATE 50 UG/ML
INJECTION, SOLUTION INTRAMUSCULAR; INTRAVENOUS
Status: DISPENSED
Start: 2017-04-05

## (undated) RX ORDER — FENTANYL CITRATE 50 UG/ML
INJECTION, SOLUTION INTRAMUSCULAR; INTRAVENOUS
Status: DISPENSED
Start: 2020-08-21

## (undated) RX ORDER — LIDOCAINE HYDROCHLORIDE 10 MG/ML
INJECTION, SOLUTION EPIDURAL; INFILTRATION; INTRACAUDAL; PERINEURAL
Status: DISPENSED
Start: 2020-08-21

## (undated) RX ORDER — FENTANYL CITRATE 50 UG/ML
INJECTION, SOLUTION INTRAMUSCULAR; INTRAVENOUS
Status: DISPENSED
Start: 2024-11-06

## (undated) RX ORDER — EPHEDRINE SULFATE 50 MG/ML
INJECTION, SOLUTION INTRAMUSCULAR; INTRAVENOUS; SUBCUTANEOUS
Status: DISPENSED
Start: 2025-06-18

## (undated) RX ORDER — FENTANYL CITRATE 0.05 MG/ML
INJECTION, SOLUTION INTRAMUSCULAR; INTRAVENOUS
Status: DISPENSED
Start: 2020-12-11

## (undated) RX ORDER — ACETAMINOPHEN 325 MG/1
TABLET ORAL
Status: DISPENSED
Start: 2025-06-18

## (undated) RX ORDER — LIDOCAINE HYDROCHLORIDE 10 MG/ML
INJECTION, SOLUTION EPIDURAL; INFILTRATION; INTRACAUDAL; PERINEURAL
Status: DISPENSED
Start: 2024-10-25

## (undated) RX ORDER — CEFAZOLIN SODIUM/WATER 2 G/20 ML
SYRINGE (ML) INTRAVENOUS
Status: DISPENSED
Start: 2025-06-18

## (undated) RX ORDER — PROPOFOL 10 MG/ML
INJECTION, EMULSION INTRAVENOUS
Status: DISPENSED
Start: 2025-06-18

## (undated) RX ORDER — FENTANYL CITRATE 0.05 MG/ML
INJECTION, SOLUTION INTRAMUSCULAR; INTRAVENOUS
Status: DISPENSED
Start: 2022-04-01

## (undated) RX ORDER — ONDANSETRON 2 MG/ML
INJECTION INTRAMUSCULAR; INTRAVENOUS
Status: DISPENSED
Start: 2025-06-18

## (undated) RX ORDER — GLYCOPYRROLATE 0.2 MG/ML
INJECTION, SOLUTION INTRAMUSCULAR; INTRAVENOUS
Status: DISPENSED
Start: 2025-06-18

## (undated) RX ORDER — HEPARIN SODIUM 200 [USP'U]/100ML
INJECTION, SOLUTION INTRAVENOUS
Status: DISPENSED
Start: 2020-07-01

## (undated) RX ORDER — FENTANYL CITRATE 50 UG/ML
INJECTION, SOLUTION INTRAMUSCULAR; INTRAVENOUS
Status: DISPENSED
Start: 2020-07-01

## (undated) RX ORDER — ONDANSETRON 2 MG/ML
INJECTION INTRAMUSCULAR; INTRAVENOUS
Status: DISPENSED
Start: 2020-12-11